# Patient Record
Sex: FEMALE | Race: WHITE | NOT HISPANIC OR LATINO | Employment: OTHER | URBAN - METROPOLITAN AREA
[De-identification: names, ages, dates, MRNs, and addresses within clinical notes are randomized per-mention and may not be internally consistent; named-entity substitution may affect disease eponyms.]

---

## 2017-01-10 ENCOUNTER — TRANSCRIBE ORDERS (OUTPATIENT)
Dept: LAB | Age: 72
End: 2017-01-10

## 2017-01-10 ENCOUNTER — APPOINTMENT (OUTPATIENT)
Dept: LAB | Age: 72
End: 2017-01-10
Payer: MEDICARE

## 2017-01-10 DIAGNOSIS — M06.4 UNSPECIFIED INFLAMMATORY POLYARTHROPATHY: Primary | ICD-10-CM

## 2017-01-10 DIAGNOSIS — M35.3 POLYMYALGIA RHEUMATICA (HCC): ICD-10-CM

## 2017-01-10 DIAGNOSIS — G89.29 CHRONIC RIGHT SHOULDER PAIN: ICD-10-CM

## 2017-01-10 DIAGNOSIS — M25.511 CHRONIC RIGHT SHOULDER PAIN: ICD-10-CM

## 2017-01-10 LAB
ALBUMIN SERPL BCP-MCNC: 3.5 G/DL (ref 3.5–5)
ALP SERPL-CCNC: 74 U/L (ref 46–116)
ALT SERPL W P-5'-P-CCNC: 43 U/L (ref 12–78)
ANION GAP SERPL CALCULATED.3IONS-SCNC: 5 MMOL/L (ref 4–13)
AST SERPL W P-5'-P-CCNC: 9 U/L (ref 5–45)
BASOPHILS # BLD AUTO: 0.02 THOUSANDS/ΜL (ref 0–0.1)
BASOPHILS NFR BLD AUTO: 0 % (ref 0–1)
BILIRUB SERPL-MCNC: 0.33 MG/DL (ref 0.2–1)
BUN SERPL-MCNC: 21 MG/DL (ref 5–25)
CALCIUM SERPL-MCNC: 9.5 MG/DL (ref 8.3–10.1)
CHLORIDE SERPL-SCNC: 103 MMOL/L (ref 100–108)
CO2 SERPL-SCNC: 32 MMOL/L (ref 21–32)
CREAT SERPL-MCNC: 0.92 MG/DL (ref 0.6–1.3)
CRP SERPL QL: 13.8 MG/L
EOSINOPHIL # BLD AUTO: 0.01 THOUSAND/ΜL (ref 0–0.61)
EOSINOPHIL NFR BLD AUTO: 0 % (ref 0–6)
ERYTHROCYTE [DISTWIDTH] IN BLOOD BY AUTOMATED COUNT: 15.2 % (ref 11.6–15.1)
ERYTHROCYTE [SEDIMENTATION RATE] IN BLOOD: 29 MM/HOUR (ref 0–20)
GFR SERPL CREATININE-BSD FRML MDRD: >60 ML/MIN/1.73SQ M
GLUCOSE SERPL-MCNC: 87 MG/DL (ref 65–140)
HCT VFR BLD AUTO: 38.7 % (ref 34.8–46.1)
HGB BLD-MCNC: 12.6 G/DL (ref 11.5–15.4)
LYMPHOCYTES # BLD AUTO: 4.14 THOUSANDS/ΜL (ref 0.6–4.47)
LYMPHOCYTES NFR BLD AUTO: 31 % (ref 14–44)
MCH RBC QN AUTO: 28.8 PG (ref 26.8–34.3)
MCHC RBC AUTO-ENTMCNC: 32.6 G/DL (ref 31.4–37.4)
MCV RBC AUTO: 88 FL (ref 82–98)
MONOCYTES # BLD AUTO: 1.25 THOUSAND/ΜL (ref 0.17–1.22)
MONOCYTES NFR BLD AUTO: 9 % (ref 4–12)
NEUTROPHILS # BLD AUTO: 7.83 THOUSANDS/ΜL (ref 1.85–7.62)
NEUTS SEG NFR BLD AUTO: 60 % (ref 43–75)
NRBC BLD AUTO-RTO: 0 /100 WBCS
PLATELET # BLD AUTO: 336 THOUSANDS/UL (ref 149–390)
PMV BLD AUTO: 9.6 FL (ref 8.9–12.7)
POTASSIUM SERPL-SCNC: 3.9 MMOL/L (ref 3.5–5.3)
PROT SERPL-MCNC: 7.5 G/DL (ref 6.4–8.2)
RBC # BLD AUTO: 4.38 MILLION/UL (ref 3.81–5.12)
SODIUM SERPL-SCNC: 140 MMOL/L (ref 136–145)
TSH SERPL DL<=0.05 MIU/L-ACNC: 2.08 UIU/ML (ref 0.36–3.74)
URATE SERPL-MCNC: 6.4 MG/DL (ref 2–6.8)
WBC # BLD AUTO: 13.29 THOUSAND/UL (ref 4.31–10.16)

## 2017-01-10 PROCEDURE — 84443 ASSAY THYROID STIM HORMONE: CPT

## 2017-01-10 PROCEDURE — 85025 COMPLETE CBC W/AUTO DIFF WBC: CPT

## 2017-01-10 PROCEDURE — 36415 COLL VENOUS BLD VENIPUNCTURE: CPT

## 2017-01-10 PROCEDURE — 86235 NUCLEAR ANTIGEN ANTIBODY: CPT

## 2017-01-10 PROCEDURE — 85652 RBC SED RATE AUTOMATED: CPT

## 2017-01-10 PROCEDURE — 80053 COMPREHEN METABOLIC PANEL: CPT

## 2017-01-10 PROCEDURE — 86140 C-REACTIVE PROTEIN: CPT

## 2017-01-10 PROCEDURE — 84550 ASSAY OF BLOOD/URIC ACID: CPT

## 2017-01-11 LAB
ENA SS-A AB SER-ACNC: <0.2 AI (ref 0–0.9)
ENA SS-B AB SER-ACNC: <0.2 AI (ref 0–0.9)

## 2017-01-12 ENCOUNTER — HOSPITAL ENCOUNTER (OUTPATIENT)
Dept: MRI IMAGING | Facility: HOSPITAL | Age: 72
Discharge: HOME | End: 2017-01-12
Payer: MEDICARE

## 2017-01-12 DIAGNOSIS — M35.3 POLYMYALGIA RHEUMATICA (HCC): ICD-10-CM

## 2017-01-12 DIAGNOSIS — M25.511 RIGHT SHOULDER PAIN, UNSPECIFIED CHRONICITY: ICD-10-CM

## 2017-01-12 DIAGNOSIS — M06.4 UNSPECIFIED INFLAMMATORY POLYARTHROPATHY: ICD-10-CM

## 2017-01-12 PROCEDURE — 73221 MRI JOINT UPR EXTREM W/O DYE: CPT

## 2017-01-25 ENCOUNTER — TRANSCRIBE ORDERS (OUTPATIENT)
Dept: LAB | Age: 72
End: 2017-01-25

## 2017-01-25 ENCOUNTER — APPOINTMENT (OUTPATIENT)
Dept: LAB | Age: 72
End: 2017-01-25
Payer: MEDICARE

## 2017-01-25 DIAGNOSIS — M25.511 RIGHT SHOULDER PAIN, UNSPECIFIED CHRONICITY: ICD-10-CM

## 2017-01-25 DIAGNOSIS — M06.4 UNSPECIFIED INFLAMMATORY POLYARTHROPATHY: ICD-10-CM

## 2017-01-25 DIAGNOSIS — M35.3 POLYMYALGIA RHEUMATICA (HCC): ICD-10-CM

## 2017-01-25 DIAGNOSIS — I10 ESSENTIAL HYPERTENSION, MALIGNANT: Primary | ICD-10-CM

## 2017-01-25 LAB
CRP SERPL QL: 7.6 MG/L
ERYTHROCYTE [SEDIMENTATION RATE] IN BLOOD: 29 MM/HOUR (ref 0–20)

## 2017-01-25 PROCEDURE — 85652 RBC SED RATE AUTOMATED: CPT

## 2017-01-25 PROCEDURE — 36415 COLL VENOUS BLD VENIPUNCTURE: CPT

## 2017-01-25 PROCEDURE — 86140 C-REACTIVE PROTEIN: CPT

## 2017-02-27 ENCOUNTER — APPOINTMENT (OUTPATIENT)
Dept: LAB | Age: 72
End: 2017-02-27
Payer: MEDICARE

## 2017-02-27 ENCOUNTER — TRANSCRIBE ORDERS (OUTPATIENT)
Dept: LAB | Age: 72
End: 2017-02-27

## 2017-02-27 DIAGNOSIS — M06.4 UNSPECIFIED INFLAMMATORY POLYARTHROPATHY: Primary | ICD-10-CM

## 2017-02-27 DIAGNOSIS — M35.3 POLYMYALGIA RHEUMATICA (HCC): ICD-10-CM

## 2017-02-27 DIAGNOSIS — M25.511 ACUTE PAIN OF RIGHT SHOULDER: ICD-10-CM

## 2017-02-27 LAB
ALBUMIN SERPL BCP-MCNC: 3.4 G/DL (ref 3.5–5)
ALP SERPL-CCNC: 68 U/L (ref 46–116)
ALT SERPL W P-5'-P-CCNC: 31 U/L (ref 12–78)
ANION GAP SERPL CALCULATED.3IONS-SCNC: 8 MMOL/L (ref 4–13)
AST SERPL W P-5'-P-CCNC: 8 U/L (ref 5–45)
BASOPHILS # BLD AUTO: 0.02 THOUSANDS/ΜL (ref 0–0.1)
BASOPHILS NFR BLD AUTO: 0 % (ref 0–1)
BILIRUB SERPL-MCNC: 0.41 MG/DL (ref 0.2–1)
BUN SERPL-MCNC: 27 MG/DL (ref 5–25)
CALCIUM SERPL-MCNC: 8.8 MG/DL (ref 8.3–10.1)
CHLORIDE SERPL-SCNC: 106 MMOL/L (ref 100–108)
CO2 SERPL-SCNC: 28 MMOL/L (ref 21–32)
CREAT SERPL-MCNC: 0.93 MG/DL (ref 0.6–1.3)
CRP SERPL QL: 7.4 MG/L
EOSINOPHIL # BLD AUTO: 0.05 THOUSAND/ΜL (ref 0–0.61)
EOSINOPHIL NFR BLD AUTO: 0 % (ref 0–6)
ERYTHROCYTE [DISTWIDTH] IN BLOOD BY AUTOMATED COUNT: 15 % (ref 11.6–15.1)
ERYTHROCYTE [SEDIMENTATION RATE] IN BLOOD: 34 MM/HOUR (ref 0–20)
GFR SERPL CREATININE-BSD FRML MDRD: 59.4 ML/MIN/1.73SQ M
GLUCOSE SERPL-MCNC: 77 MG/DL (ref 65–140)
HCT VFR BLD AUTO: 37.5 % (ref 34.8–46.1)
HGB BLD-MCNC: 12 G/DL (ref 11.5–15.4)
LYMPHOCYTES # BLD AUTO: 4.18 THOUSANDS/ΜL (ref 0.6–4.47)
LYMPHOCYTES NFR BLD AUTO: 28 % (ref 14–44)
MCH RBC QN AUTO: 28.4 PG (ref 26.8–34.3)
MCHC RBC AUTO-ENTMCNC: 32 G/DL (ref 31.4–37.4)
MCV RBC AUTO: 89 FL (ref 82–98)
MONOCYTES # BLD AUTO: 1.28 THOUSAND/ΜL (ref 0.17–1.22)
MONOCYTES NFR BLD AUTO: 9 % (ref 4–12)
NEUTROPHILS # BLD AUTO: 9.22 THOUSANDS/ΜL (ref 1.85–7.62)
NEUTS SEG NFR BLD AUTO: 63 % (ref 43–75)
NRBC BLD AUTO-RTO: 0 /100 WBCS
PLATELET # BLD AUTO: 274 THOUSANDS/UL (ref 149–390)
PMV BLD AUTO: 9.7 FL (ref 8.9–12.7)
POTASSIUM SERPL-SCNC: 3.6 MMOL/L (ref 3.5–5.3)
PROT SERPL-MCNC: 7.3 G/DL (ref 6.4–8.2)
RBC # BLD AUTO: 4.22 MILLION/UL (ref 3.81–5.12)
SODIUM SERPL-SCNC: 142 MMOL/L (ref 136–145)
WBC # BLD AUTO: 14.81 THOUSAND/UL (ref 4.31–10.16)

## 2017-02-27 PROCEDURE — 86140 C-REACTIVE PROTEIN: CPT

## 2017-02-27 PROCEDURE — 36415 COLL VENOUS BLD VENIPUNCTURE: CPT

## 2017-02-27 PROCEDURE — 85025 COMPLETE CBC W/AUTO DIFF WBC: CPT

## 2017-02-27 PROCEDURE — 80053 COMPREHEN METABOLIC PANEL: CPT

## 2017-02-27 PROCEDURE — 85652 RBC SED RATE AUTOMATED: CPT

## 2017-02-28 ENCOUNTER — GENERIC CONVERSION - ENCOUNTER (OUTPATIENT)
Dept: OTHER | Facility: OTHER | Age: 72
End: 2017-02-28

## 2017-03-17 ENCOUNTER — TRANSCRIBE ORDERS (OUTPATIENT)
Dept: LAB | Age: 72
End: 2017-03-17

## 2017-03-17 ENCOUNTER — APPOINTMENT (OUTPATIENT)
Dept: LAB | Age: 72
End: 2017-03-17
Payer: MEDICARE

## 2017-03-17 DIAGNOSIS — M35.3 POLYMYALGIA RHEUMATICA (HCC): ICD-10-CM

## 2017-03-17 DIAGNOSIS — M25.511 ACUTE PAIN OF RIGHT SHOULDER: ICD-10-CM

## 2017-03-17 DIAGNOSIS — M06.4 UNSPECIFIED INFLAMMATORY POLYARTHROPATHY: Primary | ICD-10-CM

## 2017-03-17 LAB
CRP SERPL QL: 10.1 MG/L
ERYTHROCYTE [SEDIMENTATION RATE] IN BLOOD: 42 MM/HOUR (ref 0–20)

## 2017-03-17 PROCEDURE — 85652 RBC SED RATE AUTOMATED: CPT

## 2017-03-17 PROCEDURE — 86140 C-REACTIVE PROTEIN: CPT

## 2017-03-17 PROCEDURE — 36415 COLL VENOUS BLD VENIPUNCTURE: CPT

## 2017-04-12 DIAGNOSIS — E55.9 VITAMIN D DEFICIENCY: ICD-10-CM

## 2017-04-12 DIAGNOSIS — E03.9 HYPOTHYROIDISM: ICD-10-CM

## 2017-04-12 DIAGNOSIS — I10 ESSENTIAL (PRIMARY) HYPERTENSION: ICD-10-CM

## 2017-05-02 ENCOUNTER — TRANSCRIBE ORDERS (OUTPATIENT)
Dept: LAB | Age: 72
End: 2017-05-02

## 2017-05-02 ENCOUNTER — APPOINTMENT (OUTPATIENT)
Dept: LAB | Age: 72
End: 2017-05-02
Payer: MEDICARE

## 2017-05-02 ENCOUNTER — LAB CONVERSION - ENCOUNTER (OUTPATIENT)
Dept: OTHER | Facility: OTHER | Age: 72
End: 2017-05-02

## 2017-05-02 DIAGNOSIS — E03.9 UNSPECIFIED HYPOTHYROIDISM: ICD-10-CM

## 2017-05-02 DIAGNOSIS — M25.511 ACUTE PAIN OF RIGHT SHOULDER: ICD-10-CM

## 2017-05-02 DIAGNOSIS — M06.4 INFLAMMATORY POLYARTHROPATHY (HCC): Primary | ICD-10-CM

## 2017-05-02 DIAGNOSIS — M35.3 POLYMYALGIA RHEUMATICA (HCC): ICD-10-CM

## 2017-05-02 DIAGNOSIS — E55.9 UNSPECIFIED VITAMIN D DEFICIENCY: ICD-10-CM

## 2017-05-02 DIAGNOSIS — I10 ESSENTIAL HYPERTENSION, MALIGNANT: ICD-10-CM

## 2017-05-02 LAB
25(OH)D3 SERPL-MCNC: 13.1 NG/ML (ref 30–100)
ALBUMIN SERPL BCP-MCNC: 3.2 G/DL (ref 3.5–5)
ALP SERPL-CCNC: 67 U/L (ref 46–116)
ALT SERPL W P-5'-P-CCNC: 27 U/L (ref 12–78)
ANION GAP SERPL CALCULATED.3IONS-SCNC: 7 MMOL/L (ref 4–13)
AST SERPL W P-5'-P-CCNC: 10 U/L (ref 5–45)
BILIRUB SERPL-MCNC: 0.39 MG/DL (ref 0.2–1)
BUN SERPL-MCNC: 21 MG/DL (ref 5–25)
CALCIUM SERPL-MCNC: 9.1 MG/DL (ref 8.3–10.1)
CHLORIDE SERPL-SCNC: 108 MMOL/L (ref 100–108)
CHOLEST SERPL-MCNC: 181 MG/DL (ref 50–200)
CO2 SERPL-SCNC: 28 MMOL/L (ref 21–32)
CREAT SERPL-MCNC: 0.95 MG/DL (ref 0.6–1.3)
CRP SERPL QL: 4.1 MG/L
ERYTHROCYTE [SEDIMENTATION RATE] IN BLOOD: 16 MM/HOUR (ref 0–20)
GFR SERPL CREATININE-BSD FRML MDRD: 58 ML/MIN/1.73SQ M
GLUCOSE P FAST SERPL-MCNC: 85 MG/DL (ref 65–99)
HDLC SERPL-MCNC: 59 MG/DL (ref 40–60)
LDLC SERPL CALC-MCNC: 99 MG/DL (ref 0–100)
POTASSIUM SERPL-SCNC: 4 MMOL/L (ref 3.5–5.3)
PROT SERPL-MCNC: 6.7 G/DL (ref 6.4–8.2)
SODIUM SERPL-SCNC: 143 MMOL/L (ref 136–145)
T3FREE SERPL-MCNC: 1.98 PG/ML (ref 2.3–4.2)
T4 FREE SERPL-MCNC: 1.04 NG/DL (ref 0.76–1.46)
TRIGL SERPL-MCNC: 113 MG/DL
TSH SERPL DL<=0.05 MIU/L-ACNC: 2.4 UIU/ML (ref 0.36–3.74)

## 2017-05-02 PROCEDURE — 84481 FREE ASSAY (FT-3): CPT

## 2017-05-02 PROCEDURE — 84443 ASSAY THYROID STIM HORMONE: CPT

## 2017-05-02 PROCEDURE — 80061 LIPID PANEL: CPT

## 2017-05-02 PROCEDURE — 82306 VITAMIN D 25 HYDROXY: CPT

## 2017-05-02 PROCEDURE — 85652 RBC SED RATE AUTOMATED: CPT

## 2017-05-02 PROCEDURE — 86140 C-REACTIVE PROTEIN: CPT

## 2017-05-02 PROCEDURE — 80053 COMPREHEN METABOLIC PANEL: CPT

## 2017-05-02 PROCEDURE — 36415 COLL VENOUS BLD VENIPUNCTURE: CPT

## 2017-05-02 PROCEDURE — 84439 ASSAY OF FREE THYROXINE: CPT

## 2017-05-16 ENCOUNTER — TRANSCRIBE ORDERS (OUTPATIENT)
Dept: LAB | Age: 72
End: 2017-05-16

## 2017-05-16 ENCOUNTER — APPOINTMENT (OUTPATIENT)
Dept: LAB | Age: 72
End: 2017-05-16
Payer: MEDICARE

## 2017-05-16 DIAGNOSIS — M25.511 ACUTE PAIN OF RIGHT SHOULDER: ICD-10-CM

## 2017-05-16 DIAGNOSIS — M35.3 POLYMYALGIA RHEUMATICA (HCC): ICD-10-CM

## 2017-05-16 DIAGNOSIS — E03.9 UNSPECIFIED HYPOTHYROIDISM: Primary | ICD-10-CM

## 2017-05-16 DIAGNOSIS — M06.4 INFLAMMATORY POLYARTHROPATHY (HCC): ICD-10-CM

## 2017-05-16 LAB
CRP SERPL QL: 6 MG/L
ERYTHROCYTE [SEDIMENTATION RATE] IN BLOOD: 29 MM/HOUR (ref 0–20)

## 2017-05-16 PROCEDURE — 36415 COLL VENOUS BLD VENIPUNCTURE: CPT

## 2017-05-16 PROCEDURE — 86140 C-REACTIVE PROTEIN: CPT

## 2017-05-16 PROCEDURE — 85652 RBC SED RATE AUTOMATED: CPT

## 2017-05-23 ENCOUNTER — ALLSCRIPTS OFFICE VISIT (OUTPATIENT)
Dept: OTHER | Facility: OTHER | Age: 72
End: 2017-05-23

## 2017-05-23 DIAGNOSIS — Z12.31 ENCOUNTER FOR SCREENING MAMMOGRAM FOR MALIGNANT NEOPLASM OF BREAST: ICD-10-CM

## 2017-05-23 DIAGNOSIS — Z13.820 ENCOUNTER FOR SCREENING FOR OSTEOPOROSIS: ICD-10-CM

## 2017-06-09 ENCOUNTER — APPOINTMENT (OUTPATIENT)
Dept: LAB | Age: 72
End: 2017-06-09
Payer: MEDICARE

## 2017-06-09 ENCOUNTER — TRANSCRIBE ORDERS (OUTPATIENT)
Dept: LAB | Age: 72
End: 2017-06-09

## 2017-06-09 DIAGNOSIS — M35.3 POLYMYALGIA RHEUMATICA (HCC): ICD-10-CM

## 2017-06-09 DIAGNOSIS — M06.4 INFLAMMATORY POLYARTHROPATHY (HCC): ICD-10-CM

## 2017-06-09 DIAGNOSIS — M25.511 ACUTE PAIN OF RIGHT SHOULDER: Primary | ICD-10-CM

## 2017-06-09 DIAGNOSIS — E03.9 UNSPECIFIED HYPOTHYROIDISM: ICD-10-CM

## 2017-06-09 LAB
CRP SERPL QL: <3 MG/L
ERYTHROCYTE [SEDIMENTATION RATE] IN BLOOD: 21 MM/HOUR (ref 0–20)

## 2017-06-09 PROCEDURE — 85652 RBC SED RATE AUTOMATED: CPT

## 2017-06-09 PROCEDURE — 36415 COLL VENOUS BLD VENIPUNCTURE: CPT

## 2017-06-09 PROCEDURE — 86140 C-REACTIVE PROTEIN: CPT

## 2017-06-30 ENCOUNTER — TRANSCRIBE ORDERS (OUTPATIENT)
Dept: LAB | Age: 72
End: 2017-06-30

## 2017-06-30 ENCOUNTER — APPOINTMENT (OUTPATIENT)
Dept: LAB | Age: 72
End: 2017-06-30
Payer: MEDICARE

## 2017-06-30 DIAGNOSIS — M25.511 ACUTE PAIN OF RIGHT SHOULDER: ICD-10-CM

## 2017-06-30 DIAGNOSIS — M35.3 POLYMYALGIA RHEUMATICA (HCC): ICD-10-CM

## 2017-06-30 DIAGNOSIS — M06.4 INFLAMMATORY POLYARTHROPATHY (HCC): Primary | ICD-10-CM

## 2017-06-30 DIAGNOSIS — E03.9 UNSPECIFIED HYPOTHYROIDISM: ICD-10-CM

## 2017-06-30 LAB
CRP SERPL QL: 4 MG/L
ERYTHROCYTE [SEDIMENTATION RATE] IN BLOOD: 28 MM/HOUR (ref 0–20)

## 2017-06-30 PROCEDURE — 85652 RBC SED RATE AUTOMATED: CPT

## 2017-06-30 PROCEDURE — 36415 COLL VENOUS BLD VENIPUNCTURE: CPT

## 2017-06-30 PROCEDURE — 86140 C-REACTIVE PROTEIN: CPT

## 2017-07-31 DIAGNOSIS — S42.291A OTHER DISPLACED FRACTURE OF UPPER END OF RIGHT HUMERUS, INITIAL ENCOUNTER FOR CLOSED FRACTURE: ICD-10-CM
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
## 2017-07-31 DIAGNOSIS — Z91.89 OTHER SPECIFIED PERSONAL RISK FACTORS, NOT ELSEWHERE CLASSIFIED: ICD-10-CM

## 2017-07-31 DIAGNOSIS — G47.00 INSOMNIA: ICD-10-CM

## 2017-07-31 DIAGNOSIS — M25.511 PAIN IN RIGHT SHOULDER: ICD-10-CM

## 2017-08-04 ENCOUNTER — TRANSCRIBE ORDERS (OUTPATIENT)
Dept: LAB | Age: 72
End: 2017-08-04

## 2017-08-04 ENCOUNTER — APPOINTMENT (OUTPATIENT)
Dept: LAB | Age: 72
End: 2017-08-04
Payer: MEDICARE

## 2017-08-04 DIAGNOSIS — M25.511 RIGHT SHOULDER PAIN, UNSPECIFIED CHRONICITY: ICD-10-CM

## 2017-08-04 DIAGNOSIS — M35.3 POLYMYALGIA RHEUMATICA (HCC): ICD-10-CM

## 2017-08-04 DIAGNOSIS — E03.9 UNSPECIFIED HYPOTHYROIDISM: ICD-10-CM

## 2017-08-04 DIAGNOSIS — M06.4 INFLAMMATORY POLYARTHROPATHY (HCC): Primary | ICD-10-CM

## 2017-08-04 DIAGNOSIS — M06.4 INFLAMMATORY POLYARTHROPATHY (HCC): ICD-10-CM

## 2017-08-04 LAB
CRP SERPL QL: 5.3 MG/L
ERYTHROCYTE [SEDIMENTATION RATE] IN BLOOD: 20 MM/HOUR (ref 0–20)

## 2017-08-04 PROCEDURE — 85652 RBC SED RATE AUTOMATED: CPT

## 2017-08-04 PROCEDURE — 86140 C-REACTIVE PROTEIN: CPT

## 2017-08-04 PROCEDURE — 36415 COLL VENOUS BLD VENIPUNCTURE: CPT

## 2017-08-11 ENCOUNTER — APPOINTMENT (OUTPATIENT)
Dept: RADIOLOGY | Age: 72
End: 2017-08-11
Attending: FAMILY MEDICINE
Payer: MEDICARE

## 2017-08-11 ENCOUNTER — OFFICE VISIT (OUTPATIENT)
Dept: URGENT CARE | Age: 72
End: 2017-08-11
Payer: MEDICARE
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
## 2017-08-11 DIAGNOSIS — G47.00 INSOMNIA: ICD-10-CM

## 2017-08-11 PROCEDURE — 29125 APPL SHORT ARM SPLINT STATIC: CPT | Performed by: FAMILY MEDICINE

## 2017-08-11 PROCEDURE — 73060 X-RAY EXAM OF HUMERUS: CPT

## 2017-08-11 PROCEDURE — 73130 X-RAY EXAM OF HAND: CPT

## 2017-08-11 PROCEDURE — 73110 X-RAY EXAM OF WRIST: CPT

## 2017-08-11 PROCEDURE — G0463 HOSPITAL OUTPT CLINIC VISIT: HCPCS | Performed by: FAMILY MEDICINE

## 2017-08-11 PROCEDURE — 99203 OFFICE O/P NEW LOW 30 MIN: CPT | Performed by: FAMILY MEDICINE

## 2017-08-12 ENCOUNTER — OFFICE VISIT (OUTPATIENT)
Dept: URGENT CARE | Age: 72
End: 2017-08-12
Payer: MEDICARE

## 2017-08-12 PROCEDURE — G0463 HOSPITAL OUTPT CLINIC VISIT: HCPCS | Performed by: FAMILY MEDICINE

## 2017-08-12 PROCEDURE — 99213 OFFICE O/P EST LOW 20 MIN: CPT | Performed by: FAMILY MEDICINE

## 2017-08-14 ENCOUNTER — TRANSCRIBE ORDERS (OUTPATIENT)
Dept: ADMINISTRATIVE | Facility: HOSPITAL | Age: 72
End: 2017-08-14

## 2017-08-14 ENCOUNTER — APPOINTMENT (OUTPATIENT)
Dept: RADIOLOGY | Facility: CLINIC | Age: 72
End: 2017-08-14
Payer: MEDICARE

## 2017-08-14 ENCOUNTER — ALLSCRIPTS OFFICE VISIT (OUTPATIENT)
Dept: OTHER | Facility: OTHER | Age: 72
End: 2017-08-14

## 2017-08-14 DIAGNOSIS — S42.291A CLOSED FRACTURE OF HEAD OF RIGHT HUMERUS, INITIAL ENCOUNTER: Primary | ICD-10-CM

## 2017-08-14 DIAGNOSIS — M25.511 PAIN IN RIGHT SHOULDER: ICD-10-CM

## 2017-08-14 PROCEDURE — 73030 X-RAY EXAM OF SHOULDER: CPT

## 2017-08-16 ENCOUNTER — HOSPITAL ENCOUNTER (OUTPATIENT)
Dept: RADIOLOGY | Age: 72
Discharge: HOME | End: 2017-08-16
Payer: MEDICARE

## 2017-08-16 DIAGNOSIS — S42.291A OTHER DISPLACED FRACTURE OF UPPER END OF RIGHT HUMERUS, INITIAL ENCOUNTER FOR CLOSED FRACTURE: ICD-10-CM

## 2017-08-16 PROCEDURE — 73200 CT UPPER EXTREMITY W/O DYE: CPT

## 2017-08-17 ENCOUNTER — ALLSCRIPTS OFFICE VISIT (OUTPATIENT)
Dept: OTHER | Facility: OTHER | Age: 72
End: 2017-08-17

## 2017-08-25 ENCOUNTER — TRANSCRIBE ORDERS (OUTPATIENT)
Dept: LAB | Age: 72
End: 2017-08-25

## 2017-08-25 ENCOUNTER — APPOINTMENT (OUTPATIENT)
Dept: LAB | Age: 72
End: 2017-08-25
Payer: MEDICARE

## 2017-08-25 DIAGNOSIS — M06.4 INFLAMMATORY POLYARTHROPATHY (HCC): Primary | ICD-10-CM

## 2017-08-25 DIAGNOSIS — M35.3 POLYMYALGIA RHEUMATICA (HCC): ICD-10-CM

## 2017-08-25 DIAGNOSIS — E03.9 UNSPECIFIED HYPOTHYROIDISM: ICD-10-CM

## 2017-08-25 DIAGNOSIS — M25.511 RIGHT SHOULDER PAIN, UNSPECIFIED CHRONICITY: ICD-10-CM

## 2017-08-25 LAB
CRP SERPL QL: 5.6 MG/L
ERYTHROCYTE [SEDIMENTATION RATE] IN BLOOD: 28 MM/HOUR (ref 0–20)

## 2017-08-25 PROCEDURE — 86140 C-REACTIVE PROTEIN: CPT

## 2017-08-25 PROCEDURE — 36415 COLL VENOUS BLD VENIPUNCTURE: CPT

## 2017-08-25 PROCEDURE — 85652 RBC SED RATE AUTOMATED: CPT

## 2017-09-25 ENCOUNTER — APPOINTMENT (OUTPATIENT)
Dept: RADIOLOGY | Facility: CLINIC | Age: 72
End: 2017-09-25
Payer: MEDICARE

## 2017-09-25 ENCOUNTER — ALLSCRIPTS OFFICE VISIT (OUTPATIENT)
Dept: OTHER | Facility: OTHER | Age: 72
End: 2017-09-25

## 2017-09-25 DIAGNOSIS — S52.501A CLOSED FRACTURE OF LOWER END OF RIGHT RADIUS: ICD-10-CM

## 2017-09-25 DIAGNOSIS — S42.291A OTHER DISPLACED FRACTURE OF UPPER END OF RIGHT HUMERUS, INITIAL ENCOUNTER FOR CLOSED FRACTURE: ICD-10-CM

## 2017-09-25 PROCEDURE — 73110 X-RAY EXAM OF WRIST: CPT

## 2017-09-28 ENCOUNTER — GENERIC CONVERSION - ENCOUNTER (OUTPATIENT)
Dept: OTHER | Facility: OTHER | Age: 72
End: 2017-09-28

## 2017-09-28 ENCOUNTER — APPOINTMENT (OUTPATIENT)
Dept: RADIOLOGY | Facility: OTHER | Age: 72
End: 2017-09-28
Payer: MEDICARE

## 2017-09-28 DIAGNOSIS — S42.291A OTHER DISPLACED FRACTURE OF UPPER END OF RIGHT HUMERUS, INITIAL ENCOUNTER FOR CLOSED FRACTURE: ICD-10-CM

## 2017-09-28 PROCEDURE — 73030 X-RAY EXAM OF SHOULDER: CPT

## 2017-09-29 ENCOUNTER — TRANSCRIBE ORDERS (OUTPATIENT)
Dept: LAB | Age: 72
End: 2017-09-29

## 2017-10-02 ENCOUNTER — TRANSCRIBE ORDERS (OUTPATIENT)
Dept: LAB | Age: 72
End: 2017-10-02

## 2017-10-02 ENCOUNTER — APPOINTMENT (OUTPATIENT)
Dept: LAB | Age: 72
End: 2017-10-02
Payer: MEDICARE

## 2017-10-02 DIAGNOSIS — M35.3 POLYMYALGIA RHEUMATICA (HCC): ICD-10-CM

## 2017-10-02 DIAGNOSIS — M25.511 ACUTE PAIN OF RIGHT SHOULDER: ICD-10-CM

## 2017-10-02 DIAGNOSIS — M06.4 INFLAMMATORY POLYARTHROPATHY (HCC): Primary | ICD-10-CM

## 2017-10-02 LAB
CRP SERPL QL: <3 MG/L
ERYTHROCYTE [SEDIMENTATION RATE] IN BLOOD: 19 MM/HOUR (ref 0–20)

## 2017-10-02 PROCEDURE — 36415 COLL VENOUS BLD VENIPUNCTURE: CPT

## 2017-10-02 PROCEDURE — 86140 C-REACTIVE PROTEIN: CPT

## 2017-10-02 PROCEDURE — 85652 RBC SED RATE AUTOMATED: CPT

## 2017-11-03 ENCOUNTER — TRANSCRIBE ORDERS (OUTPATIENT)
Dept: LAB | Age: 72
End: 2017-11-03

## 2017-11-03 ENCOUNTER — APPOINTMENT (OUTPATIENT)
Dept: LAB | Age: 72
End: 2017-11-03
Payer: MEDICARE

## 2017-11-03 DIAGNOSIS — M25.511 ACUTE PAIN OF RIGHT SHOULDER: Primary | ICD-10-CM

## 2017-11-03 DIAGNOSIS — M06.4 INFLAMMATORY POLYARTHROPATHY (HCC): ICD-10-CM

## 2017-11-03 DIAGNOSIS — M35.3 POLYMYALGIA RHEUMATICA (HCC): ICD-10-CM

## 2017-11-03 LAB
CRP SERPL QL: 5.4 MG/L
ERYTHROCYTE [SEDIMENTATION RATE] IN BLOOD: 26 MM/HOUR (ref 0–20)

## 2017-11-03 PROCEDURE — 86140 C-REACTIVE PROTEIN: CPT

## 2017-11-03 PROCEDURE — 36415 COLL VENOUS BLD VENIPUNCTURE: CPT

## 2017-11-03 PROCEDURE — 85652 RBC SED RATE AUTOMATED: CPT

## 2017-11-20 ENCOUNTER — APPOINTMENT (OUTPATIENT)
Dept: LAB | Age: 72
End: 2017-11-20
Payer: MEDICARE

## 2017-11-20 ENCOUNTER — TRANSCRIBE ORDERS (OUTPATIENT)
Dept: LAB | Age: 72
End: 2017-11-20

## 2017-11-20 DIAGNOSIS — M06.4 INFLAMMATORY POLYARTHROPATHY (HCC): ICD-10-CM

## 2017-11-20 DIAGNOSIS — M35.3 POLYMYALGIA RHEUMATICA (HCC): Primary | ICD-10-CM

## 2017-11-20 LAB
ALBUMIN SERPL BCP-MCNC: 3.6 G/DL (ref 3.5–5)
ALP SERPL-CCNC: 81 U/L (ref 46–116)
ALT SERPL W P-5'-P-CCNC: 28 U/L (ref 12–78)
ANION GAP SERPL CALCULATED.3IONS-SCNC: 6 MMOL/L (ref 4–13)
AST SERPL W P-5'-P-CCNC: 9 U/L (ref 5–45)
BASOPHILS # BLD AUTO: 0.03 THOUSANDS/ΜL (ref 0–0.1)
BASOPHILS NFR BLD AUTO: 0 % (ref 0–1)
BILIRUB SERPL-MCNC: 0.3 MG/DL (ref 0.2–1)
BUN SERPL-MCNC: 30 MG/DL (ref 5–25)
CALCIUM SERPL-MCNC: 9.3 MG/DL (ref 8.3–10.1)
CHLORIDE SERPL-SCNC: 107 MMOL/L (ref 100–108)
CO2 SERPL-SCNC: 28 MMOL/L (ref 21–32)
CREAT SERPL-MCNC: 1.13 MG/DL (ref 0.6–1.3)
CRP SERPL QL: 3.5 MG/L
EOSINOPHIL # BLD AUTO: 0.03 THOUSAND/ΜL (ref 0–0.61)
EOSINOPHIL NFR BLD AUTO: 0 % (ref 0–6)
ERYTHROCYTE [DISTWIDTH] IN BLOOD BY AUTOMATED COUNT: 14.2 % (ref 11.6–15.1)
ERYTHROCYTE [SEDIMENTATION RATE] IN BLOOD: 16 MM/HOUR (ref 0–20)
GFR SERPL CREATININE-BSD FRML MDRD: 49 ML/MIN/1.73SQ M
GLUCOSE P FAST SERPL-MCNC: 99 MG/DL (ref 65–99)
HCT VFR BLD AUTO: 40 % (ref 34.8–46.1)
HGB BLD-MCNC: 13.1 G/DL (ref 11.5–15.4)
LYMPHOCYTES # BLD AUTO: 3.64 THOUSANDS/ΜL (ref 0.6–4.47)
LYMPHOCYTES NFR BLD AUTO: 31 % (ref 14–44)
MCH RBC QN AUTO: 28.5 PG (ref 26.8–34.3)
MCHC RBC AUTO-ENTMCNC: 32.8 G/DL (ref 31.4–37.4)
MCV RBC AUTO: 87 FL (ref 82–98)
MONOCYTES # BLD AUTO: 1.07 THOUSAND/ΜL (ref 0.17–1.22)
MONOCYTES NFR BLD AUTO: 9 % (ref 4–12)
NEUTROPHILS # BLD AUTO: 7 THOUSANDS/ΜL (ref 1.85–7.62)
NEUTS SEG NFR BLD AUTO: 60 % (ref 43–75)
NRBC BLD AUTO-RTO: 0 /100 WBCS
PLATELET # BLD AUTO: 299 THOUSANDS/UL (ref 149–390)
PMV BLD AUTO: 10.2 FL (ref 8.9–12.7)
POTASSIUM SERPL-SCNC: 4 MMOL/L (ref 3.5–5.3)
PROT SERPL-MCNC: 7.4 G/DL (ref 6.4–8.2)
RBC # BLD AUTO: 4.6 MILLION/UL (ref 3.81–5.12)
SODIUM SERPL-SCNC: 141 MMOL/L (ref 136–145)
WBC # BLD AUTO: 11.81 THOUSAND/UL (ref 4.31–10.16)

## 2017-11-20 PROCEDURE — 36415 COLL VENOUS BLD VENIPUNCTURE: CPT

## 2017-11-20 PROCEDURE — 80053 COMPREHEN METABOLIC PANEL: CPT

## 2017-11-20 PROCEDURE — 86140 C-REACTIVE PROTEIN: CPT

## 2017-11-20 PROCEDURE — 85652 RBC SED RATE AUTOMATED: CPT

## 2017-11-20 PROCEDURE — 85025 COMPLETE CBC W/AUTO DIFF WBC: CPT

## 2017-11-27 ENCOUNTER — APPOINTMENT (OUTPATIENT)
Dept: RADIOLOGY | Facility: CLINIC | Age: 72
End: 2017-11-27
Payer: MEDICARE

## 2017-11-27 ENCOUNTER — GENERIC CONVERSION - ENCOUNTER (OUTPATIENT)
Dept: OTHER | Facility: OTHER | Age: 72
End: 2017-11-27

## 2017-11-27 ENCOUNTER — ALLSCRIPTS OFFICE VISIT (OUTPATIENT)
Dept: OTHER | Facility: OTHER | Age: 72
End: 2017-11-27

## 2017-11-27 DIAGNOSIS — S52.501A CLOSED FRACTURE OF LOWER END OF RIGHT RADIUS: ICD-10-CM

## 2017-11-27 PROCEDURE — 73110 X-RAY EXAM OF WRIST: CPT

## 2018-01-05 ENCOUNTER — APPOINTMENT (OUTPATIENT)
Dept: LAB | Age: 73
End: 2018-01-05
Payer: MEDICARE

## 2018-01-05 ENCOUNTER — TRANSCRIBE ORDERS (OUTPATIENT)
Dept: LAB | Age: 73
End: 2018-01-05

## 2018-01-05 DIAGNOSIS — M06.4 INFLAMMATORY POLYARTHROPATHY (HCC): ICD-10-CM

## 2018-01-05 DIAGNOSIS — M35.3 POLYMYALGIA RHEUMATICA (HCC): Primary | ICD-10-CM

## 2018-01-05 LAB
CRP SERPL QL: 4.4 MG/L
ERYTHROCYTE [SEDIMENTATION RATE] IN BLOOD: 23 MM/HOUR (ref 0–20)

## 2018-01-05 PROCEDURE — 86140 C-REACTIVE PROTEIN: CPT

## 2018-01-05 PROCEDURE — 85652 RBC SED RATE AUTOMATED: CPT

## 2018-01-05 PROCEDURE — 36415 COLL VENOUS BLD VENIPUNCTURE: CPT

## 2018-01-09 NOTE — RESULT NOTES
Verified Results  (1) VITAMIN D 25-HYDROXY 95MAU1472 09:02AM Karla Solano     Test Name Result Flag Reference   VIT D 25-HYDROX 13 1 ng/mL L 30 0-100 0   This assay is a certified procedure of the CDC Vitamin D Standardization Certification Program (VDSCP)     Deficiency <20ng/ml   Insufficiency 20-30ng/ml   Sufficient  ng/ml     *Patients undergoing fluorescein dye angiography may retain small amounts of fluorescein in the body for 48-72 hours post procedure  Samples containing fluorescein can produce falsely elevated Vitamin D values  If the patient had this procedure, a specimen should be resubmitted post fluorescein clearance  (1) COMPREHENSIVE METABOLIC PANEL 21OOH5656 19:64XD Astrid     Test Name Result Flag Reference   SODIUM 143 mmol/L  136-145   POTASSIUM 4 0 mmol/L  3 5-5 3   CHLORIDE 108 mmol/L  100-108   CARBON DIOXIDE 28 mmol/L  21-32   ANION GAP (CALC) 7 mmol/L  4-13   BLOOD UREA NITROGEN 21 mg/dL  5-25   CREATININE 0 95 mg/dL  0 60-1 30   Standardized to IDMS reference method   CALCIUM 9 1 mg/dL  8 3-10 1   BILI, TOTAL 0 39 mg/dL  0 20-1 00   ALK PHOSPHATAS 67 U/L     ALT (SGPT) 27 U/L  12-78   AST(SGOT) 10 U/L  5-45   ALBUMIN 3 2 g/dL L 3 5-5 0   TOTAL PROTEIN 6 7 g/dL  6 4-8 2   eGFR Non-African American 58 0 ml/min/1 73sq [facility] Disease Education Program recommendations are as follows:  GFR calculation is accurate only with a steady state creatinine  Chronic Kidney disease less than 60 ml/min/1 73 sq  meters  Kidney failure less than 15 ml/min/1 73 sq  meters  GLUCOSE FASTING 85 mg/dL  65-99     (1) TSH WITH FT4 REFLEX 95UWV8441 09:02AM Astrid     Test Name Result Flag Reference   TSH 2 400 uIU/mL  0 358-3 740   Patients undergoing fluorescein dye angiography may retain small amounts of fluorescein in the body for 48-72 hours post procedure  Samples containing fluorescein can produce falsely depressed TSH values   If the patient had this procedure,a specimen should be resubmitted post fluorescein clearance  The recommended reference ranges for TSH during pregnancy are as follows:  First trimester 0 1 to 2 5 uIU/mL  Second trimester  0 2 to 3 0 uIU/mL  Third trimester 0 3 to 3 0 uIU/m     (1) LIPID PANEL, FASTING 33HSM7710 09:02AM Karla Solano     Test Name Result Flag Reference   CHOLESTEROL 181 mg/dL     HDL,DIRECT 59 mg/dL  40-60   Specimen collection should occur prior to Metamizole administration due to the potential for falsely depressed results  LDL CHOLESTEROL CALCULATED 99 mg/dL  0-100   This is a fasting blood test  Water,black tea or black  coffee only after 9:00pm the night before test  Drink 2 glasses of water the morning of test         Triglyceride:         Normal              <150 mg/dl       Borderline High    150-199 mg/dl       High               200-499 mg/dl       Very High          >499 mg/dl  Cholesterol:         Desirable        <200 mg/dl      Borderline High  200-239 mg/dl      High             >239 mg/dl  HDL Cholesterol:        High    >59 mg/dL      Low     <41 mg/dL  LDL CALCULATED:    This screening LDL is a calculated result  It does not have the accuracy of the Direct Measured LDL in the monitoring of patients with hyperlipidemia and/or statin therapy  Direct Measure LDL (GEM591) must be ordered separately in these patients  TRIGLYCERIDES 113 mg/dL  <=150   Specimen collection should occur prior to N-Acetylcysteine or Metamizole administration due to the potential for falsely depressed results  Plan  Vitamin D deficiency    · Vitamin D (Ergocalciferol) 33493 UNIT Oral Capsule;  Take one capsule weekly  for 8 weeks

## 2018-01-11 NOTE — PROGRESS NOTES
Assessment    1  Encounter for preventive health examination (V70 0) (Z00 00)   2  Encounter for screening mammogram for breast cancer (V76 12) (Z12 31)   3  Osteoporosis screening (V82 81) (Z13 820)   4  Need for pneumococcal vaccination (V03 82) (Z23)    Plan  Encounter for screening mammogram for breast cancer    · * MAMMO SCREENING BILATERAL W CAD; Status:Active; Requested for:84Jng3121; Initial Medicare annual wellness visit    · Medicare Annual Wellness Visit; Status:Complete;   Done: 69FVO7822 01:40PM  Need for pneumococcal vaccination    · Prevnar 13 Intramuscular Suspension  Osteoporosis screening    · * DXA BONE DENSITY SPINE HIP AND PELVIS; Status:Active; Requested  for:60Ckc6517;   Screening for depression    · *VB-Depression Screening; Status:Complete;   Done: 76LRK2127 01:43PM  Screening for genitourinary condition    · *VB - Urinary Incontinence Screen (Dx Z13 89 Screen for UI); Status:Complete;   Done:  87UBQ1549 01:42PM  Screening for neurological condition    · *VB - Fall Risk Assessment  (Dx Z13 89 Screen for Neurologic Disorder);  Status:Complete;   Done: 81VGU7041 01:41PM    Discussion/Summary  Impression: Subsequent Annual Wellness Visit, with preventive exam as well as age and risk appropriate counseling completed  Cardiovascular screening and counseling: the risks and benefits of screening were discussed, screening is current, counseling was given on maintaining a healthy diet, counseling was given on maintaining a healthy weight, counseling was given on ways to improve cholesterol, counseling was given on ways to improve blood pressure, counseling was given on ways to improve exercise tolerance and Dx - V81 2 Screen for CV Disorder     Diabetes screening and counseling: the risks and benefits of screening were discussed, screening is current, counseling was given on maintaining a healthy diet, counseling was given on maintaining a healthy weight, counseling was given on ways to improve physical activity and Dx - V77 1 Screen for DM  Colorectal cancer screening and counseling: the risks and benefits of screening were discussed, the patient declines screening, counseling was given on ways to eat a high fiber diet and Dx - V76 51 Screen for CRC  Breast cancer screening and counseling: the risks and benefits of screening were discussed and due for a screening mammogram    Cervical cancer screening and counseling: screening not indicated  Osteoporosis screening and counseling: the risks and benefits of screening were discussed, counseling was given on obtaining adequate amounts of calcium and vitamin D on a daily basis, counseling was given on the importance of regular weightbearing exercise and due for DXA axial skeleton  Immunizations: the risks and benefits of pneumococcal vaccination were discussed with the patient and pneumococcal vaccine due today  Advance Directive Planning: not complete, paperwork and instructions were given to the patient, she was encouraged to follow-up with me to discuss her questions and/or decisions  Patient Discussion: plan discussed with the patient, follow-up visit needed in one year  Possible side effects of new medications were reviewed with the patient/guardian today  The treatment plan was reviewed with the patient/guardian  The patient/guardian understands and agrees with the treatment plan      Chief Complaint  Medicare Wellness visit      History of Present Illness  HPI: Last colonoscopy 5-10 yrs ago  Last mammmogram -- uncertain  DXA scan >0-9 yrs ago   Hysterectomy 20+ yrs ago   Welcome to [employer] and Wellness Visits: The patient is being seen for the initial annual wellness visit  Medicare Screening and Risk Factors   Hospitalizations: no previous hospitalizations  Medicare Screening Tests Risk Questions   Drug and Alcohol Use: The patient has never smoked cigarettes  The patient reports never drinking alcohol  Alcohol concern:    The patient has no concerns about alcohol abuse  She has never used illicit drugs  Diet and Physical Activity: Current diet includes well balanced meals, 2 servings of fruit per day, 1 servings of meat per day, 1 servings of whole grains per day, 2 cups of coffee per day, 1 cups of tea per day, 0 cans of regular soda per day and 2 of water  Exercise: walking  Mood Disorder and Cognitive Impairment Screening: She denies feeling down, depressed, or hopeless over the past two weeks  She denies feeling little interest or pleasure in doing things over the past two weeks  Cognitive impairment screening: denies difficulty learning/retaining new information, denies difficulty handling complex tasks, denies difficulty with reasoning and denies difficulty with language  Functional Ability/Level of Safety: Hearing is a hearing aid is not used  She denies hearing difficulties  Activities of daily living details: does not need help using the phone, no transportation help needed, does not need help shopping, no meal preparation help needed, does not need help doing housework, does not need help doing laundry, does not need help managing medications and does not need help managing money  Home safety risk factors:  no loose rugs, no poor household lighting, no uneven floors and no household clutter  Advance Directives: Advance directives: no living will and no advance directives  Co-Managers and Medical Equipment/Suppliers: See Patient Care Team   Preventive Quality Program 65 and Older: Falls Risk: The patient fell 0 times in the past 12 months  The patient is currently experiencing urinary symptoms  Urinary Incontinence Symptoms includes: urinary incontinence        Patient Care Team    Care Team Member Role Specialty Office Number   Alondracharlotte Lucero   [phone]   Kath Farnsworth   Family Medicine [phone]     Review of Systems    Constitutional: no fever, no chills, no malaise, no fatigue and no anorexia  Head and Face: no facial pain and no facial pressure  Eyes: no eye pain and eyes not red  ENT: no earache, no hearing loss, no nasal congestion and no sore throat  Cardiovascular: no chest pain, no palpitations and no lower extremity edema  Respiratory: no shortness of breath, no wheezing and no cough  Gastrointestinal: no abdominal pain, no nausea, no vomiting, no diarrhea, no constipation, no bright red blood per rectum and no melena  Genitourinary: no dysuria, no urinary frequency and no urinary urgency  Musculoskeletal: diffuse joint pain and generalized muscle aches  Integumentary and Breasts: no rashes and no skin lesions  Neurological: no headache, no confusion, no dizziness and no fainting  Psychiatric: no anxiety and no depression  Endocrine: no hot flashes and no night sweats  Hematologic and Lymphatic: no swollen glands, no tendency for easy bleeding, no tendency for easy bruising and no jaundice  Active Problems    1  Acute hip pain (719 45) (M25 559)   2  Acute pain of both shoulders (719 41) (M25 511,M25 512)   3  Allergic rhinitis (477 9) (J30 9)   4  Benign essential hypertension (401 1) (I10)   5  Hypothyroidism (244 9) (E03 9)   6  Insomnia (780 52) (G47 00)   7  Microscopic hematuria (599 72) (R31 29)   8  Need for influenza vaccination (V04 81) (Z23)   9  Obesity (278 00) (E66 9)   10  Obstructive sleep apnea (327 23) (G47 33)   11  Other fatigue (780 79) (R53 83)   12  Subacromial impingement of left shoulder (726 19) (M75 42)   13  Subacromial impingement of right shoulder (726 19) (M75 41)   14  Urticaria (708 9) (L50 9)   15   Vitamin D deficiency (268 9) (E55 9)    Past Medical History    · Acute bronchitis (466 0) (J20 9)   · History of Acute laryngitis (464 00) (J04 0)   · History of Acute sinusitis (461 9) (J01 90)   · History of Fracture Of The Clavicle (810 00)   · History of anemia (V12 3) (Z86 2)   · History of Impacted cerumen of both ears (380 4) (H61 23)   · Shoulder impingement syndrome (726 2) (M30 40)    The active problems and past medical history were reviewed and updated today  Surgical History    · History of Abdominal Surgery   · History of Hysterectomy    The surgical history was reviewed and updated today  Family History  Mother    · Family history of Glaucoma   · Family history of Hypertension (V17 49)   · Family history of Stroke Syndrome (V17 1)  Father    · Family history of Heart Disease (V17 49)  Sister    · Family history of Chronic Renal Failure   · Family history of Gout (V18 19)   · Family history of Hypertension (V17 49)   · Family history of Multiple Sclerosis  Brother    · Family history of Alcoholism   · Family history of Cancer   · Family history of Gout (V18 19)  Family History    · Family history of Father  At Age 68   · Family history of Mother  At Age 80    The family history was reviewed and updated today  Social History    · Alcohol   · OCCASIONAL   · Drinks coffee   · Denied: History of Drug Use   · Education History   · COLLEGE GRADUATE   · Former smoker (V15 82) (V77 352)   · 1/2 PPD QUIT    · Marital History - Currently    · 5 CHILDREN   · 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  The social history was reviewed and updated today  The social history was reviewed and is unchanged  Current Meds   1  Claritin 10 MG Oral Tablet; take 1 tablet daily as needed Recorded   2  HydroCHLOROthiazide 25 MG Oral Tablet; Take 1 tablet by mouth  every other day; Therapy: 47Ooj1259 to (Evaluate:74Xqj7040)  Requested for: 50ELL2710 Recorded   3  Levothyroxine Sodium 100 MCG Oral Tablet; take one tablet by mouth every day; Therapy: 15KZL2507 to (Evaluate:28Xrf4378)  Requested for: 32Jwk2881; Last   Rx:44Nmt0973 Ordered   4  Loratadine 10 MG Oral Tablet; Therapy: (Recorded:2016) to Recorded   5  Lutein 20 MG Oral Tablet; daily; Therapy: 60UKP4931 to Recorded   6   Metoprolol Succinate ER 50 MG Oral Tablet Extended Release 24 Hour; take 1 tablet by   mouth one time daily; Therapy: 27WQI9294 to (Loraine Segura)  Requested for: 09KBP8267; Last   Rx:02Jun2016 Ordered   7  PredniSONE 10 MG Oral Tablet; take 10-15 mg qd; Therapy: (Recorded:94Pnl6917) to Recorded   8  Tylenol 8 Hour 650 MG Oral Tablet Extended Release; Therapy: (Recorded:01Nov2016) to Recorded   9  Vitamin D (Ergocalciferol) 46642 UNIT Oral Capsule; Take one capsule weekly for 8   weeks; Therapy: 37XRT8175 to (Last WC:31CHW5331)  Requested for: 62KUK4599 Ordered    The medication list was reviewed and updated today  Allergies    1  Darvon-N TABS    Immunizations   1 2 3 4    Influenza  04-Nov-2009 2013 17-Nov-2016 2014-work     Vitals  Signs    Systolic: 373  Diastolic: 92   Temperature: 98 1 F, Tympanic  Heart Rate: 89  Height: 5 ft 1 in  Weight: 224 lb 9 oz  BMI Calculated: 42 43  BSA Calculated: 1 98  O2 Saturation: 98    Physical Exam    Constitutional   General appearance: No acute distress, well appearing and well nourished  obese  Head and Face   Head and face: Normal     Palpation of the face and sinuses: No sinus tenderness  Eyes   Conjunctiva and lids: No swelling, erythema or discharge  Pupils and irises: Equal, round, reactive to light  Ears, Nose, Mouth, and Throat   External inspection of ears and nose: Normal     Otoscopic examination: Tympanic membranes translucent with normal light reflex  Canals patent without erythema  Hearing: Normal     Nasal mucosa, septum, and turbinates: Normal without edema or erythema  Lips, teeth, and gums: Normal, good dentition  Oropharynx: Normal with no erythema, edema, exudate or lesions  Neck   Neck: Supple, symmetric, trachea midline, no masses  Thyroid: Normal, no thyromegaly  Pulmonary   Respiratory effort: No increased work of breathing or signs of respiratory distress  Auscultation of lungs: Clear to auscultation      Cardiovascular   Auscultation of heart: Normal rate and rhythm, normal S1 and S2, no murmurs  Carotid pulses: 2+ bilaterally  Examination of extremities for edema and/or varicosities: Normal     Abdomen   Abdomen: Non-tender, no masses  Liver and spleen: No hepatomegaly or splenomegaly  Lymphatic   Palpation of lymph nodes in neck: No lymphadenopathy  Musculoskeletal   Gait and station: Normal     Digits and nails: Normal without clubbing or cyanosis  Joints, bones, and muscles: Normal     Range of motion: Normal     Stability: Normal     Muscle strength/tone: Normal     Skin   Skin and subcutaneous tissue: Normal without rashes or lesions  Neurologic   Cranial nerves: Cranial nerves II-XII intact  Reflexes: 2+ and symmetric  Sensation: No sensory loss      Psychiatric   Orientation to person, place, and time: Normal     Mood and affect: Normal        Results/Data  *VB-Depression Screening 88MEE5215 01:43PM Jenene Living     Test Name Result Flag Reference   Depression Scale Result      Depression Screen - Negative For Symptoms     *VB - Urinary Incontinence Screen (Dx Z13 89 Screen for UI) 17YLD2439 01:42PM Jenene Living     Test Name Result Flag Reference   Urinary Incontinence Assessment 60YMJ6427       *VB - Fall Risk Assessment  (Dx Z13 89 Screen for Neurologic Disorder) 02XUI4150 01:41PM Jenene Living     Test Name Result Flag Reference   Falls Risk      No falls in the past year     Skyler Melgoza Annual Wellness Visit 58TJR6785 01:40PM Jenene Living     Test Name Result [facility] Reference   [address] [facility] VISIT 91AJM5206         Signatures   Electronically signed by : Fang Perez DO; May 23 2017  4:20PM EST                       (Author)

## 2018-01-13 VITALS — SYSTOLIC BLOOD PRESSURE: 173 MMHG | HEART RATE: 84 BPM | DIASTOLIC BLOOD PRESSURE: 79 MMHG

## 2018-01-13 NOTE — RESULT NOTES
Verified Results  (1) TSH WITH FT4 REFLEX 28Nov2016 10:38AM [employer] Order Number: [id number]     Test Name Result Flag Reference   TSH 3 840 uIU/mL H 0 358-3 740   Patients undergoing fluorescein dye angiography may retain small amounts of fluorescein in the body for 48-72 hours post procedure  Samples containing fluorescein can produce falsely depressed TSH values  If the patient had this procedure,a specimen should be resubmitted post fluorescein clearance  The recommended reference ranges for TSH during pregnancy are as follows:  First trimester 0 1 to 2 5 uIU/mL  Second trimester  0 2 to 3 0 uIU/mL  Third trimester 0 3 to 3 0 uIU/m   T4,FREE 1 27 ng/dL  0 76-1 46     (1) C-REACTIVE PROTEIN 28Nov2016 10:38AM [facility] Order Number: [id number]     Test Name Result Flag Reference   C-REACT PROTEIN 63 6 mg/L H <3 0     (1) SED RATE 03AGF3788 10:38AM [facility] Order Number: [id number]     Test Name Result Flag Reference   SED RATE 82 mm/hour H 0-20     (1) RHEUMATOID FACTOR SCREEN 01IMU0256 10:38AM [facility] Order Number: [id number]     Test Name Result Flag Reference   RHEUMATOID FACTOR Positive A Negative   See RF Quantitation   RF QUANTITATION 20 IU/mL A (none)     (1) VITAMIN D 25-HYDROXY 52UQB0634 10:38AM [facility] Order Number: [id number]     Test Name Result Flag Reference   VIT D 25-HYDROX 12 2 ng/mL L 30 0-100 0   This assay is a certified procedure of the CDC Vitamin D Standardization Certification Program (VDSCP)     Deficiency <20ng/ml   Insufficiency 20-30ng/ml   Sufficient  ng/ml     *Patients undergoing fluorescein dye angiography may retain small amounts of fluorescein in the body for 48-72 hours post procedure  Samples containing fluorescein can produce falsely elevated Vitamin D values  If the patient had this procedure, a specimen should be resubmitted post fluorescein clearance  Plan  Vitamin D deficiency    · Vitamin D (Ergocalciferol) 02691 UNIT Oral Capsule;  Take one capsule weekly for  8 weeks

## 2018-01-14 VITALS
DIASTOLIC BLOOD PRESSURE: 92 MMHG | SYSTOLIC BLOOD PRESSURE: 196 MMHG | TEMPERATURE: 98.1 F | WEIGHT: 224.56 LBS | BODY MASS INDEX: 42.4 KG/M2 | HEART RATE: 89 BPM | OXYGEN SATURATION: 98 % | HEIGHT: 61 IN

## 2018-01-14 VITALS
WEIGHT: 227 LBS | BODY MASS INDEX: 42.89 KG/M2 | SYSTOLIC BLOOD PRESSURE: 178 MMHG | DIASTOLIC BLOOD PRESSURE: 81 MMHG | HEART RATE: 80 BPM

## 2018-01-14 VITALS
DIASTOLIC BLOOD PRESSURE: 81 MMHG | BODY MASS INDEX: 43.23 KG/M2 | WEIGHT: 229 LBS | HEART RATE: 77 BPM | HEIGHT: 61 IN | SYSTOLIC BLOOD PRESSURE: 208 MMHG

## 2018-01-14 NOTE — RESULT NOTES
Verified Results  * XR WRIST 3+ VIEW RIGHT 29Clg2436 01:16PM Merrie Friday Order Number: [id number]   Performing Comments: rm 2     Test Name Result Flag Reference   XR WRIST 3+ VW RIGHT (Report)     RIGHT WRIST     INDICATION:  Right wrist fracture August 20, 2017, follow-up     COMPARISON: August 11, 2017     VIEWS: PA, lateral, and oblique     IMAGES: 3     FINDINGS:     Oblique nondisplaced fracture of the radial styloid is unchanged in position and alignment  Remainder the bones appear intact  No degenerative changes  No lytic or blastic lesions are seen  Soft tissues are unremarkable  IMPRESSION:     Stable position alignment of radial styloid fracture fragments  Workstation performed: [id number]     Signed by:   Maggie Crabtree MD   9/28/17       Plan  Fracture of right distal radius    · * XR WRIST 3+ VIEW RIGHT; Status:Complete;   Done: 76THD6211 01:16PM   · Remove Cast/Leg/Arm - POC; Status:Complete;   Done: 03QMC3734

## 2018-01-16 ENCOUNTER — ALLSCRIPTS OFFICE VISIT (OUTPATIENT)
Dept: OTHER | Facility: OTHER | Age: 73
End: 2018-01-16

## 2018-01-16 ENCOUNTER — APPOINTMENT (OUTPATIENT)
Dept: RADIOLOGY | Facility: CLINIC | Age: 73
End: 2018-01-16
Payer: MEDICARE

## 2018-01-16 DIAGNOSIS — R06.02 SHORTNESS OF BREATH: ICD-10-CM

## 2018-01-16 PROCEDURE — 71046 X-RAY EXAM CHEST 2 VIEWS: CPT

## 2018-01-16 NOTE — RESULT NOTES
Verified Results  (1) OTIS SCREEN W/REFLEX TO TITER/PATTERN 77TPN2419 10:38AM Rusty Marquez Order Number: [id number]     Test Name Result Flag Reference   OTIS SCREEN   Negative  Negative     (1) CYCLIC CITRULLINATED PEPTIDE 27Xdd5193 10:38AM Rusty Marquez Order Number: [id number]     Test Name Result Flag Reference   CYCLIC CITRULLINATED PEPTIDE 6 units  0 - 19   Negative               <20                            Weak positive      20 - 39                            Moderate positive  40 - 59                            Strong positive        >59    Performed at:  [address]  [id number]  : Sheree Miguel MD, Phone:  [phone]

## 2018-01-17 NOTE — PROGRESS NOTES
Assessment   1  Shortness of breath (786 05) (R06 02)   2  Benign essential hypertension (401 1) (I10)   3  Hypothyroidism (244 9) (E03 9)   4  Obesity (278 00) (E66 9)    Plan   Benign essential hypertension    · From  HydroCHLOROthiazide 25 MG Oral Tablet Take 1 tablet by mouth  every    other day To HydroCHLOROthiazide 25 MG Oral Tablet Take 1 tablet by mouth daily  Shortness of breath    · * XR CHEST PA & LATERAL; Status:Active; Requested [id number];    · ECHO COMPLETE WITH CONTRAST IF INDICATED; Status:Hold For - Scheduling;    Requested [id number];    · STRESS TEST ONLY, EXERCISE; Status:Hold For - Scheduling; Requested    URU:[id number]; Discussion/Summary      Will workup shortness of breath with chest x-ray, stress test, and echocardiogram  If testing unremarkable, patient is encouraged to follow up with her ophthalmologist to see if there any other eye drop alternatives that do not carry shortness of breath as a side effect  Hypertension not well controlled--will increase hydrochlorothiazide to 25 mg daily and have patient call in 1-2 weeks with home blood pressure readings  If blood pressure does not get below 140/90, will have to consider adding ACE-inhibitor  Patient cautioned regarding NSAID use as this will elevate blood pressure  Thyroid appears to be stable at this time  RTO 5 months for annual wellness visit--will give lab orders at that time  Patient verbalizes understanding and agrees to plan of care  The patient was counseled regarding diagnostic results,-- instructions for management,-- risk factor reductions,-- prognosis,-- patient and family education,-- impressions,-- risks and benefits of treatment options,-- importance of compliance with treatment  Possible side effects of new medications were reviewed with the patient/guardian today  The treatment plan was reviewed with the patient/guardian   The patient/guardian understands and agrees with the treatment plan      Chief Complaint   Patient is here today for a routine follow up  Patient is here today for follow up of chronic conditions described in HPI  History of Present Illness   Patient presents for routine follow-up complains of shortness of breath that began 2-3 weeks ago around the same time she began using eye drops for increased intra-ocular pressure primarily with exertion, rarely at rest associated symptoms including chest pain, wheezing, orthopnea, paroxysmal nocturnal dyspnea concerned about shortness of breath, wants to rule out cardiac cause before changing eye drops history of coronary artery disease, however patient does have longstanding history of hypertension placing her at risk for diastolic CHF    The patient is being seen for follow-up of obesity  The patient reports no change in the condition  She has had no significant interval events  Interval symptoms:  denies poor eating habits,-- denies depressed mood,-- denies anxiety,-- denies fatigue,-- denies back pain-- and-- stable joint pain  Associated symptoms: no poor self esteem,-- no polyuria,-- no polydipsia-- and-- no snoring  (Waking and obesity related to chronic prednisone use)    The patient is being seen for follow-up of hypothyroidism of undetermined etiology  The patient reports doing well  She has had no significant interval events  Interval symptoms:  denies cold intolerance,-- denies fatigue,-- denies weakness,-- denies constipation,-- denies trouble concentrating,-- denies hair loss-- and-- denies dry skin--       The patient presents with complaints of stable weight gain (Likely due to concurrent use of prednisone)  Associated symptoms: no myalgias,-- no arthralgias,-- no paresthesias,-- no depression-- and-- no palpitations  Medications:  the patient is adherent to her medication regimen, but-- she denies medication side effects  The patient presents for follow-up of essential hypertension   The patient states she has been doing well with her blood pressure control since the last visit  She has no significant interval events  Symptoms: denies impaired vision,-- denies dyspnea,-- denies chest pain,-- denies intermittent leg claudication-- and-- denies lower extremity edema  Associated symptoms include no headache,-- no focal neurologic deficits-- and-- no memory loss  Home monitoring: The patient is not checking blood pressure at home  Medications: the patient is adherent with her medication regimen  -- She denies medication side effects  Review of Systems        Constitutional: No fever, no chills, feels well, no tiredness, no recent weight gain or weight loss  Eyes: No complaints of eye pain, no red eyes, no eyesight problems, no discharge, no dry eyes, no itching of eyes  ENT: no complaints of earache, no loss of hearing, no nose bleeds, no nasal discharge, no sore throat, no hoarseness  Cardiovascular: as noted in HPI  Respiratory: as noted in HPI  Gastrointestinal: No complaints of abdominal pain, no constipation, no nausea or vomiting, no diarrhea, no bloody stools  Genitourinary: No complaints of dysuria, no incontinence, no pelvic pain, no dysmenorrhea, no vaginal discharge or bleeding  Musculoskeletal: arthralgias  Integumentary: No complaints of skin rash or lesions, no itching, no skin wounds, no breast pain or lump  Neurological: No complaints of headache, no confusion, no convulsions, no numbness, no dizziness or fainting, no tingling, no limb weakness, no difficulty walking  Psychiatric: Not suicidal, no sleep disturbance, no anxiety or depression, no change in personality, no emotional problems  Endocrine: No complaints of proptosis, no hot flashes, no muscle weakness, no deepening of the voice, no feelings of weakness        Hematologic/Lymphatic: No complaints of swollen glands, no swollen glands in the neck, does not bleed easily, does not bruise easily  Active Problems   1  Acute hip pain (719 45) (M25 559)   2  Acute pain of both shoulders (719 41) (M25 511,M25 512)   3  Allergic rhinitis (477 9) (J30 9)   4  Benign essential hypertension (401 1) (I10)   5  Closed fracture of head of right humerus, initial encounter (812 09) (S42 291A)   6  Closed traumatic nondisplaced fracture of proximal end of right humerus with routine     healing, subsequent encounter (V54 11) (S42 201D)   7  Encounter for screening mammogram for breast cancer (V76 12) (Z12 31)   8  Excessive cerumen in right ear canal (380 4) (H61 21)   9  Fall (E888 9) (W19 XXXA)   10  Fracture of right distal radius (813 42) (S52 501A)   11  Hypothyroidism (244 9) (E03 9)   12  Incomplete immunization status (V15 89) (Z91 89)   13  Initial Medicare annual wellness visit (V70 0) (Z00 00)   14  Insomnia (780 52) (G47 00)   15  Microscopic hematuria (599 72) (R31 29)   16  Need for influenza vaccination (V04 81) (Z23)   17  Need for pneumococcal vaccination (V03 82) (Z23)   18  Obesity (278 00) (E66 9)   19  Obstructive sleep apnea (327 23) (G47 33)   20  Osteoporosis screening (V82 81) (Z13 820)   21  Other fatigue (780 79) (R53 83)   22  Otitis media of right ear (382 9) (H66 91)   23  PPD screening test (V74 1) (Z11 1)   24  Right shoulder pain (719 41) (M25 511)   25  Screening for depression (V79 0) (Z13 89)   26  Screening for genitourinary condition (V81 6) (Z13 89)   27  Screening for neurological condition (V80 09) (Z13 89)   28  Subacromial impingement of left shoulder (726 19) (M75 42)   29  Subacromial impingement of right shoulder (726 19) (M75 41)   30  Urticaria (708 9) (L50 9)   31  Vitamin D deficiency (268 9) (E55 9)    Past Medical History   1  Acute bronchitis (466 0) (J20 9)   2  History of Acute laryngitis (464 00) (J04 0)   3  History of Acute sinusitis (461 9) (J01 90)   4  History of Fracture Of The Clavicle (810 00)   5   History of anemia (V12 3) (Z86 2)   6  History of Impacted cerumen of both ears (380 4) (H61 23)   7  Shoulder impingement syndrome (726 2) (M75 40)     The active problems and past medical history were reviewed and updated today  Surgical History   1  History of Abdominal Surgery   2  History of Hysterectomy     The surgical history was reviewed and updated today  Family History   Mother    1  Family history of Glaucoma   2  Family history of Hypertension (V17 49)   3  Family history of Stroke Syndrome (V17 1)  Father    4  Family history of Heart Disease (V17 49)  Sister    5  Family history of Chronic Renal Failure   6  Family history of Gout (V18 19)   7  Family history of Hypertension (V17 49)   8  Family history of Multiple Sclerosis  Brother    9  Family history of Alcoholism   10  Family history of Cancer   11  Family history of Gout (V18 19)  Family History    12  Family history of Father  At Age 65   12  Family history of Mother  At Age 80     The family history was reviewed and updated today  Social History    · Alcohol   · Drinks coffee   · Denied: History of Drug Use   · Education History   · Former smoker (V15 82) (D42 640)   · Marital History - Currently    · Occupation:  The social history was reviewed and updated today  The social history was reviewed and is unchanged  Current Meds    1  Advil TABS; Therapy: (Recorded:41Eck3540) to Recorded   2  Claritin 10 MG Oral Tablet; take 1 tablet daily as needed Recorded   3  HydroCHLOROthiazide 25 MG Oral Tablet; Take 1 tablet by mouth  every other day; Therapy: 11Kfu2304 to (Evaluate:12Uvj1371)  Requested for: 82ZPX9802 Recorded   4  Levothyroxine Sodium 100 MCG Oral Tablet; take one tablet by mouth every day; Therapy: 16MXD5196 to (Evaluate:05Dzl8791)  Requested for: 15Hvo0154; Last     Rx:63Gkd8155 Ordered   5  Loratadine 10 MG Oral Tablet; Therapy: (Recorded:40Oer3338) to Recorded   6   Lutein 20 MG Oral Tablet; daily; Therapy: 41LKQ6678 to Recorded   7  Metoprolol Succinate ER 50 MG Oral Tablet Extended Release 24 Hour; take 1 tablet by     mouth one time daily; Therapy: 83KKF6991 to (Karin Vazquez)  Requested for: 29MHM3017; Last     Rx:22Zsw1729 Ordered   8  PredniSONE 10 MG Oral Tablet; take 10-15 mg qd; Therapy: (Indiana Morales) to Recorded   9  Temazepam 15 MG Oral Capsule; TAKE 1 CAPSULE AT BEDTIME AS NEEDED FOR     SLEEP; Therapy: 36BUU2743 to (Evaluate:28Jan2018); Last Rx:09Iyv4873 Ordered   10  Tylenol 8 Hour 650 MG Oral Tablet Extended Release; Therapy: (Recorded:01Nov2016) to Recorded     The medication list was reviewed and updated today  Allergies   1  Darvon-N TABS    Vitals   Vital Signs    Recorded: 78CXL6914 10:58AM   Temperature 98 2 F   Heart Rate 79   Systolic 992   Diastolic 88   Height 5 ft 1 in   Weight 231 lb 4 oz   BMI Calculated 43 69   BSA Calculated 2 01   O2 Saturation 95     Physical Exam        Constitutional      General appearance: No acute distress, well appearing and well nourished  obese  Eyes      Conjunctiva and lids: No swelling, erythema or discharge  Pupils and irises: Equal, round and reactive to light  Ears, Nose, Mouth, and Throat      External inspection of ears and nose: Normal        Otoscopic examination: Tympanic membranes translucent with normal light reflex  Canals patent without erythema  Nasal mucosa, septum, and turbinates: Normal without edema or erythema  Oropharynx: Normal with no erythema, edema, exudate or lesions  Pulmonary      Respiratory effort: No increased work of breathing or signs of respiratory distress  Auscultation of lungs: Clear to auscultation  Cardiovascular      Auscultation of heart: Normal rate and rhythm, normal S1 and S2, without murmurs  Examination of extremities for edema and/or varicosities: Normal        Abdomen      Abdomen: Non-tender, no masses  Lymphatic      Palpation of lymph nodes in neck: No lymphadenopathy  Musculoskeletal      Gait and station: Normal        Skin      Skin and subcutaneous tissue: Normal without rashes or lesions  Neurologic      Cranial nerves: Cranial nerves 2-12 intact  Psychiatric      Orientation to person, place, and time: Normal        Mood and affect: Normal           Future Appointments      Date/Time Provider Specialty Site   02/26/2018 11:00 AM SUSANNAH Escobar   Orthopedic Surgery [address]     Signatures    Electronically signed by : Fareed Monahan DO; Jan 16 2018  1:10PM EST                       (Author)

## 2018-01-18 ENCOUNTER — GENERIC CONVERSION - ENCOUNTER (OUTPATIENT)
Dept: OTHER | Facility: OTHER | Age: 73
End: 2018-01-18

## 2018-01-22 VITALS
SYSTOLIC BLOOD PRESSURE: 146 MMHG | BODY MASS INDEX: 43.14 KG/M2 | DIASTOLIC BLOOD PRESSURE: 84 MMHG | HEIGHT: 61 IN | HEART RATE: 70 BPM | WEIGHT: 228.5 LBS

## 2018-01-22 VITALS
WEIGHT: 228.5 LBS | DIASTOLIC BLOOD PRESSURE: 84 MMHG | HEART RATE: 70 BPM | SYSTOLIC BLOOD PRESSURE: 187 MMHG | BODY MASS INDEX: 43.17 KG/M2

## 2018-01-22 VITALS
WEIGHT: 230.25 LBS | DIASTOLIC BLOOD PRESSURE: 77 MMHG | BODY MASS INDEX: 43.5 KG/M2 | HEART RATE: 84 BPM | SYSTOLIC BLOOD PRESSURE: 186 MMHG

## 2018-01-23 VITALS
HEIGHT: 61 IN | SYSTOLIC BLOOD PRESSURE: 162 MMHG | WEIGHT: 231.25 LBS | OXYGEN SATURATION: 95 % | TEMPERATURE: 98.2 F | DIASTOLIC BLOOD PRESSURE: 88 MMHG | BODY MASS INDEX: 43.66 KG/M2 | HEART RATE: 79 BPM

## 2018-01-23 NOTE — RESULT NOTES
Discussion/Summary   please inform pt that CXR unremarkable except for mild cardiomegaly --- no comparison XR to see if this is new or old -- proceed with stress test and echo as ordered     Verified Results  * XR CHEST PA & LATERAL 50SRM4529 12:48PM Aureliano Rosales Order Number: [id number]     Test Name Result Flag Reference   XR CHEST PA & LATERAL (Report)     CHEST      INDICATION: Shortness of breath, wheezing     COMPARISON: None     VIEWS: Frontal and lateral projections     IMAGES: 2     FINDINGS:        There is enlargement of the cardiac silhouette  Mild linear changes are noted in the left lung base compatible with subsegmental atelectasis or scarring  The lungs are otherwise clear  There is no evidence of pneumothorax or pleural effusion  Visualized osseous structures appear within normal limits for the patient's age  IMPRESSION:     Enlargement of cardiac silhouette  Mild linear scarring or subsegmental atelectasis at the left lung base   Otherwise clear lungs       Workstation performed: [id number]     Signed by:   Cristal Pelaez MD   1/17/18

## 2018-01-23 NOTE — RESULT NOTES
Verified Results  * XR WRIST 3+ VIEW RIGHT 13JWY4473 10:40AM Viki Stewart Order Number: [id number]   Performing Comments: pt in waiting room     Test Name Result Flag Reference   XR WRIST 3+ VW RIGHT (Report)     RIGHT WRIST     INDICATION:  Follow-up fracture of right radial styloid  COMPARISON: August 11, 2017 and September 25, 2017  VIEWS: PA, lateral, and oblique     IMAGES: 3     FINDINGS:     The distal right radial fracture is no longer visible  Alignment is normal  There is no new fracture  No degenerative changes  No lytic or blastic lesions are seen  Soft tissues are unremarkable  IMPRESSION:     Healed fracture of the distal right radius         Workstation performed: [id number]     Signed by:   Sabrina Mccracken MD   11/29/17       Plan  Fracture of right distal radius    · Follow-up PRN Evaluation and Treatment  Follow-up  Status: Complete  Done:  74SYN3662   · * XR WRIST 3+ VIEW RIGHT; Status:Complete;   Done: 12YRX0325 10:40AM

## 2018-02-02 ENCOUNTER — HOSPITAL ENCOUNTER (OUTPATIENT)
Dept: NON INVASIVE DIAGNOSTICS | Facility: CLINIC | Age: 73
Discharge: HOME | End: 2018-02-02
Payer: MEDICARE

## 2018-02-02 ENCOUNTER — TELEPHONE (OUTPATIENT)
Dept: CARDIOLOGY CLINIC | Facility: CLINIC | Age: 73
End: 2018-02-02

## 2018-02-02 DIAGNOSIS — R06.02 SHORTNESS OF BREATH: ICD-10-CM

## 2018-02-02 DIAGNOSIS — I10 ACCELERATED HYPERTENSION: Primary | ICD-10-CM

## 2018-02-02 PROCEDURE — 1124F ACP DISCUSS-NO DSCNMKR DOCD: CPT | Performed by: INTERNAL MEDICINE

## 2018-02-02 PROCEDURE — 93306 TTE W/DOPPLER COMPLETE: CPT

## 2018-02-02 PROCEDURE — 93306 TTE W/DOPPLER COMPLETE: CPT | Performed by: INTERNAL MEDICINE

## 2018-02-02 PROCEDURE — 93017 CV STRESS TEST TRACING ONLY: CPT

## 2018-02-02 RX ORDER — LISINOPRIL 10 MG/1
10 TABLET ORAL DAILY
Qty: 30 TABLET | Refills: 0 | Status: SHIPPED | OUTPATIENT
Start: 2018-02-02 | End: 2018-02-26 | Stop reason: DRUGHIGH

## 2018-02-02 NOTE — TELEPHONE ENCOUNTER
Nikki Johnson came for stress test today, but resting BP was initially > 330 systolic, came down to 916 after sitting for a bit  Not safe to get her on treadmill  She says she's been taking the HCTZ regularly now since you saw her, also been on the metoprolol  No symptoms, didn't feel the need to send her to ER, but ordered lisinopril 10 for her  Advised her to check BP with your office next week, and if you wanted different medication, you may wish to change things around  Once BP better controlled, we can stress her safely  Thanks    -Daniel Shaffer

## 2018-02-02 NOTE — TELEPHONE ENCOUNTER
Dr Fabiola Mc,   Dr Blu Phipps is out of office for a while  I will ensure the patient has a follow up on BP check next week  Once BP is in range I will have her contact your office to pursue the stress test       Thanks,  DAVECTBHARAT  (Staff at [facility] clinic please call this patient and schedule a follow up visit for BP check next week    Thanks )

## 2018-02-05 ENCOUNTER — OFFICE VISIT (OUTPATIENT)
Dept: FAMILY MEDICINE CLINIC | Facility: OTHER | Age: 73
End: 2018-02-05
Payer: MEDICARE

## 2018-02-05 VITALS
BODY MASS INDEX: 42.86 KG/M2 | HEIGHT: 61 IN | HEART RATE: 84 BPM | TEMPERATURE: 98.9 F | WEIGHT: 227 LBS | SYSTOLIC BLOOD PRESSURE: 148 MMHG | OXYGEN SATURATION: 98 % | DIASTOLIC BLOOD PRESSURE: 100 MMHG

## 2018-02-05 DIAGNOSIS — I10 UNCONTROLLED HYPERTENSION: ICD-10-CM

## 2018-02-05 DIAGNOSIS — I10 ESSENTIAL HYPERTENSION: Primary | ICD-10-CM
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
## 2018-02-05 PROBLEM — S42.291A CLOSED FRACTURE OF HEAD OF RIGHT HUMERUS: Status: ACTIVE | Noted: 2017-08-11

## 2018-02-05 PROBLEM — S52.501A FRACTURE OF RIGHT DISTAL RADIUS: Status: ACTIVE | Noted: 2017-08-11

## 2018-02-05 PROBLEM — M25.511 RIGHT SHOULDER PAIN: Status: ACTIVE | Noted: 2017-08-14

## 2018-02-05 PROBLEM — H61.21 EXCESSIVE CERUMEN IN RIGHT EAR CANAL: Status: ACTIVE | Noted: 2017-08-12

## 2018-02-05 PROBLEM — R06.02 SHORTNESS OF BREATH: Status: ACTIVE | Noted: 2018-01-16

## 2018-02-05 PROBLEM — H66.91 OTITIS MEDIA OF RIGHT EAR: Status: ACTIVE | Noted: 2017-08-12

## 2018-02-05 PROCEDURE — 99214 OFFICE O/P EST MOD 30 MIN: CPT | Performed by: FAMILY MEDICINE

## 2018-02-05 RX ORDER — LEVOTHYROXINE SODIUM 0.1 MG/1
1 TABLET ORAL DAILY
COMMUNITY
Start: 2011-10-21 | End: 2018-03-10 | Stop reason: SDUPTHER

## 2018-02-05 RX ORDER — HYDROCHLOROTHIAZIDE 25 MG/1
1 TABLET ORAL DAILY
COMMUNITY
Start: 2015-08-03 | End: 2018-08-09 | Stop reason: SDUPTHER

## 2018-02-05 RX ORDER — LUTEIN 6 MG
TABLET ORAL DAILY
COMMUNITY
Start: 2015-03-04 | End: 2019-04-02

## 2018-02-05 RX ORDER — TRAVOPROST OPHTHALMIC SOLUTION 0.04 MG/ML
SOLUTION OPHTHALMIC
COMMUNITY
End: 2018-09-04

## 2018-02-05 RX ORDER — PREDNISONE 1 MG/1
5 TABLET ORAL DAILY
COMMUNITY
End: 2019-03-11

## 2018-02-05 RX ORDER — TEMAZEPAM 15 MG/1
1 CAPSULE ORAL
COMMUNITY
Start: 2016-11-28 | End: 2018-09-04 | Stop reason: SDUPTHER

## 2018-02-05 RX ORDER — METOPROLOL SUCCINATE 50 MG/1
1 TABLET, EXTENDED RELEASE ORAL DAILY
COMMUNITY
Start: 2013-10-25 | End: 2018-07-10 | Stop reason: SDUPTHER

## 2018-02-05 NOTE — PROGRESS NOTES
Assessment/Plan:    BP is uncontrolled with current therapy  Will check labs and adjust medication next visit  He was just started on lisinopril 10 mg daily along with his water pill and blocker  Will wait for 2 weeks and have her check BP daily until any further changes  Dash diet hand out provided to her today  counseled on alarming signs and to seek immediate help if needed  FU in 2 weeks        Diagnoses and all orders for this visit:    Essential hypertension  -     CBC and differential; Future  -     Comprehensive metabolic panel; Future  -     TSH, 3rd generation with T4 reflex; Future  -     Lipid panel; Future    Uncontrolled hypertension    Other orders  -     hydrochlorothiazide (HYDRODIURIL) 25 mg tablet; Take 1 tablet by mouth daily  -     levothyroxine 100 mcg tablet; Take 1 tablet by mouth daily  -     metoprolol succinate (TOPROL-XL) 50 mg 24 hr tablet; Take 1 tablet by mouth daily  -     Lutein 20 MG TABS; Take by mouth daily  -     temazepam (RESTORIL) 15 mg capsule; Take 1 capsule by mouth  -     Acetaminophen (TYLENOL ARTHRITIS PAIN PO); Take by mouth  -     travoprost (TRAVATAN Z) 0 004 % ophthalmic solution; Apply to eye  -     predniSONE 5 mg tablet; Take 5 mg by mouth daily          Subjective:      Patient ID: Barbara Edwards is a 67 y o  female  Hypertension   This is a chronic problem  The current episode started more than 1 year ago  The problem has been gradually worsening since onset  The problem is uncontrolled  Pertinent negatives include no blurred vision, chest pain, headaches, malaise/fatigue, palpitations, peripheral edema or shortness of breath  Agents associated with hypertension include NSAIDs and steroids  Risk factors for coronary artery disease include obesity, post-menopausal state and sedentary lifestyle  Past treatments include beta blockers and diuretics  The current treatment provides mild improvement  Compliance problems include exercise          The following portions of the patient's history were reviewed and updated as appropriate: allergies, current medications, past family history, past medical history, past social history, past surgical history and problem list     Review of Systems   Constitutional: Negative for appetite change, chills, fever and malaise/fatigue  Eyes: Negative for blurred vision and visual disturbance  Respiratory: Negative for shortness of breath  Cardiovascular: Negative for chest pain and palpitations  Genitourinary: Negative for dysuria  Musculoskeletal: Positive for arthralgias and myalgias  Skin: Negative for pallor and rash  Neurological: Negative for headaches  Psychiatric/Behavioral: Negative for behavioral problems, confusion and decreased concentration  Objective:  /100 (BP Location: Left arm, Patient Position: Sitting, Cuff Size: Adult)   Pulse 84   Temp 98 9 °F (37 2 °C) (Tympanic)   Ht 5' 1" (1 549 m)   Wt 103 kg (227 lb)   SpO2 98%   BMI 42 89 kg/m²        Physical Exam   Constitutional: She is oriented to person, place, and time  She appears well-developed and well-nourished  No distress  HENT:   Head: Normocephalic and atraumatic  Nose: Nose normal    Mouth/Throat: Oropharynx is clear and moist    Neck: Normal range of motion  Neck supple  No JVD present  No thyromegaly present  Cardiovascular: Normal rate, regular rhythm and normal heart sounds  No murmur heard  Pulmonary/Chest: Effort normal and breath sounds normal  No respiratory distress  She has no wheezes  Abdominal: Soft  Bowel sounds are normal  She exhibits no distension  There is no tenderness  Musculoskeletal: Normal range of motion  She exhibits no edema or tenderness  Neurological: She is alert and oriented to person, place, and time  She has normal reflexes  No cranial nerve deficit  Skin: Skin is warm and dry  No rash noted  She is not diaphoretic  No pallor  Nursing note and vitals reviewed

## 2018-02-05 NOTE — PATIENT INSTRUCTIONS
DASH Eating Plan   AMBULATORY CARE:   The DASH (Dietary Approaches to Stop Hypertension) Eating Plan  is designed to help prevent or lower high blood pressure  It can also help to lower LDL (bad) cholesterol and decrease your risk for heart disease  The plan is low in sodium, sugar, unhealthy fats, and total fat  It is high in potassium, calcium, magnesium, and fiber  These nutrients are added when you eat more fruits, vegetables, and whole grains  Your sodium limit each day: Your dietitian will tell you how much sodium is safe for you to have each day  People with high blood pressure should have no more than 1,500 to 2,300 mg of sodium in a day  A teaspoon (tsp) of salt has 2,300 mg of sodium  This may seem like a difficult goal, but small changes to the foods you eat can make a big difference  Your healthcare provider or dietitian can help you create a meal plan that follows your sodium limit  How to limit sodium:   · Read food labels  Food labels can help you choose foods that are low in sodium  The amount of sodium is listed in milligrams (mg)  The % Daily Value (DV) column tells you how much of your daily needs are met by 1 serving of the food for each nutrient listed  Choose foods that have less than 5% of the DV of sodium  These foods are considered low in sodium  Foods that have 20% or more of the DV of sodium are considered high in sodium  Avoid foods that have more than 300 mg of sodium in each serving  Choose foods that say low-sodium, reduced-sodium, or no salt added on the food label  · Avoid salt  Do not salt food at the table, and add very little salt to foods during cooking  Use herbs and spices, such as onions, garlic, and salt-free seasonings to add flavor to foods  Try lemon or lime juice or vinegar to give foods a tart flavor  Use hot peppers or a small amount of hot pepper sauce to add a spicy flavor to foods  · Ask about salt substitutes    Ask your healthcare provider if you may use salt substitutes  Some salt substitutes have ingredients that can be harmful if you have certain health conditions  · Choose foods carefully at restaurants  Meals from restaurants, especially fast food restaurants, are often high in sodium  Some restaurants have nutrition information that tells you the amount of sodium in their foods  Ask to have your food prepared with less, or no salt  What you need to know about fats:   · Include healthy fats  Examples are unsaturated fats and omega-3 fatty acids  Unsaturated fats are found in soybean, canola, olive, or sunflower oil, and liquid and soft tub margarines  Omega-3 fatty acids are found in fatty fish, such as salmon, tuna, mackerel, and sardines  It is also found in flaxseed oil and ground flaxseed  · Avoid unhealthy fats  Do not eat unhealthy fats, such as saturated fats and trans fats  Saturated fats are found in foods that contain fat from animals  Examples are fatty meats, whole milk, butter, cream, and other dairy foods  It is also found in shortening, stick margarine, palm oil, and coconut oil  Trans fats are found in fried foods, crackers, chips, and baked goods made with margarine or shortening  Foods to include: With the DASH eating plan, you need to eat a certain number of servings from each food group  This will help you get enough of certain nutrients and limit others  The amount of servings you should eat depends on how many calories you need  Your dietitian can tell you how many calories you need  The number of servings listed next to the food groups below are for people who need about 2,000 calories each day    · Grains:  6 to 8 servings (3 of these servings should be whole-grain foods)    ¨ 1 slice of whole-grain bread     ¨ 1 ounce of dry cereal    ¨ ½ cup of cooked cereal, pasta, or brown rice    · Vegetables and fruits:  4 to 5 servings of fruits and 4 to 5 servings of vegetables    ¨ 1 medium fruit    ¨ ½ cup of frozen, canned (no added salt), or chopped fresh vegetables     ¨ ½ cup of fresh, frozen, dried, or canned fruit (canned in light syrup or fruit juice)    ¨ ½ cup of vegetable or fruit juice    · Dairy:  2 to 3 servings    ¨ 1 cup of nonfat (skim) or 1% milk    ¨ 1½ ounces of fat-free or low-fat cheese    ¨ 6 ounces of nonfat or low-fat yogurt    · Lean meat, poultry, and fish:  6 ounces or less    [employer] (chicken, turkey) with no skin    ¨ Fish (especially fatty fish, such as salmon, fresh tuna, or mackerel)    ¨ Lean beef and pork (loin, round, extra lean hamburger)    ¨ Egg whites and egg substitutes    · Nuts, seeds, and legumes:  4 to 5 servings each week    ¨ ½ cup of cooked beans and peas    ¨ 1½ ounces of unsalted nuts    ¨ 2 tablespoons of peanut butter or seeds    · Sweets and added sugars:  5 or less each week    ¨ 1 tablespoon of sugar, jelly, or jam    ¨ ½ cup of sorbet or gelatin    ¨ 1 cup of lemonade    · Fats:  2 to 3 servings each week    ¨ 1 teaspoon of soft margarine or vegetable oil    ¨ 1 tablespoon of mayonnaise    ¨ 2 tablespoons of salad dressing  Foods to avoid:   · Grains:      [employer], such as doughnuts, pastries, cookies, and biscuits (high in fat and sugar)    ¨ Mixes for cornbread and biscuits, packaged foods, such as bread stuffing, rice and pasta mixes, macaroni and cheese, and instant cereals (high in sodium)    · Fruits and vegetables:      ¨ Regular, canned vegetables (high in sodium)    ¨ Sauerkraut, pickled vegetables, and other foods prepared in brine (high in sodium)    ¨ Fried vegetables or vegetables in butter or high-fat sauces    ¨ Fruit in cream or butter sauce (high in fat)    · Dairy:      ¨ Whole milk, 2% milk, and cream (high in fat)    ¨ Regular cheese and processed cheese (high in fat and sodium)    · Meats and protein foods:      ¨ Smoked or cured meat, such as corned beef, aguila, ham, hot dogs, and sausage (high in fat and sodium)    ¨ Canned beans and canned meats or spreads, such as potted meats, sardines, anchovies, and imitation seafood (high in sodium)    ¨ Deli or lunch meats, such as bologna, ham, turkey, and roast beef (high in sodium)    ¨ High-fat meat (T-bone steak, regular hamburger, and ribs)    ¨ Whole eggs and egg yolks (high in fat)    · Other:      ¨ Seasonings made with salt, such as garlic salt, celery salt, onion salt, seasoned salt, meat tenderizers, and monosodium glutamate (MSG)    ¨ Miso soup and canned or dried soup mixes (high in sodium)    ¨ Regular soy sauce, barbecue sauce, teriyaki sauce, steak sauce, Worcestershire sauce, and most flavored vinegars (high in sodium)    ¨ Regular condiments, such as mustard, ketchup, and salad dressings (high in sodium)    ¨ Gravy and sauces, such as Felice or cheese sauces (high in sodium and fat)    ¨ Drinks high in sugar, such as soda or fruit drinks    [employer] foods, such as salted chips, popcorn, pretzels, pork rinds, salted crackers, and salted nuts    ¨ Frozen foods, such as dinners, entrees, vegetables with sauces, and breaded meats (high in sodium)  Other guidelines to follow:   · Maintain a healthy weight  Your risk for heart disease is higher if you are overweight  Your healthcare provider may suggest that you lose weight if you are overweight  You can lose weight by eating fewer calories and foods that have added sugars and fat  The DASH meal plan can help you do this  Decrease calories by eating smaller portions at each meal and fewer snacks  Ask your healthcare provider for more information about how to lose weight  · Exercise regularly  Regular exercise can help you reach or maintain a healthy weight  Regular exercise can also help decrease your blood pressure and improve your cholesterol levels  Get 30 minutes or more of moderate exercise each day of the week  To lose weight, get at least 60 minutes of exercise  Talk to your healthcare provider about the best exercise program for you      · Limit alcohol  Women should limit alcohol to 1 drink a day  Men should limit alcohol to 2 drinks a day  A drink of alcohol is 12 ounces of beer, 5 ounces of wine, or 1½ ounces of liquor  © 2017 [address] Information is for End User's use only and may not be sold, redistributed or otherwise used for commercial purposes  All illustrations and images included in CareNotes® are the copyrighted property of [employer] , [employer]  or Salvatore Melissa  The above information is an  only  It is not intended as medical advice for individual conditions or treatments  Talk to your doctor, nurse or pharmacist before following any medical regimen to see if it is safe and effective for you

## 2018-02-20 ENCOUNTER — TRANSCRIBE ORDERS (OUTPATIENT)
Dept: LAB | Age: 73
End: 2018-02-20

## 2018-02-20 ENCOUNTER — APPOINTMENT (OUTPATIENT)
Dept: LAB | Age: 73
End: 2018-02-20
Payer: MEDICARE

## 2018-02-20 DIAGNOSIS — M25.511 ACUTE PAIN OF RIGHT SHOULDER: ICD-10-CM

## 2018-02-20 DIAGNOSIS — M35.3 POLYMYALGIA RHEUMATICA (HCC): ICD-10-CM

## 2018-02-20 DIAGNOSIS — I10 ESSENTIAL HYPERTENSION: ICD-10-CM

## 2018-02-20 DIAGNOSIS — M06.4 INFLAMMATORY POLYARTHROPATHY (HCC): Primary | ICD-10-CM

## 2018-02-20 LAB
ALBUMIN SERPL BCP-MCNC: 3.7 G/DL (ref 3.5–5)
ALP SERPL-CCNC: 82 U/L (ref 46–116)
ALT SERPL W P-5'-P-CCNC: 33 U/L (ref 12–78)
ANION GAP SERPL CALCULATED.3IONS-SCNC: 11 MMOL/L (ref 4–13)
AST SERPL W P-5'-P-CCNC: 15 U/L (ref 5–45)
BASOPHILS # BLD AUTO: 0.03 THOUSANDS/ΜL (ref 0–0.1)
BASOPHILS NFR BLD AUTO: 0 % (ref 0–1)
BILIRUB SERPL-MCNC: 0.47 MG/DL (ref 0.2–1)
BUN SERPL-MCNC: 29 MG/DL (ref 5–25)
CALCIUM SERPL-MCNC: 9.3 MG/DL (ref 8.3–10.1)
CHLORIDE SERPL-SCNC: 102 MMOL/L (ref 100–108)
CHOLEST SERPL-MCNC: 199 MG/DL (ref 50–200)
CO2 SERPL-SCNC: 26 MMOL/L (ref 21–32)
CREAT SERPL-MCNC: 1.05 MG/DL (ref 0.6–1.3)
CRP SERPL QL: 6 MG/L
EOSINOPHIL # BLD AUTO: 0.05 THOUSAND/ΜL (ref 0–0.61)
EOSINOPHIL NFR BLD AUTO: 1 % (ref 0–6)
ERYTHROCYTE [DISTWIDTH] IN BLOOD BY AUTOMATED COUNT: 14 % (ref 11.6–15.1)
ERYTHROCYTE [SEDIMENTATION RATE] IN BLOOD: 28 MM/HOUR (ref 0–20)
GFR SERPL CREATININE-BSD FRML MDRD: 53 ML/MIN/1.73SQ M
GLUCOSE P FAST SERPL-MCNC: 102 MG/DL (ref 65–99)
HCT VFR BLD AUTO: 39.6 % (ref 34.8–46.1)
HDLC SERPL-MCNC: 50 MG/DL (ref 40–60)
HGB BLD-MCNC: 12.9 G/DL (ref 11.5–15.4)
LDLC SERPL CALC-MCNC: 113 MG/DL (ref 0–100)
LYMPHOCYTES # BLD AUTO: 3.13 THOUSANDS/ΜL (ref 0.6–4.47)
LYMPHOCYTES NFR BLD AUTO: 31 % (ref 14–44)
MCH RBC QN AUTO: 28 PG (ref 26.8–34.3)
MCHC RBC AUTO-ENTMCNC: 32.6 G/DL (ref 31.4–37.4)
MCV RBC AUTO: 86 FL (ref 82–98)
MONOCYTES # BLD AUTO: 0.91 THOUSAND/ΜL (ref 0.17–1.22)
MONOCYTES NFR BLD AUTO: 9 % (ref 4–12)
NEUTROPHILS # BLD AUTO: 5.83 THOUSANDS/ΜL (ref 1.85–7.62)
NEUTS SEG NFR BLD AUTO: 59 % (ref 43–75)
NRBC BLD AUTO-RTO: 0 /100 WBCS
PLATELET # BLD AUTO: 326 THOUSANDS/UL (ref 149–390)
PMV BLD AUTO: 10.2 FL (ref 8.9–12.7)
POTASSIUM SERPL-SCNC: 3.6 MMOL/L (ref 3.5–5.3)
PROT SERPL-MCNC: 7.5 G/DL (ref 6.4–8.2)
RBC # BLD AUTO: 4.61 MILLION/UL (ref 3.81–5.12)
SODIUM SERPL-SCNC: 139 MMOL/L (ref 136–145)
TRIGL SERPL-MCNC: 180 MG/DL
TSH SERPL DL<=0.05 MIU/L-ACNC: 2.66 UIU/ML (ref 0.36–3.74)
WBC # BLD AUTO: 9.97 THOUSAND/UL (ref 4.31–10.16)

## 2018-02-20 PROCEDURE — 85652 RBC SED RATE AUTOMATED: CPT

## 2018-02-20 PROCEDURE — 85025 COMPLETE CBC W/AUTO DIFF WBC: CPT

## 2018-02-20 PROCEDURE — 80061 LIPID PANEL: CPT

## 2018-02-20 PROCEDURE — 80053 COMPREHEN METABOLIC PANEL: CPT

## 2018-02-20 PROCEDURE — 36415 COLL VENOUS BLD VENIPUNCTURE: CPT

## 2018-02-20 PROCEDURE — 84443 ASSAY THYROID STIM HORMONE: CPT

## 2018-02-20 PROCEDURE — 86140 C-REACTIVE PROTEIN: CPT

## 2018-02-26 ENCOUNTER — OFFICE VISIT (OUTPATIENT)
Dept: FAMILY MEDICINE CLINIC | Facility: OTHER | Age: 73
End: 2018-02-26
Payer: MEDICARE

## 2018-02-26 ENCOUNTER — OFFICE VISIT (OUTPATIENT)
Dept: OBGYN CLINIC | Facility: HOSPITAL | Age: 73
End: 2018-02-26
Payer: MEDICARE

## 2018-02-26 VITALS
BODY MASS INDEX: 42.89 KG/M2 | WEIGHT: 227 LBS | DIASTOLIC BLOOD PRESSURE: 74 MMHG | SYSTOLIC BLOOD PRESSURE: 148 MMHG | HEART RATE: 73 BPM

## 2018-02-26 VITALS
SYSTOLIC BLOOD PRESSURE: 188 MMHG | OXYGEN SATURATION: 98 % | HEART RATE: 85 BPM | DIASTOLIC BLOOD PRESSURE: 94 MMHG | BODY MASS INDEX: 44.16 KG/M2 | TEMPERATURE: 98.8 F | WEIGHT: 233.7 LBS

## 2018-02-26 DIAGNOSIS — G47.00 INSOMNIA, UNSPECIFIED TYPE: ICD-10-CM

## 2018-02-26 DIAGNOSIS — Z91.81 HISTORY OF FALL: ICD-10-CM

## 2018-02-26 DIAGNOSIS — I10 BENIGN ESSENTIAL HYPERTENSION: Primary | ICD-10-CM

## 2018-02-26 DIAGNOSIS — S42.201D CLOSED FRACTURE OF PROXIMAL END OF RIGHT HUMERUS WITH ROUTINE HEALING, UNSPECIFIED FRACTURE MORPHOLOGY, SUBSEQUENT ENCOUNTER: Primary | ICD-10-CM

## 2018-02-26 PROCEDURE — 99213 OFFICE O/P EST LOW 20 MIN: CPT | Performed by: ORTHOPAEDIC SURGERY

## 2018-02-26 PROCEDURE — 99214 OFFICE O/P EST MOD 30 MIN: CPT | Performed by: FAMILY MEDICINE

## 2018-02-26 RX ORDER — LISINOPRIL 20 MG/1
20 TABLET ORAL DAILY
Qty: 90 TABLET | Refills: 0 | Status: SHIPPED | OUTPATIENT
Start: 2018-02-26 | End: 2018-05-21 | Stop reason: SDUPTHER

## 2018-02-26 RX ORDER — PREDNISONE 2.5 MG
2.5 TABLET ORAL DAILY
Refills: 5 | COMMUNITY
Start: 2018-02-17 | End: 2018-09-04

## 2018-02-26 NOTE — PROGRESS NOTES
Assessment/Plan:           Problem List Items Addressed This Visit     Benign essential hypertension - Primary    Relevant Medications    lisinopril (ZESTRIL) 20 mg tablet  Increased the dose of lisinopril   Continue with rest of medication   Low salt diet  FU routine and prn   Labs done recently and reviewed  Other Visit Diagnoses     History of fall        Relevant Orders    Ambulatory referral to Physical Therapy  Discussed use of cane or walker  Fall precautions  Fu routine         Fatigue:  Discussed to rest in day time   Get good night sleep as possible  Avoid caffeine late   Regular exercise and eat balanced meals and keep hydrated  Subjective:      Patient ID: Meagan Almeida is a 67 y o  female  Patient is here for routine follow up visit  She has been taking 3 BP agents but BP seems to be still elevated  Her home readings are in the 634-985 range systolic and 55'T diastolic  She denies any CP or SOB or palpitations or HA associated  She doesn't feel dizzy either  She does feel sleepy and tired in day time but that is due to her not being able to sleep at night  She did have history of fall this year with fracture  She has been following with the orthopedic docs and doing well  She is back to her full function as before the fall now  States has had bone scan order from Dr Charlee Shaw that she still hasn't done yet  We discussed about safety at home and fall precautions and use of devices like cane or walker  Recommended PT referral for overall strength and balance and conditioning  She agreed to it  The following portions of the patient's history were reviewed and updated as appropriate: allergies, current medications, past family history, past medical history, past social history, past surgical history and problem list     Review of Systems   Constitutional: Positive for fatigue  Negative for appetite change, chills, fever and unexpected weight change     HENT: Negative for congestion, ear pain, rhinorrhea and sore throat  Eyes: Negative for visual disturbance  Respiratory: Negative for cough, chest tightness and shortness of breath  Cardiovascular: Negative for chest pain, palpitations and leg swelling  Gastrointestinal: Negative for abdominal pain, blood in stool, constipation and diarrhea  Endocrine: Negative for cold intolerance, heat intolerance, polydipsia, polyphagia and polyuria  Genitourinary: Negative for dysuria, flank pain, menstrual problem and vaginal discharge  Musculoskeletal: Negative for arthralgias and back pain  Skin: Negative for rash  Allergic/Immunologic: Negative for environmental allergies  Neurological: Negative for dizziness, weakness, light-headedness and headaches  Hematological: Negative for adenopathy  Psychiatric/Behavioral: Negative for behavioral problems, decreased concentration and sleep disturbance  The patient is not nervous/anxious and is not hyperactive  Objective:      BP (!) 188/94 (BP Location: Left arm, Patient Position: Sitting, Cuff Size: Adult)   Pulse 85   Temp 98 8 °F (37 1 °C) (Tympanic)   Wt 106 kg (233 lb 11 2 oz)   SpO2 98%   BMI 44 16 kg/m²          Physical Exam   Constitutional: She is oriented to person, place, and time  She appears well-developed and well-nourished  No distress  HENT:   Head: Normocephalic and atraumatic  Nose: Nose normal    Mouth/Throat: Oropharynx is clear and moist    Eyes: Conjunctivae are normal  Pupils are equal, round, and reactive to light  Right eye exhibits no discharge  Left eye exhibits no discharge  Neck: Normal range of motion  Neck supple  No thyromegaly present  Cardiovascular: Normal rate, regular rhythm and normal heart sounds  No murmur heard  Pulmonary/Chest: Effort normal and breath sounds normal  No respiratory distress  She has no wheezes  Abdominal: Soft  Bowel sounds are normal  She exhibits no distension  There is no tenderness  Lymphadenopathy:     She has no cervical adenopathy  Neurological: She is alert and oriented to person, place, and time  She has normal reflexes  No cranial nerve deficit  Skin: Skin is warm and dry  No rash noted  She is not diaphoretic  No pallor  Nursing note and vitals reviewed        Lab Results   Component Value Date    WBC 9 97 02/20/2018    HGB 12 9 02/20/2018    HCT 39 6 02/20/2018     02/20/2018    CHOL 199 02/20/2018    TRIG 180 (H) 02/20/2018    HDL 50 02/20/2018    ALT 33 02/20/2018    AST 15 02/20/2018     02/20/2018    K 3 6 02/20/2018     02/20/2018    CREATININE 1 05 02/20/2018    BUN 29 (H) 02/20/2018    CO2 26 02/20/2018    GLUF 102 (H) 02/20/2018

## 2018-02-26 NOTE — PROGRESS NOTES
Assessment/Plan:  Assessment/Plan   Diagnoses and all orders for this visit:    Closed fracture of proximal end of right humerus with routine healing, subsequent encounter      - patient exam consistent with routine healing, good ROM and appropriate function  - follow up on as needed basis should symptoms return/worsen    Subjective:   Patient ID: Rajesh Kim is a 67 y o  female  Patient seen previously for nondisplaced right proximal humerus fracture, managed nonoperatively with routine healing, presents today for routine follow up  Her injury is from August 2017, she is doing well and continues to improve from a range of motion and functionality standpoint  No recent injuries or setbacks, she states that her current function is satisfactory should it remain the same  The following portions of the patient's history were reviewed and updated as appropriate: allergies, current medications, past family history, past medical history, past social history, past surgical history and problem list     Review of Systems   Constitutional: Negative  Negative for chills and fever  HENT: Negative  Respiratory: Negative  Negative for shortness of breath and wheezing  Cardiovascular: Negative  Negative for chest pain and palpitations  Gastrointestinal: Negative  Negative for diarrhea, nausea and vomiting  Endocrine: Negative  Genitourinary: Negative  Skin: Negative  Neurological: Negative  Hematological: Negative  Psychiatric/Behavioral: Negative  Objective:  Right Shoulder Exam     Tenderness   The patient is experiencing no tenderness  Range of Motion   Active Abduction: 90   Passive Abduction: 120   Forward Flexion: 90     Muscle Strength   Abduction: 4/5   External Rotation: 4/5     Other   Erythema: absent  Sensation: normal  Pulse: present            Physical Exam   Constitutional: She is oriented to person, place, and time   She appears well-developed and well-nourished  HENT:   Head: Normocephalic and atraumatic  Neck: Normal range of motion  Cardiovascular: Normal rate and intact distal pulses  Pulmonary/Chest: Effort normal  She has no wheezes  Abdominal: Soft  She exhibits no distension  Neurological: She is alert and oriented to person, place, and time  Skin: Skin is warm and dry  Psychiatric: She has a normal mood and affect         No pertinent imaging to be reviewed at today's visit given clinical exam consistent with routine healing

## 2018-03-10 DIAGNOSIS — E03.9 HYPOTHYROIDISM, UNSPECIFIED TYPE: Primary | ICD-10-CM

## 2018-03-11 RX ORDER — LEVOTHYROXINE SODIUM 0.1 MG/1
TABLET ORAL
Qty: 90 TABLET | Refills: 3 | Status: SHIPPED | OUTPATIENT
Start: 2018-03-11 | End: 2018-03-12 | Stop reason: SDUPTHER

## 2018-03-12 DIAGNOSIS — E03.9 HYPOTHYROIDISM, UNSPECIFIED TYPE: ICD-10-CM

## 2018-03-12 RX ORDER — LEVOTHYROXINE SODIUM 0.1 MG/1
100 TABLET ORAL DAILY
Qty: 90 TABLET | Refills: 3 | Status: SHIPPED | OUTPATIENT
Start: 2018-03-12 | End: 2019-02-06 | Stop reason: SDUPTHER

## 2018-05-10 ENCOUNTER — TRANSCRIBE ORDERS (OUTPATIENT)
Dept: LAB | Age: 73
End: 2018-05-10

## 2018-05-10 ENCOUNTER — APPOINTMENT (OUTPATIENT)
Dept: LAB | Age: 73
End: 2018-05-10
Payer: MEDICARE

## 2018-05-10 DIAGNOSIS — M35.3 POLYMYALGIA RHEUMATICA (HCC): Primary | ICD-10-CM

## 2018-05-10 DIAGNOSIS — M06.4 INFLAMMATORY POLYARTHROPATHY (HCC): ICD-10-CM

## 2018-05-10 DIAGNOSIS — M25.511 ACUTE PAIN OF RIGHT SHOULDER: ICD-10-CM

## 2018-05-10 LAB
CRP SERPL QL: 7.1 MG/L
ERYTHROCYTE [SEDIMENTATION RATE] IN BLOOD: 36 MM/HOUR (ref 0–20)

## 2018-05-10 PROCEDURE — 85652 RBC SED RATE AUTOMATED: CPT

## 2018-05-10 PROCEDURE — 36415 COLL VENOUS BLD VENIPUNCTURE: CPT

## 2018-05-10 PROCEDURE — 86140 C-REACTIVE PROTEIN: CPT

## 2018-05-21 DIAGNOSIS — I10 BENIGN ESSENTIAL HYPERTENSION: ICD-10-CM

## 2018-05-21 RX ORDER — LISINOPRIL 20 MG/1
20 TABLET ORAL DAILY
Qty: 90 TABLET | Refills: 0 | Status: SHIPPED | OUTPATIENT
Start: 2018-05-21 | End: 2018-08-10 | Stop reason: SDUPTHER

## 2018-05-29 ENCOUNTER — OFFICE VISIT (OUTPATIENT)
Dept: FAMILY MEDICINE CLINIC | Facility: OTHER | Age: 73
End: 2018-05-29
Payer: MEDICARE

## 2018-05-29 VITALS
WEIGHT: 227 LBS | HEART RATE: 91 BPM | BODY MASS INDEX: 42.89 KG/M2 | TEMPERATURE: 98.7 F | SYSTOLIC BLOOD PRESSURE: 128 MMHG | OXYGEN SATURATION: 98 % | DIASTOLIC BLOOD PRESSURE: 74 MMHG

## 2018-05-29 DIAGNOSIS — E03.9 ACQUIRED HYPOTHYROIDISM: ICD-10-CM

## 2018-05-29 DIAGNOSIS — R73.09 ABNORMAL BLOOD SUGAR: ICD-10-CM

## 2018-05-29 DIAGNOSIS — I10 BENIGN ESSENTIAL HYPERTENSION: ICD-10-CM

## 2018-05-29 DIAGNOSIS — Z86.39 HISTORY OF VITAMIN D DEFICIENCY: Primary | ICD-10-CM

## 2018-05-29 DIAGNOSIS — Z87.81 HISTORY OF FRACTURE DUE TO FALL: ICD-10-CM

## 2018-05-29 DIAGNOSIS — M89.9 DISORDER OF BONE: ICD-10-CM

## 2018-05-29 DIAGNOSIS — Z23 NEED FOR PNEUMOCOCCAL VACCINATION: ICD-10-CM

## 2018-05-29 PROCEDURE — G0009 ADMIN PNEUMOCOCCAL VACCINE: HCPCS

## 2018-05-29 PROCEDURE — 90732 PPSV23 VACC 2 YRS+ SUBQ/IM: CPT

## 2018-05-29 PROCEDURE — 99214 OFFICE O/P EST MOD 30 MIN: CPT | Performed by: FAMILY MEDICINE

## 2018-05-29 NOTE — PROGRESS NOTES
Assessment/Plan:           Problem List Items Addressed This Visit     Benign essential hypertension    Relevant Orders    CBC and differential    Comprehensive metabolic panel    TSH, 3rd generation with T4 reflex    HEMOGLOBIN A1C W/ EAG ESTIMATION    Vitamin D 25 hydroxy    Lipid panel    Hypothyroidism    Relevant Orders    CBC and differential    Comprehensive metabolic panel    TSH, 3rd generation with T4 reflex    HEMOGLOBIN A1C W/ EAG ESTIMATION    Vitamin D 25 hydroxy      Other Visit Diagnoses     History of vitamin D deficiency    -  Primary    Relevant Orders    CBC and differential    Comprehensive metabolic panel    TSH, 3rd generation with T4 reflex    HEMOGLOBIN A1C W/ EAG ESTIMATION    Vitamin D 25 hydroxy    DXA bone density spine hip and pelvis    History of fracture due to fall        Relevant Orders    CBC and differential    Comprehensive metabolic panel    TSH, 3rd generation with T4 reflex    HEMOGLOBIN A1C W/ EAG ESTIMATION    Vitamin D 25 hydroxy    Abnormal blood sugar        Relevant Orders    CBC and differential    Comprehensive metabolic panel    TSH, 3rd generation with T4 reflex    HEMOGLOBIN A1C W/ EAG ESTIMATION    Vitamin D 25 hydroxy    Disorder of bone         Relevant Orders    Vitamin D 25 hydroxy    DXA bone density spine hip and pelvis    Need for pneumococcal vaccination        Relevant Orders    Pneumococcal Polysaccharide Vaccine 23-Valent =>1yo SQ IM (Completed)            Subjective:      Patient ID: Therese Huynh is a 67 y o  female  Hypertension   Patient is a 67 y o  female who presents for follow-up of hypertension  Her high blood pressure was first noted several years ago  Home blood pressure readings: good  Salt intake and diet: salt not added to cooking  Usual weight: stable  Associated signs and symptoms: none  Patient denies: blurred vision, chest pain, headache and peripheral edema  Use of agents associated with hypertension: none   Medication compliance: taking as prescribed  Hypothyroidism:  Patient has been doing well with current therapy  Has no concerns about Palpitations or weight change or energy change or hair or skin changes,  She has been feeling well  Will check labs and monitor routine    She has elevated blood sugar but is not diabetic  Has been watching her sugar intake and carb intake  She is due to have blood work  Advised to walk and encouraged to exercise regularly  She is due to have pneumococcal vaccine and agrees to get it today  The following portions of the patient's history were reviewed and updated as appropriate: allergies, current medications, past family history, past medical history, past social history, past surgical history and problem list     Review of Systems   Constitutional: Negative for appetite change, chills, fatigue, fever and unexpected weight change  HENT: Negative for congestion, ear pain, rhinorrhea and sore throat  Eyes: Negative for visual disturbance  Respiratory: Negative for cough, chest tightness and shortness of breath  Cardiovascular: Negative for chest pain, palpitations and leg swelling  Gastrointestinal: Negative for abdominal pain, blood in stool, constipation and diarrhea  Endocrine: Negative for cold intolerance, heat intolerance, polydipsia, polyphagia and polyuria  Genitourinary: Negative for dysuria, flank pain, menstrual problem and vaginal discharge  Musculoskeletal: Negative for arthralgias and back pain  Skin: Negative for rash  Allergic/Immunologic: Negative for environmental allergies  Neurological: Negative for dizziness, weakness and headaches  Hematological: Negative for adenopathy  Psychiatric/Behavioral: Negative for behavioral problems, decreased concentration and sleep disturbance  The patient is not nervous/anxious and is not hyperactive            Objective:      /74 (BP Location: Left arm, Patient Position: Sitting, Cuff Size: Large)   Pulse 91   Temp 98 7 °F (37 1 °C) (Tympanic)   Wt 103 kg (227 lb)   SpO2 98%   BMI 42 89 kg/m²          Physical Exam   Constitutional: She is oriented to person, place, and time  She appears well-developed and well-nourished  No distress  HENT:   Head: Normocephalic and atraumatic  Eyes: Conjunctivae are normal  Right eye exhibits no discharge  Left eye exhibits no discharge  No scleral icterus  Neck: Normal range of motion  Neck supple  Cardiovascular: Normal rate, regular rhythm and normal heart sounds  No murmur heard  Pulmonary/Chest: Effort normal and breath sounds normal  No respiratory distress  She has no wheezes  Abdominal: Soft  Bowel sounds are normal  She exhibits no distension  There is no tenderness  Lymphadenopathy:     She has no cervical adenopathy  Neurological: She is alert and oriented to person, place, and time  No cranial nerve deficit  Skin: Skin is warm and dry  No rash noted  She is not diaphoretic  Psychiatric: She has a normal mood and affect  Her behavior is normal    Nursing note and vitals reviewed          Lab Results   Component Value Date    WBC 9 97 02/20/2018    HGB 12 9 02/20/2018    HCT 39 6 02/20/2018     02/20/2018    CHOL 199 02/20/2018    TRIG 180 (H) 02/20/2018    HDL 50 02/20/2018    ALT 33 02/20/2018    AST 15 02/20/2018     02/20/2018    K 3 6 02/20/2018     02/20/2018    CREATININE 1 05 02/20/2018    BUN 29 (H) 02/20/2018    CO2 26 02/20/2018    GLUF 102 (H) 02/20/2018

## 2018-07-10 ENCOUNTER — APPOINTMENT (OUTPATIENT)
Dept: LAB | Age: 73
End: 2018-07-10
Payer: MEDICARE

## 2018-07-10 ENCOUNTER — TRANSCRIBE ORDERS (OUTPATIENT)
Dept: LAB | Age: 73
End: 2018-07-10

## 2018-07-10 DIAGNOSIS — Z79.52 LONG TERM CURRENT USE OF SYSTEMIC STEROIDS: ICD-10-CM

## 2018-07-10 DIAGNOSIS — M25.519 ARTHRALGIA OF SHOULDER, UNSPECIFIED LATERALITY: ICD-10-CM

## 2018-07-10 DIAGNOSIS — M35.3 POLYMYALGIA RHEUMATICA (HCC): Primary | ICD-10-CM

## 2018-07-10 DIAGNOSIS — M35.3 POLYMYALGIA RHEUMATICA (HCC): ICD-10-CM

## 2018-07-10 DIAGNOSIS — I10 ESSENTIAL HYPERTENSION: Primary | ICD-10-CM

## 2018-07-10 LAB
CRP SERPL QL: 4.3 MG/L
ERYTHROCYTE [SEDIMENTATION RATE] IN BLOOD: 38 MM/HOUR (ref 0–20)

## 2018-07-10 PROCEDURE — 86140 C-REACTIVE PROTEIN: CPT

## 2018-07-10 PROCEDURE — 85652 RBC SED RATE AUTOMATED: CPT

## 2018-07-10 PROCEDURE — 36415 COLL VENOUS BLD VENIPUNCTURE: CPT

## 2018-07-10 RX ORDER — METOPROLOL SUCCINATE 50 MG/1
TABLET, EXTENDED RELEASE ORAL
Qty: 90 TABLET | Refills: 3 | Status: SHIPPED | OUTPATIENT
Start: 2018-07-10 | End: 2019-07-03 | Stop reason: SDUPTHER

## 2018-08-09 DIAGNOSIS — I10 ESSENTIAL HYPERTENSION: Primary | ICD-10-CM

## 2018-08-09 RX ORDER — HYDROCHLOROTHIAZIDE 25 MG/1
TABLET ORAL
Qty: 90 TABLET | Refills: 2 | Status: SHIPPED | OUTPATIENT
Start: 2018-08-09 | End: 2019-07-10 | Stop reason: SDUPTHER

## 2018-08-10 DIAGNOSIS — I10 BENIGN ESSENTIAL HYPERTENSION: ICD-10-CM

## 2018-08-13 RX ORDER — LISINOPRIL 20 MG/1
TABLET ORAL
Qty: 90 TABLET | Refills: 0 | Status: SHIPPED | OUTPATIENT
Start: 2018-08-13 | End: 2018-11-06 | Stop reason: SDUPTHER

## 2018-09-04 ENCOUNTER — OFFICE VISIT (OUTPATIENT)
Dept: FAMILY MEDICINE CLINIC | Facility: OTHER | Age: 73
End: 2018-09-04
Payer: MEDICARE

## 2018-09-04 VITALS
TEMPERATURE: 98.6 F | OXYGEN SATURATION: 97 % | BODY MASS INDEX: 42.75 KG/M2 | HEIGHT: 61 IN | WEIGHT: 226.44 LBS | DIASTOLIC BLOOD PRESSURE: 80 MMHG | SYSTOLIC BLOOD PRESSURE: 148 MMHG | HEART RATE: 68 BPM

## 2018-09-04 DIAGNOSIS — Z00.00 MEDICARE ANNUAL WELLNESS VISIT, SUBSEQUENT: Primary | ICD-10-CM

## 2018-09-04 DIAGNOSIS — Z13.820 OSTEOPOROSIS SCREENING: ICD-10-CM

## 2018-09-04 DIAGNOSIS — G47.00 INSOMNIA, UNSPECIFIED TYPE: ICD-10-CM

## 2018-09-04 DIAGNOSIS — I10 BENIGN ESSENTIAL HYPERTENSION: ICD-10-CM

## 2018-09-04 DIAGNOSIS — Z12.39 BREAST CANCER SCREENING: ICD-10-CM

## 2018-09-04 DIAGNOSIS — R73.01 IMPAIRED FASTING GLUCOSE: ICD-10-CM

## 2018-09-04 DIAGNOSIS — Z12.11 COLON CANCER SCREENING: ICD-10-CM

## 2018-09-04 DIAGNOSIS — E55.9 VITAMIN D DEFICIENCY: ICD-10-CM

## 2018-09-04 DIAGNOSIS — R31.29 MICROSCOPIC HEMATURIA: ICD-10-CM

## 2018-09-04 DIAGNOSIS — E03.9 ACQUIRED HYPOTHYROIDISM: ICD-10-CM

## 2018-09-04 PROCEDURE — G0439 PPPS, SUBSEQ VISIT: HCPCS | Performed by: FAMILY MEDICINE

## 2018-09-04 RX ORDER — LATANOPROST 50 UG/ML
SOLUTION/ DROPS OPHTHALMIC
Refills: 5 | COMMUNITY
Start: 2018-08-16

## 2018-09-04 RX ORDER — TEMAZEPAM 15 MG/1
15 CAPSULE ORAL
Qty: 30 CAPSULE | Refills: 0 | Status: SHIPPED | OUTPATIENT
Start: 2018-09-04 | End: 2019-09-10 | Stop reason: SDUPTHER

## 2018-09-04 NOTE — PROGRESS NOTES
HPI:  Jennifer Abdi is a 68 y o  female here for her Subsequent Wellness Visit      Patient Active Problem List   Diagnosis    Acute hip pain    Acute pain of both shoulders    Allergic rhinitis    Benign essential hypertension    Closed fracture of proximal end of right humerus with routine healing    Excessive cerumen in right ear canal    Fall    Fracture of right distal radius    Hypothyroidism    Insomnia    Microscopic hematuria    Obesity    Obstructive sleep apnea    Other fatigue    Otitis media of right ear    Right shoulder pain    Shortness of breath    Subacromial impingement of left shoulder    Subacromial impingement of right shoulder    Urticaria    Vitamin D deficiency     Past Medical History:   Diagnosis Date    Anemia     Arthritis     Hypertension     Polymyalgia rheumatica (HCC)     Shoulder impingement syndrome     last assessed 12/27/16     Past Surgical History:   Procedure Laterality Date    ABDOMINAL SURGERY      last assessed 11/1/16    OTHER SURGICAL HISTORY Right 2017    shoulder    TOTAL ABDOMINAL HYSTERECTOMY  27 year ago    WRIST FRACTURE SURGERY Right 2017     Family History   Problem Relation Age of Onset    Hypertension Mother    Deadra Barbie Glaucoma Mother     Stroke Mother     Gout Father     COPD Father     Heart disease Father     Kidney failure Sister         chronic    Gout Sister     Multiple sclerosis Sister     Hypertension Sister     Alcohol abuse Brother     Cancer Brother     Gout Brother      History   Smoking Status    Former Smoker    Packs/day: 0 50    Quit date: 1998   Smokeless Tobacco    Never Used     Comment: 40 years ago      History   Alcohol Use    Yes     Comment: rarely ; occasional       History   Drug Use No       Current Outpatient Prescriptions   Medication Sig Dispense Refill    Acetaminophen (TYLENOL ARTHRITIS PAIN PO) Take by mouth      hydrochlorothiazide (HYDRODIURIL) 25 mg tablet TAKE 1 TABLET BY MOUTH EVERY DAY 90 tablet 2    latanoprost (XALATAN) 0 005 % ophthalmic solution INSTILL 1 DROP IN EACH EYE AT BEDTIME  5    levothyroxine 100 mcg tablet Take 1 tablet (100 mcg total) by mouth daily for 90 days 90 tablet 3    lisinopril (ZESTRIL) 20 mg tablet TAKE 1 TABLET BY MOUTH EVERY DAY 90 tablet 0    Lutein 20 MG TABS Take by mouth daily      metoprolol succinate (TOPROL-XL) 50 mg 24 hr tablet TAKE 1 TABLET BY MOUTH ONE TIME DAILY 90 tablet 3    predniSONE 5 mg tablet Take 5 mg by mouth daily      temazepam (RESTORIL) 15 mg capsule Take 1 capsule (15 mg total) by mouth daily at bedtime as needed for sleep 30 capsule 0     No current facility-administered medications for this visit  Allergies   Allergen Reactions    Tdap [Diphth-Acell Pertussis-Tetanus] Swelling    Dilaudid [Hydromorphone]      Severe vertigo     Propoxyphene      Immunization History   Administered Date(s) Administered    Influenza Split High Dose Preservative Free IM 11/17/2016    Influenza TIV (IM) 1945, 11/04/2009, 01/01/2013    Pneumococcal Conjugate 13-Valent 05/23/2017    Pneumococcal Polysaccharide PPV23 05/29/2018    Tuberculin Skin Test-PPD Intradermal 07/31/2017, 08/16/2017       Patient Care Team:  Willi Martinez DO as PCP - Gordon Cardozo MD    Medicare Screening Tests and Risk Assessments:  Howie Edwards is here for her Subsequent Wellness visit  Last Medicare Wellness visit information reviewed, patient interviewed and updates made to the record today  Health Risk Assessment:  Patient rates overall health as very good  Patient feels that their physical health rating is Same  Eyesight was rated as Same  Hearing was rated as Same  Patient feels that their emotional and mental health rating is Same  Pain experienced by patient in the last 7 days has been None  Patient states that she has experienced no weight loss or gain in last 6 months       Emotional/Mental Health:  Patient has been feeling nervous/anxious  PHQ-9 Depression Screening:    Frequency of the following problems over the past two weeks:      1  Little interest or pleasure in doing things: 0 - not at all      2  Feeling down, depressed, or hopeless: 0 - not at all  PHQ-2 Score: 0          Broken Bones/Falls: Fall Risk Assessment:    In the past year, patient has experienced: No history of falling in past year  visual disturbance    Bladder/Bowel:  Patient has leaked urine accidently in the last six months  Patient reports no loss of bowel control  Immunizations:  Patient has had a flu vaccination within the last year  Patient has received a pneumonia shot  Patient has not received a shingles shot  Patient has received tetanus/diphtheria shot  Home Safety:  Patient does not have trouble with stairs inside or outside of their home  Patient currently reports that there are no safety hazards present in home, working smoke alarms, working carbon monoxide detectors  Preventative Screenings:   Breast cancer screening performed, colon cancer screen completed, cholesterol screen completed, glaucoma eye exam completed,     Nutrition:  Current diet: Regular and Limited junk food with servings of the following:    Medications:  Patient is currently taking over-the-counter supplements  List of OTC medications includes: Advil  Patient is able to manage medications  Lifestyle Choices:  Patient reports no tobacco use  Patient has smoked or used tobacco in the past   Patient has stopped her tobacco use  Patient reports alcohol use  Alcohol use per week: 1 per month  Patient drives a vehicle  Patient wears seat belt  Current level of exercise of physical activity described by patient as: Low          Activities of Daily Living:  Can get out of bed by his or her self, able to dress self, able to make own meals, able to do own shopping, able to bathe self, can do own laundry/housekeeping, can manage own money, pay bills and track expenses    Previous Hospitalizations:  No hospitalization or ED visit in past 12 months        Advanced Directives:  Patient has not decided on power of   Patient has not completed advanced directive  Preventative Screening/Counseling:      Cardiovascular:      General: Risks and Benefits Discussed      Counseling: Healthy Diet, Healthy Weight, Improve Cholesterol, Improve Blood Pressure and Improve Exercise Tolerance     Due for Labs/Analytes/Optional EKG: Lipid Panel          Diabetes:      General: Risks and Benefits Discussed      Counseling: Healthy Diet, Healthy Weight and Improve Physical Activity      Due for labs: Blood Glucose          Colorectal Cancer:      General: Risks and Benefits Discussed      Counseling: high fiber diet      Due for studies: Fecal Occult Blood          Breast Cancer:      General: Risks and Benefits Discussed          Cervical Cancer:      General: Screening Not Indicated          Osteoporosis:      General: Risks and Benefits Discussed      Counseling: Calcium and Vitamin D Intake and Regular Weightbearing Exercise      Due for studies: DXA Axial          AAA:      General: Screening Not Indicated          Glaucoma:      General: Risks and Benefits Discussed and Screening Current          HIV:      General: Screening Not Indicated          Hepatitis C:      General: Risks and Benefits Discussed and Screening Current        Advanced Directives:   Patient has no living will for healthcare, does not have durable POA for healthcare, patient does not have an advanced directive  Information on ACP and/or AD provided  5 wishes given       Immunizations:  Patient reviewed and up to date      Influenza: Risks & Benefits Discussed and Influenza Recommended Annually      Pneumococcal: Lifetime Vaccine Completed      Shingrix: Risks & Benefits Discussed      TDAP: Tdap Vaccine UTD      Other Preventative Counseling (Non-Medicare):  Alcohol Use, Fall Prevention, Increase physical activity, Nutrition Counseling, Car/seat belt/driving safety reviewed, Skin self-exam and Sunscreen use            Review of Systems   Constitutional: Negative for appetite change, fatigue, fever and unexpected weight change  HENT: Negative for congestion, dental problem, ear pain, postnasal drip, sore throat and tinnitus  Eyes: Negative for pain, discharge and visual disturbance  Respiratory: Negative for cough, shortness of breath and wheezing  Cardiovascular: Negative for chest pain, palpitations and leg swelling  Gastrointestinal: Negative for abdominal pain, bowel incontinence, constipation, diarrhea, nausea and vomiting  Endocrine: Negative for cold intolerance and heat intolerance  Genitourinary: Negative for difficulty urinating, dysuria, flank pain and urgency  Musculoskeletal: Negative for arthralgias, back pain, joint swelling and myalgias  Skin: Negative for rash and wound  Allergic/Immunologic: Negative for immunocompromised state  Neurological: Negative for dizziness, syncope, speech difficulty, weakness and numbness  Hematological: Negative for adenopathy  Does not bruise/bleed easily  Psychiatric/Behavioral: Negative for confusion, dysphoric mood and sleep disturbance  The patient is not nervous/anxious  /80 (BP Location: Left arm, Patient Position: Sitting, Cuff Size: Large)   Pulse 68   Temp 98 6 °F (37 °C) (Tympanic)   Ht 5' 0 5" (1 537 m)   Wt 103 kg (226 lb 7 oz)   SpO2 97%   BMI 43 50 kg/m²       Physical Exam   Constitutional: She is oriented to person, place, and time  She appears well-developed and well-nourished  No distress  HENT:   Head: Normocephalic and atraumatic     Right Ear: Hearing, tympanic membrane, external ear and ear canal normal    Left Ear: Hearing, tympanic membrane, external ear and ear canal normal    Nose: Nose normal    Mouth/Throat: Uvula is midline, oropharynx is clear and moist and mucous membranes are normal    Eyes: Conjunctivae and EOM are normal  Pupils are equal, round, and reactive to light  No scleral icterus  Neck: Normal range of motion  Neck supple  No thyromegaly present  Cardiovascular: Normal rate, regular rhythm, normal heart sounds and intact distal pulses  No murmur heard  Pulmonary/Chest: Effort normal and breath sounds normal  No respiratory distress  Abdominal: Soft  Bowel sounds are normal  There is no tenderness  Musculoskeletal: Normal range of motion  She exhibits no edema or tenderness  Lymphadenopathy:     She has no cervical adenopathy  Neurological: She is alert and oriented to person, place, and time  No cranial nerve deficit  Skin: Skin is warm and dry  No rash noted  Psychiatric: She has a normal mood and affect   Her behavior is normal        Assessment and Plan:    Problem List Items Addressed This Visit     Benign essential hypertension    Relevant Orders    Comprehensive metabolic panel    Lipid panel    Hypothyroidism    Relevant Orders    TSH, 3rd generation with Free T4 reflex    Insomnia    Relevant Medications    temazepam (RESTORIL) 15 mg capsule    Microscopic hematuria    Relevant Orders    UA (URINE) with reflex to Microscopic    Vitamin D deficiency      Other Visit Diagnoses     Medicare annual wellness visit, subsequent    -  Primary    Impaired fasting glucose        Relevant Orders    Comprehensive metabolic panel    Hemoglobin A1C    Lipid panel    Breast cancer screening        Relevant Orders    Mammo screening bilateral w cad    Osteoporosis screening        Relevant Orders    DXA bone density spine hip and pelvis    Colon cancer screening        Relevant Orders    Occult Blood, Fecal Immunochemical        Health Maintenance Due   Topic Date Due    MAMMOGRAM  1945    DTaP,Tdap,and Td Vaccines (1 - Tdap) 08/23/1966           RTO 6 months for BP recheck    The patient indicates understanding of these issues and agrees with the plan          Josue Charles, DO

## 2018-09-12 ENCOUNTER — APPOINTMENT (OUTPATIENT)
Dept: LAB | Facility: HOSPITAL | Age: 73
End: 2018-09-12
Payer: MEDICARE

## 2018-09-12 ENCOUNTER — TELEPHONE (OUTPATIENT)
Dept: FAMILY MEDICINE CLINIC | Facility: OTHER | Age: 73
End: 2018-09-12

## 2018-09-12 DIAGNOSIS — Z12.11 COLON CANCER SCREENING: ICD-10-CM

## 2018-09-12 LAB — HEMOCCULT STL QL IA: NEGATIVE

## 2018-09-12 PROCEDURE — G0328 FECAL BLOOD SCRN IMMUNOASSAY: HCPCS

## 2018-09-12 NOTE — TELEPHONE ENCOUNTER
Pt notified   ----- Message from Konstantin Siddiqui DO sent at 9/12/2018  2:00 PM EDT -----  Please inform pt that FIT testing was negative    Thanks!   Konstantin Siddiqui DO

## 2018-09-17 ENCOUNTER — TRANSCRIBE ORDERS (OUTPATIENT)
Dept: LAB | Age: 73
End: 2018-09-17

## 2018-09-17 ENCOUNTER — APPOINTMENT (OUTPATIENT)
Dept: LAB | Age: 73
End: 2018-09-17
Payer: MEDICARE

## 2018-09-17 DIAGNOSIS — Z86.39 HISTORY OF VITAMIN D DEFICIENCY: ICD-10-CM

## 2018-09-17 DIAGNOSIS — I10 BENIGN ESSENTIAL HYPERTENSION: ICD-10-CM

## 2018-09-17 DIAGNOSIS — M35.3 POLYMYALGIA (HCC): ICD-10-CM

## 2018-09-17 DIAGNOSIS — Z87.81 HISTORY OF FRACTURE DUE TO FALL: ICD-10-CM

## 2018-09-17 DIAGNOSIS — Z79.52 LONG TERM CURRENT USE OF SYSTEMIC STEROIDS: ICD-10-CM

## 2018-09-17 DIAGNOSIS — R73.09 ABNORMAL BLOOD SUGAR: ICD-10-CM

## 2018-09-17 DIAGNOSIS — R73.01 IMPAIRED FASTING GLUCOSE: ICD-10-CM

## 2018-09-17 DIAGNOSIS — M35.3 POLYMYALGIA (HCC): Primary | ICD-10-CM

## 2018-09-17 DIAGNOSIS — M89.9 DISORDER OF BONE: ICD-10-CM

## 2018-09-17 DIAGNOSIS — E03.9 ACQUIRED HYPOTHYROIDISM: ICD-10-CM

## 2018-09-17 LAB
25(OH)D3 SERPL-MCNC: 18.6 NG/ML (ref 30–100)
ALBUMIN SERPL BCP-MCNC: 3.6 G/DL (ref 3.5–5)
ALP SERPL-CCNC: 73 U/L (ref 46–116)
ALT SERPL W P-5'-P-CCNC: 36 U/L (ref 12–78)
ANION GAP SERPL CALCULATED.3IONS-SCNC: 8 MMOL/L (ref 4–13)
AST SERPL W P-5'-P-CCNC: 14 U/L (ref 5–45)
BASOPHILS # BLD AUTO: 0.05 THOUSANDS/ΜL (ref 0–0.1)
BASOPHILS NFR BLD AUTO: 1 % (ref 0–1)
BILIRUB SERPL-MCNC: 0.43 MG/DL (ref 0.2–1)
BILIRUB UR QL STRIP: NEGATIVE
BUN SERPL-MCNC: 35 MG/DL (ref 5–25)
CALCIUM SERPL-MCNC: 9.3 MG/DL (ref 8.3–10.1)
CHLORIDE SERPL-SCNC: 104 MMOL/L (ref 100–108)
CHOLEST SERPL-MCNC: 187 MG/DL (ref 50–200)
CLARITY UR: NORMAL
CO2 SERPL-SCNC: 27 MMOL/L (ref 21–32)
COLOR UR: YELLOW
CREAT SERPL-MCNC: 1.24 MG/DL (ref 0.6–1.3)
CRP SERPL QL: 4.4 MG/L
EOSINOPHIL # BLD AUTO: 0.04 THOUSAND/ΜL (ref 0–0.61)
EOSINOPHIL NFR BLD AUTO: 0 % (ref 0–6)
ERYTHROCYTE [DISTWIDTH] IN BLOOD BY AUTOMATED COUNT: 13.5 % (ref 11.6–15.1)
ERYTHROCYTE [SEDIMENTATION RATE] IN BLOOD: 29 MM/HOUR (ref 0–20)
EST. AVERAGE GLUCOSE BLD GHB EST-MCNC: 154 MG/DL
GFR SERPL CREATININE-BSD FRML MDRD: 43 ML/MIN/1.73SQ M
GLUCOSE P FAST SERPL-MCNC: 102 MG/DL (ref 65–99)
GLUCOSE UR STRIP-MCNC: NEGATIVE MG/DL
HBA1C MFR BLD: 7 % (ref 4.2–6.3)
HCT VFR BLD AUTO: 39.3 % (ref 34.8–46.1)
HDLC SERPL-MCNC: 42 MG/DL (ref 40–60)
HGB BLD-MCNC: 12.7 G/DL (ref 11.5–15.4)
HGB UR QL STRIP.AUTO: NEGATIVE
IMM GRANULOCYTES # BLD AUTO: 0.02 THOUSAND/UL (ref 0–0.2)
IMM GRANULOCYTES NFR BLD AUTO: 0 % (ref 0–2)
KETONES UR STRIP-MCNC: NEGATIVE MG/DL
LDLC SERPL CALC-MCNC: 110 MG/DL (ref 0–100)
LEUKOCYTE ESTERASE UR QL STRIP: NEGATIVE
LYMPHOCYTES # BLD AUTO: 2.61 THOUSANDS/ΜL (ref 0.6–4.47)
LYMPHOCYTES NFR BLD AUTO: 26 % (ref 14–44)
MCH RBC QN AUTO: 29.3 PG (ref 26.8–34.3)
MCHC RBC AUTO-ENTMCNC: 32.3 G/DL (ref 31.4–37.4)
MCV RBC AUTO: 91 FL (ref 82–98)
MONOCYTES # BLD AUTO: 0.86 THOUSAND/ΜL (ref 0.17–1.22)
MONOCYTES NFR BLD AUTO: 9 % (ref 4–12)
NEUTROPHILS # BLD AUTO: 6.41 THOUSANDS/ΜL (ref 1.85–7.62)
NEUTS SEG NFR BLD AUTO: 64 % (ref 43–75)
NITRITE UR QL STRIP: NEGATIVE
NONHDLC SERPL-MCNC: 145 MG/DL
NRBC BLD AUTO-RTO: 0 /100 WBCS
PH UR STRIP.AUTO: 5.5 [PH] (ref 4.5–8)
PLATELET # BLD AUTO: 256 THOUSANDS/UL (ref 149–390)
PMV BLD AUTO: 10.9 FL (ref 8.9–12.7)
POTASSIUM SERPL-SCNC: 3.8 MMOL/L (ref 3.5–5.3)
PROT SERPL-MCNC: 7.6 G/DL (ref 6.4–8.2)
PROT UR STRIP-MCNC: NEGATIVE MG/DL
RBC # BLD AUTO: 4.33 MILLION/UL (ref 3.81–5.12)
SODIUM SERPL-SCNC: 139 MMOL/L (ref 136–145)
SP GR UR STRIP.AUTO: 1.02 (ref 1–1.03)
TRIGL SERPL-MCNC: 173 MG/DL
TSH SERPL DL<=0.05 MIU/L-ACNC: 1.44 UIU/ML (ref 0.36–3.74)
UROBILINOGEN UR QL STRIP.AUTO: 0.2 E.U./DL
WBC # BLD AUTO: 9.99 THOUSAND/UL (ref 4.31–10.16)

## 2018-09-17 PROCEDURE — 86140 C-REACTIVE PROTEIN: CPT

## 2018-09-17 PROCEDURE — 81003 URINALYSIS AUTO W/O SCOPE: CPT | Performed by: FAMILY MEDICINE

## 2018-09-17 PROCEDURE — 80053 COMPREHEN METABOLIC PANEL: CPT

## 2018-09-17 PROCEDURE — 85025 COMPLETE CBC W/AUTO DIFF WBC: CPT

## 2018-09-17 PROCEDURE — 85652 RBC SED RATE AUTOMATED: CPT

## 2018-09-17 PROCEDURE — 82306 VITAMIN D 25 HYDROXY: CPT

## 2018-09-17 PROCEDURE — 84443 ASSAY THYROID STIM HORMONE: CPT

## 2018-09-17 PROCEDURE — 36415 COLL VENOUS BLD VENIPUNCTURE: CPT

## 2018-09-17 PROCEDURE — 80061 LIPID PANEL: CPT

## 2018-09-17 PROCEDURE — 83036 HEMOGLOBIN GLYCOSYLATED A1C: CPT

## 2018-09-18 ENCOUNTER — TELEPHONE (OUTPATIENT)
Dept: FAMILY MEDICINE CLINIC | Facility: OTHER | Age: 73
End: 2018-09-18

## 2018-09-18 DIAGNOSIS — E11.9 NEW ONSET TYPE 2 DIABETES MELLITUS (HCC): Primary | ICD-10-CM

## 2018-09-18 DIAGNOSIS — E55.9 VITAMIN D DEFICIENCY: ICD-10-CM

## 2018-09-18 RX ORDER — ERGOCALCIFEROL 1.25 MG/1
50000 CAPSULE ORAL WEEKLY
Qty: 12 CAPSULE | Refills: 0 | Status: SHIPPED | OUTPATIENT
Start: 2018-09-18 | End: 2019-04-02

## 2018-09-18 NOTE — TELEPHONE ENCOUNTER
----- Message from Michael James DO sent at 9/18/2018  5:44 PM EDT -----  Woody Morris,    I have reviewed your recent blood work and two items warrant some extra attention  First, your vitamin D level was notably low at 18 6 ng/mL -- I will send a weekly replacement medication to the pharmacy  Please take this for 12 weeks and we can reassess your labs at that time  Secondly, I'm sorry to say that you have earned the distinction of "Type 2 Diabetic" based on your A1c of 7%  This A1c value correlates to an average glucose of 150-160, far above our goal   This is likely exacerbated by your chronic prednisone use, but nevertheless, I am placing an order for you to meet with the Diabetic Nutrition educators to see if there are any dietary adjustments you can make to keep this under control without needing medications  Please let me know if you have any questions  I'd like to recheck blood work in 3-4 months and follow up with you in the office to review the findings      Take care,  Dr Quijano

## 2018-09-27 ENCOUNTER — HOSPITAL ENCOUNTER (OUTPATIENT)
Dept: RADIOLOGY | Facility: MEDICAL CENTER | Age: 73
Discharge: HOME | End: 2018-09-27
Payer: MEDICARE

## 2018-09-27 DIAGNOSIS — Z13.820 OSTEOPOROSIS SCREENING: ICD-10-CM

## 2018-09-27 DIAGNOSIS — Z12.39 BREAST CANCER SCREENING: ICD-10-CM

## 2018-09-27 PROCEDURE — 77067 SCR MAMMO BI INCL CAD: CPT

## 2018-09-27 PROCEDURE — 77080 DXA BONE DENSITY AXIAL: CPT

## 2018-10-02 ENCOUNTER — TELEPHONE (OUTPATIENT)
Dept: FAMILY MEDICINE CLINIC | Facility: OTHER | Age: 73
End: 2018-10-02

## 2018-10-02 NOTE — TELEPHONE ENCOUNTER
Left message for patient    ----- Message from Tony Callahan DO sent at 10/2/2018  7:29 AM EDT -----  Please inform pt that DXA scan shows osteopenia, or "low bone density "  A daily intake of at least 1200 mg calcium and 800 to 1000 IU of Vitamin D, as well as weight bearing and muscle strengthening exercise, fall prevention and avoidance of tobacco and excessive alcohol intake as basic preventive measures are suggested  Will repeat study in 2-3 yrs as recommended by current guidelines  Thanks!   Tony Callahan DO

## 2018-11-06 DIAGNOSIS — I10 BENIGN ESSENTIAL HYPERTENSION: ICD-10-CM

## 2018-11-06 RX ORDER — LISINOPRIL 20 MG/1
20 TABLET ORAL DAILY
Qty: 90 TABLET | Refills: 1 | Status: SHIPPED | OUTPATIENT
Start: 2018-11-06 | End: 2019-05-16 | Stop reason: SDUPTHER

## 2018-12-05 DIAGNOSIS — E55.9 VITAMIN D DEFICIENCY: ICD-10-CM

## 2018-12-05 RX ORDER — ERGOCALCIFEROL 1.25 MG/1
50000 CAPSULE ORAL WEEKLY
Qty: 12 CAPSULE | Refills: 0 | OUTPATIENT
Start: 2018-12-05 | End: 2019-02-21

## 2018-12-12 DIAGNOSIS — E55.9 VITAMIN D DEFICIENCY: ICD-10-CM

## 2018-12-12 RX ORDER — ERGOCALCIFEROL 1.25 MG/1
CAPSULE ORAL
Qty: 12 CAPSULE | Refills: 0 | OUTPATIENT
Start: 2018-12-12

## 2019-01-07 LAB
LEFT EYE DIABETIC RETINOPATHY: NORMAL
RIGHT EYE DIABETIC RETINOPATHY: NORMAL

## 2019-02-06 DIAGNOSIS — E03.9 HYPOTHYROIDISM, UNSPECIFIED TYPE: ICD-10-CM

## 2019-02-06 RX ORDER — LEVOTHYROXINE SODIUM 0.1 MG/1
100 TABLET ORAL DAILY
Qty: 90 TABLET | Refills: 1 | Status: SHIPPED | OUTPATIENT
Start: 2019-02-06 | End: 2019-08-01 | Stop reason: SDUPTHER

## 2019-02-21 ENCOUNTER — TRANSCRIBE ORDERS (OUTPATIENT)
Dept: LAB | Age: 74
End: 2019-02-21

## 2019-02-21 ENCOUNTER — APPOINTMENT (OUTPATIENT)
Dept: LAB | Age: 74
End: 2019-02-21
Payer: MEDICARE

## 2019-02-21 DIAGNOSIS — M35.3 POLYMYALGIA RHEUMATICA (HCC): ICD-10-CM

## 2019-02-21 DIAGNOSIS — M35.3 POLYMYALGIA RHEUMATICA (HCC): Primary | ICD-10-CM

## 2019-02-21 LAB
CRP SERPL QL: 6.1 MG/L
ERYTHROCYTE [SEDIMENTATION RATE] IN BLOOD: 39 MM/HOUR (ref 0–20)

## 2019-02-21 PROCEDURE — 36415 COLL VENOUS BLD VENIPUNCTURE: CPT

## 2019-02-21 PROCEDURE — 85652 RBC SED RATE AUTOMATED: CPT

## 2019-02-21 PROCEDURE — 86140 C-REACTIVE PROTEIN: CPT

## 2019-03-11 ENCOUNTER — OFFICE VISIT (OUTPATIENT)
Dept: FAMILY MEDICINE CLINIC | Facility: OTHER | Age: 74
End: 2019-03-11
Payer: MEDICARE

## 2019-03-11 VITALS
OXYGEN SATURATION: 97 % | HEIGHT: 60 IN | BODY MASS INDEX: 43.31 KG/M2 | TEMPERATURE: 98.2 F | SYSTOLIC BLOOD PRESSURE: 120 MMHG | WEIGHT: 220.6 LBS | DIASTOLIC BLOOD PRESSURE: 60 MMHG | HEART RATE: 80 BPM

## 2019-03-11 DIAGNOSIS — E11.9 NEW ONSET TYPE 2 DIABETES MELLITUS (HCC): ICD-10-CM

## 2019-03-11 DIAGNOSIS — M17.0 OSTEOARTHRITIS OF BOTH KNEES, UNSPECIFIED OSTEOARTHRITIS TYPE: Primary | ICD-10-CM

## 2019-03-11 PROCEDURE — 99213 OFFICE O/P EST LOW 20 MIN: CPT | Performed by: FAMILY MEDICINE

## 2019-03-11 NOTE — PROGRESS NOTES
Subjective:      Patient ID: Mena Snow is a 68 y o  female  Pt presents c/o bilateral knee arthritis x long time  Says it has been growing worse over the last few months  Rheumatology ordered XR that pt wishes to review with me today    Knee Pain    There was no injury mechanism  The pain is present in the left knee and right knee  The quality of the pain is described as aching  The pain is moderate  The pain has been constant since onset  Associated symptoms include a loss of motion  Pertinent negatives include no inability to bear weight, loss of sensation, muscle weakness, numbness or tingling  She reports no foreign bodies present  The symptoms are aggravated by movement  She has tried NSAIDs for the symptoms  The treatment provided mild relief  Diabetes   She presents for her follow-up ("I don't have diabetes -- I was on prednisone") diabetic visit  She has type 2 diabetes mellitus  The initial diagnosis of diabetes was made 6 months ago  There are no hypoglycemic associated symptoms  Pertinent negatives for hypoglycemia include no dizziness, headaches or nervousness/anxiousness  There are no diabetic associated symptoms  Pertinent negatives for diabetes include no chest pain and no fatigue  There are no hypoglycemic complications  There are no diabetic complications  Risk factors for coronary artery disease include diabetes mellitus, family history, obesity, hypertension, post-menopausal, sedentary lifestyle and stress  When asked about current treatments, none were reported  Her weight is stable  She is following a generally healthy diet  When asked about meal planning, she reported none  She has not had a previous visit with a dietitian  She participates in exercise intermittently  An ACE inhibitor/angiotensin II receptor blocker is being taken  She does not see a podiatrist Eye exam is not current         The following portions of the patient's history were reviewed and updated as appropriate: allergies, current medications, past family history, past medical history, past social history, past surgical history and problem list       Current Outpatient Medications:     hydrochlorothiazide (HYDRODIURIL) 25 mg tablet, TAKE 1 TABLET BY MOUTH EVERY DAY, Disp: 90 tablet, Rfl: 2    latanoprost (XALATAN) 0 005 % ophthalmic solution, INSTILL 1 DROP IN EACH EYE AT BEDTIME, Disp: , Rfl: 5    levothyroxine 100 mcg tablet, Take 1 tablet (100 mcg total) by mouth daily for 90 days, Disp: 90 tablet, Rfl: 1    lisinopril (ZESTRIL) 20 mg tablet, Take 1 tablet (20 mg total) by mouth daily, Disp: 90 tablet, Rfl: 1    metoprolol succinate (TOPROL-XL) 50 mg 24 hr tablet, TAKE 1 TABLET BY MOUTH ONE TIME DAILY, Disp: 90 tablet, Rfl: 3    temazepam (RESTORIL) 15 mg capsule, Take 1 capsule (15 mg total) by mouth daily at bedtime as needed for sleep, Disp: 30 capsule, Rfl: 0    Acetaminophen (TYLENOL ARTHRITIS PAIN PO), Take by mouth, Disp: , Rfl:     ergocalciferol (VITAMIN D2) 50,000 units, Take 1 capsule (50,000 Units total) by mouth once a week for 12 doses, Disp: 12 capsule, Rfl: 0    Lutein 20 MG TABS, Take by mouth daily, Disp: , Rfl:       Review of Systems   Constitutional: Negative for activity change, fatigue and fever  HENT: Negative for congestion, ear pain, sinus pain and sore throat  Eyes: Negative for pain and itching  Respiratory: Negative for cough and shortness of breath  Cardiovascular: Negative for chest pain and palpitations  Gastrointestinal: Negative for abdominal pain, constipation, diarrhea, nausea and vomiting  Endocrine: Negative for cold intolerance and heat intolerance  Genitourinary: Negative for dysuria  Musculoskeletal: Positive for arthralgias  Negative for myalgias  Skin: Negative for color change and rash  Neurological: Negative for dizziness, tingling, syncope, numbness and headaches  Hematological: Negative for adenopathy     Psychiatric/Behavioral: Negative for behavioral problems, dysphoric mood and sleep disturbance  The patient is not nervous/anxious  Objective:      /60 (BP Location: Left arm, Patient Position: Sitting, Cuff Size: Large)   Pulse 80   Temp 98 2 °F (36 8 °C) (Tympanic)   Ht 5' (1 524 m)   Wt 100 kg (220 lb 9 6 oz)   SpO2 97%   BMI 43 08 kg/m²          Physical Exam   Constitutional: She is oriented to person, place, and time  She appears well-developed and well-nourished  No distress  Morbidly obese, Body mass index is 43 08 kg/m²  HENT:   Head: Normocephalic and atraumatic  Right Ear: External ear normal    Left Ear: External ear normal    Nose: Nose normal    Mouth/Throat: Oropharynx is clear and moist    Eyes: Pupils are equal, round, and reactive to light  Conjunctivae and EOM are normal  No scleral icterus  Neck: Normal range of motion  Neck supple  No thyromegaly present  Cardiovascular: Normal rate, regular rhythm and normal heart sounds  No murmur heard  Pulmonary/Chest: Effort normal and breath sounds normal  No respiratory distress  She has no wheezes  Abdominal: Soft  Bowel sounds are normal  She exhibits no distension  There is no tenderness  Musculoskeletal: She exhibits no edema  Right knee: She exhibits decreased range of motion  She exhibits no swelling, no effusion, no ecchymosis and no deformity  Tenderness found  Medial joint line tenderness noted  Left knee: She exhibits decreased range of motion  She exhibits no swelling, no effusion, no ecchymosis and no deformity  Tenderness found  Medial joint line tenderness noted  Lymphadenopathy:     She has no cervical adenopathy  Neurological: She is alert and oriented to person, place, and time  No cranial nerve deficit  Coordination normal    Skin: Skin is warm and dry  No rash noted  No erythema  No pallor  Psychiatric: She has a normal mood and affect  Her behavior is normal    Vitals reviewed            Assessment/Plan: Diagnoses and all orders for this visit:    Osteoarthritis of both knees, unspecified osteoarthritis type  -     Ambulatory referral to Orthopedic Surgery; Future  -     XR suggests b/l osteoarthritis (L>R, not bone-on-bone) -- f/u with Ortho to discuss injections as pt wishes to avoid surgery    New onset type 2 diabetes mellitus ([facility] [address] )        -     Dx reviewed with pt, labs from 9/2018 reviewed in detail        -     Reiterated the importance of healthy diet and regular exercise        -     Labs (due 12/2018) reprinted -- pt advised to do them ASAP;  Consider Nutrition referral          Return in about 3 months (around 6/11/2019) for Recheck DM    AWV due September 2019  The patient indicates understanding of these issues and agrees with the plan          Fern Clark, DO

## 2019-04-02 ENCOUNTER — OFFICE VISIT (OUTPATIENT)
Dept: FAMILY MEDICINE CLINIC | Facility: OTHER | Age: 74
End: 2019-04-02
Payer: MEDICARE

## 2019-04-02 VITALS
HEIGHT: 60 IN | DIASTOLIC BLOOD PRESSURE: 70 MMHG | TEMPERATURE: 97.7 F | OXYGEN SATURATION: 98 % | SYSTOLIC BLOOD PRESSURE: 142 MMHG | BODY MASS INDEX: 42.03 KG/M2 | HEART RATE: 82 BPM | WEIGHT: 214.06 LBS

## 2019-04-02 DIAGNOSIS — R53.83 MALAISE AND FATIGUE: ICD-10-CM

## 2019-04-02 DIAGNOSIS — E86.1 HYPOTENSION DUE TO HYPOVOLEMIA: ICD-10-CM

## 2019-04-02 DIAGNOSIS — I95.89 HYPOTENSION DUE TO HYPOVOLEMIA: ICD-10-CM

## 2019-04-02 DIAGNOSIS — E86.9 VOLUME DEPLETION: Primary | ICD-10-CM

## 2019-04-02 DIAGNOSIS — R53.81 MALAISE AND FATIGUE: ICD-10-CM

## 2019-04-02 DIAGNOSIS — E11.9 NEW ONSET TYPE 2 DIABETES MELLITUS (HCC): ICD-10-CM

## 2019-04-02 DIAGNOSIS — E66.01 MORBID OBESITY (HCC): ICD-10-CM

## 2019-04-02 LAB — SL AMB POCT HEMOGLOBIN AIC: 6.8 (ref ?–6.5)

## 2019-04-02 PROCEDURE — 99214 OFFICE O/P EST MOD 30 MIN: CPT | Performed by: FAMILY MEDICINE

## 2019-04-02 PROCEDURE — 83036 HEMOGLOBIN GLYCOSYLATED A1C: CPT | Performed by: FAMILY MEDICINE

## 2019-04-02 RX ORDER — IBUPROFEN 200 MG
200 TABLET ORAL AS NEEDED
COMMUNITY
End: 2019-05-07

## 2019-04-03 ENCOUNTER — APPOINTMENT (OUTPATIENT)
Dept: LAB | Age: 74
End: 2019-04-03
Payer: MEDICARE

## 2019-04-03 DIAGNOSIS — M35.3 POLYMYALGIA RHEUMATICA (HCC): Primary | ICD-10-CM

## 2019-04-03 DIAGNOSIS — E11.9 NEW ONSET TYPE 2 DIABETES MELLITUS (HCC): ICD-10-CM

## 2019-04-03 DIAGNOSIS — I95.89 HYPOTENSION DUE TO HYPOVOLEMIA: ICD-10-CM

## 2019-04-03 DIAGNOSIS — R53.83 MALAISE AND FATIGUE: ICD-10-CM

## 2019-04-03 DIAGNOSIS — E86.1 HYPOTENSION DUE TO HYPOVOLEMIA: ICD-10-CM

## 2019-04-03 DIAGNOSIS — E86.9 VOLUME DEPLETION: ICD-10-CM

## 2019-04-03 DIAGNOSIS — E55.9 VITAMIN D DEFICIENCY: ICD-10-CM

## 2019-04-03 DIAGNOSIS — R53.81 MALAISE AND FATIGUE: ICD-10-CM

## 2019-04-03 LAB
25(OH)D3 SERPL-MCNC: 21.2 NG/ML (ref 30–100)
ALBUMIN SERPL BCP-MCNC: 3.4 G/DL (ref 3.5–5)
ALP SERPL-CCNC: 80 U/L (ref 46–116)
ALT SERPL W P-5'-P-CCNC: 44 U/L (ref 12–78)
ANION GAP SERPL CALCULATED.3IONS-SCNC: 9 MMOL/L (ref 4–13)
AST SERPL W P-5'-P-CCNC: 16 U/L (ref 5–45)
BACTERIA UR QL AUTO: ABNORMAL /HPF
BASOPHILS # BLD AUTO: 0.06 THOUSANDS/ΜL (ref 0–0.1)
BASOPHILS NFR BLD AUTO: 1 % (ref 0–1)
BILIRUB SERPL-MCNC: 0.4 MG/DL (ref 0.2–1)
BILIRUB UR QL STRIP: NEGATIVE
BUN SERPL-MCNC: 52 MG/DL (ref 5–25)
CALCIUM SERPL-MCNC: 9 MG/DL (ref 8.3–10.1)
CHLORIDE SERPL-SCNC: 100 MMOL/L (ref 100–108)
CHOLEST SERPL-MCNC: 181 MG/DL (ref 50–200)
CLARITY UR: ABNORMAL
CO2 SERPL-SCNC: 25 MMOL/L (ref 21–32)
COLOR UR: YELLOW
CREAT SERPL-MCNC: 2.09 MG/DL (ref 0.6–1.3)
CREAT UR-MCNC: 179 MG/DL
CRP SERPL QL: 30.5 MG/L
EOSINOPHIL # BLD AUTO: 0.11 THOUSAND/ΜL (ref 0–0.61)
EOSINOPHIL NFR BLD AUTO: 1 % (ref 0–6)
ERYTHROCYTE [DISTWIDTH] IN BLOOD BY AUTOMATED COUNT: 13.1 % (ref 11.6–15.1)
EST. AVERAGE GLUCOSE BLD GHB EST-MCNC: 146 MG/DL
GFR SERPL CREATININE-BSD FRML MDRD: 23 ML/MIN/1.73SQ M
GLUCOSE P FAST SERPL-MCNC: 139 MG/DL (ref 65–99)
GLUCOSE UR STRIP-MCNC: NEGATIVE MG/DL
HBA1C MFR BLD: 6.7 % (ref 4.2–6.3)
HCT VFR BLD AUTO: 35.4 % (ref 34.8–46.1)
HDLC SERPL-MCNC: 25 MG/DL (ref 40–60)
HGB BLD-MCNC: 11.7 G/DL (ref 11.5–15.4)
HGB UR QL STRIP.AUTO: ABNORMAL
HYALINE CASTS #/AREA URNS LPF: ABNORMAL /LPF
IMM GRANULOCYTES # BLD AUTO: 0.08 THOUSAND/UL (ref 0–0.2)
IMM GRANULOCYTES NFR BLD AUTO: 1 % (ref 0–2)
KETONES UR STRIP-MCNC: NEGATIVE MG/DL
LDLC SERPL CALC-MCNC: 116 MG/DL (ref 0–100)
LEUKOCYTE ESTERASE UR QL STRIP: ABNORMAL
LYMPHOCYTES # BLD AUTO: 2.58 THOUSANDS/ΜL (ref 0.6–4.47)
LYMPHOCYTES NFR BLD AUTO: 24 % (ref 14–44)
MCH RBC QN AUTO: 28.6 PG (ref 26.8–34.3)
MCHC RBC AUTO-ENTMCNC: 33.1 G/DL (ref 31.4–37.4)
MCV RBC AUTO: 87 FL (ref 82–98)
MICROALBUMIN UR-MCNC: 45.8 MG/L (ref 0–20)
MICROALBUMIN/CREAT 24H UR: 26 MG/G CREATININE (ref 0–30)
MONOCYTES # BLD AUTO: 1.13 THOUSAND/ΜL (ref 0.17–1.22)
MONOCYTES NFR BLD AUTO: 10 % (ref 4–12)
NEUTROPHILS # BLD AUTO: 6.96 THOUSANDS/ΜL (ref 1.85–7.62)
NEUTS SEG NFR BLD AUTO: 63 % (ref 43–75)
NITRITE UR QL STRIP: NEGATIVE
NON-SQ EPI CELLS URNS QL MICRO: ABNORMAL /HPF
NONHDLC SERPL-MCNC: 156 MG/DL
NRBC BLD AUTO-RTO: 0 /100 WBCS
PH UR STRIP.AUTO: 6 [PH]
PLATELET # BLD AUTO: 347 THOUSANDS/UL (ref 149–390)
PMV BLD AUTO: 10.2 FL (ref 8.9–12.7)
POTASSIUM SERPL-SCNC: 3.5 MMOL/L (ref 3.5–5.3)
PROT SERPL-MCNC: 7.6 G/DL (ref 6.4–8.2)
PROT UR STRIP-MCNC: ABNORMAL MG/DL
RBC # BLD AUTO: 4.09 MILLION/UL (ref 3.81–5.12)
RBC #/AREA URNS AUTO: ABNORMAL /HPF
SODIUM SERPL-SCNC: 134 MMOL/L (ref 136–145)
SP GR UR STRIP.AUTO: 1.02 (ref 1–1.03)
TRIGL SERPL-MCNC: 201 MG/DL
UROBILINOGEN UR QL STRIP.AUTO: 0.2 E.U./DL
WBC # BLD AUTO: 10.92 THOUSAND/UL (ref 4.31–10.16)
WBC #/AREA URNS AUTO: ABNORMAL /HPF

## 2019-04-03 PROCEDURE — 86140 C-REACTIVE PROTEIN: CPT

## 2019-04-03 PROCEDURE — 87077 CULTURE AEROBIC IDENTIFY: CPT | Performed by: FAMILY MEDICINE

## 2019-04-03 PROCEDURE — 87086 URINE CULTURE/COLONY COUNT: CPT | Performed by: FAMILY MEDICINE

## 2019-04-03 PROCEDURE — 80053 COMPREHEN METABOLIC PANEL: CPT

## 2019-04-03 PROCEDURE — 36415 COLL VENOUS BLD VENIPUNCTURE: CPT

## 2019-04-03 PROCEDURE — 80061 LIPID PANEL: CPT

## 2019-04-03 PROCEDURE — 83036 HEMOGLOBIN GLYCOSYLATED A1C: CPT

## 2019-04-03 PROCEDURE — 82043 UR ALBUMIN QUANTITATIVE: CPT

## 2019-04-03 PROCEDURE — 82570 ASSAY OF URINE CREATININE: CPT

## 2019-04-03 PROCEDURE — 81001 URINALYSIS AUTO W/SCOPE: CPT | Performed by: FAMILY MEDICINE

## 2019-04-03 PROCEDURE — 82306 VITAMIN D 25 HYDROXY: CPT

## 2019-04-03 PROCEDURE — 87186 SC STD MICRODIL/AGAR DIL: CPT | Performed by: FAMILY MEDICINE

## 2019-04-03 PROCEDURE — 85025 COMPLETE CBC W/AUTO DIFF WBC: CPT

## 2019-04-04 ENCOUNTER — TELEPHONE (OUTPATIENT)
Dept: FAMILY MEDICINE CLINIC | Facility: OTHER | Age: 74
End: 2019-04-04

## 2019-04-04 DIAGNOSIS — N39.0 URINARY TRACT INFECTION WITHOUT HEMATURIA, SITE UNSPECIFIED: Primary | ICD-10-CM

## 2019-04-04 RX ORDER — CIPROFLOXACIN 500 MG/1
500 TABLET, FILM COATED ORAL EVERY 12 HOURS SCHEDULED
Qty: 20 TABLET | Refills: 0 | Status: SHIPPED | OUTPATIENT
Start: 2019-04-04 | End: 2019-04-14

## 2019-04-05 ENCOUNTER — TELEPHONE (OUTPATIENT)
Dept: FAMILY MEDICINE CLINIC | Facility: OTHER | Age: 74
End: 2019-04-05

## 2019-04-05 LAB — BACTERIA UR CULT: ABNORMAL

## 2019-04-08 ENCOUNTER — TELEPHONE (OUTPATIENT)
Dept: FAMILY MEDICINE CLINIC | Facility: OTHER | Age: 74
End: 2019-04-08

## 2019-04-11 ENCOUNTER — TELEPHONE (OUTPATIENT)
Dept: FAMILY MEDICINE CLINIC | Facility: CLINIC | Age: 74
End: 2019-04-11

## 2019-04-11 DIAGNOSIS — N17.9 ACUTE RENAL FAILURE, UNSPECIFIED ACUTE RENAL FAILURE TYPE (HCC): Primary | ICD-10-CM

## 2019-04-15 ENCOUNTER — APPOINTMENT (OUTPATIENT)
Dept: LAB | Age: 74
End: 2019-04-15
Payer: MEDICARE

## 2019-04-15 DIAGNOSIS — N17.9 ACUTE RENAL FAILURE, UNSPECIFIED ACUTE RENAL FAILURE TYPE (HCC): ICD-10-CM

## 2019-04-15 LAB
ANION GAP SERPL CALCULATED.3IONS-SCNC: 7 MMOL/L (ref 4–13)
BUN SERPL-MCNC: 30 MG/DL (ref 5–25)
CALCIUM SERPL-MCNC: 8.6 MG/DL (ref 8.3–10.1)
CHLORIDE SERPL-SCNC: 115 MMOL/L (ref 100–108)
CO2 SERPL-SCNC: 20 MMOL/L (ref 21–32)
CREAT SERPL-MCNC: 1.72 MG/DL (ref 0.6–1.3)
GFR SERPL CREATININE-BSD FRML MDRD: 29 ML/MIN/1.73SQ M
GLUCOSE P FAST SERPL-MCNC: 94 MG/DL (ref 65–99)
POTASSIUM SERPL-SCNC: 4.5 MMOL/L (ref 3.5–5.3)
SODIUM SERPL-SCNC: 142 MMOL/L (ref 136–145)

## 2019-04-15 PROCEDURE — 80048 BASIC METABOLIC PNL TOTAL CA: CPT

## 2019-04-15 PROCEDURE — 36415 COLL VENOUS BLD VENIPUNCTURE: CPT

## 2019-04-16 ENCOUNTER — OFFICE VISIT (OUTPATIENT)
Dept: FAMILY MEDICINE CLINIC | Facility: OTHER | Age: 74
End: 2019-04-16
Payer: MEDICARE

## 2019-04-16 VITALS
HEART RATE: 63 BPM | SYSTOLIC BLOOD PRESSURE: 148 MMHG | TEMPERATURE: 98.7 F | BODY MASS INDEX: 44.03 KG/M2 | DIASTOLIC BLOOD PRESSURE: 76 MMHG | OXYGEN SATURATION: 98 % | HEIGHT: 60 IN | WEIGHT: 224.25 LBS

## 2019-04-16 DIAGNOSIS — N17.9 ACUTE RENAL FAILURE, UNSPECIFIED ACUTE RENAL FAILURE TYPE (HCC): Primary | ICD-10-CM

## 2019-04-16 DIAGNOSIS — I10 BENIGN ESSENTIAL HYPERTENSION: ICD-10-CM

## 2019-04-16 PROCEDURE — 99213 OFFICE O/P EST LOW 20 MIN: CPT | Performed by: FAMILY MEDICINE

## 2019-04-17 DIAGNOSIS — I10 BENIGN ESSENTIAL HYPERTENSION: ICD-10-CM

## 2019-04-17 RX ORDER — LISINOPRIL 20 MG/1
TABLET ORAL
Qty: 90 TABLET | Refills: 1 | OUTPATIENT
Start: 2019-04-17

## 2019-04-19 ENCOUNTER — TELEPHONE (OUTPATIENT)
Dept: FAMILY MEDICINE CLINIC | Facility: OTHER | Age: 74
End: 2019-04-19

## 2019-04-22 ENCOUNTER — CONSULT (OUTPATIENT)
Dept: OBGYN CLINIC | Facility: MEDICAL CENTER | Age: 74
End: 2019-04-22
Payer: MEDICARE

## 2019-04-22 VITALS
WEIGHT: 224 LBS | SYSTOLIC BLOOD PRESSURE: 175 MMHG | BODY MASS INDEX: 43.98 KG/M2 | HEART RATE: 85 BPM | HEIGHT: 60 IN | DIASTOLIC BLOOD PRESSURE: 74 MMHG

## 2019-04-22 DIAGNOSIS — M17.0 PRIMARY OSTEOARTHRITIS OF BOTH KNEES: Primary | ICD-10-CM

## 2019-04-22 DIAGNOSIS — M17.0 OSTEOARTHRITIS OF BOTH KNEES, UNSPECIFIED OSTEOARTHRITIS TYPE: ICD-10-CM

## 2019-04-22 PROCEDURE — 99214 OFFICE O/P EST MOD 30 MIN: CPT | Performed by: ORTHOPAEDIC SURGERY

## 2019-04-22 PROCEDURE — 20610 DRAIN/INJ JOINT/BURSA W/O US: CPT | Performed by: ORTHOPAEDIC SURGERY

## 2019-04-22 RX ORDER — LIDOCAINE HYDROCHLORIDE 10 MG/ML
1 INJECTION, SOLUTION INFILTRATION; PERINEURAL
Status: COMPLETED | OUTPATIENT
Start: 2019-04-22 | End: 2019-04-22

## 2019-04-22 RX ORDER — TRIAMCINOLONE ACETONIDE 40 MG/ML
40 INJECTION, SUSPENSION INTRA-ARTICULAR; INTRAMUSCULAR
Status: COMPLETED | OUTPATIENT
Start: 2019-04-22 | End: 2019-04-22

## 2019-04-22 RX ADMIN — LIDOCAINE HYDROCHLORIDE 1 ML: 10 INJECTION, SOLUTION INFILTRATION; PERINEURAL at 13:51

## 2019-04-22 RX ADMIN — TRIAMCINOLONE ACETONIDE 40 MG: 40 INJECTION, SUSPENSION INTRA-ARTICULAR; INTRAMUSCULAR at 13:50

## 2019-04-22 RX ADMIN — TRIAMCINOLONE ACETONIDE 40 MG: 40 INJECTION, SUSPENSION INTRA-ARTICULAR; INTRAMUSCULAR at 13:51

## 2019-04-22 RX ADMIN — LIDOCAINE HYDROCHLORIDE 1 ML: 10 INJECTION, SOLUTION INFILTRATION; PERINEURAL at 13:50

## 2019-04-24 ENCOUNTER — TELEPHONE (OUTPATIENT)
Dept: OBGYN CLINIC | Facility: HOSPITAL | Age: 74
End: 2019-04-24

## 2019-05-01 ENCOUNTER — TELEPHONE (OUTPATIENT)
Dept: OBGYN CLINIC | Facility: OTHER | Age: 74
End: 2019-05-01

## 2019-05-03 ENCOUNTER — APPOINTMENT (OUTPATIENT)
Dept: LAB | Age: 74
End: 2019-05-03
Payer: MEDICARE

## 2019-05-03 DIAGNOSIS — N17.9 ACUTE RENAL FAILURE, UNSPECIFIED ACUTE RENAL FAILURE TYPE (HCC): ICD-10-CM

## 2019-05-03 LAB
ANION GAP SERPL CALCULATED.3IONS-SCNC: 5 MMOL/L (ref 4–13)
BUN SERPL-MCNC: 46 MG/DL (ref 5–25)
CALCIUM SERPL-MCNC: 9 MG/DL (ref 8.3–10.1)
CHLORIDE SERPL-SCNC: 105 MMOL/L (ref 100–108)
CO2 SERPL-SCNC: 26 MMOL/L (ref 21–32)
CREAT SERPL-MCNC: 1.57 MG/DL (ref 0.6–1.3)
GFR SERPL CREATININE-BSD FRML MDRD: 32 ML/MIN/1.73SQ M
GLUCOSE P FAST SERPL-MCNC: 105 MG/DL (ref 65–99)
POTASSIUM SERPL-SCNC: 4.4 MMOL/L (ref 3.5–5.3)
SODIUM SERPL-SCNC: 136 MMOL/L (ref 136–145)

## 2019-05-03 PROCEDURE — 80048 BASIC METABOLIC PNL TOTAL CA: CPT

## 2019-05-03 PROCEDURE — 36415 COLL VENOUS BLD VENIPUNCTURE: CPT

## 2019-05-07 ENCOUNTER — OFFICE VISIT (OUTPATIENT)
Dept: FAMILY MEDICINE CLINIC | Facility: OTHER | Age: 74
End: 2019-05-07
Payer: MEDICARE

## 2019-05-07 VITALS
BODY MASS INDEX: 41.93 KG/M2 | TEMPERATURE: 98.8 F | WEIGHT: 213.56 LBS | DIASTOLIC BLOOD PRESSURE: 64 MMHG | OXYGEN SATURATION: 98 % | SYSTOLIC BLOOD PRESSURE: 108 MMHG | HEIGHT: 60 IN | HEART RATE: 64 BPM

## 2019-05-07 DIAGNOSIS — I10 BENIGN ESSENTIAL HYPERTENSION: Primary | ICD-10-CM

## 2019-05-07 DIAGNOSIS — N18.30 CKD (CHRONIC KIDNEY DISEASE) STAGE 3, GFR 30-59 ML/MIN (HCC): ICD-10-CM

## 2019-05-07 DIAGNOSIS — Z28.21 REFUSED INFLUENZA VACCINE: ICD-10-CM
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
## 2019-05-07 PROBLEM — R06.02 SHORTNESS OF BREATH: Status: RESOLVED | Noted: 2018-01-16 | Resolved: 2019-05-07

## 2019-05-07 PROBLEM — M25.511 RIGHT SHOULDER PAIN: Status: RESOLVED | Noted: 2017-08-14 | Resolved: 2019-05-07

## 2019-05-07 PROBLEM — H61.21 EXCESSIVE CERUMEN IN RIGHT EAR CANAL: Status: RESOLVED | Noted: 2017-08-12 | Resolved: 2019-05-07

## 2019-05-07 PROBLEM — H66.91 OTITIS MEDIA OF RIGHT EAR: Status: RESOLVED | Noted: 2017-08-12 | Resolved: 2019-05-07

## 2019-05-07 PROCEDURE — 99213 OFFICE O/P EST LOW 20 MIN: CPT | Performed by: FAMILY MEDICINE

## 2019-05-10 ENCOUNTER — TELEPHONE (OUTPATIENT)
Dept: OBGYN CLINIC | Facility: HOSPITAL | Age: 74
End: 2019-05-10

## 2019-05-16 DIAGNOSIS — I10 BENIGN ESSENTIAL HYPERTENSION: ICD-10-CM

## 2019-05-16 RX ORDER — LISINOPRIL 20 MG/1
20 TABLET ORAL DAILY
Qty: 90 TABLET | Refills: 1 | Status: SHIPPED | OUTPATIENT
Start: 2019-05-16 | End: 2019-10-28 | Stop reason: SDUPTHER

## 2019-05-16 RX ORDER — LISINOPRIL 20 MG/1
20 TABLET ORAL DAILY
Qty: 90 TABLET | Refills: 1 | OUTPATIENT
Start: 2019-05-16

## 2019-06-05 ENCOUNTER — OFFICE VISIT (OUTPATIENT)
Dept: OBGYN CLINIC | Facility: CLINIC | Age: 74
End: 2019-06-05
Payer: MEDICARE

## 2019-06-05 DIAGNOSIS — M17.0 PRIMARY OSTEOARTHRITIS OF BOTH KNEES: Primary | ICD-10-CM

## 2019-06-05 PROCEDURE — 20610 DRAIN/INJ JOINT/BURSA W/O US: CPT | Performed by: ORTHOPAEDIC SURGERY

## 2019-06-05 RX ORDER — HYALURONATE SODIUM 10 MG/ML
20 SYRINGE (ML) INTRAARTICULAR
Status: COMPLETED | OUTPATIENT
Start: 2019-06-05 | End: 2019-06-05

## 2019-06-05 RX ADMIN — Medication 20 MG: at 13:29

## 2019-06-12 ENCOUNTER — OFFICE VISIT (OUTPATIENT)
Dept: OBGYN CLINIC | Facility: CLINIC | Age: 74
End: 2019-06-12
Payer: MEDICARE

## 2019-06-12 DIAGNOSIS — M17.0 OSTEOARTHRITIS OF BOTH KNEES, UNSPECIFIED OSTEOARTHRITIS TYPE: Primary | ICD-10-CM

## 2019-06-12 PROCEDURE — 20610 DRAIN/INJ JOINT/BURSA W/O US: CPT | Performed by: ORTHOPAEDIC SURGERY

## 2019-06-12 RX ORDER — HYALURONATE SODIUM 10 MG/ML
20 SYRINGE (ML) INTRAARTICULAR
Status: COMPLETED | OUTPATIENT
Start: 2019-06-12 | End: 2019-06-12

## 2019-06-12 RX ADMIN — Medication 20 MG: at 13:19

## 2019-06-19 ENCOUNTER — OFFICE VISIT (OUTPATIENT)
Dept: OBGYN CLINIC | Facility: CLINIC | Age: 74
End: 2019-06-19
Payer: MEDICARE

## 2019-06-19 DIAGNOSIS — M17.0 PRIMARY OSTEOARTHRITIS OF BOTH KNEES: Primary | ICD-10-CM

## 2019-06-19 PROCEDURE — 20610 DRAIN/INJ JOINT/BURSA W/O US: CPT | Performed by: ORTHOPAEDIC SURGERY

## 2019-06-19 RX ORDER — HYALURONATE SODIUM 10 MG/ML
20 SYRINGE (ML) INTRAARTICULAR
Status: COMPLETED | OUTPATIENT
Start: 2019-06-19 | End: 2019-06-19

## 2019-06-19 RX ADMIN — Medication 20 MG: at 13:18

## 2019-07-03 DIAGNOSIS — I10 ESSENTIAL HYPERTENSION: ICD-10-CM

## 2019-07-03 RX ORDER — METOPROLOL SUCCINATE 50 MG/1
TABLET, EXTENDED RELEASE ORAL
Qty: 90 TABLET | Refills: 1 | Status: SHIPPED | OUTPATIENT
Start: 2019-07-03 | End: 2020-01-13

## 2019-07-10 DIAGNOSIS — I10 ESSENTIAL HYPERTENSION: ICD-10-CM

## 2019-07-10 RX ORDER — HYDROCHLOROTHIAZIDE 25 MG/1
TABLET ORAL
Qty: 90 TABLET | Refills: 1 | Status: SHIPPED | OUTPATIENT
Start: 2019-07-10 | End: 2020-01-13

## 2019-07-10 NOTE — TELEPHONE ENCOUNTER
Patient should contact office for ALL future refills    No longer accepting auto-refill requests from pharmacies due to medication reconciliation errors

## 2019-07-21 DIAGNOSIS — E03.9 HYPOTHYROIDISM, UNSPECIFIED TYPE: ICD-10-CM

## 2019-07-22 RX ORDER — LEVOTHYROXINE SODIUM 0.1 MG/1
TABLET ORAL
Qty: 90 TABLET | Refills: 1 | OUTPATIENT
Start: 2019-07-22

## 2019-08-01 DIAGNOSIS — E03.9 HYPOTHYROIDISM, UNSPECIFIED TYPE: ICD-10-CM

## 2019-08-01 RX ORDER — LEVOTHYROXINE SODIUM 0.1 MG/1
100 TABLET ORAL DAILY
Qty: 90 TABLET | Refills: 1 | Status: SHIPPED | OUTPATIENT
Start: 2019-08-01 | End: 2020-01-17 | Stop reason: SDUPTHER

## 2019-08-07 LAB
LEFT EYE DIABETIC RETINOPATHY: NORMAL
RIGHT EYE DIABETIC RETINOPATHY: NORMAL

## 2019-09-09 ENCOUNTER — APPOINTMENT (OUTPATIENT)
Dept: RADIOLOGY | Facility: AMBULARY SURGERY CENTER | Age: 74
End: 2019-09-09
Payer: MEDICARE

## 2019-09-09 ENCOUNTER — OFFICE VISIT (OUTPATIENT)
Dept: OBGYN CLINIC | Facility: CLINIC | Age: 74
End: 2019-09-09
Payer: MEDICARE

## 2019-09-09 VITALS
BODY MASS INDEX: 41.82 KG/M2 | DIASTOLIC BLOOD PRESSURE: 86 MMHG | HEART RATE: 64 BPM | WEIGHT: 213 LBS | SYSTOLIC BLOOD PRESSURE: 156 MMHG | HEIGHT: 60 IN

## 2019-09-09 DIAGNOSIS — M25.562 PAIN IN BOTH KNEES, UNSPECIFIED CHRONICITY: Primary | ICD-10-CM

## 2019-09-09 DIAGNOSIS — M25.561 PAIN IN BOTH KNEES, UNSPECIFIED CHRONICITY: ICD-10-CM

## 2019-09-09 DIAGNOSIS — M25.561 PAIN IN BOTH KNEES, UNSPECIFIED CHRONICITY: Primary | ICD-10-CM

## 2019-09-09 DIAGNOSIS — M25.562 PAIN IN BOTH KNEES, UNSPECIFIED CHRONICITY: ICD-10-CM

## 2019-09-09 PROCEDURE — 99212 OFFICE O/P EST SF 10 MIN: CPT | Performed by: ORTHOPAEDIC SURGERY

## 2019-09-09 PROCEDURE — 73562 X-RAY EXAM OF KNEE 3: CPT

## 2019-09-09 NOTE — PROGRESS NOTES
Assessment:  1  Pain in both knees, unspecified chronicity  XR knee 3 vw left non injury    XR knee 3 vw right non injury     Patient Active Problem List   Diagnosis    Acute hip pain    Acute pain of both shoulders    Allergic rhinitis    Benign essential hypertension    Closed fracture of proximal end of right humerus with routine healing    Fracture of right distal radius    Hypothyroidism    Insomnia    Microscopic hematuria    Morbid obesity ([facility] [address] [facility]    Obstructive sleep apnea    Other fatigue    Subacromial impingement of left shoulder    Subacromial impingement of right shoulder    Urticaria    Vitamin D deficiency    New onset type 2 diabetes mellitus (HCC)    Primary osteoarthritis of both knees    Osteoarthritis of both knees    CKD (chronic kidney disease) stage 3, GFR 30-59 ml/min ([facility] [address] )           Plan       follow-up as needed I have talked to her about weight loss and talking to bariatric department for help with weight loss  Subjective:     Patient ID:    Chief Complaint:Celeste Garg 76 y o  female      HPI    Patient comes in today with regards to bilateral knees  She was last seen in June had bilateral knee injections of lubricants we had outside images  The following portions of the patient's history were reviewed and updated as appropriate: allergies, current medications, past family history, past social history, past surgical history and problem list     All organ systems normal    Social History     Socioeconomic History    Marital status:       Spouse name: Not on file    Number of children: 11    Years of education: 12    Highest education level: Not on file   Occupational History    Occupation: RN   Social Needs    Financial resource strain: Not on file    Food insecurity:     Worry: Not on file     Inability: Not on file   [employer] needs:     Medical: Not on file     Non-medical: Not on file   Tobacco Use    Smoking status: Former Smoker Packs/day: 0 50     Last attempt to quit:      Years since quittin 7    Smokeless tobacco: Never Used    Tobacco comment: 40 years ago    Substance and Sexual Activity    Alcohol use: Yes     Comment: rarely ; occasional     Drug use: No    Sexual activity: Not on file   Lifestyle    Physical activity:     Days per week: Not on file     Minutes per session: Not on file    Stress: Not on file   Relationships    Social connections:     Talks on phone: Not on file     Gets together: Not on file     Attends Anabaptist service: Not on file     Active member of club or organization: Not on file     Attends meetings of clubs or organizations: Not on file     Relationship status: Not on file    Intimate partner violence:     Fear of current or ex partner: Not on file     Emotionally abused: Not on file     Physically abused: Not on file     Forced sexual activity: Not on file   Other Topics Concern    Not on file   Social History Narrative    Drinks coffee     Past Medical History:   Diagnosis Date    Anemia     Arthritis     Hypertension     Polymyalgia rheumatica ([facility] [address] [facility]     Shoulder impingement syndrome     last assessed 16     Past Surgical History:   Procedure Laterality Date    ABDOMINAL SURGERY      last assessed 16    OTHER SURGICAL HISTORY Right 2017    shoulder    TOTAL ABDOMINAL HYSTERECTOMY  30 year ago    WRIST FRACTURE SURGERY Right 2017     Allergies   Allergen Reactions    Hydromorphone Dizziness and Other (See Comments)     Severe vertigo     Tdap [Tetanus-Diphth-Acell Pertussis] Swelling    Propoxyphene      Current Outpatient Medications on File Prior to Visit   Medication Sig Dispense Refill    Acetaminophen 500 MG Take 1,000 mg by mouth 2 (two) times a day      hydrochlorothiazide (HYDRODIURIL) 25 mg tablet TAKE 1 TABLET BY MOUTH EVERY DAY 90 tablet 1    latanoprost (XALATAN) 0 005 % ophthalmic solution INSTILL 1 DROP IN EACH EYE AT BEDTIME  5    levothyroxine 100 mcg tablet Take 1 tablet (100 mcg total) by mouth daily for 90 days 90 tablet 1    lisinopril (ZESTRIL) 20 mg tablet Take 1 tablet (20 mg total) by mouth daily 90 tablet 1    metoprolol succinate (TOPROL-XL) 50 mg 24 hr tablet TAKE 1 TABLET BY MOUTH ONE TIME DAILY 90 tablet 1    temazepam (RESTORIL) 15 mg capsule Take 1 capsule (15 mg total) by mouth daily at bedtime as needed for sleep 30 capsule 0     No current facility-administered medications on file prior to visit  Objective:        Ortho Exam   no effusion no ecchymosis pinpoint tenderness over the medial joint line      I have personally reviewed pertinent films in PACS and my interpretation is X-ray show severe arthritis on the left knee involving the medial joint space and moderate to severe arthritis on the right knee involving the medial joint space  Portions of the record may have been created with voice recognition software   Occasional wrong word or "sound a like" substitutions may have occurred due to the inherent limitations of voice recognition software   Read the chart carefully and recognize, using context, where substitutions have occurred

## 2019-09-10 ENCOUNTER — OFFICE VISIT (OUTPATIENT)
Dept: FAMILY MEDICINE CLINIC | Facility: OTHER | Age: 74
End: 2019-09-10
Payer: MEDICARE

## 2019-09-10 VITALS
DIASTOLIC BLOOD PRESSURE: 70 MMHG | SYSTOLIC BLOOD PRESSURE: 128 MMHG | HEIGHT: 60 IN | HEART RATE: 69 BPM | TEMPERATURE: 98.4 F | OXYGEN SATURATION: 96 % | BODY MASS INDEX: 43.04 KG/M2 | WEIGHT: 219.25 LBS

## 2019-09-10 DIAGNOSIS — E11.9 NEW ONSET TYPE 2 DIABETES MELLITUS (HCC): ICD-10-CM

## 2019-09-10 DIAGNOSIS — G47.00 INSOMNIA, UNSPECIFIED TYPE: ICD-10-CM

## 2019-09-10 DIAGNOSIS — I10 BENIGN ESSENTIAL HYPERTENSION: ICD-10-CM

## 2019-09-10 DIAGNOSIS — Z00.00 MEDICARE ANNUAL WELLNESS VISIT, SUBSEQUENT: ICD-10-CM

## 2019-09-10 DIAGNOSIS — Z12.39 BREAST CANCER SCREENING: ICD-10-CM

## 2019-09-10 DIAGNOSIS — N18.30 CKD (CHRONIC KIDNEY DISEASE) STAGE 3, GFR 30-59 ML/MIN (HCC): ICD-10-CM

## 2019-09-10 DIAGNOSIS — Z11.59 NEED FOR HEPATITIS C SCREENING TEST: ICD-10-CM

## 2019-09-10 DIAGNOSIS — Z12.11 COLON CANCER SCREENING: Primary | ICD-10-CM

## 2019-09-10 PROBLEM — M06.4 INFLAMMATORY POLYARTHROPATHY (HCC): Status: ACTIVE | Noted: 2017-09-06

## 2019-09-10 PROBLEM — M35.3 POLYMYALGIA RHEUMATICA (HCC): Status: ACTIVE | Noted: 2017-09-06

## 2019-09-10 LAB — SL AMB POCT HEMOGLOBIN AIC: 6.5 (ref ?–6.5)

## 2019-09-10 PROCEDURE — G0438 PPPS, INITIAL VISIT: HCPCS | Performed by: FAMILY MEDICINE

## 2019-09-10 PROCEDURE — 83036 HEMOGLOBIN GLYCOSYLATED A1C: CPT | Performed by: FAMILY MEDICINE

## 2019-09-10 RX ORDER — TEMAZEPAM 15 MG/1
15 CAPSULE ORAL
Qty: 30 CAPSULE | Refills: 0 | Status: SHIPPED | OUTPATIENT
Start: 2019-09-10 | End: 2020-04-20 | Stop reason: SDUPTHER

## 2019-09-10 RX ORDER — IBUPROFEN 200 MG
200 TABLET ORAL EVERY 6 HOURS PRN
COMMUNITY

## 2019-09-10 NOTE — PROGRESS NOTES
History of Present Illness:     Patient presents for Medicare Annual Wellness visit    Patient Care Team:  Austin López DO as PCP - MD Dr Jose Sandra (Ophthalmology)  Lencho Zelaya DO (Orthopedic Surgery)     Problem List:     Patient Active Problem List   Diagnosis    Acute hip pain    Acute pain of both shoulders    Allergic rhinitis    Benign essential hypertension    Closed fracture of proximal end of right humerus with routine healing    Fracture of right distal radius    Hypothyroidism    Insomnia    Microscopic hematuria    Morbid obesity ([facility] [address] [facility]    Obstructive sleep apnea    Other fatigue    Subacromial impingement of left shoulder    Subacromial impingement of right shoulder    Urticaria    Vitamin D deficiency    New onset type 2 diabetes mellitus ([facility] [address] [facility]    Primary osteoarthritis of both knees    Osteoarthritis of both knees    CKD (chronic kidney disease) stage 3, GFR 30-59 ml/min [facility]    Anemia    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[address]    Polymyalgia rheumatica ([facility] [address] )      Past Medical and Surgical History:     Past Medical History:   Diagnosis Date    Anemia     Arthritis     Hypertension     Polymyalgia rheumatica ([facility] [address] [facility]     Shoulder impingement syndrome     last assessed 12/27/16     Past Surgical History:   Procedure Laterality Date    ABDOMINAL SURGERY      last assessed 11/1/16    OTHER SURGICAL HISTORY Right 2017    shoulder    TOTAL ABDOMINAL HYSTERECTOMY  30 year ago    WRIST FRACTURE SURGERY Right 2017      Family History:     Family History   Problem Relation Age of Onset   Asif Epperson Hypertension Mother     Glaucoma Mother     Stroke Mother     Gout Father     COPD Father     Heart disease Father     Kidney failure Sister         chronic    Gout Sister     Multiple sclerosis Sister     Hypertension Sister     Alcohol abuse Brother     Cancer Brother     Gout Brother       Social History:     Social History     Socioeconomic History    Marital status:       Spouse name: None    Number of children: 11    Years of education: 12    Highest education level: None   Occupational History    Occupation: RN   Social Needs    Financial resource strain: None    Food insecurity:     Worry: None     Inability: None    Transportation needs:     Medical: None     Non-medical: None   Tobacco Use    Smoking status: Former Smoker     Packs/day: 0 50     Last attempt to quit:      Years since quittin 7    Smokeless tobacco: Never Used    Tobacco comment: 40 years ago    Substance and Sexual Activity    Alcohol use: Yes     Comment: rarely ; occasional     Drug use: No    Sexual activity: None   Lifestyle    Physical activity:     Days per week: None     Minutes per session: None    Stress: None   Relationships    Social connections:     Talks on phone: None     Gets together: None     Attends Gnosticism service: None     Active member of club or organization: None     Attends meetings of clubs or organizations: None     Relationship status: None    Intimate partner violence:     Fear of current or ex partner: None     Emotionally abused: None     Physically abused: None     Forced sexual activity: None   Other Topics Concern    None   Social History Narrative    Drinks coffee       Medications and Allergies:     Current Outpatient Medications   Medication Sig Dispense Refill    Acetaminophen 500 MG Take 1,000 mg by mouth 2 (two) times a day      hydrochlorothiazide (HYDRODIURIL) 25 mg tablet TAKE 1 TABLET BY MOUTH EVERY DAY 90 tablet 1    ibuprofen (ADVIL) 200 mg tablet Take 200 mg by mouth every 6 (six) hours as needed for mild pain      latanoprost (XALATAN) 0 005 % ophthalmic solution INSTILL 1 DROP IN EACH EYE AT BEDTIME  5    levothyroxine 100 mcg tablet Take 1 tablet (100 mcg total) by mouth daily for 90 days 90 tablet 1    lisinopril (ZESTRIL) 20 mg tablet Take 1 tablet (20 mg total) by mouth daily 90 tablet 1    metoprolol succinate (TOPROL-XL) 50 mg 24 hr tablet TAKE 1 TABLET BY MOUTH ONE TIME DAILY 90 tablet 1    temazepam (RESTORIL) 15 mg capsule Take 1 capsule (15 mg total) by mouth daily at bedtime as needed for sleep 30 capsule 0     No current facility-administered medications for this visit  Allergies   Allergen Reactions    Hydromorphone Dizziness and Other (See Comments)     Severe vertigo     Tdap [Tetanus-Diphth-Acell Pertussis] Swelling    Propoxyphene       Immunizations:     Immunization History   Administered Date(s) Administered    Influenza Split High Dose Preservative Free IM 11/17/2016    Influenza TIV (IM) 1945, 11/04/2009, 01/01/2013    Pneumococcal Conjugate 13-Valent 05/23/2017    Pneumococcal Polysaccharide PPV23 05/29/2018    Tuberculin Skin Test-PPD Intradermal 07/31/2017, 08/16/2017      Health Maintenance:         Topic Date Due    Hepatitis C Screening  1945    MAMMOGRAM  09/27/2019    CRC Screening: Colonoscopy  09/10/2020 (Originally 1945)    CRC Screening: FOBTx3/FIT  09/12/2019         Topic Date Due    INFLUENZA VACCINE  07/01/2019      Medicare Health Risk Assessment:     /70 (BP Location: Left arm, Patient Position: Sitting, Cuff Size: Large)   Pulse 69   Temp 98 4 °F (36 9 °C) (Tympanic)   Ht 5' (1 524 m)   Wt 99 5 kg (219 lb 4 oz)   SpO2 96%   BMI 42 82 kg/m²      Fernanda Beverly is here for her Subsequent Wellness visit  Health Risk Assessment:   Patient rates overall health as good  Patient feels that their physical health rating is same  Eyesight was rated as same  Hearing was rated as same  Patient feels that their emotional and mental health rating is same  Pain experienced in the last 7 days has been some  Patient's pain rating has been 6/10  Depression Screening:   PHQ-2 Score: 0      Fall Risk Screening:    In the past year, patient has experienced: no history of falling in past year      Urinary Incontinence Screening:   Patient has leaked urine accidently in the last six months  Home Safety:  Patient does not have trouble with stairs inside or outside of their home  Patient has working smoke alarms and has no working carbon monoxide detector  Nutrition:   Current diet is Regular and No Added Salt  Medications:   Patient is currently taking over-the-counter supplements  OTC medications include: Aleve and Tylenol  Patient is able to manage medications  Activities of Daily Living (ADLs)/Instrumental Activities of Daily Living (IADLs):   Walk and transfer into and out of bed and chair?: Yes  Dress and groom yourself?: Yes    Bathe or shower yourself?: Yes    Feed yourself?  Yes  Do your laundry/housekeeping?: Yes  Manage your money, pay your bills and track your expenses?: Yes  Make your own meals?: Yes    Do your own shopping?: Yes    Previous Hospitalizations:   Any hospitalizations or ED visits within the last 12 months?: No      Advance Care Planning:   Living will: No    Durable POA for healthcare: No    Advanced directive: No    Advanced directive counseling given: Yes    Five wishes given: Yes      Cognitive Screening:   Provider or family/friend/caregiver concerned regarding cognition?: No    PREVENTIVE SCREENINGS      Cardiovascular Screening:    General: Screening Current      Diabetes Screening:     General: Screening Not Indicated and History Diabetes      Colorectal Cancer Screening:     General: Screening Current    Due for: FOBT/FIT      Breast Cancer Screening:     General: Screening Current and Risks and Benefits Discussed    Due for: Mammogram        Cervical Cancer Screening:    General: Screening Not Indicated      Osteoporosis Screening:    General: Risks and Benefits Discussed and Screening Current      Abdominal Aortic Aneurysm (AAA) Screening:        General: Screening Not Indicated      Lung Cancer Screening:     General: Screening Not Indicated      Hepatitis C Screening:    General: Risks and Benefits Discussed      Review of Systems   Constitutional: Negative for appetite change, fatigue, fever and unexpected weight change  HENT: Negative for congestion, dental problem, ear pain, postnasal drip, sore throat and tinnitus  Eyes: Negative for pain, discharge and visual disturbance  Respiratory: Negative for cough, shortness of breath and wheezing  Cardiovascular: Negative for chest pain, palpitations and leg swelling  Gastrointestinal: Negative for abdominal pain, constipation, diarrhea, nausea and vomiting  Endocrine: Negative for cold intolerance and heat intolerance  Genitourinary: Negative for difficulty urinating, dysuria, flank pain and urgency  Musculoskeletal: Negative for arthralgias, back pain, joint swelling and myalgias  Skin: Negative for rash and wound  Allergic/Immunologic: Negative for immunocompromised state  Neurological: Negative for dizziness, syncope, speech difficulty, weakness and numbness  Hematological: Negative for adenopathy  Does not bruise/bleed easily  Psychiatric/Behavioral: Negative for confusion, dysphoric mood and sleep disturbance  The patient is not nervous/anxious  /70 (BP Location: Left arm, Patient Position: Sitting, Cuff Size: Large)   Pulse 69   Temp 98 4 °F (36 9 °C) (Tympanic)   Ht 5' (1 524 m)   Wt 99 5 kg (219 lb 4 oz)   SpO2 96%   BMI 42 82 kg/m²       Physical Exam   Constitutional: She is oriented to person, place, and time  She appears well-developed and well-nourished  No distress  Body mass index is 42 82 kg/m²  HENT:   Head: Normocephalic and atraumatic     Right Ear: Hearing, tympanic membrane, external ear and ear canal normal    Left Ear: Hearing, tympanic membrane, external ear and ear canal normal    Nose: Nose normal    Mouth/Throat: Uvula is midline, oropharynx is clear and moist and mucous membranes are normal    Eyes: Pupils are equal, round, and reactive to light  Conjunctivae and EOM are normal  No scleral icterus  Neck: Normal range of motion  Neck supple  No thyromegaly present  Cardiovascular: Normal rate, regular rhythm, normal heart sounds and intact distal pulses  Pulses are no weak pulses  No murmur heard  Pulses:       Dorsalis pedis pulses are 2+ on the right side, and 2+ on the left side  Posterior tibial pulses are 2+ on the right side, and 2+ on the left side  Pulmonary/Chest: Effort normal and breath sounds normal  No respiratory distress  She has no wheezes  Abdominal: Soft  Bowel sounds are normal  She exhibits no distension  There is no tenderness  Musculoskeletal: Normal range of motion  She exhibits no edema, tenderness or deformity  Feet:   Right Foot:   Skin Integrity: Negative for ulcer, skin breakdown, erythema, warmth, callus or dry skin  Left Foot:   Skin Integrity: Negative for ulcer, skin breakdown, erythema, warmth, callus or dry skin  Lymphadenopathy:     She has no cervical adenopathy  Neurological: She is alert and oriented to person, place, and time  No cranial nerve deficit  Coordination normal    Skin: Skin is warm and dry  No rash noted  No erythema  No pallor  Psychiatric: She has a normal mood and affect  Her behavior is normal    Nursing note and vitals reviewed  Patient's shoes and socks removed  Right Foot/Ankle   Right Foot Inspection  Skin Exam: skin normal and skin intact no dry skin, no warmth, no callus, no erythema, no maceration, no abnormal color, no pre-ulcer, no ulcer and no callus                          Toe Exam: ROM and strength within normal limits  Sensory   Vibration: intact  Proprioception: intact   Monofilament testing: intact  Vascular  Capillary refills: < 3 seconds  The right DP pulse is 2+  The right PT pulse is 2+       Left Foot/Ankle  Left Foot Inspection  Skin Exam: skin normal and skin intactno dry skin, no warmth, no erythema, no maceration, normal color, no pre-ulcer, no ulcer and no callus                         Toe Exam: ROM and strength within normal limits                   Sensory   Vibration: intact  Proprioception: intact  Monofilament: intact  Vascular  Capillary refills: < 3 seconds  The left DP pulse is 2+  The left PT pulse is 2+  Assign Risk Category:  No deformity present; No loss of protective sensation; No weak pulses       Risk: 0         Assessment and Plan:     Problem List Items Addressed This Visit        Endocrine    New onset type 2 diabetes mellitus ([facility] [address] )    Relevant Orders    POCT hemoglobin A1c (Completed)    Comprehensive metabolic panel    Lipid panel    Microalbumin / creatinine urine ratio    Hemoglobin A1C       Cardiovascular and Mediastinum    Benign essential hypertension    Relevant Orders    Comprehensive metabolic panel    Lipid panel       Genitourinary    CKD (chronic kidney disease) stage 3, GFR 30-59 ml/min (HCC)    Relevant Orders    Comprehensive metabolic panel    Lipid panel       Other    Insomnia    Relevant Medications    temazepam (RESTORIL) 15 mg capsule      Other Visit Diagnoses     Colon cancer screening    -  Primary    Relevant Orders    Occult Blood, Fecal Immunochemical    Breast cancer screening        Relevant Orders    Mammo screening bilateral w 3d & cad    Medicare annual wellness visit, subsequent        Need for hepatitis C screening test        Relevant Orders    Hepatitis C antibody           Preventive health issues were discussed with patient, and age appropriate screening tests were ordered as noted in patient's After Visit Summary  Personalized health advice and appropriate referrals for health education or preventive services given if needed, as noted in patient's After Visit Summary          Rogene Gitelman, DO

## 2019-09-10 NOTE — PATIENT INSTRUCTIONS
Medicare Preventive Visit Patient Instructions  Thank you for completing your Welcome to Medicare Visit or Medicare Annual Wellness Visit today  Your next wellness visit will be due in one year (9/10/2020)  The screening/preventive services that you may require over the next 5-10 years are detailed below  Some tests may not apply to you based off risk factors and/or age  Screening tests ordered at today's visit but not completed yet may show as past due  Also, please note that scanned in results may not display below  Preventive Screenings:  Service Recommendations Previous Testing/Comments   Colorectal Cancer Screening  * Colonoscopy    * Fecal Occult Blood Test (FOBT)/Fecal Immunochemical Test (FIT)  * Fecal DNA/Cologuard Test  * Flexible Sigmoidoscopy Age: 54-65 years old   Colonoscopy: every 10 years (may be performed more frequently if at higher risk)  OR  FOBT/FIT: every 1 year  OR  Cologuard: every 3 years  OR  Sigmoidoscopy: every 5 years  Screening may be recommended earlier than age 48 if at higher risk for colorectal cancer  Also, an individualized decision between you and your healthcare provider will decide whether screening between the ages of 74-80 would be appropriate  Colonoscopy: Not on file  FOBT/FIT: 09/12/2018  Cologuard: Not on file  Sigmoidoscopy: Not on file    Screening Current     Breast Cancer Screening Age: 36 years old  Frequency: every 1-2 years  Not required if history of left and right mastectomy Mammogram: 09/27/2018    Screening Current   Cervical Cancer Screening Between the ages of 21-29, pap smear recommended once every 3 years  Between the ages of 33-67, can perform pap smear with HPV co-testing every 5 years     Recommendations may differ for women with a history of total hysterectomy, cervical cancer, or abnormal pap smears in past  Pap Smear: Not on file    Screening Not Indicated   Hepatitis C Screening Once for adults born between 1945 and 1965  More frequently in patients at high risk for Hepatitis C Hep C Antibody: Not on file       Diabetes Screening 1-2 times per year if you're at risk for diabetes or have pre-diabetes Fasting glucose: 105 mg/dL   A1C: 6 7 %    Screening Not Indicated  History Diabetes   Cholesterol Screening Once every 5 years if you don't have a lipid disorder  May order more often based on risk factors  Lipid panel: 04/03/2019    Screening Current     Other Preventive Screenings Covered by Medicare:  1  Abdominal Aortic Aneurysm (AAA) Screening: covered once if your at risk  You're considered to be at risk if you have a family history of AAA  2  Lung Cancer Screening: covers low dose CT scan once per year if you meet all of the following conditions: (1) Age 50-69; (2) No signs or symptoms of lung cancer; (3) Current smoker or have quit smoking within the last 15 years; (4) You have a tobacco smoking history of at least 30 pack years (packs per day multiplied by number of years you smoked); (5) You get a written order from a healthcare provider  3  Glaucoma Screening: covered annually if you're considered high risk: (1) You have diabetes OR (2) Family history of glaucoma OR (3)  aged 48 and older OR (3)  American aged 72 and older  3  Osteoporosis Screening: covered every 2 years if you meet one of the following conditions: (1) You're estrogen deficient and at risk for osteoporosis based off medical history and other findings; (2) Have a vertebral abnormality; (3) On glucocorticoid therapy for more than 3 months; (4) Have primary hyperparathyroidism; (5) On osteoporosis medications and need to assess response to drug therapy  · Last bone density test (DXA Scan): 09/27/2018  5  HIV Screening: covered annually if you're between the age of 12-76  Also covered annually if you are younger than 13 and older than 72 with risk factors for HIV infection   For pregnant patients, it is covered up to 3 times per pregnancy  Immunizations:  Immunization Recommendations   Influenza Vaccine Annual influenza vaccination during flu season is recommended for all persons aged >= 6 months who do not have contraindications   Pneumococcal Vaccine (Prevnar and Pneumovax)  * Prevnar = PCV13  * Pneumovax = PPSV23   Adults 25-60 years old: 1-3 doses may be recommended based on certain risk factors  Adults 72 years old: Prevnar (PCV13) vaccine recommended followed by Pneumovax (PPSV23) vaccine  If already received PPSV23 since turning 65, then PCV13 recommended at least one year after PPSV23 dose  Hepatitis B Vaccine 3 dose series if at intermediate or high risk (ex: diabetes, end stage renal disease, liver disease)   Tetanus (Td) Vaccine - COST NOT COVERED BY MEDICARE PART B Following completion of primary series, a booster dose should be given every 10 years to maintain immunity against tetanus  Td may also be given as tetanus wound prophylaxis  Tdap Vaccine - COST NOT COVERED BY MEDICARE PART B Recommended at least once for all adults  For pregnant patients, recommended with each pregnancy  Shingles Vaccine (Shingrix) - COST NOT COVERED BY MEDICARE PART B  2 shot series recommended in those aged 48 and above     Health Maintenance Due:      Topic Date Due    Hepatitis C Screening  1945    MAMMOGRAM  09/27/2019    CRC Screening: Colonoscopy  09/10/2020 (Originally 1945)    CRC Screening: FOBTx3/FIT  09/12/2019     Immunizations Due:      Topic Date Due    INFLUENZA VACCINE  07/01/2019     Advance Directives   What are advance directives? Advance directives are legal documents that state your wishes and plans for medical care  These plans are made ahead of time in case you lose your ability to make decisions for yourself  Advance directives can apply to any medical decision, such as the treatments you want, and if you want to donate organs  What are the types of advance directives?   There are many types of advance directives, and each state has rules about how to use them  You may choose a combination of any of the following:  · Living will: This is a written record of the treatment you want  You can also choose which treatments you do not want, which to limit, and which to stop at a certain time  This includes surgery, medicine, IV fluid, and tube feedings  · Durable power of  for healthcare [facility]): This is a written record that states who you want to make healthcare choices for you when you are unable to make them for yourself  This person, called a proxy, is usually a family member or a friend  You may choose more than 1 proxy  · Do not resuscitate (DNR) order:  A DNR order is used in case your heart stops beating or you stop breathing  It is a request not to have certain forms of treatment, such as CPR  A DNR order may be included in other types of advance directives  · Medical directive: This covers the care that you want if you are in a coma, near death, or unable to make decisions for yourself  You can list the treatments you want for each condition  Treatment may include pain medicine, surgery, blood transfusions, dialysis, IV or tube feedings, and a ventilator (breathing machine)  · Values history: This document has questions about your views, beliefs, and how you feel and think about life  This information can help others choose the care that you would choose  Why are advance directives important? An advance directive helps you control your care  Although spoken wishes may be used, it is better to have your wishes written down  Spoken wishes can be misunderstood, or not followed  Treatments may be given even if you do not want them  An advance directive may make it easier for your family to make difficult choices about your care  Urinary Incontinence   Urinary incontinence (UI)  is when you lose control of your bladder   UI develops because your bladder cannot store or empty urine properly  The 3 most common types of UI are stress incontinence, urge incontinence, or both  Medicines:   · May be given to help strengthen your bladder control  Report any side effects of medication to your healthcare provider  Do pelvic muscle exercises often:  Your pelvic muscles help you stop urinating  Squeeze these muscles tight for 5 seconds, then relax for 5 seconds  Gradually work up to squeezing for 10 seconds  Do 3 sets of 15 repetitions a day, or as directed  This will help strengthen your pelvic muscles and improve bladder control  Train your bladder:  Go to the bathroom at set times, such as every 2 hours, even if you do not feel the urge to go  You can also try to hold your urine when you feel the urge to go  For example, hold your urine for 5 minutes when you feel the urge to go  As that becomes easier, hold your urine for 10 minutes  Self-care:   · Keep a UI record  Write down how often you leak urine and how much you leak  Make a note of what you were doing when you leaked urine  · Drink liquids as directed  You may need to limit the amount of liquid you drink to help control your urine leakage  Do not drink any liquid right before you go to bed  Limit or do not have drinks that contain caffeine or alcohol  · Prevent constipation  Eat a variety of high-fiber foods  Good examples are high-fiber cereals, beans, vegetables, and whole-grain breads  Walking is the best way to trigger your intestines to have a bowel movement  · Exercise regularly and maintain a healthy weight  Weight loss and exercise will decrease pressure on your bladder and help you control your leakage  · Use a catheter as directed  to help empty your bladder  A catheter is a tiny, plastic tube that is put into your bladder to drain your urine  · Go to behavior therapy as directed  Behavior therapy may be used to help you learn to control your urge to urinate      Weight Management   Why it is important to manage your weight:  Being overweight increases your risk of health conditions such as heart disease, high blood pressure, type 2 diabetes, and certain types of cancer  It can also increase your risk for osteoarthritis, sleep apnea, and other respiratory problems  Aim for a slow, steady weight loss  Even a small amount of weight loss can lower your risk of health problems  How to lose weight safely:  A safe and healthy way to lose weight is to eat fewer calories and get regular exercise  You can lose up about 1 pound a week by decreasing the number of calories you eat by 500 calories each day  Healthy meal plan for weight management:  A healthy meal plan includes a variety of foods, contains fewer calories, and helps you stay healthy  A healthy meal plan includes the following:  · Eat whole-grain foods more often  A healthy meal plan should contain fiber  Fiber is the part of grains, fruits, and vegetables that is not broken down by your body  Whole-grain foods are healthy and provide extra fiber in your diet  Some examples of whole-grain foods are whole-wheat breads and pastas, oatmeal, brown rice, and bulgur  · Eat a variety of vegetables every day  Include dark, leafy greens such as spinach, kale, [place] greens, and mustard greens  Eat yellow and orange vegetables such as carrots, sweet potatoes, and winter squash  · Eat a variety of fruits every day  Choose fresh or canned fruit (canned in its own juice or light syrup) instead of juice  Fruit juice has very little or no fiber  · Eat low-fat dairy foods  Drink fat-free (skim) milk or 1% milk  Eat fat-free yogurt and low-fat cottage cheese  Try low-fat cheeses such as mozzarella and other reduced-fat cheeses  · Choose meat and other protein foods that are low in fat  Choose beans or other legumes such as split peas or lentils  Choose fish, skinless poultry (chicken or turkey), or lean cuts of red meat (beef or pork)   Before you cook meat or poultry, cut off any visible fat  · Use less fat and oil  Try baking foods instead of frying them  Add less fat, such as margarine, sour cream, regular salad dressing and mayonnaise to foods  Eat fewer high-fat foods  Some examples of high-fat foods include french fries, doughnuts, ice cream, and cakes  · Eat fewer sweets  Limit foods and drinks that are high in sugar  This includes candy, cookies, regular soda, and sweetened drinks  Exercise:  Exercise at least 30 minutes per day on most days of the week  Some examples of exercise include walking, biking, dancing, and swimming  You can also fit in more physical activity by taking the stairs instead of the elevator or parking farther away from stores  Ask your healthcare provider about the best exercise plan for you  © Copyright [employer] 2018 Information is for End User's use only and may not be sold, redistributed or otherwise used for commercial purposes   All illustrations and images included in CareNotes® are the copyrighted property of [employer]  or [address] [place] [address]

## 2019-10-08 ENCOUNTER — OFFICE VISIT (OUTPATIENT)
Dept: FAMILY MEDICINE CLINIC | Facility: OTHER | Age: 74
End: 2019-10-08
Payer: MEDICARE

## 2019-10-08 VITALS
TEMPERATURE: 98.5 F | BODY MASS INDEX: 43.59 KG/M2 | OXYGEN SATURATION: 98 % | WEIGHT: 222 LBS | HEART RATE: 73 BPM | SYSTOLIC BLOOD PRESSURE: 120 MMHG | HEIGHT: 60 IN | DIASTOLIC BLOOD PRESSURE: 58 MMHG

## 2019-10-08 DIAGNOSIS — J01.40 ACUTE NON-RECURRENT PANSINUSITIS: Primary | ICD-10-CM

## 2019-10-08 PROCEDURE — 99213 OFFICE O/P EST LOW 20 MIN: CPT | Performed by: FAMILY MEDICINE

## 2019-10-08 RX ORDER — AMOXICILLIN AND CLAVULANATE POTASSIUM 875; 125 MG/1; MG/1
1 TABLET, FILM COATED ORAL EVERY 12 HOURS SCHEDULED
Qty: 20 TABLET | Refills: 0 | Status: SHIPPED | OUTPATIENT
Start: 2019-10-08 | End: 2019-10-18

## 2019-10-08 NOTE — PROGRESS NOTES
Subjective:      Patient ID: Nilson Beth is a 76 y o  female  URI    This is a recurrent problem  Episode onset: 3 wks ago  The problem has been waxing and waning  There has been no fever  Associated symptoms include congestion, coughing, headaches, a plugged ear sensation and a sore throat (scratchy)  Pertinent negatives include no abdominal pain, chest pain, diarrhea, dysuria, ear pain, joint pain, joint swelling, nausea, neck pain, rash, rhinorrhea, sinus pain, sneezing, swollen glands, vomiting or wheezing  She has tried antihistamine, increased fluids and steam for the symptoms  The treatment provided mild relief         The following portions of the patient's history were reviewed and updated as appropriate: allergies, current medications, past family history, past medical history, past social history, past surgical history and problem list       Current Outpatient Medications:     Acetaminophen 500 MG, Take 1,000 mg by mouth 2 (two) times a day, Disp: , Rfl:     hydrochlorothiazide (HYDRODIURIL) 25 mg tablet, TAKE 1 TABLET BY MOUTH EVERY DAY, Disp: 90 tablet, Rfl: 1    ibuprofen (ADVIL) 200 mg tablet, Take 200 mg by mouth every 6 (six) hours as needed for mild pain, Disp: , Rfl:     latanoprost (XALATAN) 0 005 % ophthalmic solution, INSTILL 1 DROP IN EACH EYE AT BEDTIME, Disp: , Rfl: 5    levothyroxine 100 mcg tablet, Take 1 tablet (100 mcg total) by mouth daily for 90 days, Disp: 90 tablet, Rfl: 1    lisinopril (ZESTRIL) 20 mg tablet, Take 1 tablet (20 mg total) by mouth daily, Disp: 90 tablet, Rfl: 1    metoprolol succinate (TOPROL-XL) 50 mg 24 hr tablet, TAKE 1 TABLET BY MOUTH ONE TIME DAILY, Disp: 90 tablet, Rfl: 1    temazepam (RESTORIL) 15 mg capsule, Take 1 capsule (15 mg total) by mouth daily at bedtime as needed for sleep, Disp: 30 capsule, Rfl: 0    amoxicillin-clavulanate (AUGMENTIN) 875-125 mg per tablet, Take 1 tablet by mouth every 12 (twelve) hours for 10 days, Disp: 20 tablet, Rfl: 0      Review of Systems   Constitutional: Positive for chills and fatigue  Negative for appetite change and fever  HENT: Positive for congestion, postnasal drip, sinus pressure and sore throat (scratchy)  Negative for drooling, ear pain, hearing loss, rhinorrhea, sinus pain, sneezing and tinnitus  Eyes: Negative for pain, discharge, redness and itching  Respiratory: Positive for cough  Negative for chest tightness, shortness of breath and wheezing  Cardiovascular: Negative for chest pain, palpitations and leg swelling  Gastrointestinal: Negative for abdominal pain, diarrhea, nausea and vomiting  Genitourinary: Negative for dysuria and flank pain  Musculoskeletal: Negative for arthralgias, joint pain, myalgias and neck pain  Skin: Negative for rash  Neurological: Positive for headaches  Negative for dizziness  Hematological: Negative for adenopathy  Psychiatric/Behavioral: Negative for confusion and dysphoric mood  The patient is not nervous/anxious  Objective:      /58 (BP Location: Left arm, Patient Position: Sitting, Cuff Size: Large)   Pulse 73   Temp 98 5 °F (36 9 °C) (Tympanic)   Ht 5' (1 524 m)   Wt 101 kg (222 lb)   SpO2 98%   BMI 43 36 kg/m²          Physical Exam   Constitutional: She is oriented to person, place, and time  She appears well-developed and well-nourished  No distress  Body mass index is 43 36 kg/m²  HENT:   Head: Normocephalic and atraumatic  Right Ear: Tympanic membrane is retracted  Left Ear: Tympanic membrane is retracted  Nose: Mucosal edema present  Right sinus exhibits maxillary sinus tenderness and frontal sinus tenderness  Left sinus exhibits maxillary sinus tenderness and frontal sinus tenderness  Mouth/Throat: Uvula is midline  Oropharyngeal exudate and posterior oropharyngeal erythema present  Eyes: Pupils are equal, round, and reactive to light  Conjunctivae and EOM are normal  Right eye exhibits no discharge   Left eye exhibits no discharge  No scleral icterus  Neck: Normal range of motion  Neck supple  No thyromegaly present  Cardiovascular: Normal rate and regular rhythm  No murmur heard  Pulmonary/Chest: Effort normal and breath sounds normal  No respiratory distress  She has no wheezes  Abdominal: Soft  Bowel sounds are normal  There is no tenderness  Musculoskeletal: Normal range of motion  She exhibits no edema  Lymphadenopathy:     She has cervical adenopathy  Neurological: She is alert and oriented to person, place, and time  She has normal reflexes  Skin: Skin is warm and dry  No rash noted  Psychiatric: She has a normal mood and affect  Her behavior is normal    Nursing note and vitals reviewed  Assessment/Plan:   Diagnoses and all orders for this visit:    Acute non-recurrent pansinusitis  -     amoxicillin-clavulanate (AUGMENTIN) 875-125 mg per tablet; Take 1 tablet by mouth every 12 (twelve) hours for 10 days  -     empirically treat with 10 day course of oral Augmentin, recommend supportive care with saltwater gargles and saline nasal spray        Return if symptoms worsen or fail to improve  The patient indicates understanding of these issues and agrees with the plan          Aby Linares DO

## 2019-10-19 DIAGNOSIS — I10 BENIGN ESSENTIAL HYPERTENSION: ICD-10-CM

## 2019-10-21 RX ORDER — LISINOPRIL 20 MG/1
TABLET ORAL
Qty: 90 TABLET | Refills: 1 | OUTPATIENT
Start: 2019-10-21

## 2019-10-24 ENCOUNTER — IMMUNIZATIONS (OUTPATIENT)
Dept: FAMILY MEDICINE CLINIC | Facility: OTHER | Age: 74
End: 2019-10-24
Payer: MEDICARE

## 2019-10-24 DIAGNOSIS — Z23 ENCOUNTER FOR IMMUNIZATION: Primary | ICD-10-CM

## 2019-10-24 PROCEDURE — 90662 IIV NO PRSV INCREASED AG IM: CPT

## 2019-10-24 PROCEDURE — G0008 ADMIN INFLUENZA VIRUS VAC: HCPCS

## 2019-10-28 DIAGNOSIS — I10 BENIGN ESSENTIAL HYPERTENSION: ICD-10-CM

## 2019-10-28 RX ORDER — LISINOPRIL 20 MG/1
20 TABLET ORAL DAILY
Qty: 90 TABLET | Refills: 1 | Status: SHIPPED | OUTPATIENT
Start: 2019-10-28 | End: 2020-04-20 | Stop reason: SDUPTHER

## 2019-11-01 ENCOUNTER — APPOINTMENT (OUTPATIENT)
Dept: LAB | Facility: HOSPITAL | Age: 74
End: 2019-11-01
Attending: FAMILY MEDICINE
Payer: MEDICARE

## 2019-11-01 DIAGNOSIS — Z12.11 COLON CANCER SCREENING: ICD-10-CM

## 2019-11-01 LAB — HEMOCCULT STL QL IA: NEGATIVE

## 2019-11-01 PROCEDURE — G0328 FECAL BLOOD SCRN IMMUNOASSAY: HCPCS

## 2019-11-04 ENCOUNTER — TELEPHONE (OUTPATIENT)
Dept: FAMILY MEDICINE CLINIC | Facility: OTHER | Age: 74
End: 2019-11-04

## 2019-11-04 NOTE — TELEPHONE ENCOUNTER
LM for pt to return call      ----- Message from Hollie Johnston DO sent at 11/4/2019 11:19 AM EST -----  Please inform pt that FIT testing was negative -- repeat in 1 yr     Thanks!   Hollie Johnston DO

## 2019-11-14 ENCOUNTER — TELEPHONE (OUTPATIENT)
Dept: FAMILY MEDICINE CLINIC | Facility: OTHER | Age: 74
End: 2019-11-14

## 2019-11-14 DIAGNOSIS — E11.9 NEW ONSET TYPE 2 DIABETES MELLITUS (HCC): ICD-10-CM

## 2019-11-14 DIAGNOSIS — I10 BENIGN ESSENTIAL HYPERTENSION: Primary | ICD-10-CM

## 2019-11-14 DIAGNOSIS — G47.33 OBSTRUCTIVE SLEEP APNEA: ICD-10-CM

## 2019-11-14 DIAGNOSIS — E66.01 MORBID OBESITY (HCC): ICD-10-CM

## 2019-11-14 NOTE — TELEPHONE ENCOUNTER
Patient is requesting an order for her stress test so she can reschedule it since It was canceled in feb 2018      Patient also said she never got the results/discussion of her echo that was done in Feb 2018  She would like a call back   Please advise  Thank you

## 2019-11-14 NOTE — TELEPHONE ENCOUNTER
Order for stress test entered at pt request     Please inform her that the echo from 2/2018 was essentially normal   No follow up echo needed at this time  Thanks!   Jacoby Zelaya, DO

## 2019-12-10 ENCOUNTER — APPOINTMENT (OUTPATIENT)
Dept: LAB | Age: 74
End: 2019-12-10
Payer: MEDICARE

## 2019-12-10 DIAGNOSIS — N18.30 CKD (CHRONIC KIDNEY DISEASE) STAGE 3, GFR 30-59 ML/MIN (HCC): ICD-10-CM

## 2019-12-10 DIAGNOSIS — I10 BENIGN ESSENTIAL HYPERTENSION: ICD-10-CM

## 2019-12-10 DIAGNOSIS — Z11.59 NEED FOR HEPATITIS C SCREENING TEST: ICD-10-CM

## 2019-12-10 DIAGNOSIS — E11.9 NEW ONSET TYPE 2 DIABETES MELLITUS (HCC): ICD-10-CM

## 2019-12-10 LAB
ALBUMIN SERPL BCP-MCNC: 3.7 G/DL (ref 3.5–5)
ALP SERPL-CCNC: 86 U/L (ref 46–116)
ALT SERPL W P-5'-P-CCNC: 26 U/L (ref 12–78)
ANION GAP SERPL CALCULATED.3IONS-SCNC: 5 MMOL/L (ref 4–13)
AST SERPL W P-5'-P-CCNC: 10 U/L (ref 5–45)
BILIRUB SERPL-MCNC: 0.41 MG/DL (ref 0.2–1)
BUN SERPL-MCNC: 31 MG/DL (ref 5–25)
CALCIUM SERPL-MCNC: 9.7 MG/DL (ref 8.3–10.1)
CHLORIDE SERPL-SCNC: 105 MMOL/L (ref 100–108)
CHOLEST SERPL-MCNC: 231 MG/DL (ref 50–200)
CO2 SERPL-SCNC: 27 MMOL/L (ref 21–32)
CREAT SERPL-MCNC: 1.44 MG/DL (ref 0.6–1.3)
CREAT UR-MCNC: 155 MG/DL
EST. AVERAGE GLUCOSE BLD GHB EST-MCNC: 143 MG/DL
GFR SERPL CREATININE-BSD FRML MDRD: 36 ML/MIN/1.73SQ M
GLUCOSE P FAST SERPL-MCNC: 126 MG/DL (ref 65–99)
HBA1C MFR BLD: 6.6 % (ref 4.2–6.3)
HCV AB SER QL: NORMAL
HDLC SERPL-MCNC: 35 MG/DL
LDLC SERPL CALC-MCNC: 144 MG/DL (ref 0–100)
MICROALBUMIN UR-MCNC: 11.1 MG/L (ref 0–20)
MICROALBUMIN/CREAT 24H UR: 7 MG/G CREATININE (ref 0–30)
NONHDLC SERPL-MCNC: 196 MG/DL
POTASSIUM SERPL-SCNC: 4.1 MMOL/L (ref 3.5–5.3)
PROT SERPL-MCNC: 7.2 G/DL (ref 6.4–8.2)
SODIUM SERPL-SCNC: 137 MMOL/L (ref 136–145)
TRIGL SERPL-MCNC: 261 MG/DL

## 2019-12-10 PROCEDURE — 36415 COLL VENOUS BLD VENIPUNCTURE: CPT

## 2019-12-10 PROCEDURE — 82043 UR ALBUMIN QUANTITATIVE: CPT

## 2019-12-10 PROCEDURE — 83036 HEMOGLOBIN GLYCOSYLATED A1C: CPT

## 2019-12-10 PROCEDURE — 82570 ASSAY OF URINE CREATININE: CPT

## 2019-12-10 PROCEDURE — 80053 COMPREHEN METABOLIC PANEL: CPT

## 2019-12-10 PROCEDURE — 80061 LIPID PANEL: CPT

## 2019-12-10 PROCEDURE — 86803 HEPATITIS C AB TEST: CPT

## 2019-12-16 ENCOUNTER — OFFICE VISIT (OUTPATIENT)
Dept: FAMILY MEDICINE CLINIC | Facility: OTHER | Age: 74
End: 2019-12-16
Payer: MEDICARE

## 2019-12-16 VITALS
SYSTOLIC BLOOD PRESSURE: 130 MMHG | HEIGHT: 60 IN | DIASTOLIC BLOOD PRESSURE: 60 MMHG | OXYGEN SATURATION: 100 % | WEIGHT: 225.38 LBS | HEART RATE: 73 BPM | TEMPERATURE: 98.2 F | BODY MASS INDEX: 44.25 KG/M2

## 2019-12-16 DIAGNOSIS — E78.2 MIXED HYPERLIPIDEMIA: ICD-10-CM

## 2019-12-16 DIAGNOSIS — I10 BENIGN ESSENTIAL HYPERTENSION: ICD-10-CM

## 2019-12-16 DIAGNOSIS — N18.30 CKD (CHRONIC KIDNEY DISEASE) STAGE 3, GFR 30-59 ML/MIN (HCC): ICD-10-CM

## 2019-12-16 DIAGNOSIS — E11.9 NEW ONSET TYPE 2 DIABETES MELLITUS (HCC): Primary | ICD-10-CM

## 2019-12-16 PROCEDURE — 99214 OFFICE O/P EST MOD 30 MIN: CPT | Performed by: FAMILY MEDICINE

## 2019-12-16 RX ORDER — ATORVASTATIN CALCIUM 10 MG/1
10 TABLET, FILM COATED ORAL DAILY
Qty: 90 TABLET | Refills: 1 | Status: SHIPPED | OUTPATIENT
Start: 2019-12-16 | End: 2020-08-03 | Stop reason: ALTCHOICE

## 2019-12-16 NOTE — PROGRESS NOTES
Subjective:      Patient ID: Viraj Martin is a 76 y o  female  Hypertension   This is a chronic problem  The current episode started more than 1 year ago  The problem has been waxing and waning since onset  The problem is controlled  Pertinent negatives include no anxiety, blurred vision, chest pain, headaches, malaise/fatigue, neck pain, orthopnea, palpitations, peripheral edema, PND, shortness of breath or sweats  Agents associated with hypertension include NSAIDs  Risk factors for coronary artery disease include diabetes mellitus, dyslipidemia, family history, obesity, post-menopausal state and sedentary lifestyle  Past treatments include diuretics, beta blockers and ACE inhibitors  The current treatment provides moderate improvement  Compliance problems include diet, exercise and psychosocial issues  Hypertensive end-organ damage includes kidney disease  There is no history of angina, CAD/MI, CVA, heart failure, left ventricular hypertrophy, PVD or retinopathy  Identifiable causes of hypertension include a hypertension causing med  There is no history of chronic renal disease or a thyroid problem  Diabetes   She presents for her follow-up diabetic visit  She has type 2 diabetes mellitus  Her disease course has been stable  There are no hypoglycemic associated symptoms  Pertinent negatives for hypoglycemia include no dizziness, headaches, nervousness/anxiousness or sweats  Pertinent negatives for diabetes include no blurred vision, no chest pain, no fatigue, no foot paresthesias, no foot ulcerations, no polydipsia, no polyphagia, no polyuria, no visual change, no weakness and no weight loss  There are no hypoglycemic complications  Diabetic complications include nephropathy  Pertinent negatives for diabetic complications include no autonomic neuropathy, CVA, heart disease, peripheral neuropathy, PVD or retinopathy   Risk factors for coronary artery disease include diabetes mellitus, dyslipidemia, hypertension, obesity, family history, post-menopausal and sedentary lifestyle  Current diabetic treatment includes diet  She is compliant with treatment all of the time  Her weight is stable  She is following a generally healthy diet  Meal planning includes avoidance of concentrated sweets and carbohydrate counting  She has not had a previous visit with a dietitian  She participates in exercise intermittently  Her breakfast blood glucose is taken between 7-8 am  Her breakfast blood glucose range is generally 130-140 mg/dl  An ACE inhibitor/angiotensin II receptor blocker is being taken  She does not see a podiatrist Eye exam is current  Hyperlipidemia   This is a chronic problem  The current episode started more than 1 year ago  The problem is uncontrolled  Exacerbating diseases include diabetes, hypothyroidism and obesity  She has no history of chronic renal disease, liver disease or nephrotic syndrome  There are no known factors aggravating her hyperlipidemia  Pertinent negatives include no chest pain, focal sensory loss, focal weakness, leg pain, myalgias or shortness of breath  Current antihyperlipidemic treatment includes diet change  The current treatment provides no improvement of lipids  Compliance problems include adherence to diet, adherence to exercise and psychosocial issues  Risk factors for coronary artery disease include post-menopausal, a sedentary lifestyle, obesity, hypertension, diabetes mellitus, dyslipidemia and family history         The following portions of the patient's history were reviewed and updated as appropriate: allergies, current medications, past family history, past medical history, past social history, past surgical history and problem list       Current Outpatient Medications:     Acetaminophen 500 MG, Take 1,000 mg by mouth daily , Disp: , Rfl:     hydrochlorothiazide (HYDRODIURIL) 25 mg tablet, TAKE 1 TABLET BY MOUTH EVERY DAY, Disp: 90 tablet, Rfl: 1    ibuprofen (ADVIL) 200 mg tablet, Take 200 mg by mouth every 6 (six) hours as needed for mild pain, Disp: , Rfl:     latanoprost (XALATAN) 0 005 % ophthalmic solution, INSTILL 1 DROP IN EACH EYE AT BEDTIME, Disp: , Rfl: 5    levothyroxine 100 mcg tablet, Take 1 tablet (100 mcg total) by mouth daily for 90 days, Disp: 90 tablet, Rfl: 1    lisinopril (ZESTRIL) 20 mg tablet, Take 1 tablet (20 mg total) by mouth daily, Disp: 90 tablet, Rfl: 1    metoprolol succinate (TOPROL-XL) 50 mg 24 hr tablet, TAKE 1 TABLET BY MOUTH ONE TIME DAILY, Disp: 90 tablet, Rfl: 1    temazepam (RESTORIL) 15 mg capsule, Take 1 capsule (15 mg total) by mouth daily at bedtime as needed for sleep, Disp: 30 capsule, Rfl: 0    atorvastatin (LIPITOR) 10 mg tablet, Take 1 tablet (10 mg total) by mouth daily, Disp: 90 tablet, Rfl: 1        Review of Systems   Constitutional: Negative for activity change, fatigue, fever, malaise/fatigue and weight loss  HENT: Negative for congestion, ear pain, sinus pain and sore throat  Eyes: Negative for blurred vision, pain and itching  Respiratory: Negative for cough, chest tightness and shortness of breath  Cardiovascular: Negative for chest pain, palpitations, orthopnea and PND  Gastrointestinal: Negative for abdominal pain, constipation, diarrhea, nausea and vomiting  Endocrine: Negative for cold intolerance, heat intolerance, polydipsia, polyphagia and polyuria  Genitourinary: Negative for difficulty urinating, dysuria, frequency and urgency  Musculoskeletal: Negative for arthralgias, back pain, myalgias and neck pain  Skin: Negative for color change and rash  Neurological: Negative for dizziness, focal weakness, syncope, weakness and headaches  Hematological: Negative for adenopathy  Psychiatric/Behavioral: Negative for behavioral problems, dysphoric mood and sleep disturbance  The patient is not nervous/anxious            Objective:      /60 (BP Location: Left arm, Patient Position: Sitting, Cuff Size: Large)   Pulse 73   Temp 98 2 °F (36 8 °C) (Tympanic)   Ht 5' (1 524 m)   Wt 102 kg (225 lb 6 oz)   SpO2 100%   BMI 44 02 kg/m²          Physical Exam   Constitutional: She is oriented to person, place, and time  She appears well-developed and well-nourished  No distress  Body mass index is 44 02 kg/m²  HENT:   Head: Normocephalic and atraumatic  Right Ear: External ear normal    Left Ear: External ear normal    Nose: Nose normal    Mouth/Throat: Oropharynx is clear and moist    Eyes: Pupils are equal, round, and reactive to light  Conjunctivae and EOM are normal  No scleral icterus  Neck: Normal range of motion  Neck supple  No thyromegaly present  Cardiovascular: Normal rate, regular rhythm and normal heart sounds  No murmur heard  Pulmonary/Chest: Effort normal and breath sounds normal  No respiratory distress  She has no wheezes  Abdominal: Soft  Bowel sounds are normal  She exhibits no distension  There is no tenderness  Musculoskeletal: Normal range of motion  She exhibits no edema, tenderness or deformity  Lymphadenopathy:     She has no cervical adenopathy  Neurological: She is alert and oriented to person, place, and time  No cranial nerve deficit  Coordination normal    Skin: Skin is warm and dry  No rash noted  No erythema  No pallor  Psychiatric: She has a normal mood and affect  Her behavior is normal    Nursing note and vitals reviewed  Assessment/Plan:  Diagnoses and all orders for this visit:    New onset type 2 diabetes mellitus ([address] )  -     Comprehensive metabolic panel; Future  -     Hemoglobin A1C; Future  -     Microalbumin / creatinine urine ratio; Future    Benign essential hypertension  -     Comprehensive metabolic panel; Future  -     Lipid panel; Future    Mixed hyperlipidemia  -     atorvastatin (LIPITOR) 10 mg tablet; Take 1 tablet (10 mg total) by mouth daily  -     Comprehensive metabolic panel;  Future  -     Lipid panel; Future    CKD (chronic kidney disease) stage 3, GFR 30-59 ml/min ([facility])  -     Comprehensive metabolic panel; Future        66-year-old female presents for blood pressure recheck  Blood pressure appears to be well controlled on current antihypertensive regimen of beta-blocker, Ace inhibitor, and diuretic  Recommend patient keep home blood pressure log and bring to next visit for our review  Recent blood work reviewed with patient during visit  New onset type 2 diabetes appears to be stable at this time with a hemoglobin A1c of 6 7%--continue lifestyle modifications with a goal A1c between 6 and 7%  LDL goal less than 100--recent blood work showed LDL in the 140 range, therefore I am recommending patient started toward a statin 10 mg daily and recheck labs in approximately 3 months  CKD stable at this time however I have asked patient to refrain from using NSAIDs as this can exacerbate her chronic kidney disease as well as her hypertension  Recommend she talk to Ortho about a topical agent, perhaps Voltaren, to help control her knee pain  Return in about 4 months (around 4/16/2020) for Recheck DM (labs due prior) - 30 min  The patient indicates understanding of these issues and agrees with the plan          Rony Shelton DO

## 2020-01-02 ENCOUNTER — HOSPITAL ENCOUNTER (OUTPATIENT)
Dept: NON INVASIVE DIAGNOSTICS | Facility: CLINIC | Age: 75
Discharge: HOME | End: 2020-01-02
Payer: MEDICARE

## 2020-01-02 DIAGNOSIS — G47.33 OBSTRUCTIVE SLEEP APNEA: ICD-10-CM

## 2020-01-02 DIAGNOSIS — I10 BENIGN ESSENTIAL HYPERTENSION: ICD-10-CM

## 2020-01-02 DIAGNOSIS — E66.01 MORBID OBESITY (HCC): ICD-10-CM

## 2020-01-02 DIAGNOSIS — E11.9 NEW ONSET TYPE 2 DIABETES MELLITUS (HCC): ICD-10-CM

## 2020-01-02 PROCEDURE — 93017 CV STRESS TEST TRACING ONLY: CPT

## 2020-01-02 PROCEDURE — 93018 CV STRESS TEST I&R ONLY: CPT | Performed by: INTERNAL MEDICINE

## 2020-01-02 PROCEDURE — 93016 CV STRESS TEST SUPVJ ONLY: CPT | Performed by: INTERNAL MEDICINE

## 2020-01-03 LAB
CHEST PAIN STATEMENT: NORMAL
MAX DIASTOLIC BP: 66 MMHG
MAX HEART RATE: 127 BPM
MAX PREDICTED HEART RATE: 146 BPM
MAX. SYSTOLIC BP: 184 MMHG
PROTOCOL NAME: NORMAL
REASON FOR TERMINATION: NORMAL
TARGET HR FORMULA: NORMAL
TIME IN EXERCISE PHASE: NORMAL

## 2020-01-06 ENCOUNTER — TELEPHONE (OUTPATIENT)
Dept: FAMILY MEDICINE CLINIC | Facility: OTHER | Age: 75
End: 2020-01-06

## 2020-01-06 NOTE — TELEPHONE ENCOUNTER
Pt aware      ----- Message from [id number] sent at 1/4/2020  1:06 PM EST -----  Can we let Aaliyah Coleman know that her stress test came back negative for ischemia  If she is having any symptoms (chest pain/heaviness with exertion, unusual shortness of breath/nausea/dizziness with exertion) she should go to the ER, otherwise she can discuss results further with Dr Sadia Palacios when she sees her again in April     Thanks

## 2020-01-09 ENCOUNTER — OFFICE VISIT (OUTPATIENT)
Dept: FAMILY MEDICINE CLINIC | Facility: OTHER | Age: 75
End: 2020-01-09
Payer: MEDICARE

## 2020-01-09 VITALS
SYSTOLIC BLOOD PRESSURE: 132 MMHG | WEIGHT: 220.38 LBS | TEMPERATURE: 98 F | BODY MASS INDEX: 43.27 KG/M2 | HEART RATE: 72 BPM | OXYGEN SATURATION: 98 % | HEIGHT: 60 IN | DIASTOLIC BLOOD PRESSURE: 70 MMHG

## 2020-01-09 DIAGNOSIS — J01.00 ACUTE NON-RECURRENT MAXILLARY SINUSITIS: ICD-10-CM

## 2020-01-09 DIAGNOSIS — B34.9 VIRAL ILLNESS: Primary | ICD-10-CM

## 2020-01-09 DIAGNOSIS — H92.02 ACUTE EAR PAIN, LEFT: ICD-10-CM

## 2020-01-09 PROCEDURE — 99214 OFFICE O/P EST MOD 30 MIN: CPT | Performed by: FAMILY MEDICINE

## 2020-01-09 RX ORDER — DOXYCYCLINE HYCLATE 100 MG/1
100 CAPSULE ORAL EVERY 12 HOURS SCHEDULED
Qty: 20 CAPSULE | Refills: 0 | Status: SHIPPED | OUTPATIENT
Start: 2020-01-09 | End: 2020-01-19

## 2020-01-09 NOTE — PATIENT INSTRUCTIONS
Sinusitis   AMBULATORY CARE:   Sinusitis  is inflammation or infection of your sinuses  It is most often caused by a virus  Acute sinusitis may last up to 12 weeks  Chronic sinusitis lasts longer than 12 weeks  Recurrent sinusitis means you have 4 or more times in 1 year  Common symptoms include the following:   · Fever    · Pain, pressure, redness, or swelling around the forehead, cheeks, or eyes    · Thick yellow or green discharge from your nose    · Tenderness when you touch your face over your sinuses    · Dry cough that happens mostly at night or when you lie down    · Headache and face pain that is worse when you lean forward    · Tooth pain, or pain when you chew  Seek care immediately if:   · Your eye and eyelid are red, swollen, and painful  · You cannot open your eye  · You have vision changes, such as double vision  · Your eyeball bulges out or you cannot move your eye  · You are more sleepy than normal, or you notice changes in your ability to think, move, or talk  · You have a stiff neck, a fever, or a bad headache  · You have swelling of your forehead or scalp  Contact your healthcare provider if:   · Your symptoms do not improve after 3 days  · Your symptoms do not go away after 10 days  · You have nausea and are vomiting  · Your nose is bleeding  · You have questions or concerns about your condition or care  Treatment for sinusitis:  Your symptoms may go away on their own  Your healthcare provider may recommend watchful waiting for up to 10 days before starting antibiotics  You may  need any of the following:  · Acetaminophen  decreases pain and fever  It is available without a doctor's order  Ask how much to take and how often to take it  Follow directions  Read the labels of all other medicines you are using to see if they also contain acetaminophen, or ask your doctor or pharmacist  Acetaminophen can cause liver damage if not taken correctly   Do not use more than 4 grams (4,000 milligrams) total of acetaminophen in one day  · NSAIDs , such as ibuprofen, help decrease swelling, pain, and fever  This medicine is available with or without a doctor's order  NSAIDs can cause stomach bleeding or kidney problems in certain people  If you take blood thinner medicine, always ask your healthcare provider if NSAIDs are safe for you  Always read the medicine label and follow directions  · Nasal steroid sprays  may help decrease inflammation in your nose and sinuses  · Decongestants  help reduce swelling and drain mucus in the nose and sinuses  They may help you breathe easier  · Antihistamines  help dry mucus in the nose and relieve sneezing  · Antibiotics  help treat or prevent a bacterial infection  · Take your medicine as directed  Contact your healthcare provider if you think your medicine is not helping or if you have side effects  Tell him or her if you are allergic to any medicine  Keep a list of the medicines, vitamins, and herbs you take  Include the amounts, and when and why you take them  Bring the list or the pill bottles to follow-up visits  Carry your medicine list with you in case of an emergency  Self-care:   · Rinse your sinuses  Use a sinus rinse device to rinse your nasal passages with a saline (salt water) solution or distilled water  Do not use tap water  This will help thin the mucus in your nose and rinse away pollen and dirt  It will also help reduce swelling so you can breathe normally  Ask your healthcare provider how often to do this  · Breathe in steam   Heat a bowl of water until you see steam  Lean over the bowl and make a tent over your head with a large towel  Breathe deeply for about 20 minutes  Be careful not to get too close to the steam or burn yourself  Do this 3 times a day  You can also breathe deeply when you take a hot shower  · Sleep with your head elevated    Place an extra pillow under your head before you go to sleep to help your sinuses drain  · Drink liquids as directed  Ask your healthcare provider how much liquid to drink each day and which liquids are best for you  Liquids will thin the mucus in your nose and help it drain  Avoid drinks that contain alcohol or caffeine  · Do not smoke, and avoid secondhand smoke  Nicotine and other chemicals in cigarettes and cigars can make your symptoms worse  Ask your healthcare provider for information if you currently smoke and need help to quit  E-cigarettes or smokeless tobacco still contain nicotine  Talk to your healthcare provider before you use these products  Prevent the spread of germs that cause sinusitis:  Wash your hands often with soap and water  Wash your hands after you use the bathroom, change a child's diaper, or sneeze  Wash your hands before you prepare or eat food  Follow up with your healthcare provider as directed: You may be referred to an ear, nose, and throat specialist  Write down your questions so you remember to ask them during your visits  © 2017 [facility] Information is for End User's use only and may not be sold, redistributed or otherwise used for commercial purposes  All illustrations and images included in CareNotes® are the copyrighted property of [employer]  or Salvatore Melissa  The above information is an  only  It is not intended as medical advice for individual conditions or treatments  Talk to your doctor, nurse or pharmacist before following any medical regimen to see if it is safe and effective for you

## 2020-01-09 NOTE — PROGRESS NOTES
Assessment/Plan:    Advised patient to take over-the-counter pain medications as needed with precautions  She is to continue to keep hydrated  Continue with the allergy medication  Will treat with doxy Cyclen twice a day for 10 days to cover sinus infection as well as the possible left ear infection  Patient was advised to follow-up as needed  Problem List Items Addressed This Visit     None      Visit Diagnoses     Viral illness    -  Primary    Acute ear pain, left        Relevant Medications    doxycycline hyclate (VIBRAMYCIN) 100 mg capsule  May take OTC tylenol prn       Acute non-recurrent maxillary sinusitis        Relevant Medications  Continue with allergy medication and keep hydrated  Take OTC pain killers prn   FU PRN     doxycycline hyclate (VIBRAMYCIN) 100 mg capsule            Subjective:      Patient ID: Salvador Greenfield is a 76 y o  female  Patient presents for acute symptoms of sinus congestion nasal drainage for 1 week  As of yesterday she did have really bad left-sided ear pain which was getting worse at nighttime she did have over-the-counter pain medication which helped she would like to get checked today  She says that her left-sided hearing is a little muffled other than that pain has improved  Sinusitis   This is a new problem  The current episode started in the past 7 days  The problem has been gradually worsening since onset  There has been no fever  The pain is moderate  Associated symptoms include chills, congestion, coughing, ear pain, sinus pressure and a sore throat  Pertinent negatives include no diaphoresis, headaches, neck pain, shortness of breath, sneezing or swollen glands  Past treatments include acetaminophen  The treatment provided mild relief         The following portions of the patient's history were reviewed and updated as appropriate: She  has a past medical history of Anemia, Arthritis, Hypertension, Polymyalgia rheumatica ([address] ), and Shoulder impingement syndrome  She   Patient Active Problem List    Diagnosis Date Noted    CKD (chronic kidney disease) stage 3, GFR 30-59 ml/min ([facility]) 05/07/2019    Primary osteoarthritis of both knees 04/22/2019    Osteoarthritis of both knees 04/22/2019    New onset type 2 diabetes mellitus ([facility] ) 09/18/2018    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[facility] ) 09/06/2017    Polymyalgia rheumatica ([facility] ) 09/06/2017    Closed fracture of proximal end of right humerus with routine healing 08/11/2017    Fracture of right distal radius 08/11/2017    Anemia 12/29/2016    Vitamin D deficiency 11/29/2016    Acute hip pain 11/28/2016    Insomnia 11/28/2016    Subacromial impingement of left shoulder 11/01/2016    Subacromial impingement of right shoulder 11/01/2016    Acute pain of both shoulders 10/20/2016    Other fatigue 10/05/2015    Allergic rhinitis 11/06/2014    Microscopic hematuria 03/13/2014    Benign essential hypertension 07/30/2012    Urticaria 06/25/2012    Hypothyroidism 06/20/2012    Morbid obesity ([facility] ) 06/20/2012    Obstructive sleep apnea 06/20/2012     She  has a past surgical history that includes Total abdominal hysterectomy (30 year ago); Wrist fracture surgery (Right, 2017); Other surgical history (Right, 2017); and Abdominal surgery  Her family history includes Alcohol abuse in her brother; COPD in her father; Cancer in her brother; Coronary artery disease in her brother; Glaucoma in her mother; Gout in her brother, father, and sister; Heart disease in her father; Hypertension in her mother and sister; Kidney failure in her sister; Multiple sclerosis in her sister; Stroke in her mother  She  reports that she quit smoking about 22 years ago  She smoked 0 50 packs per day  She has never used smokeless tobacco  She reports that she drinks alcohol  She reports that she does not use drugs    Current Outpatient Medications   Medication Sig Dispense Refill    Acetaminophen 500 MG Take 1,000 mg by mouth daily       atorvastatin (LIPITOR) 10 mg tablet Take 1 tablet (10 mg total) by mouth daily 90 tablet 1    hydrochlorothiazide (HYDRODIURIL) 25 mg tablet TAKE 1 TABLET BY MOUTH EVERY DAY 90 tablet 1    ibuprofen (ADVIL) 200 mg tablet Take 200 mg by mouth every 6 (six) hours as needed for mild pain      latanoprost (XALATAN) 0 005 % ophthalmic solution INSTILL 1 DROP IN EACH EYE AT BEDTIME  5    lisinopril (ZESTRIL) 20 mg tablet Take 1 tablet (20 mg total) by mouth daily 90 tablet 1    metoprolol succinate (TOPROL-XL) 50 mg 24 hr tablet TAKE 1 TABLET BY MOUTH ONE TIME DAILY 90 tablet 1    temazepam (RESTORIL) 15 mg capsule Take 1 capsule (15 mg total) by mouth daily at bedtime as needed for sleep 30 capsule 0    doxycycline hyclate (VIBRAMYCIN) 100 mg capsule Take 1 capsule (100 mg total) by mouth every 12 (twelve) hours for 10 days 20 capsule 0    levothyroxine 100 mcg tablet Take 1 tablet (100 mcg total) by mouth daily for 90 days 90 tablet 1     No current facility-administered medications for this visit        Current Outpatient Medications on File Prior to Visit   Medication Sig    Acetaminophen 500 MG Take 1,000 mg by mouth daily     atorvastatin (LIPITOR) 10 mg tablet Take 1 tablet (10 mg total) by mouth daily    hydrochlorothiazide (HYDRODIURIL) 25 mg tablet TAKE 1 TABLET BY MOUTH EVERY DAY    ibuprofen (ADVIL) 200 mg tablet Take 200 mg by mouth every 6 (six) hours as needed for mild pain    latanoprost (XALATAN) 0 005 % ophthalmic solution INSTILL 1 DROP IN EACH EYE AT BEDTIME    lisinopril (ZESTRIL) 20 mg tablet Take 1 tablet (20 mg total) by mouth daily    metoprolol succinate (TOPROL-XL) 50 mg 24 hr tablet TAKE 1 TABLET BY MOUTH ONE TIME DAILY    temazepam (RESTORIL) 15 mg capsule Take 1 capsule (15 mg total) by mouth daily at bedtime as needed for sleep    levothyroxine 100 mcg tablet Take 1 tablet (100 mcg total) by mouth daily for 90 days     No current facility-administered medications on file prior to visit  She is allergic to hydromorphone; tdap [tetanus-diphth-acell pertussis]; and propoxyphene       Review of Systems   Constitutional: Positive for chills  Negative for diaphoresis  HENT: Positive for congestion, ear pain, sinus pressure and sore throat  Negative for sneezing  Respiratory: Positive for cough  Negative for shortness of breath  Gastrointestinal: Negative for abdominal pain, nausea and vomiting  Musculoskeletal: Negative for arthralgias, myalgias and neck pain  Skin: Negative for rash  Neurological: Negative for light-headedness and headaches  Objective:      /70 (BP Location: Left arm, Patient Position: Sitting, Cuff Size: Large)   Pulse 72   Temp 98 °F (36 7 °C) (Tympanic)   Ht 5' (1 524 m)   Wt 100 kg (220 lb 6 oz)   SpO2 98%   BMI 43 04 kg/m²          Physical Exam   Constitutional: She is oriented to person, place, and time  She appears well-developed and well-nourished  No distress  HENT:   Head: Normocephalic and atraumatic  Right Ear: External ear normal    Mouth/Throat: Oropharynx is clear and moist  No oropharyngeal exudate  Nasal mucosa is boggy with erythema and purulence  TM's with mild erythema bilaterally  Left ear canal with some dark dried bloody discharge  Manipulation of left bob didn't reproduce the pain  Eyes: Right eye exhibits no discharge  Left eye exhibits no discharge  No scleral icterus  Neck: Normal range of motion  Neck supple  Cardiovascular: Normal rate, regular rhythm and normal heart sounds  No murmur heard  Pulmonary/Chest: Effort normal and breath sounds normal  No respiratory distress  She has no wheezes  Abdominal: Soft  Bowel sounds are normal  She exhibits no distension  There is no tenderness  Lymphadenopathy:     She has no cervical adenopathy  Neurological: She is alert and oriented to person, place, and time  No cranial nerve deficit  Skin: Skin is warm and dry  No rash noted   She is not diaphoretic  Psychiatric: She has a normal mood and affect  Her behavior is normal    Nursing note and vitals reviewed

## 2020-01-11 DIAGNOSIS — I10 ESSENTIAL HYPERTENSION: ICD-10-CM

## 2020-01-13 RX ORDER — HYDROCHLOROTHIAZIDE 25 MG/1
TABLET ORAL
Qty: 90 TABLET | Refills: 0 | Status: SHIPPED | OUTPATIENT
Start: 2020-01-13 | End: 2020-01-17 | Stop reason: SDUPTHER

## 2020-01-13 RX ORDER — METOPROLOL SUCCINATE 50 MG/1
TABLET, EXTENDED RELEASE ORAL
Qty: 90 TABLET | Refills: 0 | Status: SHIPPED | OUTPATIENT
Start: 2020-01-13 | End: 2020-04-14 | Stop reason: SDUPTHER

## 2020-01-15 ENCOUNTER — OFFICE VISIT (OUTPATIENT)
Dept: OBGYN CLINIC | Facility: CLINIC | Age: 75
End: 2020-01-15
Payer: MEDICARE

## 2020-01-15 VITALS — WEIGHT: 220 LBS | BODY MASS INDEX: 43.19 KG/M2 | HEIGHT: 60 IN

## 2020-01-15 DIAGNOSIS — M17.0 BILATERAL PRIMARY OSTEOARTHRITIS OF KNEE: Primary | ICD-10-CM

## 2020-01-15 PROCEDURE — 99213 OFFICE O/P EST LOW 20 MIN: CPT | Performed by: ORTHOPAEDIC SURGERY

## 2020-01-15 PROCEDURE — 20610 DRAIN/INJ JOINT/BURSA W/O US: CPT | Performed by: ORTHOPAEDIC SURGERY

## 2020-01-15 RX ORDER — BUPIVACAINE HYDROCHLORIDE 5 MG/ML
2 INJECTION, SOLUTION EPIDURAL; INTRACAUDAL
Status: COMPLETED | OUTPATIENT
Start: 2020-01-15 | End: 2020-01-15

## 2020-01-15 RX ORDER — LIDOCAINE HYDROCHLORIDE 10 MG/ML
1 INJECTION, SOLUTION INFILTRATION; PERINEURAL
Status: COMPLETED | OUTPATIENT
Start: 2020-01-15 | End: 2020-01-15

## 2020-01-15 RX ORDER — BETAMETHASONE SODIUM PHOSPHATE AND BETAMETHASONE ACETATE 3; 3 MG/ML; MG/ML
12 INJECTION, SUSPENSION INTRA-ARTICULAR; INTRALESIONAL; INTRAMUSCULAR; SOFT TISSUE
Status: COMPLETED | OUTPATIENT
Start: 2020-01-15 | End: 2020-01-15

## 2020-01-15 RX ADMIN — BUPIVACAINE HYDROCHLORIDE 2 ML: 5 INJECTION, SOLUTION EPIDURAL; INTRACAUDAL at 13:58

## 2020-01-15 RX ADMIN — LIDOCAINE HYDROCHLORIDE 1 ML: 10 INJECTION, SOLUTION INFILTRATION; PERINEURAL at 13:58

## 2020-01-15 RX ADMIN — BETAMETHASONE SODIUM PHOSPHATE AND BETAMETHASONE ACETATE 12 MG: 3; 3 INJECTION, SUSPENSION INTRA-ARTICULAR; INTRALESIONAL; INTRAMUSCULAR; SOFT TISSUE at 13:58

## 2020-01-15 NOTE — PROGRESS NOTES
Assessment/Plan:  1  Bilateral primary osteoarthritis of knee  Large joint arthrocentesis    Large joint arthrocentesis     Patient Active Problem List   Diagnosis    Acute hip pain    Acute pain of both shoulders    Allergic rhinitis    Benign essential hypertension    Closed fracture of proximal end of right humerus with routine healing    Fracture of right distal radius    Hypothyroidism    Insomnia    Microscopic hematuria    Morbid obesity ([address] )    Obstructive sleep apnea    Other fatigue    Subacromial impingement of left shoulder    Subacromial impingement of right shoulder    Urticaria    Vitamin D deficiency    New onset type 2 diabetes mellitus (HCC)    Primary osteoarthritis of both knees    Osteoarthritis of both knees    CKD (chronic kidney disease) stage 3, GFR 30-59 ml/min (HCC)    Anemia    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HCC)    Polymyalgia rheumatica (HCC)       Discussion/Summary:    76 y o  female  Bilateral knee pain secondary to osteoarthritis left worse than right  She received bilateral intra-articular cortisone injections today tolerated this well  She is ready received medial  brace for the left knee does not feel this is helping much  She will return any time within the next 3 months for another round of Euflexxa injections in the bilateral knees  Discussed with her again the importance of BMI reduction to under 40 if she were to desire total knee arthroplasty in the future as well as  Her hemoglobin A1c being under 7 before any surgical intervention  She understands this and she will follow up as needed  The assessment and plan were formulated by Dr Jeannie Gonzales and I assisted in carrying it out  Subjective:   Patient ID: Lilian Smalls is a 76 y o  female   HPI    Patient presents to the office for follow up of   Bilateral knee pain  Since the last visit, the patient reports  Relief from cortisone injections given 4 months ago    Still has pain in the bilateral knees left worse than right  Pain stiff knee aching  Moderate to severe at times  Pain is not constant  Does  Worse with activity better with rest  Patient  denies any new injuries to the  Bilateral knees since the last visit  The following portions of the patient's history were reviewed and updated as appropriate: allergies, current medications, past family history, past social history, past surgical history and problem list     Social History     Socioeconomic History    Marital status:       Spouse name: Not on file    Number of children: 11    Years of education: 12    Highest education level: Not on file   Occupational History    Occupation: RN   Social Needs    Financial resource strain: Not on file    Food insecurity:     Worry: Not on file     Inability: Not on file   [employer] needs:     Medical: Not on file     Non-medical: Not on file   Tobacco Use    Smoking status: Former Smoker     Packs/day: 0 50     Last attempt to quit: 1998     Years since quittin 0    Smokeless tobacco: Never Used    Tobacco comment: 40 years ago    Substance and Sexual Activity    Alcohol use: Yes     Comment: rarely ; occasional     Drug use: No    Sexual activity: Not on file   Lifestyle    Physical activity:     Days per week: Not on file     Minutes per session: Not on file    Stress: Not on file   Relationships    Social connections:     Talks on phone: Not on file     Gets together: Not on file     Attends [employer] service: Not on file     Active member of club or organization: Not on file     Attends meetings of clubs or organizations: Not on file     Relationship status: Not on file    Intimate partner violence:     Fear of current or ex partner: Not on file     Emotionally abused: Not on file     Physically abused: Not on file     Forced sexual activity: Not on file   Other Topics Concern    Not on file   Social History Narrative    Drinks coffee     Past Medical History:   Diagnosis Date    Anemia     Arthritis     Hypertension     [address]ym[address] rhe[address]a ([facility] [address]     Shoulder impingement syndrome     last assessed 12/27/16     Past Surgical History:   Procedure Laterality Date    ABDOMINAL SURGERY      last assessed 11/1/16    OTHER SURGICAL HISTORY Right 2017    shoulder    TOTAL ABDOMINAL HYSTERECTOMY  30 year ago    WRIST FRACTURE SURGERY Right 2017     Allergies   Allergen Reactions    Hydromorphone Dizziness and Other (See Comments)     Severe vertigo     Tdap [Tetanus-Diphth-Acell Pertussis] Swelling    Propoxyphene      Current Outpatient Medications on File Prior to Visit   Medication Sig Dispense Refill    Acetaminophen 500 MG Take 1,000 mg by mouth daily       atorvastatin (LIPITOR) 10 mg tablet Take 1 tablet (10 mg total) by mouth daily 90 tablet 1    doxycycline hyclate (VIBRAMYCIN) 100 mg capsule Take 1 capsule (100 mg total) by mouth every 12 (twelve) hours for 10 days 20 capsule 0    hydrochlorothiazide (HYDRODIURIL) 25 mg tablet TAKE 1 TABLET BY MOUTH EVERY DAY 90 tablet 0    ibuprofen (ADVIL) 200 mg tablet Take 200 mg by mouth every 6 (six) hours as needed for mild pain      latanoprost (XALATAN) 0 005 % ophthalmic solution INSTILL 1 DROP IN EACH EYE AT BEDTIME  5    levothyroxine 100 mcg tablet Take 1 tablet (100 mcg total) by mouth daily for 90 days 90 tablet 1    lisinopril (ZESTRIL) 20 mg tablet Take 1 tablet (20 mg total) by mouth daily 90 tablet 1    metoprolol succinate (TOPROL-XL) 50 mg 24 hr tablet TAKE 1 TABLET BY MOUTH EVERY DAY 90 tablet 0    temazepam (RESTORIL) 15 mg capsule Take 1 capsule (15 mg total) by mouth daily at bedtime as needed for sleep 30 capsule 0     No current facility-administered medications on file prior to visit  Review of Systems      Objective: There were no vitals filed for this visit  Physical Exam   Constitutional: She is oriented to person, place, and time   She appears well-developed and well-nourished  HENT:   Head: Normocephalic and atraumatic  Eyes: Conjunctivae are normal  No scleral icterus  Neck: Neck supple  Cardiovascular:   No discernible arrhthymias   Pulmonary/Chest: Effort normal  No stridor  No respiratory distress  Abdominal: Soft  She exhibits no distension  Musculoskeletal:        Right knee: She exhibits no effusion  Left knee: She exhibits no effusion  Neurological: She is alert and oriented to person, place, and time  Skin: Skin is warm and dry  No erythema  Psychiatric: She has a normal mood and affect  Her behavior is normal        Right Knee Exam     Tenderness   The patient is experiencing tenderness in the medial joint line  Range of Motion   Extension: normal     Other   Erythema: absent  Scars: absent  Sensation: normal  Right knee pulse absent:  skin warm and well perfused  Swelling: none  Effusion: no effusion present      Left Knee Exam     Tenderness   The patient is experiencing tenderness in the medial joint line  Range of Motion   Extension: normal     Other   Erythema: absent  Scars: absent  Sensation: normal  Left knee pulse absent:  skin warm and well perfused  Swelling: none  Effusion: no effusion present            I have personally reviewed pertinent films in PACS      Large joint arthrocentesis: R knee  Date/Time: 1/15/2020 1:58 PM  Consent given by: patient  Site marked: site marked  Timeout: Immediately prior to procedure a time out was called to verify the correct patient, procedure, equipment, support staff and site/side marked as required   Supporting Documentation  Indications: pain   Procedure Details  Location: knee - R knee  Preparation: Patient was prepped and draped in the usual sterile fashion  Needle size: 22 G  Approach: anterolateral  Medications administered: 1 mL lidocaine 1 %; 12 mg betamethasone acetate-betamethasone sodium phosphate 6 (3-3) mg/mL; 2 mL bupivacaine (PF) 0 5 %    Patient tolerance: patient tolerated the procedure well with no immediate complications  Dressing:  Sterile dressing applied    Large joint arthrocentesis: L knee  Date/Time: 1/15/2020 1:58 PM  Consent given by: patient  Site marked: site marked  Timeout: Immediately prior to procedure a time out was called to verify the correct patient, procedure, equipment, support staff and site/side marked as required   Supporting Documentation  Indications: pain   Procedure Details  Location: knee - L knee  Preparation: Patient was prepped and draped in the usual sterile fashion  Needle size: 22 G  Approach: anterolateral  Medications administered: 1 mL lidocaine 1 %; 12 mg betamethasone acetate-betamethasone sodium phosphate 6 (3-3) mg/mL; 2 mL bupivacaine (PF) 0 5 %    Patient tolerance: patient tolerated the procedure well with no immediate complications  Dressing:  Sterile dressing applied            Portions of the record may have been created with voice recognition software  Occasional wrong word or "sound a like" substitutions may have occurred due to the inherent limitations of voice recognition software  Read the chart carefully and recognize, using context, where substitutions have occurred

## 2020-01-17 DIAGNOSIS — E03.9 HYPOTHYROIDISM, UNSPECIFIED TYPE: ICD-10-CM

## 2020-01-17 DIAGNOSIS — I10 ESSENTIAL HYPERTENSION: ICD-10-CM

## 2020-01-17 RX ORDER — HYDROCHLOROTHIAZIDE 25 MG/1
25 TABLET ORAL DAILY
Qty: 90 TABLET | Refills: 1 | Status: SHIPPED | OUTPATIENT
Start: 2020-01-17 | End: 2020-04-20 | Stop reason: SDUPTHER

## 2020-01-17 RX ORDER — LEVOTHYROXINE SODIUM 0.1 MG/1
100 TABLET ORAL DAILY
Qty: 90 TABLET | Refills: 1 | Status: SHIPPED | OUTPATIENT
Start: 2020-01-17 | End: 2020-04-20 | Stop reason: SDUPTHER

## 2020-04-02 ENCOUNTER — TELEPHONE (OUTPATIENT)
Dept: OBGYN CLINIC | Facility: HOSPITAL | Age: 75
End: 2020-04-02

## 2020-04-02 DIAGNOSIS — M17.0 PRIMARY OSTEOARTHRITIS OF BOTH KNEES: Primary | ICD-10-CM

## 2020-04-14 DIAGNOSIS — I10 ESSENTIAL HYPERTENSION: ICD-10-CM

## 2020-04-14 RX ORDER — METOPROLOL SUCCINATE 50 MG/1
50 TABLET, EXTENDED RELEASE ORAL DAILY
Qty: 90 TABLET | Refills: 1 | Status: SHIPPED | OUTPATIENT
Start: 2020-04-14 | End: 2020-10-20 | Stop reason: SDUPTHER

## 2020-04-15 ENCOUNTER — APPOINTMENT (OUTPATIENT)
Dept: LAB | Age: 75
End: 2020-04-15
Payer: MEDICARE

## 2020-04-15 DIAGNOSIS — E11.9 NEW ONSET TYPE 2 DIABETES MELLITUS (HCC): ICD-10-CM

## 2020-04-15 DIAGNOSIS — N18.30 CKD (CHRONIC KIDNEY DISEASE) STAGE 3, GFR 30-59 ML/MIN (HCC): ICD-10-CM

## 2020-04-15 DIAGNOSIS — E78.2 MIXED HYPERLIPIDEMIA: ICD-10-CM

## 2020-04-15 DIAGNOSIS — I10 BENIGN ESSENTIAL HYPERTENSION: ICD-10-CM

## 2020-04-15 LAB
ALBUMIN SERPL BCP-MCNC: 3.7 G/DL (ref 3.5–5)
ALP SERPL-CCNC: 81 U/L (ref 46–116)
ALT SERPL W P-5'-P-CCNC: 25 U/L (ref 12–78)
ANION GAP SERPL CALCULATED.3IONS-SCNC: 6 MMOL/L (ref 4–13)
AST SERPL W P-5'-P-CCNC: 12 U/L (ref 5–45)
BILIRUB SERPL-MCNC: 0.33 MG/DL (ref 0.2–1)
BUN SERPL-MCNC: 31 MG/DL (ref 5–25)
CALCIUM SERPL-MCNC: 9.4 MG/DL (ref 8.3–10.1)
CHLORIDE SERPL-SCNC: 105 MMOL/L (ref 100–108)
CHOLEST SERPL-MCNC: 177 MG/DL (ref 50–200)
CO2 SERPL-SCNC: 27 MMOL/L (ref 21–32)
CREAT SERPL-MCNC: 1.47 MG/DL (ref 0.6–1.3)
CREAT UR-MCNC: 165 MG/DL
EST. AVERAGE GLUCOSE BLD GHB EST-MCNC: 143 MG/DL
GFR SERPL CREATININE-BSD FRML MDRD: 35 ML/MIN/1.73SQ M
GLUCOSE P FAST SERPL-MCNC: 122 MG/DL (ref 65–99)
HBA1C MFR BLD: 6.6 %
HDLC SERPL-MCNC: 39 MG/DL
LDLC SERPL CALC-MCNC: 101 MG/DL (ref 0–100)
MICROALBUMIN UR-MCNC: <5 MG/L (ref 0–20)
MICROALBUMIN/CREAT 24H UR: <3 MG/G CREATININE (ref 0–30)
NONHDLC SERPL-MCNC: 138 MG/DL
POTASSIUM SERPL-SCNC: 3.9 MMOL/L (ref 3.5–5.3)
PROT SERPL-MCNC: 7.3 G/DL (ref 6.4–8.2)
SODIUM SERPL-SCNC: 138 MMOL/L (ref 136–145)
TRIGL SERPL-MCNC: 184 MG/DL

## 2020-04-15 PROCEDURE — 80061 LIPID PANEL: CPT

## 2020-04-15 PROCEDURE — 80053 COMPREHEN METABOLIC PANEL: CPT

## 2020-04-15 PROCEDURE — 83036 HEMOGLOBIN GLYCOSYLATED A1C: CPT

## 2020-04-15 PROCEDURE — 36415 COLL VENOUS BLD VENIPUNCTURE: CPT

## 2020-04-15 PROCEDURE — 82043 UR ALBUMIN QUANTITATIVE: CPT

## 2020-04-15 PROCEDURE — 82570 ASSAY OF URINE CREATININE: CPT

## 2020-04-17 ENCOUNTER — OFFICE VISIT (OUTPATIENT)
Dept: OBGYN CLINIC | Facility: CLINIC | Age: 75
End: 2020-04-17
Payer: MEDICARE

## 2020-04-17 DIAGNOSIS — M17.0 OSTEOARTHRITIS OF BOTH KNEES, UNSPECIFIED OSTEOARTHRITIS TYPE: Primary | ICD-10-CM

## 2020-04-17 PROCEDURE — 1036F TOBACCO NON-USER: CPT | Performed by: ORTHOPAEDIC SURGERY

## 2020-04-17 PROCEDURE — 20610 DRAIN/INJ JOINT/BURSA W/O US: CPT | Performed by: ORTHOPAEDIC SURGERY

## 2020-04-17 PROCEDURE — 2022F DILAT RTA XM EVC RTNOPTHY: CPT | Performed by: ORTHOPAEDIC SURGERY

## 2020-04-17 PROCEDURE — 3075F SYST BP GE 130 - 139MM HG: CPT | Performed by: ORTHOPAEDIC SURGERY

## 2020-04-17 PROCEDURE — 3066F NEPHROPATHY DOC TX: CPT | Performed by: ORTHOPAEDIC SURGERY

## 2020-04-17 PROCEDURE — 99212 OFFICE O/P EST SF 10 MIN: CPT | Performed by: ORTHOPAEDIC SURGERY

## 2020-04-17 PROCEDURE — 1160F RVW MEDS BY RX/DR IN RCRD: CPT | Performed by: ORTHOPAEDIC SURGERY

## 2020-04-17 PROCEDURE — 4040F PNEUMOC VAC/ADMIN/RCVD: CPT | Performed by: ORTHOPAEDIC SURGERY

## 2020-04-17 PROCEDURE — 3078F DIAST BP <80 MM HG: CPT | Performed by: ORTHOPAEDIC SURGERY

## 2020-04-17 PROCEDURE — 3044F HG A1C LEVEL LT 7.0%: CPT | Performed by: ORTHOPAEDIC SURGERY

## 2020-04-17 RX ORDER — BETAMETHASONE SODIUM PHOSPHATE AND BETAMETHASONE ACETATE 3; 3 MG/ML; MG/ML
6 INJECTION, SUSPENSION INTRA-ARTICULAR; INTRALESIONAL; INTRAMUSCULAR; SOFT TISSUE
Status: COMPLETED | OUTPATIENT
Start: 2020-04-17 | End: 2020-04-17

## 2020-04-17 RX ORDER — LIDOCAINE HYDROCHLORIDE 10 MG/ML
1 INJECTION, SOLUTION INFILTRATION; PERINEURAL
Status: COMPLETED | OUTPATIENT
Start: 2020-04-17 | End: 2020-04-17

## 2020-04-17 RX ADMIN — BETAMETHASONE SODIUM PHOSPHATE AND BETAMETHASONE ACETATE 6 MG: 3; 3 INJECTION, SUSPENSION INTRA-ARTICULAR; INTRALESIONAL; INTRAMUSCULAR; SOFT TISSUE at 11:10

## 2020-04-17 RX ADMIN — LIDOCAINE HYDROCHLORIDE 1 ML: 10 INJECTION, SOLUTION INFILTRATION; PERINEURAL at 11:10

## 2020-04-19 DIAGNOSIS — I10 BENIGN ESSENTIAL HYPERTENSION: ICD-10-CM

## 2020-04-20 ENCOUNTER — TELEMEDICINE (OUTPATIENT)
Dept: FAMILY MEDICINE CLINIC | Facility: OTHER | Age: 75
End: 2020-04-20
Payer: MEDICARE

## 2020-04-20 VITALS
HEIGHT: 60 IN | DIASTOLIC BLOOD PRESSURE: 61 MMHG | HEART RATE: 68 BPM | BODY MASS INDEX: 43.19 KG/M2 | SYSTOLIC BLOOD PRESSURE: 129 MMHG | WEIGHT: 220 LBS

## 2020-04-20 DIAGNOSIS — N18.30 TYPE 2 DIABETES MELLITUS WITH STAGE 3 CHRONIC KIDNEY DISEASE, WITHOUT LONG-TERM CURRENT USE OF INSULIN (HCC): Primary | ICD-10-CM

## 2020-04-20 DIAGNOSIS — E11.22 TYPE 2 DIABETES MELLITUS WITH STAGE 3 CHRONIC KIDNEY DISEASE, WITHOUT LONG-TERM CURRENT USE OF INSULIN (HCC): Primary | ICD-10-CM

## 2020-04-20 DIAGNOSIS — G47.00 INSOMNIA, UNSPECIFIED TYPE: ICD-10-CM

## 2020-04-20 DIAGNOSIS — I10 BENIGN ESSENTIAL HYPERTENSION: ICD-10-CM

## 2020-04-20 DIAGNOSIS — E03.9 HYPOTHYROIDISM, UNSPECIFIED TYPE: ICD-10-CM

## 2020-04-20 DIAGNOSIS — N18.30 CKD (CHRONIC KIDNEY DISEASE) STAGE 3, GFR 30-59 ML/MIN (HCC): ICD-10-CM

## 2020-04-20 PROCEDURE — 99214 OFFICE O/P EST MOD 30 MIN: CPT | Performed by: FAMILY MEDICINE

## 2020-04-20 RX ORDER — LISINOPRIL 20 MG/1
20 TABLET ORAL DAILY
Qty: 90 TABLET | Refills: 1 | Status: SHIPPED | OUTPATIENT
Start: 2020-04-20 | End: 2020-05-01 | Stop reason: SDUPTHER

## 2020-04-20 RX ORDER — TEMAZEPAM 15 MG/1
15 CAPSULE ORAL
Qty: 30 CAPSULE | Refills: 0 | Status: SHIPPED | OUTPATIENT
Start: 2020-04-20 | End: 2021-09-24 | Stop reason: SDUPTHER

## 2020-04-20 RX ORDER — HYDROCHLOROTHIAZIDE 25 MG/1
25 TABLET ORAL DAILY
Qty: 90 TABLET | Refills: 1 | Status: SHIPPED | OUTPATIENT
Start: 2020-04-20 | End: 2021-02-03 | Stop reason: SDUPTHER

## 2020-04-20 RX ORDER — LEVOTHYROXINE SODIUM 0.1 MG/1
100 TABLET ORAL DAILY
Qty: 90 TABLET | Refills: 1 | Status: SHIPPED | OUTPATIENT
Start: 2020-04-20 | End: 2021-01-25 | Stop reason: SDUPTHER

## 2020-04-20 RX ORDER — LISINOPRIL 20 MG/1
TABLET ORAL
Qty: 90 TABLET | Refills: 1 | OUTPATIENT
Start: 2020-04-20

## 2020-04-21 PROBLEM — N18.30 TYPE 2 DIABETES MELLITUS WITH STAGE 3 CHRONIC KIDNEY DISEASE, WITHOUT LONG-TERM CURRENT USE OF INSULIN (HCC): Status: ACTIVE | Noted: 2018-09-18

## 2020-04-21 PROBLEM — E11.22 TYPE 2 DIABETES MELLITUS WITH STAGE 3 CHRONIC KIDNEY DISEASE, WITHOUT LONG-TERM CURRENT USE OF INSULIN (HCC): Status: ACTIVE | Noted: 2018-09-18

## 2020-05-01 DIAGNOSIS — I10 BENIGN ESSENTIAL HYPERTENSION: ICD-10-CM

## 2020-05-04 RX ORDER — LISINOPRIL 20 MG/1
20 TABLET ORAL DAILY
Qty: 90 TABLET | Refills: 1 | Status: SHIPPED | OUTPATIENT
Start: 2020-05-04 | End: 2021-04-20 | Stop reason: SDUPTHER

## 2020-07-02 ENCOUNTER — TELEMEDICINE (OUTPATIENT)
Dept: FAMILY MEDICINE CLINIC | Facility: OTHER | Age: 75
End: 2020-07-02
Payer: MEDICARE

## 2020-07-02 DIAGNOSIS — Z20.822 EXPOSURE TO COVID-19 VIRUS: Primary | ICD-10-CM

## 2020-07-02 PROCEDURE — 99441 PR PHYS/QHP TELEPHONE EVALUATION 5-10 MIN: CPT | Performed by: FAMILY MEDICINE

## 2020-07-02 NOTE — PROGRESS NOTES
COVID-19 Virtual Visit     Assessment/Plan:    Problem List Items Addressed This Visit     None      Visit Diagnoses     Exposure to COVID-19 virus    -  Primary    Relevant Orders    SARS CoV-2 ANTIBODY,IgG- Lab Collect        This virtual check-in was done via [employer] and patient was informed that this is a secure, HIPAA-compliant platform  She agrees to proceed  Disposition:      I discussed COVID-19 antibody testing with Juana Grimes  I indicated that there are important limitations of this test   Antibody tests detect the body's immune response to infection rather than detecting the virus itself  It can take weeks for antibodies to show up in the blood  For this reason, antibody tests are not used to detect or manage infection  They may be better for tracking infections in the community  Current antibody tests have not undergone the usual strict FDA approval process  The FDA decided to make it easier to have more tests available  The result is that these new tests may not be reliable  Since COVID-19 antibody testing is so new, we do not know how accurate it is  One can have a positive test and have not actually been infected  One can also have a negative test despite having been infected  There are other coronaviruses that are known to cause the common cold  Many people may have antibodies to these other viruses that could make the COVID-19 antibody test positive  More importantly, even if the test positive, this does not necessarily indicate immunity to the virus  After this discussion, Juana Grimes has decided to proceed with COVID-19 antibody testing  As a result of this visit, I have not referred the patient for further respiratory evaluation       I spent 10 minutes directly with the patient during this visit    Encounter provider Chanell Sánchez DO    Provider located at [address] [id number]    Recent Visits  No visits were found meeting these conditions  Showing recent visits within past 7 days and meeting all other requirements     Today's Visits  Date Type Provider Dept   07/02/20 Telemedicine Karla Jin, DO [facility]   Showing today's visits and meeting all other requirements     Future Appointments  No visits were found meeting these conditions  Showing future appointments within next 150 days and meeting all other requirements        Patient agrees to participate in a virtual check in via telephone or video visit instead of presenting to the office to address urgent/immediate medical needs  Patient is aware this is a billable service  After connecting through telephone, the patient was identified by name and date of birth  Jason Villegas was informed that this was a telemedicine visit and that the exam was being conducted confidentially over secure lines  My office door was closed  No one else was in the room  Jason Villegas acknowledged consent and understanding of privacy and security of the telemedicine visit  I informed the patient that I have reviewed her record in Epic and presented the opportunity for her to ask any questions regarding the visit today  The patient agreed to participate  It was my intent to perform this visit via video technology but the patient was not able to do a video connection so the visit was completed via audio telephone only  Subjective  Jason Villegas is a 76 y o  female who is concerned about COVID-19  She reports that she thinks she had COVID in February  She has not experienced none She has not had contact with a person who is under investigation for or who is positive for COVID-19 within the last 14 days  She has not been hospitalized recently for fever and/or lower respiratory symptoms      Past Medical History:   Diagnosis Date    Anemia     Arthritis     Hypertension     [address]ymyalgia rhe[address]a ([facility] [address]     Shoulder impingement syndrome     last assessed 12/27/16 Past Surgical History:   Procedure Laterality Date    ABDOMINAL SURGERY      last assessed 11/1/16    OTHER SURGICAL HISTORY Right 2017    shoulder    TOTAL ABDOMINAL HYSTERECTOMY  30 year ago    WRIST FRACTURE SURGERY Right 2017       Current Outpatient Medications   Medication Sig Dispense Refill    Acetaminophen 500 MG Take 1,000 mg by mouth daily       atorvastatin (LIPITOR) 10 mg tablet Take 1 tablet (10 mg total) by mouth daily 90 tablet 1    hydrochlorothiazide (HYDRODIURIL) 25 mg tablet Take 1 tablet (25 mg total) by mouth daily 90 tablet 1    ibuprofen (ADVIL) 200 mg tablet Take 200 mg by mouth every 6 (six) hours as needed for mild pain      latanoprost (XALATAN) 0 005 % ophthalmic solution INSTILL 1 DROP IN EACH EYE AT BEDTIME  5    levothyroxine 100 mcg tablet Take 1 tablet (100 mcg total) by mouth daily 90 tablet 1    lisinopril (ZESTRIL) 20 mg tablet Take 1 tablet (20 mg total) by mouth daily 90 tablet 1    metoprolol succinate (TOPROL-XL) 50 mg 24 hr tablet Take 1 tablet (50 mg total) by mouth daily 90 tablet 1    temazepam (RESTORIL) 15 mg capsule Take 1 capsule (15 mg total) by mouth daily at bedtime as needed for sleep 30 capsule 0     No current facility-administered medications for this visit  Allergies   Allergen Reactions    Hydromorphone Dizziness and Other (See Comments)     Severe vertigo     Tdap [Tetanus-Diphth-Acell Pertussis] Swelling    Propoxyphene        Review of Systems   Constitutional: Negative for activity change, fatigue and fever  HENT: Negative for congestion, ear pain, sinus pain and sore throat  Eyes: Negative for pain and itching  Respiratory: Negative for cough and shortness of breath  Cardiovascular: Negative for chest pain and palpitations  Gastrointestinal: Negative for abdominal pain, constipation, diarrhea, nausea and vomiting  Endocrine: Negative for cold intolerance and heat intolerance     Genitourinary: Negative for dysuria  Musculoskeletal: Negative for myalgias  Skin: Negative for color change and rash  Neurological: Negative for dizziness, syncope and headaches  Hematological: Negative for adenopathy  Psychiatric/Behavioral: Negative for behavioral problems, dysphoric mood and sleep disturbance  The patient is not nervous/anxious  Video Exam    There were no vitals filed for this visit  Juana Grimes appears alert, no distress  Physical Exam   Constitutional: No distress  Observations based on telephone visit   Pulmonary/Chest: Effort normal  No respiratory distress  VIRTUAL VISIT DISCLAIMER    Kazakh Gent acknowledges that she has consented to an online visit or consultation  She understands that the online visit is based solely on information provided by her, and that, in the absence of a face-to-face physical evaluation by the physician, the diagnosis she receives is both limited and provisional in terms of accuracy and completeness  This is not intended to replace a full medical face-to-face evaluation by the physician  Diallo Land understands and accepts these terms

## 2020-07-06 ENCOUNTER — APPOINTMENT (OUTPATIENT)
Dept: LAB | Facility: CLINIC | Age: 75
End: 2020-07-06
Payer: MEDICARE

## 2020-07-06 DIAGNOSIS — I10 BENIGN ESSENTIAL HYPERTENSION: ICD-10-CM

## 2020-07-06 DIAGNOSIS — Z20.822 EXPOSURE TO COVID-19 VIRUS: ICD-10-CM

## 2020-07-06 DIAGNOSIS — E11.22 TYPE 2 DIABETES MELLITUS WITH STAGE 3 CHRONIC KIDNEY DISEASE, WITHOUT LONG-TERM CURRENT USE OF INSULIN (HCC): ICD-10-CM

## 2020-07-06 DIAGNOSIS — N18.30 TYPE 2 DIABETES MELLITUS WITH STAGE 3 CHRONIC KIDNEY DISEASE, WITHOUT LONG-TERM CURRENT USE OF INSULIN (HCC): ICD-10-CM

## 2020-07-06 DIAGNOSIS — N18.30 CKD (CHRONIC KIDNEY DISEASE) STAGE 3, GFR 30-59 ML/MIN (HCC): ICD-10-CM

## 2020-07-06 LAB
ALBUMIN SERPL BCP-MCNC: 3.7 G/DL (ref 3.5–5)
ALP SERPL-CCNC: 78 U/L (ref 46–116)
ALT SERPL W P-5'-P-CCNC: 26 U/L (ref 12–78)
ANION GAP SERPL CALCULATED.3IONS-SCNC: 8 MMOL/L (ref 4–13)
AST SERPL W P-5'-P-CCNC: 15 U/L (ref 5–45)
BILIRUB SERPL-MCNC: 0.38 MG/DL (ref 0.2–1)
BUN SERPL-MCNC: 27 MG/DL (ref 5–25)
CALCIUM SERPL-MCNC: 9.2 MG/DL (ref 8.3–10.1)
CHLORIDE SERPL-SCNC: 104 MMOL/L (ref 100–108)
CHOLEST SERPL-MCNC: 230 MG/DL (ref 50–200)
CO2 SERPL-SCNC: 28 MMOL/L (ref 21–32)
CREAT SERPL-MCNC: 1.41 MG/DL (ref 0.6–1.3)
CREAT UR-MCNC: 314 MG/DL
EST. AVERAGE GLUCOSE BLD GHB EST-MCNC: 151 MG/DL
GFR SERPL CREATININE-BSD FRML MDRD: 37 ML/MIN/1.73SQ M
GLUCOSE P FAST SERPL-MCNC: 125 MG/DL (ref 65–99)
HBA1C MFR BLD: 6.9 %
HDLC SERPL-MCNC: 39 MG/DL
LDLC SERPL CALC-MCNC: 151 MG/DL (ref 0–100)
MICROALBUMIN UR-MCNC: 18 MG/L (ref 0–20)
MICROALBUMIN/CREAT 24H UR: 6 MG/G CREATININE (ref 0–30)
NONHDLC SERPL-MCNC: 191 MG/DL
POTASSIUM SERPL-SCNC: 4 MMOL/L (ref 3.5–5.3)
PROT SERPL-MCNC: 7.3 G/DL (ref 6.4–8.2)
SARS-COV-2 IGG+IGM SERPL QL IA: NORMAL
SODIUM SERPL-SCNC: 140 MMOL/L (ref 136–145)
TRIGL SERPL-MCNC: 199 MG/DL

## 2020-07-06 PROCEDURE — 82570 ASSAY OF URINE CREATININE: CPT

## 2020-07-06 PROCEDURE — 83036 HEMOGLOBIN GLYCOSYLATED A1C: CPT

## 2020-07-06 PROCEDURE — 82043 UR ALBUMIN QUANTITATIVE: CPT

## 2020-07-06 PROCEDURE — 80053 COMPREHEN METABOLIC PANEL: CPT

## 2020-07-06 PROCEDURE — 36415 COLL VENOUS BLD VENIPUNCTURE: CPT

## 2020-07-06 PROCEDURE — 80061 LIPID PANEL: CPT

## 2020-07-06 PROCEDURE — 86769 SARS-COV-2 COVID-19 ANTIBODY: CPT

## 2020-07-08 ENCOUNTER — TELEPHONE (OUTPATIENT)
Dept: FAMILY MEDICINE CLINIC | Facility: OTHER | Age: 75
End: 2020-07-08

## 2020-07-08 NOTE — TELEPHONE ENCOUNTER
Patient informed     ----- Message from Brandie Orellana,  sent at 7/7/2020  5:01 PM EDT -----  Please inform pt that COVID antibodies were NEGATIVE  DM labs mostly stable -- will review in greater detail at August appt  Thanks!   Brandie Orellana, DO

## 2020-07-20 ENCOUNTER — OFFICE VISIT (OUTPATIENT)
Dept: OBGYN CLINIC | Facility: CLINIC | Age: 75
End: 2020-07-20
Payer: MEDICARE

## 2020-07-20 VITALS — WEIGHT: 220 LBS | HEIGHT: 60 IN | BODY MASS INDEX: 43.19 KG/M2

## 2020-07-20 DIAGNOSIS — M17.0 PRIMARY OSTEOARTHRITIS OF BOTH KNEES: Primary | ICD-10-CM

## 2020-07-20 PROCEDURE — 1160F RVW MEDS BY RX/DR IN RCRD: CPT | Performed by: ORTHOPAEDIC SURGERY

## 2020-07-20 PROCEDURE — 4040F PNEUMOC VAC/ADMIN/RCVD: CPT | Performed by: ORTHOPAEDIC SURGERY

## 2020-07-20 PROCEDURE — 3074F SYST BP LT 130 MM HG: CPT | Performed by: ORTHOPAEDIC SURGERY

## 2020-07-20 PROCEDURE — 2022F DILAT RTA XM EVC RTNOPTHY: CPT | Performed by: ORTHOPAEDIC SURGERY

## 2020-07-20 PROCEDURE — 3066F NEPHROPATHY DOC TX: CPT | Performed by: ORTHOPAEDIC SURGERY

## 2020-07-20 PROCEDURE — 3078F DIAST BP <80 MM HG: CPT | Performed by: ORTHOPAEDIC SURGERY

## 2020-07-20 PROCEDURE — 1036F TOBACCO NON-USER: CPT | Performed by: ORTHOPAEDIC SURGERY

## 2020-07-20 PROCEDURE — 3008F BODY MASS INDEX DOCD: CPT | Performed by: ORTHOPAEDIC SURGERY

## 2020-07-20 PROCEDURE — 20610 DRAIN/INJ JOINT/BURSA W/O US: CPT | Performed by: ORTHOPAEDIC SURGERY

## 2020-07-20 PROCEDURE — 99213 OFFICE O/P EST LOW 20 MIN: CPT | Performed by: ORTHOPAEDIC SURGERY

## 2020-07-20 PROCEDURE — 3044F HG A1C LEVEL LT 7.0%: CPT | Performed by: ORTHOPAEDIC SURGERY

## 2020-07-20 RX ORDER — LIDOCAINE HYDROCHLORIDE 10 MG/ML
1 INJECTION, SOLUTION INFILTRATION; PERINEURAL
Status: COMPLETED | OUTPATIENT
Start: 2020-07-20 | End: 2020-07-20

## 2020-07-20 RX ORDER — BUPIVACAINE HYDROCHLORIDE 5 MG/ML
2 INJECTION, SOLUTION EPIDURAL; INTRACAUDAL
Status: COMPLETED | OUTPATIENT
Start: 2020-07-20 | End: 2020-07-20

## 2020-07-20 RX ORDER — BETAMETHASONE SODIUM PHOSPHATE AND BETAMETHASONE ACETATE 3; 3 MG/ML; MG/ML
12 INJECTION, SUSPENSION INTRA-ARTICULAR; INTRALESIONAL; INTRAMUSCULAR; SOFT TISSUE
Status: COMPLETED | OUTPATIENT
Start: 2020-07-20 | End: 2020-07-20

## 2020-07-20 RX ORDER — TIMOLOL 5.12 MG/ML
SOLUTION/ DROPS OPHTHALMIC
COMMUNITY
Start: 2020-05-22

## 2020-07-20 RX ORDER — TIMOLOL MALEATE 5 MG/ML
SOLUTION/ DROPS OPHTHALMIC
COMMUNITY
Start: 2020-05-27

## 2020-07-20 RX ADMIN — BETAMETHASONE SODIUM PHOSPHATE AND BETAMETHASONE ACETATE 12 MG: 3; 3 INJECTION, SUSPENSION INTRA-ARTICULAR; INTRALESIONAL; INTRAMUSCULAR; SOFT TISSUE at 13:28

## 2020-07-20 RX ADMIN — LIDOCAINE HYDROCHLORIDE 1 ML: 10 INJECTION, SOLUTION INFILTRATION; PERINEURAL at 13:28

## 2020-07-20 RX ADMIN — LIDOCAINE HYDROCHLORIDE 1 ML: 10 INJECTION, SOLUTION INFILTRATION; PERINEURAL at 13:27

## 2020-07-20 RX ADMIN — BUPIVACAINE HYDROCHLORIDE 2 ML: 5 INJECTION, SOLUTION EPIDURAL; INTRACAUDAL at 13:28

## 2020-07-20 RX ADMIN — BUPIVACAINE HYDROCHLORIDE 2 ML: 5 INJECTION, SOLUTION EPIDURAL; INTRACAUDAL at 13:27

## 2020-07-20 RX ADMIN — BETAMETHASONE SODIUM PHOSPHATE AND BETAMETHASONE ACETATE 12 MG: 3; 3 INJECTION, SUSPENSION INTRA-ARTICULAR; INTRALESIONAL; INTRAMUSCULAR; SOFT TISSUE at 13:27

## 2020-07-20 NOTE — PROGRESS NOTES
Assessment/Plan:  1  Primary osteoarthritis of both knees  Large joint arthrocentesis    Large joint arthrocentesis     Patient Active Problem List   Diagnosis    Acute hip pain    Acute pain of both shoulders    Allergic rhinitis    Benign essential hypertension    Closed fracture of proximal end of right humerus with routine healing    Fracture of right distal radius    Hypothyroidism    Insomnia    Microscopic hematuria    Morbid obesity (HCC)    Obstructive sleep apnea    Other fatigue    Subacromial impingement of left shoulder    Subacromial impingement of right shoulder    Urticaria    Vitamin D deficiency    Type 2 diabetes mellitus with stage 3 chronic kidney disease, without long-term current use of insulin ([facility])    Primary osteoarthritis of both knees    Osteoarthritis of both knees    CKD (chronic kidney disease) stage 3, GFR 30-59 ml/min ([facility])    Anemia    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HCC)    Polymyalgia rheumatica ([facility])       Discussion/Summary:    76 y o  female  With bilateral knee osteoarthritis   bilateral intra-articular cortisone injections performed today in the knees   patient will follow up in 4-6 weeks to begin Visco injections for the bilat knees   advised the patient that as she has CKD stage 3 that she should limit NSAID use and she will follow up with her PCP regarding this as well      The assessment and plan were formulated by Dr Saad Jimenez and I assisted in carrying it out       subjective:   Patient ID: Sandy Frye is a 76 y o  female   HPI    Patient presents to the office for follow up of  Bilateral knee osteoarthritis  Since the last visit, the patient reports  Increased pain left knee worse than right  Pain is in the anteromedial aspect  Pain does not radiate  It is moderate to severe aching and intermittent  Made worse with weight-bearing  She no longer wears the left  brace as this is too uncomfortable for her    She has had relief from cortisone injections in the past although the last 1 she received did not give much with  Patient  denies any new injuries to the  Bilateral knees since the last visit  The following portions of the patient's history were reviewed and updated as appropriate: allergies, current medications, past family history, past social history, past surgical history and problem list     Social History     Socioeconomic History    Marital status:       Spouse name: Not on file    Number of children: 11    Years of education: 12    Highest education level: Not on file   Occupational History    Occupation: RN   Social Needs    Financial resource strain: Not on file    Food insecurity:     Worry: Not on file     Inability: Not on file   DPSI needs:     Medical: Not on file     Non-medical: Not on file   Tobacco Use    Smoking status: Former Smoker     Packs/day: 0 50     Last attempt to quit:      Years since quittin 5    Smokeless tobacco: Never Used    Tobacco comment: 40 years ago    Substance and Sexual Activity    Alcohol use: Yes     Comment: rarely ; occasional     Drug use: No    Sexual activity: Not on file   Lifestyle    Physical activity:     Days per week: Not on file     Minutes per session: Not on file    Stress: Not on file   Relationships    Social connections:     Talks on phone: Not on file     Gets together: Not on file     Attends Bahai service: Not on file     Active member of club or organization: Not on file     Attends meetings of clubs or organizations: Not on file     Relationship status: Not on file    Intimate partner violence:     Fear of current or ex partner: Not on file     Emotionally abused: Not on file     Physically abused: Not on file     Forced sexual activity: Not on file   Other Topics Concern    Not on file   Social History Narrative    Drinks coffee     Past Medical History:   Diagnosis Date    Anemia     Arthritis     Hypertension     Polymyalgia rheumatica (HCC)     Shoulder impingement syndrome     last assessed 12/27/16     Past Surgical History:   Procedure Laterality Date    ABDOMINAL SURGERY      last assessed 11/1/16    OTHER SURGICAL HISTORY Right 2017    shoulder    TOTAL ABDOMINAL HYSTERECTOMY  30 year ago    WRIST FRACTURE SURGERY Right 2017     Allergies   Allergen Reactions    Hydromorphone Dizziness and Other (See Comments)     Severe vertigo     Tdap [Tetanus-Diphth-Acell Pertussis] Swelling    Propoxyphene      Current Outpatient Medications on File Prior to Visit   Medication Sig Dispense Refill    Acetaminophen 500 MG Take 1,000 mg by mouth daily       atorvastatin (LIPITOR) 10 mg tablet Take 1 tablet (10 mg total) by mouth daily 90 tablet 1    BETIMOL 0 5 % ophthalmic solution INSTILL 1 DROP INTO THE AFFECTED EYE(S) EVERY DAY      hydrochlorothiazide (HYDRODIURIL) 25 mg tablet Take 1 tablet (25 mg total) by mouth daily 90 tablet 1    ibuprofen (ADVIL) 200 mg tablet Take 200 mg by mouth every 6 (six) hours as needed for mild pain      latanoprost (XALATAN) 0 005 % ophthalmic solution INSTILL 1 DROP IN EACH EYE AT BEDTIME  5    levothyroxine 100 mcg tablet Take 1 tablet (100 mcg total) by mouth daily 90 tablet 1    lisinopril (ZESTRIL) 20 mg tablet Take 1 tablet (20 mg total) by mouth daily 90 tablet 1    metoprolol succinate (TOPROL-XL) 50 mg 24 hr tablet Take 1 tablet (50 mg total) by mouth daily 90 tablet 1    temazepam (RESTORIL) 15 mg capsule Take 1 capsule (15 mg total) by mouth daily at bedtime as needed for sleep 30 capsule 0    timolol (TIMOPTIC) 0 5 % ophthalmic solution        No current facility-administered medications on file prior to visit  Review of Systems      Objective: There were no vitals filed for this visit  Physical Exam   Constitutional: She is oriented to person, place, and time  She appears well-developed and well-nourished  HENT:   Head: Normocephalic and atraumatic  Eyes: Conjunctivae are normal  No scleral icterus  Neck: Neck supple  Cardiovascular:   No discernible arrhthymias   Pulmonary/Chest: Effort normal  No stridor  No respiratory distress  Abdominal: Soft  She exhibits no distension  Musculoskeletal:        Right knee: She exhibits no effusion  Left knee: She exhibits no effusion  Neurological: She is alert and oriented to person, place, and time  Skin: Skin is warm and dry  No erythema  Psychiatric: She has a normal mood and affect  Her behavior is normal        Right Knee Exam     Muscle Strength   The patient has normal right knee strength  Tenderness   The patient is experiencing tenderness in the medial joint line  Range of Motion   Extension: abnormal   Flexion: abnormal     Tests   Varus: negative Valgus: positive    Other   Erythema: absent  Scars: absent  Sensation: normal  Pulse: present  Swelling: none  Effusion: no effusion present      Left Knee Exam     Muscle Strength   The patient has normal left knee strength  Tenderness   The patient is experiencing tenderness in the medial joint line  Range of Motion   Extension: abnormal   Flexion: abnormal     Tests   Varus: negative Valgus: positive    Other   Erythema: absent  Scars: absent  Sensation: normal  Pulse: present  Swelling: none  Effusion: no effusion present            I have personally reviewed pertinent films in PACS      Large joint arthrocentesis: R knee  Date/Time: 7/20/2020 1:27 PM  Consent given by: patient  Site marked: site marked  Timeout: Immediately prior to procedure a time out was called to verify the correct patient, procedure, equipment, support staff and site/side marked as required   Supporting Documentation  Indications: pain   Procedure Details  Location: knee - R knee  Preparation: Patient was prepped and draped in the usual sterile fashion  Needle size: 22 G  Approach: anterolateral  Medications administered: 1 mL lidocaine 1 %; 12 mg betamethasone acetate-betamethasone sodium phosphate 6 (3-3) mg/mL; 2 mL bupivacaine (PF) 0 5 %    Patient tolerance: patient tolerated the procedure well with no immediate complications  Dressing:  Sterile dressing applied    Large joint arthrocentesis: L knee  Date/Time: 7/20/2020 1:28 PM  Consent given by: patient  Site marked: site marked  Timeout: Immediately prior to procedure a time out was called to verify the correct patient, procedure, equipment, support staff and site/side marked as required   Supporting Documentation  Indications: pain   Procedure Details  Location: knee - L knee  Preparation: Patient was prepped and draped in the usual sterile fashion  Needle size: 22 G  Approach: anterolateral  Medications administered: 1 mL lidocaine 1 %; 12 mg betamethasone acetate-betamethasone sodium phosphate 6 (3-3) mg/mL; 2 mL bupivacaine (PF) 0 5 %    Patient tolerance: patient tolerated the procedure well with no immediate complications  Dressing:  Sterile dressing applied            Portions of the record may have been created with voice recognition software  Occasional wrong word or "sound a like" substitutions may have occurred due to the inherent limitations of voice recognition software  Read the chart carefully and recognize, using context, where substitutions have occurred

## 2020-08-03 ENCOUNTER — OFFICE VISIT (OUTPATIENT)
Dept: FAMILY MEDICINE CLINIC | Facility: OTHER | Age: 75
End: 2020-08-03
Payer: MEDICARE

## 2020-08-03 VITALS
HEART RATE: 74 BPM | WEIGHT: 224 LBS | SYSTOLIC BLOOD PRESSURE: 122 MMHG | HEIGHT: 60 IN | TEMPERATURE: 98.2 F | BODY MASS INDEX: 43.98 KG/M2 | DIASTOLIC BLOOD PRESSURE: 80 MMHG

## 2020-08-03 DIAGNOSIS — N18.30 TYPE 2 DIABETES MELLITUS WITH STAGE 3 CHRONIC KIDNEY DISEASE, WITHOUT LONG-TERM CURRENT USE OF INSULIN (HCC): Primary | ICD-10-CM

## 2020-08-03 DIAGNOSIS — Z13.820 OSTEOPOROSIS SCREENING: ICD-10-CM

## 2020-08-03 DIAGNOSIS — E11.22 TYPE 2 DIABETES MELLITUS WITH STAGE 3 CHRONIC KIDNEY DISEASE, WITHOUT LONG-TERM CURRENT USE OF INSULIN (HCC): Primary | ICD-10-CM

## 2020-08-03 DIAGNOSIS — I10 BENIGN ESSENTIAL HYPERTENSION: ICD-10-CM

## 2020-08-03 DIAGNOSIS — N18.30 CKD (CHRONIC KIDNEY DISEASE) STAGE 3, GFR 30-59 ML/MIN (HCC): ICD-10-CM

## 2020-08-03 DIAGNOSIS — Z78.0 POST-MENOPAUSAL: ICD-10-CM

## 2020-08-03 DIAGNOSIS — M06.4 INFLAMMATORY POLYARTHROPATHY (HCC): ICD-10-CM

## 2020-08-03 DIAGNOSIS — Z12.31 BREAST CANCER SCREENING BY MAMMOGRAM: ICD-10-CM

## 2020-08-03 DIAGNOSIS — E78.2 MIXED HYPERLIPIDEMIA: ICD-10-CM

## 2020-08-03 PROCEDURE — 4040F PNEUMOC VAC/ADMIN/RCVD: CPT | Performed by: FAMILY MEDICINE

## 2020-08-03 PROCEDURE — 3044F HG A1C LEVEL LT 7.0%: CPT | Performed by: FAMILY MEDICINE

## 2020-08-03 PROCEDURE — 3074F SYST BP LT 130 MM HG: CPT | Performed by: FAMILY MEDICINE

## 2020-08-03 PROCEDURE — 3079F DIAST BP 80-89 MM HG: CPT | Performed by: FAMILY MEDICINE

## 2020-08-03 PROCEDURE — 1160F RVW MEDS BY RX/DR IN RCRD: CPT | Performed by: FAMILY MEDICINE

## 2020-08-03 PROCEDURE — 1036F TOBACCO NON-USER: CPT | Performed by: FAMILY MEDICINE

## 2020-08-03 PROCEDURE — 3066F NEPHROPATHY DOC TX: CPT | Performed by: FAMILY MEDICINE

## 2020-08-03 PROCEDURE — 2022F DILAT RTA XM EVC RTNOPTHY: CPT | Performed by: FAMILY MEDICINE

## 2020-08-03 PROCEDURE — 99214 OFFICE O/P EST MOD 30 MIN: CPT | Performed by: FAMILY MEDICINE

## 2020-08-03 NOTE — PROGRESS NOTES
Assessment/Plan:    No problem-specific Assessment & Plan notes found for this encounter  Diagnoses and all orders for this visit:    Type 2 diabetes mellitus with stage 3 chronic kidney disease, without long-term current use of insulin ([facility])  Comments:  A1c 6 8%  Referral to nutrition services  Recheck in 3 months, consider metformin if A1c >7%  Orders:  -     Ambulatory referral to Nutrition Services; Future  -     Comprehensive metabolic panel; Future  -     Hemoglobin A1C; Future    Benign essential hypertension  Comments:  BP stable on ACE+diuretic  Continue to monitor for goal <140/90  Orders:  -     Ambulatory referral to Nutrition Services; Future  -     Comprehensive metabolic panel; Future    Mixed hyperlipidemia  -     Lipid panel; Future    CKD (chronic kidney disease) stage 3, GFR 30-59 ml/min ([facility])  Comments:  Stable, recheck in 3 months with labs  Consider Nephrology referral if GFR <40  Orders:  -     Ambulatory referral to Nutrition Services; Future  -     Comprehensive metabolic panel; Future    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[facility] )  Comments:  Stable, followed by Rheumatology    Breast cancer screening by mammogram  -     Mammo screening bilateral w 3d & cad; Future    Post-menopausal  -     DXA bone density spine hip and pelvis; Future    Osteoporosis screening  -     DXA bone density spine hip and pelvis; Future        Return in about 12 weeks (around 10/26/2020) for AWV, Recheck DM/HTN  The patient indicates understanding of these issues and agrees with the plan  Subjective:      Patient ID: Farheen Centeno is a 76 y o  female  Diabetes   She presents for her follow-up diabetic visit  She has type 2 diabetes mellitus  Her disease course has been stable  There are no hypoglycemic associated symptoms  Pertinent negatives for hypoglycemia include no dizziness, headaches, nervousness/anxiousness or sweats  There are no diabetic associated symptoms   Pertinent negatives for diabetes include no blurred vision, no chest pain and no fatigue  There are no hypoglycemic complications  Pertinent negatives for diabetic complications include no CVA, heart disease, nephropathy, peripheral neuropathy, PVD or retinopathy  Risk factors for coronary artery disease include diabetes mellitus, dyslipidemia, family history, hypertension, obesity and post-menopausal  Current diabetic treatment includes diet  She is compliant with treatment most of the time  Her weight is stable  She is following a generally healthy diet  Meal planning includes avoidance of concentrated sweets  She has not had a previous visit with a dietitian  She participates in exercise intermittently  Her breakfast blood glucose is taken between 7-8 am  Her breakfast blood glucose range is generally 130-140 mg/dl  An ACE inhibitor/angiotensin II receptor blocker is being taken  She does not see a podiatrist Eye exam is current  Hypertension   This is a chronic problem  The current episode started more than 1 year ago  The problem has been waxing and waning since onset  The problem is controlled  Pertinent negatives include no anxiety, blurred vision, chest pain, headaches, malaise/fatigue, neck pain, orthopnea, palpitations, peripheral edema, PND, shortness of breath or sweats  Agents associated with hypertension include NSAIDs  Risk factors for coronary artery disease include diabetes mellitus, dyslipidemia, family history, obesity and post-menopausal state  Past treatments include ACE inhibitors and diuretics  The current treatment provides significant improvement  Compliance problems include diet, exercise and psychosocial issues  Hypertensive end-organ damage includes kidney disease  There is no history of angina, CAD/MI, CVA, heart failure, left ventricular hypertrophy, PVD or retinopathy  Identifiable causes of hypertension include chronic renal disease, a hypertension causing med and a thyroid problem     Hyperlipidemia   Exacerbating diseases include chronic renal disease, diabetes, hypothyroidism and obesity  She has no history of liver disease or nephrotic syndrome  There are no known factors aggravating her hyperlipidemia  Pertinent negatives include no chest pain, focal sensory loss, focal weakness, leg pain, myalgias or shortness of breath  Current antihyperlipidemic treatment includes diet change  The current treatment provides moderate improvement of lipids  Compliance problems include adherence to diet, adherence to exercise and psychosocial issues  Risk factors for coronary artery disease include post-menopausal, obesity, hypertension, diabetes mellitus, dyslipidemia and family history         The following portions of the patient's history were reviewed and updated as appropriate: allergies, current medications, past family history, past medical history, past social history, past surgical history and problem list       Current Outpatient Medications:     Acetaminophen 500 MG, Take 1,000 mg by mouth daily , Disp: , Rfl:     BETIMOL 0 5 % ophthalmic solution, INSTILL 1 DROP INTO THE AFFECTED EYE(S) EVERY DAY, Disp: , Rfl:     hydrochlorothiazide (HYDRODIURIL) 25 mg tablet, Take 1 tablet (25 mg total) by mouth daily, Disp: 90 tablet, Rfl: 1    ibuprofen (ADVIL) 200 mg tablet, Take 200 mg by mouth every 6 (six) hours as needed for mild pain, Disp: , Rfl:     latanoprost (XALATAN) 0 005 % ophthalmic solution, INSTILL 1 DROP IN EACH EYE AT BEDTIME, Disp: , Rfl: 5    levothyroxine 100 mcg tablet, Take 1 tablet (100 mcg total) by mouth daily, Disp: 90 tablet, Rfl: 1    lisinopril (ZESTRIL) 20 mg tablet, Take 1 tablet (20 mg total) by mouth daily, Disp: 90 tablet, Rfl: 1    metoprolol succinate (TOPROL-XL) 50 mg 24 hr tablet, Take 1 tablet (50 mg total) by mouth daily, Disp: 90 tablet, Rfl: 1    temazepam (RESTORIL) 15 mg capsule, Take 1 capsule (15 mg total) by mouth daily at bedtime as needed for sleep, Disp: 30 capsule, Rfl: 0   timolol (TIMOPTIC) 0 5 % ophthalmic solution, , Disp: , Rfl:       Review of Systems   Constitutional: Negative for activity change, fatigue, fever and malaise/fatigue  HENT: Negative for congestion, ear pain, sinus pain and sore throat  Eyes: Negative for blurred vision, pain and itching  Respiratory: Negative for cough and shortness of breath  Cardiovascular: Negative for chest pain, palpitations, orthopnea and PND  Gastrointestinal: Negative for abdominal pain, constipation, diarrhea, nausea and vomiting  Endocrine: Negative for cold intolerance and heat intolerance  Genitourinary: Negative for dysuria  Musculoskeletal: Negative for myalgias and neck pain  Skin: Negative for color change and rash  Neurological: Negative for dizziness, focal weakness, syncope and headaches  Hematological: Negative for adenopathy  Psychiatric/Behavioral: Negative for behavioral problems, dysphoric mood and sleep disturbance  The patient is not nervous/anxious  Objective:      /80   Pulse 74   Temp 98 2 °F (36 8 °C)   Ht 5' (1 524 m)   Wt 102 kg (224 lb)   BMI 43 75 kg/m²          Physical Exam   Constitutional: She is oriented to person, place, and time  She appears well-developed  No distress  HENT:   Head: Normocephalic and atraumatic  Right Ear: External ear normal    Left Ear: External ear normal    Nose: Nose normal    Eyes: Pupils are equal, round, and reactive to light  Conjunctivae are normal  No scleral icterus  Neck: Normal range of motion  Neck supple  No thyromegaly present  Cardiovascular: Normal rate, regular rhythm and normal heart sounds  Pulses are no weak pulses  No murmur heard  Pulses:       Dorsalis pedis pulses are 2+ on the right side and 2+ on the left side  Posterior tibial pulses are 2+ on the right side and 2+ on the left side  Pulmonary/Chest: Effort normal and breath sounds normal  No respiratory distress  She has no wheezes     Abdominal: Soft  Bowel sounds are normal  She exhibits no distension  There is no abdominal tenderness  Musculoskeletal: Normal range of motion  General: No tenderness  Right lower leg: No edema  Left lower leg: No edema  Feet:   Right Foot:   Skin Integrity: Negative for ulcer, skin breakdown, erythema, warmth, callus or dry skin  Left Foot:   Skin Integrity: Negative for ulcer, skin breakdown, erythema, warmth, callus or dry skin  Lymphadenopathy:     She has no cervical adenopathy  Neurological: She is alert and oriented to person, place, and time  No cranial nerve deficit  Skin: Skin is warm and dry  No rash noted  Psychiatric: Her behavior is normal        Patient's shoes and socks removed  Right Foot/Ankle   Right Foot Inspection  Skin Exam: skin normal and skin intact no dry skin, no warmth, no callus, no erythema, no maceration, no abnormal color, no pre-ulcer, no ulcer and no callus                          Toe Exam: ROM and strength within normal limits  Sensory   Vibration: intact  Proprioception: intact   Monofilament testing: intact  Vascular  Capillary refills: < 3 seconds  The right DP pulse is 2+  The right PT pulse is 2+  Left Foot/Ankle  Left Foot Inspection  Skin Exam: skin normal and skin intactno dry skin, no warmth, no erythema, no maceration, normal color, no pre-ulcer, no ulcer and no callus                         Toe Exam: ROM and strength within normal limits                   Sensory   Vibration: intact  Proprioception: intact  Monofilament: intact  Vascular  Capillary refills: < 3 seconds  The left DP pulse is 2+  The left PT pulse is 2+  Assign Risk Category:  No deformity present; No loss of protective sensation;  No weak pulses       Risk: 0

## 2020-08-19 ENCOUNTER — OFFICE VISIT (OUTPATIENT)
Dept: OBGYN CLINIC | Facility: CLINIC | Age: 75
End: 2020-08-19
Payer: MEDICARE

## 2020-08-19 VITALS
WEIGHT: 224 LBS | HEIGHT: 60 IN | DIASTOLIC BLOOD PRESSURE: 64 MMHG | HEART RATE: 80 BPM | BODY MASS INDEX: 43.98 KG/M2 | SYSTOLIC BLOOD PRESSURE: 124 MMHG

## 2020-08-19 DIAGNOSIS — M17.0 PRIMARY OSTEOARTHRITIS OF BOTH KNEES: Primary | ICD-10-CM

## 2020-08-19 PROCEDURE — 3074F SYST BP LT 130 MM HG: CPT | Performed by: ORTHOPAEDIC SURGERY

## 2020-08-19 PROCEDURE — 3044F HG A1C LEVEL LT 7.0%: CPT | Performed by: ORTHOPAEDIC SURGERY

## 2020-08-19 PROCEDURE — 20610 DRAIN/INJ JOINT/BURSA W/O US: CPT | Performed by: ORTHOPAEDIC SURGERY

## 2020-08-19 PROCEDURE — 3078F DIAST BP <80 MM HG: CPT | Performed by: ORTHOPAEDIC SURGERY

## 2020-08-19 PROCEDURE — 2022F DILAT RTA XM EVC RTNOPTHY: CPT | Performed by: ORTHOPAEDIC SURGERY

## 2020-08-19 PROCEDURE — 1036F TOBACCO NON-USER: CPT | Performed by: ORTHOPAEDIC SURGERY

## 2020-08-19 PROCEDURE — 1160F RVW MEDS BY RX/DR IN RCRD: CPT | Performed by: ORTHOPAEDIC SURGERY

## 2020-08-19 PROCEDURE — 3008F BODY MASS INDEX DOCD: CPT | Performed by: ORTHOPAEDIC SURGERY

## 2020-08-19 PROCEDURE — 4040F PNEUMOC VAC/ADMIN/RCVD: CPT | Performed by: ORTHOPAEDIC SURGERY

## 2020-08-19 PROCEDURE — 3066F NEPHROPATHY DOC TX: CPT | Performed by: ORTHOPAEDIC SURGERY

## 2020-08-19 RX ORDER — HYALURONATE SODIUM 10 MG/ML
20 SYRINGE (ML) INTRAARTICULAR
Status: COMPLETED | OUTPATIENT
Start: 2020-08-19 | End: 2020-08-19

## 2020-08-19 RX ADMIN — Medication 20 MG: at 13:18

## 2020-08-19 NOTE — PROGRESS NOTES
1  Primary osteoarthritis of both knees  Large joint arthrocentesis    Large joint arthrocentesis     Patient is here for her First injection of  Euflexxa into the bilateral knee  Patient reports knee pain, left worse than right worse with activity better with rest         Physical exam of the knee shows no effusion no ecchymosis    All other organ systems normal    Large joint arthrocentesis: L knee  Date/Time: 8/19/2020 1:18 PM  Consent given by: patient  Site marked: site marked  Timeout: Immediately prior to procedure a time out was called to verify the correct patient, procedure, equipment, support staff and site/side marked as required   Supporting Documentation  Indications: pain   Procedure Details  Location: knee - L knee  Preparation: Patient was prepped and draped in the usual sterile fashion  Needle size: 22 G  Ultrasound guidance: no  Approach: anterolateral  Medications administered: 20 mg Sodium Hyaluronate 20 MG/2ML    Patient tolerance: patient tolerated the procedure well with no immediate complications  Dressing:  Sterile dressing applied    Large joint arthrocentesis: R knee  Date/Time: 8/19/2020 1:18 PM  Consent given by: patient  Site marked: site marked  Timeout: Immediately prior to procedure a time out was called to verify the correct patient, procedure, equipment, support staff and site/side marked as required   Supporting Documentation  Indications: pain   Procedure Details  Location: knee - R knee  Preparation: Patient was prepped and draped in the usual sterile fashion  Needle size: 22 G  Ultrasound guidance: no  Approach: anterolateral  Medications administered: 20 mg Sodium Hyaluronate 20 MG/2ML    Patient tolerance: patient tolerated the procedure well with no immediate complications  Dressing:  Sterile dressing applied          Patient tolerated procedure follow up  1 week

## 2020-08-26 ENCOUNTER — OFFICE VISIT (OUTPATIENT)
Dept: OBGYN CLINIC | Facility: CLINIC | Age: 75
End: 2020-08-26
Payer: MEDICARE

## 2020-08-26 VITALS
DIASTOLIC BLOOD PRESSURE: 67 MMHG | WEIGHT: 224 LBS | SYSTOLIC BLOOD PRESSURE: 130 MMHG | HEIGHT: 60 IN | BODY MASS INDEX: 43.98 KG/M2 | HEART RATE: 78 BPM

## 2020-08-26 DIAGNOSIS — M17.0 OSTEOARTHRITIS OF BOTH KNEES, UNSPECIFIED OSTEOARTHRITIS TYPE: Primary | ICD-10-CM

## 2020-08-26 PROCEDURE — 20610 DRAIN/INJ JOINT/BURSA W/O US: CPT | Performed by: ORTHOPAEDIC SURGERY

## 2020-08-26 RX ORDER — HYALURONATE SODIUM 10 MG/ML
20 SYRINGE (ML) INTRAARTICULAR
Status: COMPLETED | OUTPATIENT
Start: 2020-08-26 | End: 2020-08-26

## 2020-08-26 RX ADMIN — Medication 20 MG: at 14:26

## 2020-08-26 RX ADMIN — Medication 20 MG: at 14:27

## 2020-08-26 NOTE — PROGRESS NOTES
1  Osteoarthritis of both knees, unspecified osteoarthritis type       Patient is here for her 2nd injection of Euflexxa into the bilateral knee  Patient reports improvements in the right LEs improvements in left  All organ systems normal  Physical exam of the knee shows no effusion no ecchymosis        Large joint arthrocentesis: R knee  Date/Time: 8/26/2020 2:26 PM  Consent given by: patient  Supporting Documentation  Indications: pain   Procedure Details  Location: knee - R knee  Needle size: 22 G  Ultrasound guidance: no  Approach: anterolateral  Medications administered: 20 mg Sodium Hyaluronate 20 MG/2ML    Patient tolerance: patient tolerated the procedure well with no immediate complications    Large joint arthrocentesis: L knee  Date/Time: 8/26/2020 2:27 PM  Consent given by: patient  Timeout: Immediately prior to procedure a time out was called to verify the correct patient, procedure, equipment, support staff and site/side marked as required   Supporting Documentation  Indications: pain   Procedure Details  Location: knee - L knee  Needle size: 22 G  Ultrasound guidance: no  Approach: anterolateral  Medications administered: 20 mg Sodium Hyaluronate 20 MG/2ML    Patient tolerance: patient tolerated the procedure well with no immediate complications          Patient tolerated procedure follow up 1 week

## 2020-09-03 ENCOUNTER — OFFICE VISIT (OUTPATIENT)
Dept: OBGYN CLINIC | Facility: CLINIC | Age: 75
End: 2020-09-03
Payer: MEDICARE

## 2020-09-03 VITALS
SYSTOLIC BLOOD PRESSURE: 128 MMHG | HEIGHT: 60 IN | HEART RATE: 68 BPM | BODY MASS INDEX: 43.98 KG/M2 | WEIGHT: 224 LBS | DIASTOLIC BLOOD PRESSURE: 74 MMHG

## 2020-09-03 DIAGNOSIS — M17.0 OSTEOARTHRITIS OF BOTH KNEES, UNSPECIFIED OSTEOARTHRITIS TYPE: Primary | ICD-10-CM

## 2020-09-03 PROCEDURE — 20610 DRAIN/INJ JOINT/BURSA W/O US: CPT | Performed by: ORTHOPAEDIC SURGERY

## 2020-09-03 RX ORDER — HYALURONATE SODIUM 10 MG/ML
20 SYRINGE (ML) INTRAARTICULAR
Status: COMPLETED | OUTPATIENT
Start: 2020-09-03 | End: 2020-09-03

## 2020-09-03 RX ADMIN — Medication 20 MG: at 13:02

## 2020-09-03 NOTE — PROGRESS NOTES
1  Osteoarthritis of both knees, unspecified osteoarthritis type       Patient is here for her 3rd injection of Euflexxa into the bilateral knee  Patient reports minimal improvement  All organ systems normal  Physical exam of the knee shows no effusion no ecchymosis  Large joint arthrocentesis: bilateral knee  Date/Time: 9/3/2020 1:02 PM  Procedure Details  Location: knee - bilateral knee  Approach: anterolateral    Medications (Right): 20 mg Sodium Hyaluronate 20 MG/2MLMedications (Left): 20 mg Sodium Hyaluronate 20 MG/2ML           Patient tolerated procedure follow up p r n

## 2020-10-01 ENCOUNTER — OFFICE VISIT (OUTPATIENT)
Dept: OBGYN CLINIC | Facility: CLINIC | Age: 75
End: 2020-10-01
Payer: MEDICARE

## 2020-10-01 VITALS — HEIGHT: 60 IN | BODY MASS INDEX: 43.98 KG/M2 | WEIGHT: 224 LBS | TEMPERATURE: 98.1 F

## 2020-10-01 DIAGNOSIS — M17.0 OSTEOARTHRITIS OF BOTH KNEES, UNSPECIFIED OSTEOARTHRITIS TYPE: Primary | ICD-10-CM

## 2020-10-01 PROCEDURE — 20610 DRAIN/INJ JOINT/BURSA W/O US: CPT | Performed by: ORTHOPAEDIC SURGERY

## 2020-10-01 PROCEDURE — 99212 OFFICE O/P EST SF 10 MIN: CPT | Performed by: ORTHOPAEDIC SURGERY

## 2020-10-01 RX ORDER — BETAMETHASONE SODIUM PHOSPHATE AND BETAMETHASONE ACETATE 3; 3 MG/ML; MG/ML
6 INJECTION, SUSPENSION INTRA-ARTICULAR; INTRALESIONAL; INTRAMUSCULAR; SOFT TISSUE
Status: COMPLETED | OUTPATIENT
Start: 2020-10-01 | End: 2020-10-01

## 2020-10-01 RX ORDER — BUPIVACAINE HYDROCHLORIDE 2.5 MG/ML
1 INJECTION, SOLUTION INFILTRATION; PERINEURAL
Status: COMPLETED | OUTPATIENT
Start: 2020-10-01 | End: 2020-10-01

## 2020-10-01 RX ORDER — LIDOCAINE HYDROCHLORIDE 10 MG/ML
1 INJECTION, SOLUTION INFILTRATION; PERINEURAL
Status: COMPLETED | OUTPATIENT
Start: 2020-10-01 | End: 2020-10-01

## 2020-10-01 RX ADMIN — BETAMETHASONE SODIUM PHOSPHATE AND BETAMETHASONE ACETATE 6 MG: 3; 3 INJECTION, SUSPENSION INTRA-ARTICULAR; INTRALESIONAL; INTRAMUSCULAR; SOFT TISSUE at 13:12

## 2020-10-01 RX ADMIN — BUPIVACAINE HYDROCHLORIDE 1 ML: 2.5 INJECTION, SOLUTION INFILTRATION; PERINEURAL at 13:13

## 2020-10-01 RX ADMIN — LIDOCAINE HYDROCHLORIDE 1 ML: 10 INJECTION, SOLUTION INFILTRATION; PERINEURAL at 13:12

## 2020-10-01 RX ADMIN — BETAMETHASONE SODIUM PHOSPHATE AND BETAMETHASONE ACETATE 6 MG: 3; 3 INJECTION, SUSPENSION INTRA-ARTICULAR; INTRALESIONAL; INTRAMUSCULAR; SOFT TISSUE at 13:13

## 2020-10-01 RX ADMIN — LIDOCAINE HYDROCHLORIDE 1 ML: 10 INJECTION, SOLUTION INFILTRATION; PERINEURAL at 13:13

## 2020-10-01 RX ADMIN — BUPIVACAINE HYDROCHLORIDE 1 ML: 2.5 INJECTION, SOLUTION INFILTRATION; PERINEURAL at 13:12

## 2020-10-03 DIAGNOSIS — I10 ESSENTIAL HYPERTENSION: ICD-10-CM

## 2020-10-05 RX ORDER — METOPROLOL SUCCINATE 50 MG/1
TABLET, EXTENDED RELEASE ORAL
Qty: 90 TABLET | Refills: 1 | OUTPATIENT
Start: 2020-10-05

## 2020-10-20 DIAGNOSIS — I10 ESSENTIAL HYPERTENSION: ICD-10-CM

## 2020-10-20 RX ORDER — METOPROLOL SUCCINATE 50 MG/1
50 TABLET, EXTENDED RELEASE ORAL DAILY
Qty: 90 TABLET | Refills: 1 | Status: SHIPPED | OUTPATIENT
Start: 2020-10-20 | End: 2021-04-20 | Stop reason: SDUPTHER

## 2020-10-30 ENCOUNTER — HOSPITAL ENCOUNTER (OUTPATIENT)
Dept: RADIOLOGY | Facility: MEDICAL CENTER | Age: 75
Discharge: HOME | End: 2020-10-30
Payer: MEDICARE

## 2020-10-30 VITALS — BODY MASS INDEX: 43.98 KG/M2 | WEIGHT: 224 LBS | HEIGHT: 60 IN

## 2020-10-30 DIAGNOSIS — Z13.820 OSTEOPOROSIS SCREENING: ICD-10-CM

## 2020-10-30 DIAGNOSIS — Z12.31 VISIT FOR SCREENING MAMMOGRAM: ICD-10-CM

## 2020-10-30 DIAGNOSIS — Z78.0 POST-MENOPAUSAL: ICD-10-CM

## 2020-10-30 DIAGNOSIS — Z12.31 ENCOUNTER FOR SCREENING MAMMOGRAM FOR BREAST CANCER: Primary | ICD-10-CM

## 2020-10-30 DIAGNOSIS — Z12.31 BREAST CANCER SCREENING BY MAMMOGRAM: ICD-10-CM

## 2020-10-30 PROCEDURE — 77080 DXA BONE DENSITY AXIAL: CPT

## 2020-10-30 PROCEDURE — 77063 BREAST TOMOSYNTHESIS BI: CPT

## 2020-10-30 PROCEDURE — 77067 SCR MAMMO BI INCL CAD: CPT

## 2020-11-02 ENCOUNTER — TELEPHONE (OUTPATIENT)
Dept: FAMILY MEDICINE CLINIC | Facility: OTHER | Age: 75
End: 2020-11-02

## 2020-11-10 ENCOUNTER — LAB (OUTPATIENT)
Dept: LAB | Facility: CLINIC | Age: 75
End: 2020-11-10
Payer: MEDICARE

## 2020-11-10 DIAGNOSIS — I10 BENIGN ESSENTIAL HYPERTENSION: ICD-10-CM

## 2020-11-10 DIAGNOSIS — E11.22 TYPE 2 DIABETES MELLITUS WITH STAGE 3 CHRONIC KIDNEY DISEASE, WITHOUT LONG-TERM CURRENT USE OF INSULIN (HCC): ICD-10-CM

## 2020-11-10 DIAGNOSIS — N18.30 CKD (CHRONIC KIDNEY DISEASE) STAGE 3, GFR 30-59 ML/MIN (HCC): ICD-10-CM

## 2020-11-10 DIAGNOSIS — E78.2 MIXED HYPERLIPIDEMIA: ICD-10-CM

## 2020-11-10 DIAGNOSIS — N18.30 TYPE 2 DIABETES MELLITUS WITH STAGE 3 CHRONIC KIDNEY DISEASE, WITHOUT LONG-TERM CURRENT USE OF INSULIN (HCC): ICD-10-CM

## 2020-11-10 LAB
ALBUMIN SERPL BCP-MCNC: 3.7 G/DL (ref 3.5–5)
ALP SERPL-CCNC: 90 U/L (ref 46–116)
ALT SERPL W P-5'-P-CCNC: 26 U/L (ref 12–78)
ANION GAP SERPL CALCULATED.3IONS-SCNC: 5 MMOL/L (ref 4–13)
AST SERPL W P-5'-P-CCNC: 13 U/L (ref 5–45)
BILIRUB SERPL-MCNC: 0.34 MG/DL (ref 0.2–1)
BUN SERPL-MCNC: 34 MG/DL (ref 5–25)
CALCIUM SERPL-MCNC: 9.6 MG/DL (ref 8.3–10.1)
CHLORIDE SERPL-SCNC: 106 MMOL/L (ref 100–108)
CHOLEST SERPL-MCNC: 215 MG/DL (ref 50–200)
CO2 SERPL-SCNC: 28 MMOL/L (ref 21–32)
CREAT SERPL-MCNC: 1.45 MG/DL (ref 0.6–1.3)
EST. AVERAGE GLUCOSE BLD GHB EST-MCNC: 157 MG/DL
GFR SERPL CREATININE-BSD FRML MDRD: 35 ML/MIN/1.73SQ M
GLUCOSE P FAST SERPL-MCNC: 125 MG/DL (ref 65–99)
HBA1C MFR BLD: 7.1 %
HDLC SERPL-MCNC: 44 MG/DL
LDLC SERPL CALC-MCNC: 135 MG/DL (ref 0–100)
NONHDLC SERPL-MCNC: 171 MG/DL
POTASSIUM SERPL-SCNC: 3.9 MMOL/L (ref 3.5–5.3)
PROT SERPL-MCNC: 7.2 G/DL (ref 6.4–8.2)
SODIUM SERPL-SCNC: 139 MMOL/L (ref 136–145)
TRIGL SERPL-MCNC: 180 MG/DL

## 2020-11-10 PROCEDURE — 36415 COLL VENOUS BLD VENIPUNCTURE: CPT

## 2020-11-10 PROCEDURE — 80061 LIPID PANEL: CPT

## 2020-11-10 PROCEDURE — 83036 HEMOGLOBIN GLYCOSYLATED A1C: CPT

## 2020-11-10 PROCEDURE — 80053 COMPREHEN METABOLIC PANEL: CPT

## 2020-11-17 ENCOUNTER — OFFICE VISIT (OUTPATIENT)
Dept: FAMILY MEDICINE CLINIC | Facility: OTHER | Age: 75
End: 2020-11-17
Payer: MEDICARE

## 2020-11-17 VITALS
TEMPERATURE: 98 F | DIASTOLIC BLOOD PRESSURE: 80 MMHG | WEIGHT: 226 LBS | HEIGHT: 60 IN | SYSTOLIC BLOOD PRESSURE: 124 MMHG | BODY MASS INDEX: 44.37 KG/M2 | HEART RATE: 68 BPM | OXYGEN SATURATION: 98 %

## 2020-11-17 DIAGNOSIS — Z12.12 SCREENING FOR COLORECTAL CANCER: ICD-10-CM

## 2020-11-17 DIAGNOSIS — E78.2 MIXED HYPERLIPIDEMIA: ICD-10-CM

## 2020-11-17 DIAGNOSIS — D64.9 ANEMIA, UNSPECIFIED TYPE: ICD-10-CM

## 2020-11-17 DIAGNOSIS — Z12.11 SCREENING FOR COLORECTAL CANCER: ICD-10-CM

## 2020-11-17 DIAGNOSIS — E11.22 TYPE 2 DIABETES MELLITUS WITH STAGE 3 CHRONIC KIDNEY DISEASE, WITHOUT LONG-TERM CURRENT USE OF INSULIN, UNSPECIFIED WHETHER STAGE 3A OR 3B CKD (HCC): Primary | ICD-10-CM

## 2020-11-17 DIAGNOSIS — I10 BENIGN ESSENTIAL HYPERTENSION: ICD-10-CM

## 2020-11-17 DIAGNOSIS — Z00.00 MEDICARE ANNUAL WELLNESS VISIT, SUBSEQUENT: ICD-10-CM

## 2020-11-17 DIAGNOSIS — N18.30 TYPE 2 DIABETES MELLITUS WITH STAGE 3 CHRONIC KIDNEY DISEASE, WITHOUT LONG-TERM CURRENT USE OF INSULIN, UNSPECIFIED WHETHER STAGE 3A OR 3B CKD (HCC): Primary | ICD-10-CM

## 2020-11-17 DIAGNOSIS — E03.9 ACQUIRED HYPOTHYROIDISM: ICD-10-CM

## 2020-11-17 PROCEDURE — 99214 OFFICE O/P EST MOD 30 MIN: CPT | Performed by: FAMILY MEDICINE

## 2020-11-17 PROCEDURE — G0439 PPPS, SUBSEQ VISIT: HCPCS | Performed by: FAMILY MEDICINE

## 2020-12-02 ENCOUNTER — LAB (OUTPATIENT)
Dept: LAB | Facility: HOSPITAL | Age: 75
End: 2020-12-02
Attending: FAMILY MEDICINE
Payer: MEDICARE

## 2020-12-02 DIAGNOSIS — Z12.11 SCREENING FOR COLORECTAL CANCER: ICD-10-CM

## 2020-12-02 DIAGNOSIS — Z12.12 SCREENING FOR COLORECTAL CANCER: ICD-10-CM

## 2020-12-02 LAB — HEMOCCULT STL QL IA: NEGATIVE

## 2020-12-02 PROCEDURE — G0328 FECAL BLOOD SCRN IMMUNOASSAY: HCPCS

## 2020-12-31 ENCOUNTER — OFFICE VISIT (OUTPATIENT)
Dept: OBGYN CLINIC | Facility: MEDICAL CENTER | Age: 75
End: 2020-12-31
Payer: MEDICARE

## 2020-12-31 ENCOUNTER — APPOINTMENT (OUTPATIENT)
Dept: RADIOLOGY | Facility: MEDICAL CENTER | Age: 75
End: 2020-12-31
Payer: MEDICARE

## 2020-12-31 VITALS
WEIGHT: 231 LBS | HEART RATE: 79 BPM | DIASTOLIC BLOOD PRESSURE: 72 MMHG | SYSTOLIC BLOOD PRESSURE: 165 MMHG | HEIGHT: 60 IN | BODY MASS INDEX: 45.35 KG/M2

## 2020-12-31 DIAGNOSIS — G89.29 CHRONIC RIGHT-SIDED LOW BACK PAIN WITHOUT SCIATICA: ICD-10-CM

## 2020-12-31 DIAGNOSIS — M54.50 CHRONIC RIGHT-SIDED LOW BACK PAIN WITHOUT SCIATICA: ICD-10-CM

## 2020-12-31 DIAGNOSIS — G89.29 CHRONIC RIGHT-SIDED LOW BACK PAIN WITHOUT SCIATICA: Primary | ICD-10-CM

## 2020-12-31 DIAGNOSIS — M54.50 CHRONIC RIGHT-SIDED LOW BACK PAIN WITHOUT SCIATICA: Primary | ICD-10-CM

## 2020-12-31 PROCEDURE — 72100 X-RAY EXAM L-S SPINE 2/3 VWS: CPT

## 2020-12-31 PROCEDURE — 99214 OFFICE O/P EST MOD 30 MIN: CPT | Performed by: PHYSICAL MEDICINE & REHABILITATION

## 2020-12-31 RX ORDER — METHOCARBAMOL 500 MG/1
500 TABLET, FILM COATED ORAL 2 TIMES DAILY PRN
Qty: 30 TABLET | Refills: 0 | Status: SHIPPED | OUTPATIENT
Start: 2020-12-31 | End: 2021-01-13

## 2021-01-06 DIAGNOSIS — E03.9 HYPOTHYROIDISM, UNSPECIFIED TYPE: ICD-10-CM

## 2021-01-06 RX ORDER — LEVOTHYROXINE SODIUM 0.1 MG/1
TABLET ORAL
Qty: 90 TABLET | Refills: 1 | OUTPATIENT
Start: 2021-01-06

## 2021-01-12 ENCOUNTER — TELEPHONE (OUTPATIENT)
Dept: OBGYN CLINIC | Facility: OTHER | Age: 76
End: 2021-01-12

## 2021-01-12 NOTE — TELEPHONE ENCOUNTER
If pain is somewhat better, she can continue to do that  She should start physical therapy and we will reassess her at her follow up

## 2021-01-12 NOTE — TELEPHONE ENCOUNTER
Patient saw Dr Chapincito Smith two weeks ago  She is taking Methocarbamol but still having lots of pain, medicine did not make her sleepy, it gave her insomnia  She is alternating Tylenol and Advil    Pain in somewhat better, but she is not sure what to do for pain  Starts Physical Therapy tomorrow     Main concern: Still having to take Pain Medicine      C/b [phone]

## 2021-01-13 ENCOUNTER — EVALUATION (OUTPATIENT)
Dept: PHYSICAL THERAPY | Facility: CLINIC | Age: 76
End: 2021-01-13
Payer: MEDICARE

## 2021-01-13 DIAGNOSIS — M54.50 CHRONIC RIGHT-SIDED LOW BACK PAIN WITHOUT SCIATICA: Primary | ICD-10-CM

## 2021-01-13 DIAGNOSIS — G89.29 CHRONIC RIGHT-SIDED LOW BACK PAIN WITHOUT SCIATICA: Primary | ICD-10-CM

## 2021-01-13 PROCEDURE — 97161 PT EVAL LOW COMPLEX 20 MIN: CPT | Performed by: PHYSICAL THERAPIST

## 2021-01-13 PROCEDURE — 97110 THERAPEUTIC EXERCISES: CPT | Performed by: PHYSICAL THERAPIST

## 2021-01-13 RX ORDER — TIZANIDINE 4 MG/1
4 TABLET ORAL 2 TIMES DAILY PRN
Qty: 30 TABLET | Refills: 0 | Status: SHIPPED | OUTPATIENT
Start: 2021-01-13 | End: 2021-08-23 | Stop reason: SDUPTHER

## 2021-01-13 NOTE — PROGRESS NOTES
PT Evaluation     Today's date: 2021  Patient name: Rc Plaza  : 1945  MRN: [id number]  Referring provider: Aislinn Keene DO  Dx:   Encounter Diagnosis     ICD-10-CM    1  Chronic right-sided low back pain without sciatica  M54 5 Ambulatory referral to Physical Therapy    G89 29                   Assessment  Assessment details: Rc Plaza is a 76 y o  female who presents with signs and symptoms consistent with referring diagnosis of right sided sciatica  She demonstrates mild decrease in l/s ROM, decreased LE strength, decreased flexibility, poor posture, abnormal gait, and decreased function   Patient would benefit from skilled physical therapay in order to address said deficits and improve functional mobility  Impairments: abnormal or restricted ROM, abnormal movement, activity intolerance, impaired balance, impaired physical strength, lacks appropriate home exercise program, pain with function, weight-bearing intolerance, poor posture  and poor body mechanics  Understanding of Dx/Px/POC: good   Prognosis: good    Goals  STG:  Pt will tolerate lying supine  Pt will decrease pain 1 grade  Pt will return to work    LTG:  Decrease pain 2 grades  Get dressed without difficulty  Get in/out of bed without difficulty   Improve FOTO score to at least 60     Plan  Patient would benefit from: skilled physical therapy  Planned modality interventions: low level laser therapy, thermotherapy: paraffin bath and cryotherapy  Planned therapy interventions: abdominal trunk stabilization, joint mobilization, manual therapy, massage, activity modification, neuromuscular re-education, self care, sensory integrative techniques, strengthening, patient education, postural training, stretching, therapeutic activities, therapeutic exercise, home exercise program, functional ROM exercises and flexibility  Frequency: 2x week  Duration in weeks: 6  Treatment plan discussed with: patient        Subjective Evaluation    History of Present Illness  Mechanism of injury: Patient reports pain for about 3-4 weeks  She has difficulty picking things up from the ground and walking (she feels like she is waddling)  She notes pain in the back of her leg  She is unable to sleep at night, can only sleep on her left side  She notes she feels good while sitting at 90 degrees  Pain down glute into posterior thigh and into knee (maybe just below knee)      Patient is a retired registered nurse  The patient's pain is multifactorial and is predominantly due to right piriformis syndrome/myofascial spasticity and postural/core instability  There are no concerning neurological deficits           Pain  Current pain ratin  At worst pain rating: 10    Patient Goals  Patient goals for therapy: decreased pain and improved balance          Objective     Active Range of Motion     Lumbar   Flexion: 10 degrees   Extension: 10 degrees   Left lateral flexion: 26 degrees       Right lateral flexion: 20 degrees     Strength/Myotome Testing     Left Hip   Planes of Motion   Flexion: 4- (painin right low back)    Right Hip   Planes of Motion   Flexion: 4  Abduction: 3+    Left Knee   Flexion: 5  Extension: 5    Right Knee   Flexion: 5  Extension: 5    Left Ankle/Foot   Dorsiflexion: 5    Right Ankle/Foot   Dorsiflexion: 5    Tests     Lumbar     Right   Positive passive SLR and slump test      Right Hip   Negative HOLLY         Flowsheet Rows      Most Recent Value   PT/OT G-Codes   Current Score  40   Projected Score  60             Precautions: None       Manuals             Piriformis release 6'            Laser to piriformis NV            Therabar roller to TFL/ITB NV                         Neuro Re-Ed             Seated TA             Seated TA with march             Seated TA with kick              Seated TA with iso hip abd/add             Seated BKFO                                       Ther Ex             nustep NV            Supine piriformis stretch with towel NV attempt            Hamstring stretch at steps  20" x3             TB side step             gastroc SB stretch NV                                                                Ther Activity                                       Gait Training                                       Modalities

## 2021-01-13 NOTE — TELEPHONE ENCOUNTER
She will stop methocarbamol and start tizanidine that I have sent to her pharmacy  She still has not started physical therapy and should do this as soon as possible  Physical therapy will help with her pain over time  We are limited with medications that we can use for pain control due to her medical problems/kidney disease

## 2021-01-13 NOTE — TELEPHONE ENCOUNTER
Patient stated that the muscle relaxer that she was given she had to stop since it was not working for her, she is still having a lot of pain and is asking if another medication can be given to her until she is seen next in the office on 2/4  She is asking for a call back relating this          Call back#  [phone]

## 2021-01-18 ENCOUNTER — OFFICE VISIT (OUTPATIENT)
Dept: PHYSICAL THERAPY | Facility: CLINIC | Age: 76
End: 2021-01-18
Payer: MEDICARE

## 2021-01-18 DIAGNOSIS — M54.50 CHRONIC RIGHT-SIDED LOW BACK PAIN WITHOUT SCIATICA: Primary | ICD-10-CM

## 2021-01-18 DIAGNOSIS — G89.29 CHRONIC RIGHT-SIDED LOW BACK PAIN WITHOUT SCIATICA: Primary | ICD-10-CM

## 2021-01-18 PROCEDURE — 97112 NEUROMUSCULAR REEDUCATION: CPT | Performed by: PHYSICAL THERAPIST

## 2021-01-18 PROCEDURE — 97140 MANUAL THERAPY 1/> REGIONS: CPT | Performed by: PHYSICAL THERAPIST

## 2021-01-18 NOTE — PROGRESS NOTES
Daily Note     Today's date: 2021  Patient name: Nilson Beth  : 1945  MRN: [id number]  Referring provider: Baron Marie DO  Dx:   Encounter Diagnosis     ICD-10-CM    1  Chronic right-sided low back pain without sciatica  M54 5     G89 29                   Subjective: Patient notes she felt great after IE from piriformis release  She states that was the best day but her pain has been a bit better  She has started her new meds which feel better too  She also reports it is a bit easier to get into bed as well  Objective: See treatment diary below      Assessment: Tolerated treatment well  Patient would benefit from continued PT  She reports mild soreness with isometric adduction today at R L/S  Patent has some point tenerness over greater trochanter with therabar roller  Mild tenderness along ITB and piriformis today  She tolerated seated TA exercises wel without increase in pain  Discussed performing kegals along with TA  Plan: Progress treatment as tolerated         Precautions: None       Manuals            Piriformis release 6' 6'           Laser to piriformis  NV           Therabar roller to TFL/ITB NV 6'                         Neuro Re-Ed             Seated TA  10x5"            Seated TA with march  x10 bl            Seated TA with kick              Seated TA with iso hip abd/add  10x5" ea            Seated BKFO                                       Ther Ex             nustep NV lvl 3 3'            Supine piriformis stretch with towel NV attempt            Hamstring stretch at steps  20" x3             TB side step  NV           gastroc SB stretch NV 20" x3                                                                Ther Activity                                       Gait Training                                       Modalities

## 2021-01-20 ENCOUNTER — OFFICE VISIT (OUTPATIENT)
Dept: PHYSICAL THERAPY | Facility: CLINIC | Age: 76
End: 2021-01-20
Payer: MEDICARE

## 2021-01-20 DIAGNOSIS — M54.50 CHRONIC RIGHT-SIDED LOW BACK PAIN WITHOUT SCIATICA: Primary | ICD-10-CM

## 2021-01-20 DIAGNOSIS — G89.29 CHRONIC RIGHT-SIDED LOW BACK PAIN WITHOUT SCIATICA: Primary | ICD-10-CM

## 2021-01-20 PROCEDURE — 97140 MANUAL THERAPY 1/> REGIONS: CPT

## 2021-01-20 PROCEDURE — 97112 NEUROMUSCULAR REEDUCATION: CPT

## 2021-01-20 PROCEDURE — 97110 THERAPEUTIC EXERCISES: CPT

## 2021-01-20 NOTE — PROGRESS NOTES
Daily Note     Today's date: 2021  Patient name: Nivia Gan  : 1945  MRN: [id number]  Referring provider: Anuj Hunt DO  Dx:   Encounter Diagnosis     ICD-10-CM    1  Chronic right-sided low back pain without sciatica  M54 5     G89 29                   Subjective: Pt reports no significant discomfort following last visit; however, states she has to be careful not to aggravate the L knee  Objective: See treatment diary below      Assessment: Tolerated treatment fair  Patient would benefit from continued PT  Increased tenderness to light pressure w/ therabar roller to prox ITB  Tenderness of R piriformis noted  w/ Laser (due to pressure of instrument)  Cueing needed for proper core stability techniques  Plan: Progress treatment as tolerated         Precautions: None       Manuals           Piriformis release 6' 6' 6'          Laser to piriformis  NV 6'          Therabar roller to TFL/ITB NV 6'  6'                       Neuro Re-Ed             Seated TA  10x5"  10x5"          Seated TA with march  x10 bl  x10 BL          Seated TA with kick              Seated TA with iso hip abd/add  10x5" ea  10x5" ea          Seated BKFO                                       Ther Ex             nustep NV lvl 3 3'  4' lv 3          Supine piriformis stretch with towel NV attempt            Hamstring stretch at steps  20" x3             TB side step  NV 2 laps          gastroc SB stretch NV 20" x3  20"x3                                                              Ther Activity                                       Gait Training                                       Modalities

## 2021-01-21 DIAGNOSIS — I10 BENIGN ESSENTIAL HYPERTENSION: ICD-10-CM

## 2021-01-21 RX ORDER — HYDROCHLOROTHIAZIDE 25 MG/1
TABLET ORAL
Qty: 90 TABLET | Refills: 1 | OUTPATIENT
Start: 2021-01-21

## 2021-01-25 ENCOUNTER — OFFICE VISIT (OUTPATIENT)
Dept: PHYSICAL THERAPY | Facility: CLINIC | Age: 76
End: 2021-01-25
Payer: MEDICARE

## 2021-01-25 ENCOUNTER — TELEPHONE (OUTPATIENT)
Dept: FAMILY MEDICINE CLINIC | Facility: OTHER | Age: 76
End: 2021-01-25

## 2021-01-25 DIAGNOSIS — E03.9 HYPOTHYROIDISM, UNSPECIFIED TYPE: ICD-10-CM

## 2021-01-25 DIAGNOSIS — G89.29 CHRONIC RIGHT-SIDED LOW BACK PAIN WITHOUT SCIATICA: Primary | ICD-10-CM

## 2021-01-25 DIAGNOSIS — M54.50 CHRONIC RIGHT-SIDED LOW BACK PAIN WITHOUT SCIATICA: Primary | ICD-10-CM

## 2021-01-25 PROCEDURE — 97112 NEUROMUSCULAR REEDUCATION: CPT | Performed by: PHYSICAL THERAPIST

## 2021-01-25 PROCEDURE — 97110 THERAPEUTIC EXERCISES: CPT | Performed by: PHYSICAL THERAPIST

## 2021-01-25 PROCEDURE — 97140 MANUAL THERAPY 1/> REGIONS: CPT | Performed by: PHYSICAL THERAPIST

## 2021-01-25 RX ORDER — LEVOTHYROXINE SODIUM 0.1 MG/1
100 TABLET ORAL DAILY
Qty: 90 TABLET | Refills: 1 | Status: SHIPPED | OUTPATIENT
Start: 2021-01-25 | End: 2021-07-22 | Stop reason: SDUPTHER

## 2021-01-25 NOTE — TELEPHONE ENCOUNTER
Patient called and would like to know if you would also like to add a UA test to her labs for her upcoming appointment in February ?   Please advise   Thank you

## 2021-01-25 NOTE — PROGRESS NOTES
Daily Note     Today's date: 2021  Patient name: Patrick Corley  : 1945  MRN: [id number]  Referring provider: Alyce Marvin DO  Dx:   Encounter Diagnosis     ICD-10-CM    1  Chronic right-sided low back pain without sciatica  M54 5     G89 29                   Subjective: Pt reports she felt ok after last session and her pain has been inconsistent  She does report increased pain in her knees (left is worse than right)       Objective: See treatment diary below      Assessment: Tolerated treatment fair  Patient would benefit from continued PT  Patient is limited in function due to bilateral (L>R) knee pain  She has quite a bit of tenderness at lateral hip at piriformis, proximal ITB, greater trochanter, and TFL, only tolerating light pressure  She demonstrates increased ease of transitions today, with minimal assistance needed to go from side lying >sit  Plan: Progress treatment as tolerated         Precautions: None       Manuals          Piriformis release 6' 6' 6' 6'         Laser to piriformis  NV 6' 8'         Therabar roller to TFL/ITB NV 6'  6' 6'                      Neuro Re-Ed             Seated TA  10x5"  10x5"          Seated TA with march  x10 bl  x10 BL x10 bl          Seated TA with kick              Seated TA with iso hip abd/add  10x5" ea  10x5" ea 15x5" ea          Seated BKFO                                       Ther Ex             nustep NV lvl 3 3'  4' lv 3 4' lvl 3         Supine piriformis stretch with towel NV attempt            Hamstring stretch at steps  20" x3             TB side step  NV 2 laps Y TB 2 laps          gastroc SB stretch NV 20" x3  20"x3 20: x3                                                              Ther Activity                                       Gait Training                                       Modalities

## 2021-01-27 ENCOUNTER — APPOINTMENT (OUTPATIENT)
Dept: RADIOLOGY | Facility: AMBULARY SURGERY CENTER | Age: 76
End: 2021-01-27
Attending: ORTHOPAEDIC SURGERY
Payer: MEDICARE

## 2021-01-27 ENCOUNTER — APPOINTMENT (OUTPATIENT)
Dept: PHYSICAL THERAPY | Facility: CLINIC | Age: 76
End: 2021-01-27
Payer: MEDICARE

## 2021-01-27 ENCOUNTER — OFFICE VISIT (OUTPATIENT)
Dept: OBGYN CLINIC | Facility: CLINIC | Age: 76
End: 2021-01-27
Payer: MEDICARE

## 2021-01-27 DIAGNOSIS — M17.0 PRIMARY OSTEOARTHRITIS OF BOTH KNEES: ICD-10-CM

## 2021-01-27 DIAGNOSIS — M17.0 PRIMARY OSTEOARTHRITIS OF BOTH KNEES: Primary | ICD-10-CM

## 2021-01-27 PROCEDURE — 73562 X-RAY EXAM OF KNEE 3: CPT

## 2021-01-27 PROCEDURE — 20610 DRAIN/INJ JOINT/BURSA W/O US: CPT | Performed by: ORTHOPAEDIC SURGERY

## 2021-01-27 PROCEDURE — 99213 OFFICE O/P EST LOW 20 MIN: CPT | Performed by: ORTHOPAEDIC SURGERY

## 2021-01-27 RX ORDER — BUPIVACAINE HYDROCHLORIDE 2.5 MG/ML
1 INJECTION, SOLUTION INFILTRATION; PERINEURAL
Status: COMPLETED | OUTPATIENT
Start: 2021-01-27 | End: 2021-01-27

## 2021-01-27 RX ORDER — LIDOCAINE HYDROCHLORIDE 10 MG/ML
1 INJECTION, SOLUTION INFILTRATION; PERINEURAL
Status: COMPLETED | OUTPATIENT
Start: 2021-01-27 | End: 2021-01-27

## 2021-01-27 RX ORDER — BETAMETHASONE SODIUM PHOSPHATE AND BETAMETHASONE ACETATE 3; 3 MG/ML; MG/ML
6 INJECTION, SUSPENSION INTRA-ARTICULAR; INTRALESIONAL; INTRAMUSCULAR; SOFT TISSUE
Status: COMPLETED | OUTPATIENT
Start: 2021-01-27 | End: 2021-01-27

## 2021-01-27 RX ORDER — METHYLPREDNISOLONE 4 MG/1
TABLET ORAL
Qty: 1 EACH | Refills: 1 | Status: SHIPPED | OUTPATIENT
Start: 2021-01-27 | End: 2021-03-12

## 2021-01-27 RX ADMIN — LIDOCAINE HYDROCHLORIDE 1 ML: 10 INJECTION, SOLUTION INFILTRATION; PERINEURAL at 14:52

## 2021-01-27 RX ADMIN — BETAMETHASONE SODIUM PHOSPHATE AND BETAMETHASONE ACETATE 6 MG: 3; 3 INJECTION, SUSPENSION INTRA-ARTICULAR; INTRALESIONAL; INTRAMUSCULAR; SOFT TISSUE at 14:43

## 2021-01-27 RX ADMIN — BUPIVACAINE HYDROCHLORIDE 1 ML: 2.5 INJECTION, SOLUTION INFILTRATION; PERINEURAL at 14:52

## 2021-01-27 NOTE — PROGRESS NOTES
Assessment:  1  Primary osteoarthritis of both knees  XR knee 3 vw right non injury    XR knee 3 vw left non injury    Large joint arthrocentesis: bilateral knee    methylPREDNISolone 4 MG tablet therapy pack     Patient Active Problem List   Diagnosis    Acute hip pain    Acute pain of both shoulders    Allergic rhinitis    Benign essential hypertension    Closed fracture of proximal end of right humerus with routine healing    Fracture of right distal radius    Hypothyroidism    Insomnia    Microscopic hematuria    Morbid obesity (HCC)    Obstructive sleep apnea    Other fatigue    Subacromial impingement of left shoulder    Subacromial impingement of right shoulder    Urticaria    Vitamin D deficiency    Type 2 diabetes mellitus with stage 3 chronic kidney disease, without long-term current use of insulin (HCC)    Primary osteoarthritis of both knees    Osteoarthritis of both knees    CKD (chronic kidney disease) stage 3, GFR 30-59 ml/min    Anemia    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HCC)    Polymyalgia rheumatica (HCC)    Mixed hyperlipidemia    Chronic right-sided low back pain without sciatica           Plan      75 yo female osteoarthritis of bilateral knees  Patient requested bilateral knee CSI  Risk and benefits were reviewed with the patient and she elected to proceed  Patient tolerated the procedure well without any immediate complications  Patient advised to use ice if she has soreness after the injections  Medrol Dosepak was prescribed to take for her Sciatica if she needs it  Also advised to stop taking the advil if she begins to take the Medrol Dosepak  Patient will follow up as needed  Subjective:     Patient ID:    Chief Complaint:Celeste Garg 76 y o  female      HPI    Patient comes in today follow up for bilateral osteoarthritis  At the last visit 10/1/20, she received a CSI into her bilateral knees after receiving 3 Euflexxa injections   Last date of Euflexxa injection was on 9/3/20  Today the patient is requesting bilateral CSI  The following portions of the patient's history were reviewed and updated as appropriate: allergies, current medications, past family history, past social history, past surgical history and problem list         Social History     Socioeconomic History    Marital status:       Spouse name: Not on file    Number of children: 11    Years of education: 12    Highest education level: Not on file   Occupational History    Occupation: RN   Social Needs    Financial resource strain: Not on file    Food insecurity     Worry: Not on file     Inability: Not on file   [employer] needs     Medical: Not on file     Non-medical: Not on file   Tobacco Use    Smoking status: Former Smoker     Packs/day: 0 50     Quit date:      Years since quittin 0    Smokeless tobacco: Never Used    Tobacco comment: 40 years ago    Substance and Sexual Activity    Alcohol use: Yes     Comment: rarely ; occasional     Drug use: No    Sexual activity: Not on file   Lifestyle    Physical activity     Days per week: Not on file     Minutes per session: Not on file    Stress: Not on file   Relationships    Social connections     Talks on phone: Not on file     Gets together: Not on file     Attends Rastafarian service: Not on file     Active member of club or organization: Not on file     Attends meetings of clubs or organizations: Not on file     Relationship status: Not on file    Intimate partner violence     Fear of current or ex partner: Not on file     Emotionally abused: Not on file     Physically abused: Not on file     Forced sexual activity: Not on file   Other Topics Concern    Not on file   Social History Narrative    Drinks coffee     Past Medical History:   Diagnosis Date    Anemia     Arthritis     Hypertension     Polymyalgia rheumatica ([facility] [address] )     Shoulder impingement syndrome     last assessed 16     Past Surgical History:   Procedure Laterality Date    ABDOMINAL SURGERY      last assessed 11/1/16    OOPHORECTOMY Right     OTHER SURGICAL HISTORY Right 2017    shoulder    TOTAL ABDOMINAL HYSTERECTOMY  30 year ago    WRIST FRACTURE SURGERY Right 2017     Allergies   Allergen Reactions    Hydromorphone Dizziness and Other (See Comments)     Severe vertigo     Tdap [Tetanus-Diphth-Acell Pertussis] Swelling    Propoxyphene      Current Outpatient Medications on File Prior to Visit   Medication Sig Dispense Refill    Acetaminophen 500 MG Take 1,000 mg by mouth daily       BETIMOL 0 5 % ophthalmic solution INSTILL 1 DROP INTO THE AFFECTED EYE(S) EVERY DAY      Diclofenac Sodium (VOLTAREN) 1 % Apply 2 g topically 3 (three) times a day as needed (Pain) 1 Tube 1    hydrochlorothiazide (HYDRODIURIL) 25 mg tablet Take 1 tablet (25 mg total) by mouth daily 90 tablet 1    ibuprofen (ADVIL) 200 mg tablet Take 200 mg by mouth every 6 (six) hours as needed for mild pain      latanoprost (XALATAN) 0 005 % ophthalmic solution INSTILL 1 DROP IN EACH EYE AT BEDTIME  5    levothyroxine 100 mcg tablet Take 1 tablet (100 mcg total) by mouth daily 90 tablet 1    lisinopril (ZESTRIL) 20 mg tablet Take 1 tablet (20 mg total) by mouth daily 90 tablet 1    metoprolol succinate (TOPROL-XL) 50 mg 24 hr tablet Take 1 tablet (50 mg total) by mouth daily 90 tablet 1    temazepam (RESTORIL) 15 mg capsule Take 1 capsule (15 mg total) by mouth daily at bedtime as needed for sleep 30 capsule 0    timolol (TIMOPTIC) 0 5 % ophthalmic solution       tiZANidine (ZANAFLEX) 4 mg tablet Take 1 tablet (4 mg total) by mouth 2 (two) times a day as needed for muscle spasms 30 tablet 0     No current facility-administered medications on file prior to visit  Objective:    Review of Systems   Constitutional: Positive for activity change  Negative for fever  HENT: Negative for trouble swallowing  Eyes: Negative for visual disturbance  Respiratory: Negative for shortness of breath  Cardiovascular: Negative for chest pain  Gastrointestinal: Negative for abdominal pain  Denies bowel incontinence  Endocrine: Negative for polydipsia  Genitourinary:        Denies urinary incontinence  Musculoskeletal: Positive for back pain  Negative for gait problem  Skin: Negative for rash  Allergic/Immunologic: Negative for immunocompromised state  Neurological: Negative for weakness and numbness  Denies saddle anesthesia  Hematological: Does not bruise/bleed easily  Psychiatric/Behavioral: Negative for confusion  Right Knee Exam     Other   Erythema: absent  Sensation: normal  Pulse: present  Swelling: none  Effusion: no effusion present      Left Knee Exam     Other   Erythema: absent  Sensation: normal  Pulse: present  Swelling: none  Effusion: no effusion present            Physical Exam  Musculoskeletal:      Right knee: She exhibits no effusion  Left knee: She exhibits no effusion  Large joint arthrocentesis: bilateral knee  Universal Protocol:  Consent: Verbal consent obtained    Risks and benefits: risks, benefits and alternatives were discussed  Consent given by: patient  Patient understanding: patient states understanding of the procedure being performed  Site marked: the operative site was marked  Patient identity confirmed: verbally with patient    Supporting Documentation  Indications: pain   Procedure Details  Location: knee - bilateral knee  Ultrasound guidance: no  Approach: anterolateral    Medications (Right): 6 mg betamethasone acetate-betamethasone sodium phosphate 6 (3-3) mg/mL; 1 mL bupivacaine 0 25 %; 1 mL lidocaine 1 %Medications (Left): 6 mg betamethasone acetate-betamethasone sodium phosphate 6 (3-3) mg/mL; 1 mL bupivacaine 0 25 %; 1 mL lidocaine 1 %   Patient tolerance: patient tolerated the procedure well with no immediate complications  Dressing:  Sterile dressing applied          I have personally reviewed pertinent films in PACS and my interpretation is degenerative changes bilaterally         Portions of the record may have been created with voice recognition software   Occasional wrong word or "sound a like" substitutions may have occurred due to the inherent limitations of voice recognition software   Read the chart carefully and recognize, using context, where substitutions have occurred      Scribe Attestation    I,:  Sinan Felton MA am acting as a scribe while in the presence of the attending physician :       I,:  Ryan Perera DO personally performed the services described in this documentation    as scribed in my presence :

## 2021-01-28 ENCOUNTER — OFFICE VISIT (OUTPATIENT)
Dept: PHYSICAL THERAPY | Facility: CLINIC | Age: 76
End: 2021-01-28
Payer: MEDICARE

## 2021-01-28 DIAGNOSIS — M54.50 CHRONIC RIGHT-SIDED LOW BACK PAIN WITHOUT SCIATICA: Primary | ICD-10-CM

## 2021-01-28 DIAGNOSIS — G89.29 CHRONIC RIGHT-SIDED LOW BACK PAIN WITHOUT SCIATICA: Primary | ICD-10-CM

## 2021-01-28 PROCEDURE — 97112 NEUROMUSCULAR REEDUCATION: CPT

## 2021-01-28 PROCEDURE — 97110 THERAPEUTIC EXERCISES: CPT

## 2021-01-28 PROCEDURE — 97140 MANUAL THERAPY 1/> REGIONS: CPT

## 2021-01-28 NOTE — PROGRESS NOTES
Daily Note     Today's date: 2021  Patient name: Davide Neal  : 1945  MRN: [id number]  Referring provider: Patrice Hogan DO  Dx:   Encounter Diagnosis     ICD-10-CM    1  Chronic right-sided low back pain without sciatica  M54 5     G89 29                   Subjective: Pt reports she has an easier time getting in and out of bed lately  Also reports she is now able to sit up in bed to read for a short time w/out discomfort  Objective: See treatment diary below      Assessment: Tolerated treatment well  Patient would benefit from continued PT  Performed SB calf stretch w/ knee slightly flexed to avoid knee discomfort  No resistance set on Nustep today due to knee discomfort/recent knee injections  No adverse effects noted w/ other TE performed  Tenderness persists w/ palpation of TFL, proximal ITB  Under supervision of PT, SHANIA [id number]      Plan: Progress treatment as tolerated         Precautions: None       Manuals         Piriformis release 6' 6' 6' 6' 6'        Laser to piriformis  NV 6' 8' 8'        Therabar roller to TFL/ITB NV 6'  6' 6' 4'                     Neuro Re-Ed             Seated TA  10x5"  10x5"          Seated TA with march  x10 bl  x10 BL x10 bl  x10 BL        Seated TA with kick              Seated TA with iso hip abd/add  10x5" ea  10x5" ea 15x5" ea  15x5"ea        Seated BKFO                                       Ther Ex             nustep NV lvl 3 3'  4' lv 3 4' lvl 3 4' lv 1        Supine piriformis stretch with towel NV attempt            Hamstring stretch at steps  20" x3             TB side step  NV 2 laps Y TB 2 laps  YTB 3 laps        gastroc SB stretch NV 20" x3  20"x3 20: x3  20"x3                                                            Ther Activity                                       Gait Training                                       Modalities

## 2021-02-01 ENCOUNTER — APPOINTMENT (OUTPATIENT)
Dept: PHYSICAL THERAPY | Facility: CLINIC | Age: 76
End: 2021-02-01
Payer: MEDICARE

## 2021-02-03 ENCOUNTER — APPOINTMENT (OUTPATIENT)
Dept: PHYSICAL THERAPY | Facility: CLINIC | Age: 76
End: 2021-02-03
Payer: MEDICARE

## 2021-02-03 DIAGNOSIS — I10 BENIGN ESSENTIAL HYPERTENSION: ICD-10-CM

## 2021-02-03 RX ORDER — HYDROCHLOROTHIAZIDE 25 MG/1
25 TABLET ORAL DAILY
Qty: 90 TABLET | Refills: 1 | Status: SHIPPED | OUTPATIENT
Start: 2021-02-03 | End: 2021-07-22 | Stop reason: SDUPTHER

## 2021-02-03 NOTE — PROGRESS NOTES
Daily Note     Today's date: 2/3/2021  Patient name: Marilyn Martin  : 1945  MRN: [id number]  Referring provider: Shauna Cardona DO  Dx: No diagnosis found  Subjective: ***      Objective: See treatment diary below      Assessment: Tolerated treatment {Tolerated treatment :7205864400}   Patient {assessment:9978832517}      Plan: {PLAN:2071380443}     Precautions: None       Manuals 1/13 1/18 1/20 1/25 1/28 2/3       Piriformis release 6' 6' 6' 6' 6' 6'       Laser to piriformis  NV 6' 8' 8' 8'       Therabar roller to TFL/ITB NV 6'  6' 6' 4' 4'                    Neuro Re-Ed             Seated TA  10x5"  10x5"          Seated TA with march  x10 bl  x10 BL x10 bl  x10 BL x10 BL       Seated TA with kick              Seated TA with iso hip abd/add  10x5" ea  10x5" ea 15x5" ea  15x5"ea 15x5" ea       Seated BKFO                                       Ther Ex             nustep NV lvl 3 3'  4' lv 3 4' lvl 3 4' lv 1 4' lv 1       Supine piriformis stretch with towel NV attempt            Hamstring stretch at steps  20" x3             TB side step  NV 2 laps Y TB 2 laps  YTB 3 laps YTB 3 laps       gastroc SB stretch NV 20" x3  20"x3 20: x3  20"x3 20"x3                                                           Ther Activity                                       Gait Training                                       Modalities

## 2021-02-04 ENCOUNTER — OFFICE VISIT (OUTPATIENT)
Dept: OBGYN CLINIC | Facility: MEDICAL CENTER | Age: 76
End: 2021-02-04
Payer: MEDICARE

## 2021-02-04 VITALS
BODY MASS INDEX: 45.35 KG/M2 | HEIGHT: 60 IN | DIASTOLIC BLOOD PRESSURE: 82 MMHG | HEART RATE: 58 BPM | SYSTOLIC BLOOD PRESSURE: 160 MMHG | WEIGHT: 231 LBS

## 2021-02-04 DIAGNOSIS — G89.29 CHRONIC RIGHT-SIDED LOW BACK PAIN WITHOUT SCIATICA: Primary | ICD-10-CM

## 2021-02-04 DIAGNOSIS — M54.50 CHRONIC RIGHT-SIDED LOW BACK PAIN WITHOUT SCIATICA: Primary | ICD-10-CM

## 2021-02-04 PROCEDURE — 99213 OFFICE O/P EST LOW 20 MIN: CPT | Performed by: PHYSICAL MEDICINE & REHABILITATION

## 2021-02-04 NOTE — PROGRESS NOTES
1  Chronic right-sided low back pain without sciatica       No orders of the defined types were placed in this encounter  Imaging Studies (I personally reviewed images in PACS and report if it was available):  Lumbar spine x-rays that are most recent to this encounter were reviewed  These images show diffuse disc space narrowing in the lower lumbar spine  There is grade 1 anterolisthesis of L4 on L5  There is lower lumbar facet hypertrophy  No acute osseous abnormalities      Impression:  The patient's pain is multifactorial and is predominantly due to right piriformis syndrome/myofascial spasticity and postural/core instability  There are no concerning neurological deficits      We discussed different treatment options and decided to proceed with methocarbamol, Tylenol, diclofenac gel and methyl-salicylate patches on her last visit  She discontinued methocarbamol and started tizanidine which has been helpful  She was also prescribed a medrol dose pack by my colleague, Dr Halima Acosta, which has helped her pain  She still has pain along the right greater trochanter but this has improved so we decided to hold off on an injection  She will continue PT  She will try to get back into work  If she continues to have pain in about 3-4 weeks, can consider an MRI of her lumbar spine  No follow-ups on file  HPI:  Luciano Dumont is a 76 y o  female  who presents in follow up  Here for   Chief Complaint   Patient presents with    Spine - Follow-up       Date of injury: Pain started around early to mid December without any incident  Trajectory of symptoms: Doing a lot better  Review of Systems   Constitutional: Positive for activity change  Negative for fever  HENT: Negative for trouble swallowing  Eyes: Negative for visual disturbance  Respiratory: Negative for shortness of breath  Cardiovascular: Negative for chest pain  Gastrointestinal: Negative for abdominal pain          Denies bowel incontinence  Endocrine: Negative for polydipsia  Genitourinary:        Denies urinary incontinence  Musculoskeletal: Positive for back pain  Negative for gait problem  Skin: Negative for rash  Allergic/Immunologic: Negative for immunocompromised state  Neurological: Negative for weakness and numbness  Denies saddle anesthesia  Hematological: Does not bruise/bleed easily  Psychiatric/Behavioral: Negative for confusion         Following history reviewed and updated:  Past Medical History:   Diagnosis Date    Anemia     Arthritis     Hypertension     Polymyalgia rheumatica ([facility] [address] [facility]     Shoulder impingement syndrome     last assessed 16     Past Surgical History:   Procedure Laterality Date    ABDOMINAL SURGERY      last assessed 16    OOPHORECTOMY Right     OTHER SURGICAL HISTORY Right 2017    shoulder    TOTAL ABDOMINAL HYSTERECTOMY  30 year ago    WRIST FRACTURE SURGERY Right 2017     Social History   Social History     Substance and Sexual Activity   Alcohol Use Yes    Comment: rarely ; occasional      Social History     Substance and Sexual Activity   Drug Use No     Social History     Tobacco Use   Smoking Status Former Smoker    Packs/day: 0 50    Quit date: 0    Years since quittin 1   Smokeless Tobacco Never Used   Tobacco Comment    40 years ago      Family History   Problem Relation Age of Onset   Valaria Reil Hypertension Mother    Valaria Reil Glaucoma Mother     Stroke Mother     Gout Father     COPD Father     Heart disease Father     Kidney failure Sister         chronic    Gout Sister     Multiple sclerosis Sister     Hypertension Sister     Alcohol abuse Brother     Cancer Brother     Gout Brother     Coronary artery disease Brother         CABGx4  (2019)    No Known Problems Daughter     No Known Problems Maternal Grandmother     No Known Problems Maternal Grandfather     No Known Problems Paternal Grandmother     No Known Problems Paternal Grandfather     Lung cancer Paternal Uncle     No Known Problems Sister     No Known Problems Daughter     No Known Problems Son     No Known Problems Son     No Known Problems Son      Allergies   Allergen Reactions    Hydromorphone Dizziness and Other (See Comments)     Severe vertigo     Tdap [Tetanus-Diphth-Acell Pertussis] Swelling    Propoxyphene         Constitutional:  /82 (BP Location: Right arm, Patient Position: Sitting, Cuff Size: Standard)   Pulse 58   Ht 5' (1 524 m)   Wt 105 kg (231 lb)   BMI 45 11 kg/m²    General: NAD  Eyes: Clear sclerae  ENT: No inflammation, lesion, or mass of lips  No tracheal deviation  Musculoskeletal: As mentioned below  Integumentary: No visible rashes or skin lesions  Pulmonary/Chest: Effort normal  No respiratory distress  Neuro: CN's grossly intact, FINN  Psych: Normal affect and judgement  Vascular: WWP  Right Hip Exam     Tenderness   The patient is experiencing tenderness in the greater trochanter  Back Exam     Tenderness   The patient is experiencing tenderness in the lumbar and sacroiliac  Range of Motion   Extension: abnormal   Flexion: abnormal     Muscle Strength   The patient has normal back strength      Other   Erythema: no back redness  Scars: absent             Procedures

## 2021-02-05 ENCOUNTER — OFFICE VISIT (OUTPATIENT)
Dept: PHYSICAL THERAPY | Facility: CLINIC | Age: 76
End: 2021-02-05
Payer: MEDICARE

## 2021-02-05 DIAGNOSIS — M54.50 CHRONIC RIGHT-SIDED LOW BACK PAIN WITHOUT SCIATICA: Primary | ICD-10-CM

## 2021-02-05 DIAGNOSIS — G89.29 CHRONIC RIGHT-SIDED LOW BACK PAIN WITHOUT SCIATICA: Primary | ICD-10-CM

## 2021-02-05 PROCEDURE — 97112 NEUROMUSCULAR REEDUCATION: CPT

## 2021-02-05 PROCEDURE — 97140 MANUAL THERAPY 1/> REGIONS: CPT

## 2021-02-05 PROCEDURE — 97110 THERAPEUTIC EXERCISES: CPT

## 2021-02-05 NOTE — PROGRESS NOTES
Daily Note     Today's date: 2021  Patient name: Patrick Corley  : 1945  MRN: [id number]  Referring provider: Alyce Marvin DO  Dx:   Encounter Diagnosis     ICD-10-CM    1  Chronic right-sided low back pain without sciatica  M54 5     G89 29                   Subjective: Pt reports decreased sx, discomfort in low back and hip following a series of steroid tx  Objective: See treatment diary below      Assessment: Tolerated treatment well  Patient would benefit from continued PT  Pt defers Nustep due to the fact it often aggravates her knee  No adverse effects noted w/ Laser tx  TTP of R piriformis persists  Plan: Progress treatment as tolerated         Precautions: None       Manuals 1/13 1/18 1/20 1/25 1/28 2/3 2/5      Piriformis release 6' 6' 6' 6' 6' 6' 6'      Laser to piriformis  NV 6' 8' 8' 8' 8'      Therabar roller to TFL/ITB NV 6'  6' 6' 4' 4' 4'                   Neuro Re-Ed             Seated TA  10x5"  10x5"          Seated TA with march  x10 bl  x10 BL x10 bl  x10 BL x10 BL x15 BL      Seated TA with kick              Seated TA with iso hip abd/add  10x5" ea  10x5" ea 15x5" ea  15x5"ea 15x5" ea 15x5" ea      Seated BKFO                                       Ther Ex             nustep NV lvl 3 3'  4' lv 3 4' lvl 3 4' lv 1 4' lv 1 defers      Supine piriformis stretch with towel NV attempt            Hamstring stretch at steps  20" x3             TB side step  NV 2 laps Y TB 2 laps  YTB 3 laps YTB 3 laps YTB 2 laps      gastroc SB stretch NV 20" x3  20"x3 20: x3  20"x3 20"x3 20"x3                                                          Ther Activity                                       Gait Training                                       Modalities                                       1:1 [id number]

## 2021-02-08 ENCOUNTER — APPOINTMENT (OUTPATIENT)
Dept: PHYSICAL THERAPY | Facility: CLINIC | Age: 76
End: 2021-02-08
Payer: MEDICARE

## 2021-02-10 ENCOUNTER — OFFICE VISIT (OUTPATIENT)
Dept: PHYSICAL THERAPY | Facility: CLINIC | Age: 76
End: 2021-02-10
Payer: MEDICARE

## 2021-02-10 DIAGNOSIS — G89.29 CHRONIC RIGHT-SIDED LOW BACK PAIN WITHOUT SCIATICA: Primary | ICD-10-CM

## 2021-02-10 DIAGNOSIS — M54.50 CHRONIC RIGHT-SIDED LOW BACK PAIN WITHOUT SCIATICA: Primary | ICD-10-CM

## 2021-02-10 PROCEDURE — 97112 NEUROMUSCULAR REEDUCATION: CPT

## 2021-02-10 PROCEDURE — 97140 MANUAL THERAPY 1/> REGIONS: CPT

## 2021-02-10 PROCEDURE — 97110 THERAPEUTIC EXERCISES: CPT

## 2021-02-10 NOTE — PROGRESS NOTES
Daily Note     Today's date: 2/10/2021  Patient name: Nivia Gan  : 1945  MRN: [id number]  Referring provider: Anuj Hunt DO  Dx:   Encounter Diagnosis     ICD-10-CM    1  Chronic right-sided low back pain without sciatica  M54 5     G89 29                   Subjective: Pt states "two PT visits a week may be too much for the knee"; pt c/o soreness a couple of days after PT  Objective: See treatment diary below      Assessment: Tolerated treatment well  Patient would benefit from continued PT  No increased sx noted in the knee following exercise performed today  Moderate tenderness of TFL noted; tolerates piriformis release well  No adverse effects noted w/ Laser tx  Plan: Progress treatment as tolerated         Precautions: None       Manuals 1/13 1/18 1/20 1/25 1/28 2/3 2/5 2/10     Piriformis release 6' 6' 6' 6' 6' 6' 6' 6'     Laser to piriformis  NV 6' 8' 8' 8' 8' 8'     Therabar roller to TFL/ITB NV 6'  6' 6' 4' 4' 4' 4'                  Neuro Re-Ed             Seated TA  10x5"  10x5"          Seated TA with march  x10 bl  x10 BL x10 bl  x10 BL x10 BL x15 BL 15 BL     Seated TA with kick              Seated TA with iso hip abd/add  10x5" ea  10x5" ea 15x5" ea  15x5"ea 15x5" ea 15x5" ea 15x5" ea     Seated BKFO                                       Ther Ex             nustep NV lvl 3 3'  4' lv 3 4' lvl 3 4' lv 1 4' lv 1 defers DC     Supine piriformis stretch with towel NV attempt            Hamstring stretch at steps  20" x3             TB side step  NV 2 laps Y TB 2 laps  YTB 3 laps YTB 3 laps YTB 2 laps YTB 2 laps     gastroc SB stretch NV 20" x3  20"x3 20: x3  20"x3 20"x3 20"x3 20"x3                                                         Ther Activity                                       Gait Training                                       Modalities

## 2021-02-12 DIAGNOSIS — Z23 ENCOUNTER FOR IMMUNIZATION: ICD-10-CM

## 2021-02-15 ENCOUNTER — OFFICE VISIT (OUTPATIENT)
Dept: PHYSICAL THERAPY | Facility: CLINIC | Age: 76
End: 2021-02-15
Payer: MEDICARE

## 2021-02-15 DIAGNOSIS — G89.29 CHRONIC RIGHT-SIDED LOW BACK PAIN WITHOUT SCIATICA: Primary | ICD-10-CM

## 2021-02-15 DIAGNOSIS — M54.50 CHRONIC RIGHT-SIDED LOW BACK PAIN WITHOUT SCIATICA: Primary | ICD-10-CM

## 2021-02-15 PROCEDURE — 97140 MANUAL THERAPY 1/> REGIONS: CPT | Performed by: PHYSICAL THERAPIST

## 2021-02-15 PROCEDURE — 97112 NEUROMUSCULAR REEDUCATION: CPT | Performed by: PHYSICAL THERAPIST

## 2021-02-15 PROCEDURE — 97530 THERAPEUTIC ACTIVITIES: CPT | Performed by: PHYSICAL THERAPIST

## 2021-02-15 NOTE — PROGRESS NOTES
Daily Note     Today's date: 2/15/2021  Patient name: Vikki Poe  : 1945  MRN: [id number]  Referring provider: Gio Hughes DO  Dx:   Encounter Diagnosis     ICD-10-CM    1  Chronic right-sided low back pain without sciatica  M54 5     G89 29                   Subjective: Patient reports she is feeling better with her sciatica and she had seen the doctor last week and he is happy with her progress  She is able to roll in bed, and she can bend down without gasping for pain  Objective: See treatment diary below      Assessment: Tolerated treatment well  Patient would benefit from continued PT  Pt reports she still has sciatic symptoms into popliteal fossa though is not tender today with manuals to piriformis  Tenderness at trochanteric bursa persists  She moves slowly through core stability exercises  Progress core exercises NV if tolerates  Plan: Progress treatment as tolerated         Precautions: None       Manuals 1/25 1/28 2/3 2/5 2/10 2/15    Piriformis release 6' 6' 6' 6' 6' 6'    Laser to piriformis 8' 8' 8' 8' 8' 8'    Therabar roller to TFL/ITB 6' 4' 4' 4' 4' 4'              Neuro Re-Ed          Seated TA          Seated TA with march x10 bl  x10 BL x10 BL x15 BL 15 BL Nv    Seated TA with kick           Seated TA with iso hip abd/add 15x5" ea  15x5"ea 15x5" ea 15x5" ea 15x5" ea 15x5"ea      Seated BKFO                              Ther Ex          nustep 4' lvl 3 4' lv 1 4' lv 1 defers DC DC    Supine piriformis stretch with towel          Hamstring stretch at steps           TB side step Y TB 2 laps  YTB 3 laps YTB 3 laps YTB 2 laps YTB 2 laps NV    gastroc SB stretch 20: x3  20"x3 20"x3 20"x3 20"x3 NV                                            Ther Activity                              Gait Training                              Modalities

## 2021-02-25 ENCOUNTER — APPOINTMENT (OUTPATIENT)
Dept: PHYSICAL THERAPY | Facility: CLINIC | Age: 76
End: 2021-02-25
Payer: MEDICARE

## 2021-03-03 ENCOUNTER — LAB (OUTPATIENT)
Dept: LAB | Facility: CLINIC | Age: 76
End: 2021-03-03
Payer: MEDICARE

## 2021-03-03 DIAGNOSIS — E11.22 TYPE 2 DIABETES MELLITUS WITH STAGE 3 CHRONIC KIDNEY DISEASE, WITHOUT LONG-TERM CURRENT USE OF INSULIN, UNSPECIFIED WHETHER STAGE 3A OR 3B CKD (HCC): ICD-10-CM

## 2021-03-03 DIAGNOSIS — D64.9 ANEMIA, UNSPECIFIED TYPE: ICD-10-CM

## 2021-03-03 DIAGNOSIS — E78.2 MIXED HYPERLIPIDEMIA: ICD-10-CM

## 2021-03-03 DIAGNOSIS — N18.30 TYPE 2 DIABETES MELLITUS WITH STAGE 3 CHRONIC KIDNEY DISEASE, WITHOUT LONG-TERM CURRENT USE OF INSULIN, UNSPECIFIED WHETHER STAGE 3A OR 3B CKD (HCC): ICD-10-CM

## 2021-03-03 DIAGNOSIS — I10 BENIGN ESSENTIAL HYPERTENSION: ICD-10-CM

## 2021-03-03 DIAGNOSIS — E03.9 ACQUIRED HYPOTHYROIDISM: ICD-10-CM

## 2021-03-03 LAB
ALBUMIN SERPL BCP-MCNC: 3.6 G/DL (ref 3.5–5)
ALP SERPL-CCNC: 83 U/L (ref 46–116)
ALT SERPL W P-5'-P-CCNC: 25 U/L (ref 12–78)
ANION GAP SERPL CALCULATED.3IONS-SCNC: 8 MMOL/L (ref 4–13)
AST SERPL W P-5'-P-CCNC: 12 U/L (ref 5–45)
BASOPHILS # BLD AUTO: 0.04 THOUSANDS/ΜL (ref 0–0.1)
BASOPHILS NFR BLD AUTO: 0 % (ref 0–1)
BILIRUB SERPL-MCNC: 0.35 MG/DL (ref 0.2–1)
BUN SERPL-MCNC: 31 MG/DL (ref 5–25)
CALCIUM SERPL-MCNC: 9.5 MG/DL (ref 8.3–10.1)
CHLORIDE SERPL-SCNC: 107 MMOL/L (ref 100–108)
CHOLEST SERPL-MCNC: 223 MG/DL (ref 50–200)
CO2 SERPL-SCNC: 25 MMOL/L (ref 21–32)
CREAT SERPL-MCNC: 1.38 MG/DL (ref 0.6–1.3)
EOSINOPHIL # BLD AUTO: 0.11 THOUSAND/ΜL (ref 0–0.61)
EOSINOPHIL NFR BLD AUTO: 1 % (ref 0–6)
ERYTHROCYTE [DISTWIDTH] IN BLOOD BY AUTOMATED COUNT: 13.4 % (ref 11.6–15.1)
GFR SERPL CREATININE-BSD FRML MDRD: 37 ML/MIN/1.73SQ M
GLUCOSE P FAST SERPL-MCNC: 136 MG/DL (ref 65–99)
HCT VFR BLD AUTO: 35.6 % (ref 34.8–46.1)
HDLC SERPL-MCNC: 36 MG/DL
HGB BLD-MCNC: 11.3 G/DL (ref 11.5–15.4)
IMM GRANULOCYTES # BLD AUTO: 0.04 THOUSAND/UL (ref 0–0.2)
IMM GRANULOCYTES NFR BLD AUTO: 0 % (ref 0–2)
LDLC SERPL CALC-MCNC: 139 MG/DL (ref 0–100)
LYMPHOCYTES # BLD AUTO: 1.92 THOUSANDS/ΜL (ref 0.6–4.47)
LYMPHOCYTES NFR BLD AUTO: 18 % (ref 14–44)
MCH RBC QN AUTO: 28.8 PG (ref 26.8–34.3)
MCHC RBC AUTO-ENTMCNC: 31.7 G/DL (ref 31.4–37.4)
MCV RBC AUTO: 91 FL (ref 82–98)
MONOCYTES # BLD AUTO: 1.07 THOUSAND/ΜL (ref 0.17–1.22)
MONOCYTES NFR BLD AUTO: 10 % (ref 4–12)
NEUTROPHILS # BLD AUTO: 7.4 THOUSANDS/ΜL (ref 1.85–7.62)
NEUTS SEG NFR BLD AUTO: 71 % (ref 43–75)
NONHDLC SERPL-MCNC: 187 MG/DL
NRBC BLD AUTO-RTO: 0 /100 WBCS
PLATELET # BLD AUTO: 284 THOUSANDS/UL (ref 149–390)
PMV BLD AUTO: 11 FL (ref 8.9–12.7)
POTASSIUM SERPL-SCNC: 3.8 MMOL/L (ref 3.5–5.3)
PROT SERPL-MCNC: 7.2 G/DL (ref 6.4–8.2)
RBC # BLD AUTO: 3.93 MILLION/UL (ref 3.81–5.12)
SODIUM SERPL-SCNC: 140 MMOL/L (ref 136–145)
T3FREE SERPL-MCNC: 2.02 PG/ML (ref 2.3–4.2)
TRIGL SERPL-MCNC: 240 MG/DL
TSH SERPL DL<=0.05 MIU/L-ACNC: 3.29 UIU/ML (ref 0.36–3.74)
WBC # BLD AUTO: 10.58 THOUSAND/UL (ref 4.31–10.16)

## 2021-03-03 PROCEDURE — 84443 ASSAY THYROID STIM HORMONE: CPT

## 2021-03-03 PROCEDURE — 85025 COMPLETE CBC W/AUTO DIFF WBC: CPT

## 2021-03-03 PROCEDURE — 84481 FREE ASSAY (FT-3): CPT

## 2021-03-03 PROCEDURE — 80053 COMPREHEN METABOLIC PANEL: CPT

## 2021-03-03 PROCEDURE — 80061 LIPID PANEL: CPT

## 2021-03-04 ENCOUNTER — APPOINTMENT (OUTPATIENT)
Dept: PHYSICAL THERAPY | Facility: CLINIC | Age: 76
End: 2021-03-04
Payer: MEDICARE

## 2021-03-05 ENCOUNTER — OFFICE VISIT (OUTPATIENT)
Dept: PHYSICAL THERAPY | Facility: CLINIC | Age: 76
End: 2021-03-05
Payer: MEDICARE

## 2021-03-05 DIAGNOSIS — G89.29 CHRONIC RIGHT-SIDED LOW BACK PAIN WITHOUT SCIATICA: Primary | ICD-10-CM

## 2021-03-05 DIAGNOSIS — M54.50 CHRONIC RIGHT-SIDED LOW BACK PAIN WITHOUT SCIATICA: Primary | ICD-10-CM

## 2021-03-05 PROCEDURE — 97140 MANUAL THERAPY 1/> REGIONS: CPT | Performed by: PHYSICAL THERAPIST

## 2021-03-05 PROCEDURE — 97112 NEUROMUSCULAR REEDUCATION: CPT | Performed by: PHYSICAL THERAPIST

## 2021-03-05 NOTE — PROGRESS NOTES
Daily Note and Discharge     Today's date: 3/5/2021  Patient name: Juan Renteria  : 1945  MRN: [id number]  Referring provider: Dominga Villareal DO  Dx:   Encounter Diagnosis     ICD-10-CM    1  Chronic right-sided low back pain without sciatica  M54 5     G89 29                   Subjective: Patient reports she has felt improvement in her sciatic pain and is able to function as normal  She has returned to work and is most limited due to her knee pain       Objective: See treatment diary below      Assessment: Tolerated treatment well  Patient has demonstrated overall progression  She has less pain and improved function  She has returned to work and demonstrates fair form with exercises and has fair carryover with core stability work  Patient wishes to be dc at this time       Plan: DC with HEP     Precautions: None       Manuals 1/25 1/28 2/3 2/5 2/10 2/15 3/5   Piriformis release 6' 6' 6' 6' 6' 6' 6'   Laser to piriformis 8' 8' 8' 8' 8' 8'    Therabar roller to TFL/ITB 6' 4' 4' 4' 4' 4' 4'             Neuro Re-Ed          Seated TA          Seated TA with march x10 bl  x10 BL x10 BL x15 BL 15 BL Nv 2x10   Seated TA with kick           Seated TA with iso hip abd/add 15x5" ea  15x5"ea 15x5" ea 15x5" ea 15x5" ea 15x5"ea   15x10" ea   Seated BKFO          Seated TA with MB push outs        Y MB 2x10              Ther Ex          nustep 4' lvl 3 4' lv 1 4' lv 1 defers DC DC dc   Supine piriformis stretch with towel          Hamstring stretch at steps           TB side step Y TB 2 laps  YTB 3 laps YTB 3 laps YTB 2 laps YTB 2 laps NV    gastroc SB stretch 20: x3  20"x3 20"x3 20"x3 20"x3 NV                                            Ther Activity                              Gait Training                              Modalities

## 2021-03-09 ENCOUNTER — OFFICE VISIT (OUTPATIENT)
Dept: FAMILY MEDICINE CLINIC | Facility: OTHER | Age: 76
End: 2021-03-09
Payer: MEDICARE

## 2021-03-09 VITALS
SYSTOLIC BLOOD PRESSURE: 130 MMHG | WEIGHT: 233 LBS | RESPIRATION RATE: 18 BRPM | BODY MASS INDEX: 45.75 KG/M2 | HEIGHT: 60 IN | OXYGEN SATURATION: 98 % | DIASTOLIC BLOOD PRESSURE: 80 MMHG | TEMPERATURE: 98.4 F | HEART RATE: 69 BPM

## 2021-03-09 DIAGNOSIS — E78.2 MIXED HYPERLIPIDEMIA: ICD-10-CM

## 2021-03-09 DIAGNOSIS — N18.30 TYPE 2 DIABETES MELLITUS WITH STAGE 3 CHRONIC KIDNEY DISEASE, WITHOUT LONG-TERM CURRENT USE OF INSULIN, UNSPECIFIED WHETHER STAGE 3A OR 3B CKD (HCC): Primary | ICD-10-CM

## 2021-03-09 DIAGNOSIS — E03.9 ACQUIRED HYPOTHYROIDISM: ICD-10-CM

## 2021-03-09 DIAGNOSIS — M35.3 POLYMYALGIA RHEUMATICA (HCC): ICD-10-CM

## 2021-03-09 DIAGNOSIS — N18.30 STAGE 3 CHRONIC KIDNEY DISEASE, UNSPECIFIED WHETHER STAGE 3A OR 3B CKD (HCC): ICD-10-CM

## 2021-03-09 DIAGNOSIS — E11.22 TYPE 2 DIABETES MELLITUS WITH STAGE 3 CHRONIC KIDNEY DISEASE, WITHOUT LONG-TERM CURRENT USE OF INSULIN, UNSPECIFIED WHETHER STAGE 3A OR 3B CKD (HCC): Primary | ICD-10-CM

## 2021-03-09 DIAGNOSIS — I10 BENIGN ESSENTIAL HYPERTENSION: ICD-10-CM

## 2021-03-09 LAB — SL AMB POCT HEMOGLOBIN AIC: 7.3 (ref ?–6.5)

## 2021-03-09 PROCEDURE — 99214 OFFICE O/P EST MOD 30 MIN: CPT | Performed by: FAMILY MEDICINE

## 2021-03-09 PROCEDURE — 83036 HEMOGLOBIN GLYCOSYLATED A1C: CPT | Performed by: FAMILY MEDICINE

## 2021-03-09 NOTE — PROGRESS NOTES
Assessment/Plan:    No problem-specific Assessment & Plan notes found for this encounter  Diagnoses and all orders for this visit:    Type 2 diabetes mellitus with stage 3 chronic kidney disease, without long-term current use of insulin, unspecified whether stage 3a or 3b CKD ([facility] [address] )  Comments:   A1c 7 3%  Reviewed the importance healthy diet and regular physical activity, particularly the role they play in managing sugars  Recheck labs in 3 months  Orders:  -     POCT hemoglobin A1c  -     Comprehensive metabolic panel; Future  -     Hemoglobin A1C; Future  -     Lipid panel; Future  -     Microalbumin / creatinine urine ratio; Future  -     TSH, 3rd generation with Free T4 reflex; Future    Benign essential hypertension  Comments:   BP stable, goal less than 140/90  Continue lisinopril, metoprolol, hydrochlorothiazide  Orders:  -     Comprehensive metabolic panel; Future  -     Lipid panel; Future  -     TSH, 3rd generation with Free T4 reflex; Future    Stage 3 chronic kidney disease, unspecified whether stage 3a or 3b CKD  Comments:   GFR 37, creatinine 1 38  Continue to monitor closely  Urine for microalbumin due at next visit  Orders:  -     Comprehensive metabolic panel; Future  -     Microalbumin / creatinine urine ratio; Future    Mixed hyperlipidemia  Comments:  Patient declines statin at this time in favor of lifestyle modifications  Recheck labs in 3 months, plan to revisit discussion at that time  37 7 ASCVD risk  Orders:  -     Comprehensive metabolic panel; Future  -     Lipid panel; Future    Polymyalgia rheumatica (HCC)  Comments:  Possible exacerbation  Check sed rate and follow-up pending results  Orders:  -     Sedimentation rate, automated; Future    Acquired hypothyroidism  -     TSH, 3rd generation with Free T4 reflex; Future    Other orders  -     Cancel: POCT hemoglobin A1c        Return in about 3 months (around 6/10/2021) for Recheck DM, thyroid      The patient indicates understanding of these issues and agrees with the plan  Subjective:      Patient ID: Rahul Verdin is a 76 y o  female  Pt presents for DM follow up  Also c/o pain in shoulder girdle x 1-2 weeks  Shoulders feel achy, weak  She is concerned her polymyalgia rheumatica may be flaring again    Diabetes  She presents for her follow-up diabetic visit  She has type 2 diabetes mellitus  Her disease course has been stable  There are no hypoglycemic associated symptoms  Pertinent negatives for hypoglycemia include no dizziness, headaches, nervousness/anxiousness or sweats  There are no diabetic associated symptoms  Pertinent negatives for diabetes include no blurred vision, no chest pain and no fatigue  There are no hypoglycemic complications  Diabetic complications include nephropathy  Pertinent negatives for diabetic complications include no autonomic neuropathy, CVA, heart disease, peripheral neuropathy, PVD or retinopathy  Risk factors for coronary artery disease include post-menopausal, obesity, hypertension, family history, dyslipidemia and diabetes mellitus  Current diabetic treatment includes diet  She is compliant with treatment all of the time  Her weight is stable  She is following a generally healthy diet  Meal planning includes avoidance of concentrated sweets and ADA exchanges  She has had a previous visit with a dietitian  She participates in exercise intermittently  Her breakfast blood glucose is taken between 7-8 am  Her breakfast blood glucose range is generally 110-130 mg/dl  An ACE inhibitor/angiotensin II receptor blocker is being taken  She does not see a podiatrist Eye exam is current  Hypertension  This is a chronic problem  The current episode started more than 1 year ago  The problem has been waxing and waning since onset  The problem is controlled   Pertinent negatives include no anxiety, blurred vision, chest pain, headaches, malaise/fatigue, neck pain, orthopnea, palpitations, peripheral edema, PND, shortness of breath or sweats  Agents associated with hypertension include thyroid hormones  Risk factors for coronary artery disease include post-menopausal state, obesity, family history, dyslipidemia and diabetes mellitus  Past treatments include ACE inhibitors, diuretics and beta blockers  The current treatment provides moderate improvement  Compliance problems include diet, exercise and psychosocial issues  Hypertensive end-organ damage includes kidney disease  There is no history of angina, CAD/MI, CVA, heart failure, left ventricular hypertrophy, PVD or retinopathy  Identifiable causes of hypertension include chronic renal disease and a thyroid problem  There is no history of a hypertension causing med  Hyperlipidemia  This is a chronic problem  The current episode started more than 1 year ago  The problem is uncontrolled  Exacerbating diseases include chronic renal disease, diabetes, hypothyroidism and obesity  She has no history of liver disease  There are no known factors aggravating her hyperlipidemia  Associated symptoms include myalgias  Pertinent negatives include no chest pain, focal sensory loss, focal weakness, leg pain or shortness of breath  Current antihyperlipidemic treatment includes diet change  Compliance problems include adherence to diet, adherence to exercise and psychosocial issues  Risk factors for coronary artery disease include post-menopausal, obesity, hypertension, family history, dyslipidemia and diabetes mellitus         The following portions of the patient's history were reviewed and updated as appropriate: allergies, current medications, past family history, past medical history, past social history, past surgical history and problem list       Current Outpatient Medications:     Acetaminophen 500 MG, Take 1,000 mg by mouth daily , Disp: , Rfl:     BETIMOL 0 5 % ophthalmic solution, INSTILL 1 DROP INTO THE AFFECTED EYE(S) EVERY DAY, Disp: , Rfl:     Diclofenac Sodium (VOLTAREN) 1 %, Apply 2 g topically 3 (three) times a day as needed (Pain), Disp: 1 Tube, Rfl: 1    hydrochlorothiazide (HYDRODIURIL) 25 mg tablet, Take 1 tablet (25 mg total) by mouth daily, Disp: 90 tablet, Rfl: 1    ibuprofen (ADVIL) 200 mg tablet, Take 200 mg by mouth every 6 (six) hours as needed for mild pain, Disp: , Rfl:     latanoprost (XALATAN) 0 005 % ophthalmic solution, INSTILL 1 DROP IN EACH EYE AT BEDTIME, Disp: , Rfl: 5    levothyroxine 100 mcg tablet, Take 1 tablet (100 mcg total) by mouth daily, Disp: 90 tablet, Rfl: 1    lisinopril (ZESTRIL) 20 mg tablet, Take 1 tablet (20 mg total) by mouth daily, Disp: 90 tablet, Rfl: 1    metoprolol succinate (TOPROL-XL) 50 mg 24 hr tablet, Take 1 tablet (50 mg total) by mouth daily, Disp: 90 tablet, Rfl: 1    temazepam (RESTORIL) 15 mg capsule, Take 1 capsule (15 mg total) by mouth daily at bedtime as needed for sleep, Disp: 30 capsule, Rfl: 0    timolol (TIMOPTIC) 0 5 % ophthalmic solution, , Disp: , Rfl:     tiZANidine (ZANAFLEX) 4 mg tablet, Take 1 tablet (4 mg total) by mouth 2 (two) times a day as needed for muscle spasms, Disp: 30 tablet, Rfl: 0    methylPREDNISolone 4 MG tablet therapy pack, Use as directed on package (Patient not taking: Reported on 2/4/2021), Disp: 1 each, Rfl: 1      Review of Systems   Constitutional: Negative for activity change, fatigue, fever and malaise/fatigue  HENT: Negative for congestion, ear pain, sinus pain and sore throat  Eyes: Negative for blurred vision, pain and itching  Respiratory: Negative for cough and shortness of breath  Cardiovascular: Negative for chest pain, palpitations, orthopnea and PND  Gastrointestinal: Negative for abdominal pain, constipation, diarrhea, nausea and vomiting  Endocrine: Negative for cold intolerance and heat intolerance  Genitourinary: Negative for dysuria  Musculoskeletal: Positive for arthralgias and myalgias  Negative for neck pain     Skin: Negative for color change and rash  Neurological: Negative for dizziness, focal weakness, syncope and headaches  Hematological: Negative for adenopathy  Psychiatric/Behavioral: Negative for behavioral problems, dysphoric mood and sleep disturbance  The patient is not nervous/anxious  Objective:      /80   Pulse 69   Temp 98 4 °F (36 9 °C)   Resp 18   Ht 5' (1 524 m)   Wt 106 kg (233 lb)   SpO2 98%   BMI 45 50 kg/m²          Physical Exam  Vitals signs and nursing note reviewed  Constitutional:       General: She is not in acute distress  Appearance: She is well-developed  She is obese  She is not ill-appearing  HENT:      Head: Normocephalic and atraumatic  Right Ear: External ear normal       Left Ear: External ear normal       Nose: Nose normal    Eyes:      General: No scleral icterus  Conjunctiva/sclera: Conjunctivae normal       Pupils: Pupils are equal, round, and reactive to light  Neck:      Musculoskeletal: Normal range of motion and neck supple  Thyroid: No thyromegaly  Cardiovascular:      Rate and Rhythm: Normal rate and regular rhythm  Heart sounds: Normal heart sounds  No murmur  Pulmonary:      Effort: Pulmonary effort is normal  No respiratory distress  Breath sounds: Normal breath sounds  No wheezing  Abdominal:      General: Bowel sounds are normal  There is no distension  Palpations: Abdomen is soft  Tenderness: There is no abdominal tenderness  Musculoskeletal: Normal range of motion  General: Tenderness ( noted diffusely In bilateral shoulders) present  No swelling or deformity  Right lower leg: No edema  Left lower leg: No edema  Lymphadenopathy:      Cervical: No cervical adenopathy  Skin:     General: Skin is warm and dry  Coloration: Skin is not jaundiced  Findings: No rash  Neurological:      General: No focal deficit present        Mental Status: She is alert and oriented to person, place, and time  Cranial Nerves: No cranial nerve deficit        Gait: Gait normal    Psychiatric:         Mood and Affect: Mood normal          Behavior: Behavior normal

## 2021-03-12 ENCOUNTER — APPOINTMENT (OUTPATIENT)
Dept: LAB | Facility: CLINIC | Age: 76
End: 2021-03-12
Payer: MEDICARE

## 2021-03-12 ENCOUNTER — TELEPHONE (OUTPATIENT)
Dept: FAMILY MEDICINE CLINIC | Facility: OTHER | Age: 76
End: 2021-03-12

## 2021-03-12 DIAGNOSIS — M35.3 POLYMYALGIA RHEUMATICA (HCC): ICD-10-CM

## 2021-03-12 DIAGNOSIS — M35.3 POLYMYALGIA RHEUMATICA (HCC): Primary | ICD-10-CM

## 2021-03-12 LAB — ERYTHROCYTE [SEDIMENTATION RATE] IN BLOOD: 66 MM/HOUR (ref 0–29)

## 2021-03-12 PROCEDURE — 85652 RBC SED RATE AUTOMATED: CPT

## 2021-03-12 PROCEDURE — 36415 COLL VENOUS BLD VENIPUNCTURE: CPT

## 2021-03-12 RX ORDER — PREDNISONE 2.5 MG
15 TABLET ORAL DAILY
Qty: 180 TABLET | Refills: 1 | Status: SHIPPED | OUTPATIENT
Start: 2021-03-12 | End: 2021-04-19 | Stop reason: SDUPTHER

## 2021-03-12 NOTE — TELEPHONE ENCOUNTER
Patient called and saw her blood work results online and would like to know if you want to start her on some prednisone   Please advise

## 2021-03-15 NOTE — TELEPHONE ENCOUNTER
Patient aware and just would like you to know that she is having foot pain now which sh can barely walk on it, she scheduled f/u and will start prednisone

## 2021-04-12 DIAGNOSIS — I10 ESSENTIAL HYPERTENSION: ICD-10-CM

## 2021-04-12 DIAGNOSIS — I10 BENIGN ESSENTIAL HYPERTENSION: ICD-10-CM

## 2021-04-12 RX ORDER — LISINOPRIL 20 MG/1
TABLET ORAL
Qty: 90 TABLET | Refills: 1 | OUTPATIENT
Start: 2021-04-12

## 2021-04-12 RX ORDER — METOPROLOL SUCCINATE 50 MG/1
TABLET, EXTENDED RELEASE ORAL
Qty: 90 TABLET | Refills: 1 | OUTPATIENT
Start: 2021-04-12

## 2021-04-19 ENCOUNTER — TELEMEDICINE (OUTPATIENT)
Dept: FAMILY MEDICINE CLINIC | Facility: OTHER | Age: 76
End: 2021-04-19
Payer: MEDICARE

## 2021-04-19 VITALS — BODY MASS INDEX: 45.75 KG/M2 | WEIGHT: 233 LBS | HEIGHT: 60 IN

## 2021-04-19 DIAGNOSIS — M35.3 POLYMYALGIA RHEUMATICA (HCC): Primary | ICD-10-CM

## 2021-04-19 PROCEDURE — 99213 OFFICE O/P EST LOW 20 MIN: CPT | Performed by: FAMILY MEDICINE

## 2021-04-19 RX ORDER — PREDNISONE 2.5 MG
TABLET ORAL
Qty: 180 TABLET | Refills: 1
Start: 2021-04-19 | End: 2021-06-14

## 2021-04-19 NOTE — PROGRESS NOTES
Virtual Regular Visit      Assessment/Plan:    Problem List Items Addressed This Visit        Other    Polymyalgia rheumatica ([facility] [address] ) - Primary    Relevant Medications    predniSONE 2 5 mg tablet    Other Relevant Orders    Sedimentation rate, automated        Return for Next scheduled follow up  The patient indicates understanding of these issues and agrees with the plan  Reason for visit is   Chief Complaint   Patient presents with    Virtual Regular Visit        Encounter provider Tiff Butts DO    Provider located at [address] [place] [address] [id number]  [facility] [id number]      Recent Visits  Date Type Provider Dept   04/19/21 Telemedicine Karla Joseph DO [facility]   Showing recent visits within past 7 days and meeting all other requirements     Future Appointments  No visits were found meeting these conditions  Showing future appointments within next 150 days and meeting all other requirements        The patient was identified by name and date of birth  Tomeka Mena was informed that this is a telemedicine visit and that the visit is being conducted through [address] [facility] and patient was informed that this is a secure, HIPAA-compliant platform  She agrees to proceed     My office door was closed  No one else was in the room  She acknowledged consent and understanding of privacy and security of the video platform  The patient has agreed to participate and understands they can discontinue the visit at any time  Patient is aware this is a billable service  Subjective  Tomeka Mena is a 76 y o  female        HPI   Pt presents for f/u PMR flare  Last visit 3/9 -- pt reported then a 1-2 wk h/o shoulder girdle pain/weakness  ESR obtained 3/12/21 was elevated at 66 and pt was subsequently started on prednisone 15 mg daily  She reports good medication compliance and tolerance  Now reporting that shoulders are "mostly better" but not 100%  Noted a significant, concurrent pain in her foot that did resolve completely with the prednisone prescribed  Has not seen her rheumatologist (Dr Julieth Sarmiento) for this episode  No significant side effects or adverse reactions reported to prednisone          Past Medical History:   Diagnosis Date    Anemia     Arthritis     Hypertension     Polymyalgia rheumatica ([address] )     Shoulder impingement syndrome     last assessed 12/27/16       Past Surgical History:   Procedure Laterality Date    ABDOMINAL SURGERY      last assessed 11/1/16    OOPHORECTOMY Right     OTHER SURGICAL HISTORY Right 2017    shoulder    TOTAL ABDOMINAL HYSTERECTOMY  30 year ago    WRIST FRACTURE SURGERY Right 2017       Current Outpatient Medications   Medication Sig Dispense Refill    Acetaminophen 500 MG Take 1,000 mg by mouth daily       BETIMOL 0 5 % ophthalmic solution INSTILL 1 DROP INTO THE AFFECTED EYE(S) EVERY DAY      Diclofenac Sodium (VOLTAREN) 1 % Apply 2 g topically 3 (three) times a day as needed (Pain) 1 Tube 1    hydrochlorothiazide (HYDRODIURIL) 25 mg tablet Take 1 tablet (25 mg total) by mouth daily 90 tablet 1    ibuprofen (ADVIL) 200 mg tablet Take 200 mg by mouth every 6 (six) hours as needed for mild pain      latanoprost (XALATAN) 0 005 % ophthalmic solution INSTILL 1 DROP IN EACH EYE AT BEDTIME  5    levothyroxine 100 mcg tablet Take 1 tablet (100 mcg total) by mouth daily 90 tablet 1    predniSONE 2 5 mg tablet Take 5 tablets (12 5 mg total) by mouth daily for 14 days, THEN 4 tablets (10 mg total) daily for 14 days, THEN 3 tablets (7 5 mg total) daily for 14 days, THEN 2 tablets (5 mg total) daily for 14 days   180 tablet 1    temazepam (RESTORIL) 15 mg capsule Take 1 capsule (15 mg total) by mouth daily at bedtime as needed for sleep 30 capsule 0    timolol (TIMOPTIC) 0 5 % ophthalmic solution       tiZANidine (ZANAFLEX) 4 mg tablet Take 1 tablet (4 mg total) by mouth 2 (two) times a day as needed for muscle spasms 30 tablet 0  lisinopril (ZESTRIL) 20 mg tablet Take 1 tablet (20 mg total) by mouth daily 90 tablet 1    metoprolol succinate (TOPROL-XL) 50 mg 24 hr tablet Take 1 tablet (50 mg total) by mouth daily 90 tablet 1     No current facility-administered medications for this visit  Allergies   Allergen Reactions    Hydromorphone Dizziness and Other (See Comments)     Severe vertigo     Tdap [Tetanus-Diphth-Acell Pertussis] Swelling    Propoxyphene Dizziness     Darvon also known as       Review of Systems   Constitutional: Negative for activity change, fatigue and fever  HENT: Negative for congestion, ear pain, sinus pain and sore throat  Eyes: Negative for pain and itching  Respiratory: Negative for cough and shortness of breath  Cardiovascular: Negative for chest pain and palpitations  Gastrointestinal: Negative for abdominal pain, constipation, diarrhea, nausea and vomiting  Endocrine: Negative for cold intolerance and heat intolerance  Genitourinary: Negative for dysuria  Musculoskeletal: Positive for arthralgias and myalgias  Mild aches in b/l shoulders per HPI -- improving with prednisone   Skin: Negative for color change and rash  Neurological: Negative for dizziness, syncope and headaches  Hematological: Negative for adenopathy  Psychiatric/Behavioral: Negative for behavioral problems, dysphoric mood and sleep disturbance  The patient is not nervous/anxious  Video Exam    Vitals:    04/19/21 1421   Weight: 106 kg (233 lb)   Height: 5' (1 524 m)       Physical Exam  Constitutional:       General: She is not in acute distress  Appearance: Normal appearance  She is well-developed  She is not ill-appearing  Comments: Observations limited by video visit   HENT:      Head: Normocephalic and atraumatic  Eyes:      General: No scleral icterus  Right eye: No discharge  Left eye: No discharge        Conjunctiva/sclera: Conjunctivae normal       Pupils: Pupils are equal, round, and reactive to light  Neck:      Musculoskeletal: Normal range of motion  Pulmonary:      Effort: Pulmonary effort is normal  No respiratory distress  Skin:     Coloration: Skin is not pale  Findings: No erythema  Neurological:      Mental Status: She is alert and oriented to person, place, and time  Cranial Nerves: No cranial nerve deficit  Coordination: Coordination normal    Psychiatric:         Mood and Affect: Mood normal          Behavior: Behavior normal           I spent 15 minutes directly with the patient during this visit      VIRTUAL VISIT DISCLAIMER    Germán Moran acknowledges that she has consented to an online visit or consultation  She understands that the online visit is based solely on information provided by her, and that, in the absence of a face-to-face physical evaluation by the physician, the diagnosis she receives is both limited and provisional in terms of accuracy and completeness  This is not intended to replace a full medical face-to-face evaluation by the physician  Germán Moran understands and accepts these terms

## 2021-04-20 DIAGNOSIS — I10 BENIGN ESSENTIAL HYPERTENSION: ICD-10-CM

## 2021-04-20 DIAGNOSIS — I10 ESSENTIAL HYPERTENSION: ICD-10-CM

## 2021-04-20 DIAGNOSIS — M35.3 POLYMYALGIA RHEUMATICA (HCC): ICD-10-CM

## 2021-04-20 RX ORDER — LISINOPRIL 20 MG/1
20 TABLET ORAL DAILY
Qty: 90 TABLET | Refills: 1 | Status: SHIPPED | OUTPATIENT
Start: 2021-04-20 | End: 2021-07-22 | Stop reason: SDUPTHER

## 2021-04-20 RX ORDER — PREDNISONE 2.5 MG
TABLET ORAL
Qty: 180 TABLET | Refills: 1 | OUTPATIENT
Start: 2021-04-20

## 2021-04-20 RX ORDER — METOPROLOL SUCCINATE 50 MG/1
50 TABLET, EXTENDED RELEASE ORAL DAILY
Qty: 90 TABLET | Refills: 1 | Status: SHIPPED | OUTPATIENT
Start: 2021-04-20 | End: 2021-07-22 | Stop reason: SDUPTHER

## 2021-04-28 ENCOUNTER — LAB (OUTPATIENT)
Dept: LAB | Facility: CLINIC | Age: 76
End: 2021-04-28
Payer: MEDICARE

## 2021-04-28 DIAGNOSIS — M35.3 POLYMYALGIA RHEUMATICA (HCC): ICD-10-CM

## 2021-04-28 LAB — ERYTHROCYTE [SEDIMENTATION RATE] IN BLOOD: 29 MM/HOUR (ref 0–29)

## 2021-04-28 PROCEDURE — 36415 COLL VENOUS BLD VENIPUNCTURE: CPT

## 2021-04-28 PROCEDURE — 85652 RBC SED RATE AUTOMATED: CPT

## 2021-04-29 NOTE — RESULT ENCOUNTER NOTE
Kevin Goldstein,    Your Sed Rate is improving nicely! Please continue the prednisone as we discussed at your last visit      Take care,  Dr Mona Ag

## 2021-06-10 ENCOUNTER — OFFICE VISIT (OUTPATIENT)
Dept: FAMILY MEDICINE CLINIC | Facility: OTHER | Age: 76
End: 2021-06-10
Payer: MEDICARE

## 2021-06-10 VITALS
HEART RATE: 86 BPM | DIASTOLIC BLOOD PRESSURE: 82 MMHG | TEMPERATURE: 98.4 F | SYSTOLIC BLOOD PRESSURE: 148 MMHG | RESPIRATION RATE: 18 BRPM | BODY MASS INDEX: 46.53 KG/M2 | HEIGHT: 60 IN | OXYGEN SATURATION: 97 % | WEIGHT: 237 LBS

## 2021-06-10 DIAGNOSIS — N18.30 TYPE 2 DIABETES MELLITUS WITH STAGE 3 CHRONIC KIDNEY DISEASE, WITHOUT LONG-TERM CURRENT USE OF INSULIN, UNSPECIFIED WHETHER STAGE 3A OR 3B CKD (HCC): Primary | ICD-10-CM

## 2021-06-10 DIAGNOSIS — I10 BENIGN ESSENTIAL HYPERTENSION: ICD-10-CM

## 2021-06-10 DIAGNOSIS — H26.9 CATARACT OF BOTH EYES, UNSPECIFIED CATARACT TYPE: ICD-10-CM

## 2021-06-10 DIAGNOSIS — E11.22 TYPE 2 DIABETES MELLITUS WITH STAGE 3 CHRONIC KIDNEY DISEASE, WITHOUT LONG-TERM CURRENT USE OF INSULIN, UNSPECIFIED WHETHER STAGE 3A OR 3B CKD (HCC): Primary | ICD-10-CM

## 2021-06-10 DIAGNOSIS — E03.9 ACQUIRED HYPOTHYROIDISM: ICD-10-CM

## 2021-06-10 LAB — SL AMB POCT HEMOGLOBIN AIC: 7.5 (ref ?–6.5)

## 2021-06-10 PROCEDURE — 99214 OFFICE O/P EST MOD 30 MIN: CPT | Performed by: FAMILY MEDICINE

## 2021-06-10 PROCEDURE — 83036 HEMOGLOBIN GLYCOSYLATED A1C: CPT | Performed by: FAMILY MEDICINE

## 2021-06-10 NOTE — PROGRESS NOTES
Assessment/Plan:    No problem-specific Assessment & Plan notes found for this encounter  Diagnoses and all orders for this visit:    Type 2 diabetes mellitus with stage 3 chronic kidney disease, without long-term current use of insulin, unspecified whether stage 3a or 3b CKD ([facility] [address] )  Comments:  Stable, despite slight increase in A1c  Recheck labs prior to f/u in 3 months  Cut back on bread! Orders:  -     POCT hemoglobin A1c  -     Ambulatory referral to Ophthalmology; Future    Acquired hypothyroidism  Comments:  Stable, continue levothyroxine    Benign essential hypertension  Comments:  BP goal <140/90  Increase exercise, decrease salt intake  Continue lisinopril, hctz, metoprolol    Body mass index (BMI) 45 0-49 9, adult (HCC)    Cataract of both eyes, unspecified cataract type  Comments:  Referred to [facility] as her ophthalmologist is retiring        BMI Counseling: Body mass index is 46 29 kg/m²  The BMI is above normal  Nutrition recommendations include decreasing portion sizes, encouraging healthy choices of fruits and vegetables, decreasing fast food intake, consuming healthier snacks, limiting drinks that contain sugar, moderation in carbohydrate intake, increasing intake of lean protein, reducing intake of saturated and trans fat and reducing intake of cholesterol  Exercise recommendations include exercising 3-5 times per week  Falls Plan of Care: Medications that increase falls were reviewed  Return in about 3 months (around 9/10/2021) for Recheck DM, HTN  The patient indicates understanding of these issues and agrees with the plan  Subjective:      Patient ID: Jarred Masters is a 76 y o  female  Diabetes  She presents for her follow-up diabetic visit  She has type 2 diabetes mellitus  Her disease course has been stable  There are no hypoglycemic associated symptoms   Pertinent negatives for hypoglycemia include no dizziness, headaches, nervousness/anxiousness, sweats or tremors  There are no diabetic associated symptoms  Pertinent negatives for diabetes include no blurred vision, no chest pain, no fatigue, no visual change and no weight loss  There are no hypoglycemic complications  Diabetic complications include nephropathy  Pertinent negatives for diabetic complications include no autonomic neuropathy, CVA, heart disease, peripheral neuropathy, PVD or retinopathy  Risk factors for coronary artery disease include diabetes mellitus, dyslipidemia, family history, hypertension, obesity, post-menopausal and sedentary lifestyle  Current diabetic treatment includes diet  She is compliant with treatment most of the time  She is following a generally healthy diet  Meal planning includes avoidance of concentrated sweets  She has not had a previous visit with a dietitian  She participates in exercise intermittently  An ACE inhibitor/angiotensin II receptor blocker is being taken  She does not see a podiatrist Eye exam is current  Hypertension  This is a chronic problem  The current episode started more than 1 year ago  The problem has been waxing and waning since onset  The problem is uncontrolled  Pertinent negatives include no anxiety, blurred vision, chest pain, headaches, malaise/fatigue, neck pain, orthopnea, palpitations, peripheral edema, PND, shortness of breath or sweats  Agents associated with hypertension include NSAIDs and thyroid hormones  Risk factors for coronary artery disease include sedentary lifestyle, post-menopausal state, obesity, family history, dyslipidemia and diabetes mellitus  Past treatments include ACE inhibitors, diuretics and beta blockers  The current treatment provides moderate improvement  Compliance problems include diet, exercise and psychosocial issues  There is no history of angina, kidney disease, CAD/MI, CVA, heart failure, left ventricular hypertrophy, PVD or retinopathy   Identifiable causes of hypertension include a hypertension causing med and a thyroid problem  There is no history of chronic renal disease  Thyroid Problem  Presents for follow-up visit  Patient reports no anxiety, cold intolerance, constipation, depressed mood, diaphoresis, diarrhea, dry skin, fatigue, hair loss, heat intolerance, hoarse voice, leg swelling, nail problem, palpitations, tremors, visual change, weight gain or weight loss  The symptoms have been stable  There is no history of heart failure  The following portions of the patient's history were reviewed and updated as appropriate: allergies, current medications, past family history, past medical history, past social history, past surgical history and problem list       Current Outpatient Medications:     Acetaminophen 500 MG, Take 1,000 mg by mouth daily , Disp: , Rfl:     BETIMOL 0 5 % ophthalmic solution, INSTILL 1 DROP INTO THE AFFECTED EYE(S) EVERY DAY, Disp: , Rfl:     hydrochlorothiazide (HYDRODIURIL) 25 mg tablet, Take 1 tablet (25 mg total) by mouth daily, Disp: 90 tablet, Rfl: 1    ibuprofen (ADVIL) 200 mg tablet, Take 200 mg by mouth every 6 (six) hours as needed for mild pain, Disp: , Rfl:     latanoprost (XALATAN) 0 005 % ophthalmic solution, INSTILL 1 DROP IN EACH EYE AT BEDTIME, Disp: , Rfl: 5    levothyroxine 100 mcg tablet, Take 1 tablet (100 mcg total) by mouth daily, Disp: 90 tablet, Rfl: 1    lisinopril (ZESTRIL) 20 mg tablet, Take 1 tablet (20 mg total) by mouth daily, Disp: 90 tablet, Rfl: 1    metoprolol succinate (TOPROL-XL) 50 mg 24 hr tablet, Take 1 tablet (50 mg total) by mouth daily, Disp: 90 tablet, Rfl: 1    predniSONE 2 5 mg tablet, Take 5 tablets (12 5 mg total) by mouth daily for 14 days, THEN 4 tablets (10 mg total) daily for 14 days, THEN 3 tablets (7 5 mg total) daily for 14 days, THEN 2 tablets (5 mg total) daily for 14 days  , Disp: 180 tablet, Rfl: 1    temazepam (RESTORIL) 15 mg capsule, Take 1 capsule (15 mg total) by mouth daily at bedtime as needed for sleep, Disp: 30 capsule, Rfl: 0    timolol (TIMOPTIC) 0 5 % ophthalmic solution, , Disp: , Rfl:     tiZANidine (ZANAFLEX) 4 mg tablet, Take 1 tablet (4 mg total) by mouth 2 (two) times a day as needed for muscle spasms, Disp: 30 tablet, Rfl: 0    Diclofenac Sodium (VOLTAREN) 1 %, Apply 2 g topically 3 (three) times a day as needed (Pain) (Patient not taking: Reported on 6/10/2021), Disp: 1 Tube, Rfl: 1      Review of Systems   Constitutional: Negative for activity change, diaphoresis, fatigue, fever, malaise/fatigue, weight gain and weight loss  HENT: Negative for congestion, ear pain, hoarse voice, sinus pain and sore throat  Eyes: Negative for blurred vision, pain and itching  Respiratory: Negative for cough and shortness of breath  Cardiovascular: Negative for chest pain, palpitations, orthopnea and PND  Gastrointestinal: Negative for abdominal pain, constipation, diarrhea, nausea and vomiting  Endocrine: Negative for cold intolerance and heat intolerance  Genitourinary: Negative for dysuria  Musculoskeletal: Negative for myalgias and neck pain  Skin: Negative for color change and rash  Neurological: Negative for dizziness, tremors, syncope and headaches  Hematological: Negative for adenopathy  Psychiatric/Behavioral: Negative for behavioral problems, dysphoric mood and sleep disturbance  The patient is not nervous/anxious  Objective:      /82   Pulse 86   Temp 98 4 °F (36 9 °C)   Resp 18   Ht 5' (1 524 m)   Wt 108 kg (237 lb)   SpO2 97%   BMI 46 29 kg/m²          Physical Exam  Vitals signs and nursing note reviewed  Constitutional:       General: She is not in acute distress  Appearance: Normal appearance  She is well-developed  She is obese  She is not ill-appearing  HENT:      Head: Normocephalic and atraumatic  Right Ear: External ear normal       Left Ear: External ear normal       Nose: Nose normal    Eyes:      General: No scleral icterus  Conjunctiva/sclera: Conjunctivae normal       Pupils: Pupils are equal, round, and reactive to light  Neck:      Musculoskeletal: Normal range of motion and neck supple  Thyroid: No thyromegaly  Cardiovascular:      Rate and Rhythm: Normal rate and regular rhythm  Pulses: no weak pulses          Dorsalis pedis pulses are 2+ on the right side and 2+ on the left side  Posterior tibial pulses are 2+ on the right side and 2+ on the left side  Heart sounds: Normal heart sounds  No murmur  Pulmonary:      Effort: Pulmonary effort is normal  No respiratory distress  Breath sounds: Normal breath sounds  No wheezing  Abdominal:      General: Bowel sounds are normal  There is no distension  Palpations: Abdomen is soft  Tenderness: There is no abdominal tenderness  Musculoskeletal: Normal range of motion  General: No tenderness  Right lower leg: No edema  Left lower leg: No edema  Feet:      Right foot:      Skin integrity: No ulcer, skin breakdown, erythema, warmth, callus or dry skin  Left foot:      Skin integrity: No ulcer, skin breakdown, erythema, warmth, callus or dry skin  Lymphadenopathy:      Cervical: No cervical adenopathy  Skin:     General: Skin is warm and dry  Coloration: Skin is not jaundiced  Findings: No rash  Neurological:      General: No focal deficit present  Mental Status: She is alert and oriented to person, place, and time  Cranial Nerves: No cranial nerve deficit  Gait: Gait normal    Psychiatric:         Mood and Affect: Mood normal          Behavior: Behavior normal            Patient's shoes and socks removed  Right Foot/Ankle   Right Foot Inspection  Skin Exam: skin normal and skin intact no dry skin, no warmth, no callus, no erythema, no maceration, no abnormal color, no pre-ulcer, no ulcer and no callus                          Toe Exam: ROM and strength within normal limits  Sensory Vibration: intact  Proprioception: intact   Monofilament testing: intact  Vascular  Capillary refills: < 3 seconds  The right DP pulse is 2+  The right PT pulse is 2+  Left Foot/Ankle  Left Foot Inspection  Skin Exam: skin normal and skin intactno dry skin, no warmth, no erythema, no maceration, normal color, no pre-ulcer, no ulcer and no callus                         Toe Exam: ROM and strength within normal limits                   Sensory   Vibration: intact  Proprioception: intact  Monofilament: intact  Vascular  Capillary refills: < 3 seconds  The left DP pulse is 2+  The left PT pulse is 2+  Assign Risk Category:  No deformity present; No loss of protective sensation;  No weak pulses       Risk: 0

## 2021-07-22 ENCOUNTER — TELEPHONE (OUTPATIENT)
Dept: OBGYN CLINIC | Facility: CLINIC | Age: 76
End: 2021-07-22

## 2021-07-22 DIAGNOSIS — I10 ESSENTIAL HYPERTENSION: ICD-10-CM

## 2021-07-22 DIAGNOSIS — E03.9 HYPOTHYROIDISM, UNSPECIFIED TYPE: ICD-10-CM

## 2021-07-22 DIAGNOSIS — I10 BENIGN ESSENTIAL HYPERTENSION: ICD-10-CM

## 2021-07-23 RX ORDER — LEVOTHYROXINE SODIUM 0.1 MG/1
100 TABLET ORAL DAILY
Qty: 90 TABLET | Refills: 1 | Status: SHIPPED | OUTPATIENT
Start: 2021-07-23 | End: 2022-01-26 | Stop reason: SDUPTHER

## 2021-07-23 RX ORDER — METOPROLOL SUCCINATE 50 MG/1
50 TABLET, EXTENDED RELEASE ORAL DAILY
Qty: 90 TABLET | Refills: 1 | Status: SHIPPED | OUTPATIENT
Start: 2021-07-23 | End: 2022-04-08

## 2021-07-23 RX ORDER — LISINOPRIL 20 MG/1
20 TABLET ORAL DAILY
Qty: 90 TABLET | Refills: 1 | Status: SHIPPED | OUTPATIENT
Start: 2021-07-23 | End: 2022-04-08

## 2021-07-23 RX ORDER — HYDROCHLOROTHIAZIDE 25 MG/1
25 TABLET ORAL DAILY
Qty: 90 TABLET | Refills: 1 | Status: SHIPPED | OUTPATIENT
Start: 2021-07-23 | End: 2022-02-14 | Stop reason: SDUPTHER

## 2021-07-23 NOTE — TELEPHONE ENCOUNTER
Have not seen her since February  Muscle relaxants are not to be taken long term 
LM on  for return call  Please relay message as stated above   Thank you
Patient returning call   Patient scheduled a follow up appointment 8/23
Pt contacted Call Center requested refill of their medication  Medication Name: tiZANidine (ZANAFLEX)       Dosage of Med: 4mg      Frequency of Med: Take 1 tablet (4 mg total) by mouth 2 (two) times a day as needed for muscle spasms      Remaining Medication: 1 left       Pharmacy and Location: [facility]         Pt  Preferred Callback Phone Number:  [phone]      Thank you  * PLEASE ADVISE PATIENTS:    REFILL REQUESTS WILL BE PROCESSED WITHIN 24-48 HOURS  *
[id number] Detailed

## 2021-07-27 ENCOUNTER — OFFICE VISIT (OUTPATIENT)
Dept: OBGYN CLINIC | Facility: CLINIC | Age: 76
End: 2021-07-27
Payer: MEDICARE

## 2021-07-27 VITALS
HEART RATE: 76 BPM | WEIGHT: 237 LBS | RESPIRATION RATE: 17 BRPM | HEIGHT: 62 IN | SYSTOLIC BLOOD PRESSURE: 129 MMHG | DIASTOLIC BLOOD PRESSURE: 72 MMHG | BODY MASS INDEX: 43.61 KG/M2

## 2021-07-27 DIAGNOSIS — M17.0 PRIMARY OSTEOARTHRITIS OF BOTH KNEES: Primary | ICD-10-CM

## 2021-07-27 PROCEDURE — 20610 DRAIN/INJ JOINT/BURSA W/O US: CPT | Performed by: ORTHOPAEDIC SURGERY

## 2021-07-27 PROCEDURE — 99214 OFFICE O/P EST MOD 30 MIN: CPT | Performed by: ORTHOPAEDIC SURGERY

## 2021-07-27 RX ORDER — BETAMETHASONE SODIUM PHOSPHATE AND BETAMETHASONE ACETATE 3; 3 MG/ML; MG/ML
12 INJECTION, SUSPENSION INTRA-ARTICULAR; INTRALESIONAL; INTRAMUSCULAR; SOFT TISSUE
Status: COMPLETED | OUTPATIENT
Start: 2021-07-27 | End: 2021-07-27

## 2021-07-27 RX ORDER — LIDOCAINE HYDROCHLORIDE 10 MG/ML
1 INJECTION, SOLUTION INFILTRATION; PERINEURAL
Status: COMPLETED | OUTPATIENT
Start: 2021-07-27 | End: 2021-07-27

## 2021-07-27 RX ORDER — BUPIVACAINE HYDROCHLORIDE 2.5 MG/ML
1 INJECTION, SOLUTION INFILTRATION; PERINEURAL
Status: COMPLETED | OUTPATIENT
Start: 2021-07-27 | End: 2021-07-27

## 2021-07-27 RX ADMIN — BETAMETHASONE SODIUM PHOSPHATE AND BETAMETHASONE ACETATE 12 MG: 3; 3 INJECTION, SUSPENSION INTRA-ARTICULAR; INTRALESIONAL; INTRAMUSCULAR; SOFT TISSUE at 11:58

## 2021-07-27 RX ADMIN — LIDOCAINE HYDROCHLORIDE 1 ML: 10 INJECTION, SOLUTION INFILTRATION; PERINEURAL at 11:58

## 2021-07-27 RX ADMIN — BUPIVACAINE HYDROCHLORIDE 1 ML: 2.5 INJECTION, SOLUTION INFILTRATION; PERINEURAL at 11:58

## 2021-07-27 NOTE — PROGRESS NOTES
Assessment/Plan:  1  Primary osteoarthritis of both knees  Injection procedure prior authorization    Large joint arthrocentesis: bilateral knee     Patient Active Problem List   Diagnosis    Acute hip pain    Acute pain of both shoulders    Allergic rhinitis    Benign essential hypertension    Closed fracture of proximal end of right humerus with routine healing    Fracture of right distal radius    Hypothyroidism    Insomnia    Microscopic hematuria    Morbid obesity ([address] )    Obstructive sleep apnea    Other fatigue    Subacromial impingement of left shoulder    Subacromial impingement of right shoulder    Urticaria    Vitamin D deficiency    Type 2 diabetes mellitus with stage 3 chronic kidney disease, without long-term current use of insulin ([facility])    Primary osteoarthritis of both knees    Osteoarthritis of both knees    CKD (chronic kidney disease) stage 3, GFR 30-59 ml/min ([facility])    Anemia    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HCC)    Polymyalgia rheumatica ([facility])    Mixed hyperlipidemia    Chronic right-sided low back pain without sciatica       Discussion/Summary:    76 y o  female  with bilateral knee pain secondary to osteoarthritis     Patient was offered accepted received bilateral intra-articular cortisone injections today she tolerated this well   She will follow-up in 2 weeks for [place] injections into bilateral knees   She will continue to take over-the-counter analgesics as needed for pain   If he injections we perform over the next several weeks are not effective enough and relieving her pain, will recommend pain management referral for medical management for her arthritic pain     The assessment and plan were formulated by Dr Alexandra Smith and I assisted in carrying it out  Subjective:   Patient ID: Felicity Jordan is a 76 y o  female   HPI    Patient presents to the office for follow up of bilateral knee pain osteoarthritis   Since the last visit, the patient reports persistent pain in the bilateral knees  She has had cortisone injections in January this lasted for some months  She returns now resting repeat injections into the knee  She reports left knee is worse than right  She notes crepitus of range of motion  pain with weight-bearing activities  Better with rest   She takes Tylenol and Advil over-the-counter for pain relief     Denies any numbness tingling fevers chills  Patient denies any new injuries to the left knee or right knee since the last visit  The following portions of the patient's history were reviewed and updated as appropriate: allergies, current medications, past family history, past social history, past surgical history and problem list     Social History     Socioeconomic History    Marital status:      Spouse name: Not on file    Number of children: 11    Years of education: 12    Highest education level: Not on file   Occupational History    Occupation: RN   Tobacco Use    Smoking status: Former Smoker     Packs/day: 0 50     Quit date:      Years since quittin 5    Smokeless tobacco: Never Used    Tobacco comment: 40 years ago    Vaping Use    Vaping Use: Former   Substance and Sexual Activity    Alcohol use: Yes     Comment: rarely ; occasional     Drug use: No    Sexual activity: Not on file   Other Topics Concern    Not on file   Social History Narrative    Drinks coffee     Social Determinants of Health     Financial Resource Strain:     Difficulty of Paying Living Expenses:    Food Insecurity:     Worried About Running Out of Food in the Last Year:     [address] in the Last Year:    Transportation Needs:     Lack of Transportation (Medical):      Lack of Transportation (Non-Medical):    Physical Activity:     Days of Exercise per Week:     Minutes of Exercise per Session:    Stress:     Feeling of Stress :    Social Connections:     Frequency of Communication with Friends and Family:     Frequency of Social Gatherings with Friends and Family:     Attends Zoroastrianism Services:     Active Member of Clubs or Organizations:     Attends Club or Organization Meetings:     Marital Status:    Intimate Partner Violence:     Fear of Current or Ex-Partner:     Emotionally Abused:     Physically Abused:     Sexually Abused:      Past Medical History:   Diagnosis Date    Anemia     Arthritis     Hypertension     [facility]ym[facility]a [facility] [address]     Shoulder impingement syndrome     last assessed 12/27/16     Past Surgical History:   Procedure Laterality Date    ABDOMINAL SURGERY      last assessed 11/1/16    OOPHORECTOMY Right     OTHER SURGICAL HISTORY Right 2017    shoulder    TOTAL ABDOMINAL HYSTERECTOMY  30 year ago    WRIST FRACTURE SURGERY Right 2017     Allergies   Allergen Reactions    Hydromorphone Dizziness and Other (See Comments)     Severe vertigo     Tdap [Tetanus-Diphth-Acell Pertussis] Swelling    Propoxyphene Dizziness     Darvon also known as     Current Outpatient Medications on File Prior to Visit   Medication Sig Dispense Refill    Acetaminophen 500 MG Take 1,000 mg by mouth daily       BETIMOL 0 5 % ophthalmic solution INSTILL 1 DROP INTO THE AFFECTED EYE(S) EVERY DAY      hydrochlorothiazide (HYDRODIURIL) 25 mg tablet Take 1 tablet (25 mg total) by mouth daily 90 tablet 1    ibuprofen (ADVIL) 200 mg tablet Take 200 mg by mouth every 6 (six) hours as needed for mild pain      latanoprost (XALATAN) 0 005 % ophthalmic solution INSTILL 1 DROP IN EACH EYE AT BEDTIME  5    levothyroxine 100 mcg tablet Take 1 tablet (100 mcg total) by mouth daily 90 tablet 1    lisinopril (ZESTRIL) 20 mg tablet Take 1 tablet (20 mg total) by mouth daily 90 tablet 1    metoprolol succinate (TOPROL-XL) 50 mg 24 hr tablet Take 1 tablet (50 mg total) by mouth daily 90 tablet 1    Multiple Vitamins-Minerals (OCUVITE ADULT 50+ PO) Take 1 tablet by mouth daily      temazepam (RESTORIL) 15 mg capsule Take 1 capsule (15 mg total) by mouth daily at bedtime as needed for sleep 30 capsule 0    timolol (TIMOPTIC) 0 5 % ophthalmic solution       tiZANidine (ZANAFLEX) 4 mg tablet Take 1 tablet (4 mg total) by mouth 2 (two) times a day as needed for muscle spasms 30 tablet 0    Diclofenac Sodium (VOLTAREN) 1 % Apply 2 g topically 3 (three) times a day as needed (Pain) (Patient not taking: Reported on 6/10/2021) 1 Tube 1     No current facility-administered medications on file prior to visit  Review of Systems  See HPI    Objective:    Vitals:    07/27/21 1131   BP: 129/72   Pulse: 76   Resp: 17       Physical Exam  Constitutional:       Appearance: She is well-developed  HENT:      Head: Normocephalic and atraumatic  Eyes:      General: No scleral icterus  Conjunctiva/sclera: Conjunctivae normal    Cardiovascular:      Comments: No discernible arrhthymias  Pulmonary:      Effort: Pulmonary effort is normal  No respiratory distress  Breath sounds: No stridor  Abdominal:      General: There is no distension  Palpations: Abdomen is soft  Musculoskeletal:      Cervical back: Neck supple  Right knee: No effusion  Left knee: No effusion  Skin:     General: Skin is warm and dry  Findings: No erythema  Neurological:      Mental Status: She is alert and oriented to person, place, and time  Psychiatric:         Behavior: Behavior normal          Right Knee Exam     Muscle Strength   The patient has normal right knee strength  Tenderness   The patient is experiencing tenderness in the medial joint line      Range of Motion   Extension:  -5 abnormal   Flexion: normal     Tests   Varus: negative Valgus: negative    Other   Erythema: absent  Scars: absent  Sensation: normal  Pulse: present  Swelling: none  Effusion: no effusion present    Comments:  No ecchymosis or deformity  Crepitus with range of motion       Left Knee Exam     Muscle Strength   The patient has normal left knee strength  Tenderness   The patient is experiencing tenderness in the medial joint line  Range of Motion   Extension:  -5 abnormal   Flexion: normal     Tests   Varus: negative Valgus: negative    Other   Erythema: absent  Scars: absent  Sensation: normal  Pulse: present  Swelling: none  Effusion: no effusion present    Comments:  No ecchymosis or deformity  Crepitus the range of motion            I have personally reviewed pertinent films in PACS  Large joint arthrocentesis: bilateral knee  Universal Protocol:  Consent given by: patient  Time out: Immediately prior to procedure a "time out" was called to verify the correct patient, procedure, equipment, support staff and site/side marked as required  Site marked: the operative site was marked  Supporting Documentation  Indications: pain   Procedure Details  Location: knee - bilateral knee  Preparation: Patient was prepped and draped in the usual sterile fashion  Needle size: 22 G  Approach: anterolateral    Medications (Right): 1 mL lidocaine 1 %; 12 mg betamethasone acetate-betamethasone sodium phosphate 6 (3-3) mg/mL; 1 mL bupivacaine 0 25 %Medications (Left): 1 mL lidocaine 1 %; 12 mg betamethasone acetate-betamethasone sodium phosphate 6 (3-3) mg/mL; 1 mL bupivacaine 0 25 %   Patient tolerance: patient tolerated the procedure well with no immediate complications  Dressing:  Sterile dressing applied            Portions of the record may have been created with voice recognition software  Occasional wrong word or "sound a like" substitutions may have occurred due to the inherent limitations of voice recognition software  Read the chart carefully and recognize, using context, where substitutions have occurred

## 2021-08-19 ENCOUNTER — PROCEDURE VISIT (OUTPATIENT)
Dept: OBGYN CLINIC | Facility: CLINIC | Age: 76
End: 2021-08-19
Payer: MEDICARE

## 2021-08-19 VITALS — WEIGHT: 237 LBS | HEIGHT: 62 IN | BODY MASS INDEX: 43.61 KG/M2

## 2021-08-19 DIAGNOSIS — M17.0 PRIMARY OSTEOARTHRITIS OF BOTH KNEES: Primary | ICD-10-CM

## 2021-08-19 PROCEDURE — 20610 DRAIN/INJ JOINT/BURSA W/O US: CPT | Performed by: ORTHOPAEDIC SURGERY

## 2021-08-19 RX ORDER — HYALURONATE SODIUM 10 MG/ML
20 SYRINGE (ML) INTRAARTICULAR
Status: COMPLETED | OUTPATIENT
Start: 2021-08-19 | End: 2021-08-19

## 2021-08-19 RX ADMIN — Medication 20 MG: at 12:03

## 2021-08-19 NOTE — PROGRESS NOTES
Large joint arthrocentesis: bilateral knee  Universal Protocol:  Risks and benefits: risks, benefits and alternatives were discussed  Consent given by: patient  Time out: Immediately prior to procedure a "time out" was called to verify the correct patient, procedure, equipment, support staff and site/side marked as required  Timeout called at: 8/19/2021 12:00 PM   Patient understanding: patient states understanding of the procedure being performed  Required items: required blood products, implants, devices, and special equipment available  Patient identity confirmed: verbally with patient    Supporting Documentation  Indications: pain   Procedure Details  Location: knee - bilateral knee  Preparation: Patient was prepped and draped in the usual sterile fashion  Needle size: 22 G  Approach: anterolateral    Medications (Right): 20 mg Sodium Hyaluronate 20 MG/2MLMedications (Left): 20 mg Sodium Hyaluronate 20 MG/2ML   Patient tolerance: patient tolerated the procedure well with no immediate complications  Dressing:  Sterile dressing applied          Patient presenting for first Euflexxa injection  Doing well, no change in her history  Reports approximately 3 days of relief from previous bilateral CSI  Follow up next week for second Euflexxa injection

## 2021-08-23 ENCOUNTER — OFFICE VISIT (OUTPATIENT)
Dept: OBGYN CLINIC | Facility: CLINIC | Age: 76
End: 2021-08-23
Payer: MEDICARE

## 2021-08-23 VITALS
WEIGHT: 237 LBS | HEIGHT: 62 IN | BODY MASS INDEX: 43.61 KG/M2 | DIASTOLIC BLOOD PRESSURE: 82 MMHG | HEART RATE: 79 BPM | SYSTOLIC BLOOD PRESSURE: 137 MMHG

## 2021-08-23 DIAGNOSIS — M54.50 CHRONIC RIGHT-SIDED LOW BACK PAIN WITHOUT SCIATICA: Primary | ICD-10-CM

## 2021-08-23 DIAGNOSIS — G89.29 CHRONIC RIGHT-SIDED LOW BACK PAIN WITHOUT SCIATICA: Primary | ICD-10-CM

## 2021-08-23 PROCEDURE — 99214 OFFICE O/P EST MOD 30 MIN: CPT | Performed by: PHYSICAL MEDICINE & REHABILITATION

## 2021-08-23 RX ORDER — TIZANIDINE 4 MG/1
4 TABLET ORAL 2 TIMES DAILY PRN
Qty: 30 TABLET | Refills: 0 | Status: SHIPPED | OUTPATIENT
Start: 2021-08-23 | End: 2022-03-03

## 2021-08-23 RX ORDER — METHYLPREDNISOLONE 4 MG/1
TABLET ORAL
Qty: 1 EACH | Refills: 0 | Status: SHIPPED | OUTPATIENT
Start: 2021-08-23 | End: 2022-03-03

## 2021-08-23 NOTE — PROGRESS NOTES
1  Chronic right-sided low back pain without sciatica  methylPREDNISolone 4 MG tablet therapy pack    MRI lumbar spine wo contrast    tiZANidine (ZANAFLEX) 4 mg tablet     Orders Placed This Encounter   Procedures    MRI lumbar spine wo contrast        Impression:  Patient presents in follow-up with low-back pain that is predominantly due to right piriformis syndrome/myofascial spasticity and postural/core instability  There are no concerning neurological deficits      Patient has used methocarbamol, Tylenol, diclofenac gel and methyl-salicylate patches, tizanidine and ibuprofen  She has been through over 6 weeks of physical therapy and home exercise program     She continues to have right radicular lumbar pain  At this juncture, we will obtain an MRI of her lumbar spine  I have prescribed her a medrol dose pack along with a refill of her tizanidine  She should avoid taking it with restoril  RTC after MRI of lumbar spine  Imaging Studies (I personally reviewed images in PACS and report):  Lumbar spine x-rays that are most recent to this encounter were reviewed  These images show diffuse disc space narrowing in the lower lumbar spine  There is grade 1 anterolisthesis of L4 on L5  There is lower lumbar facet hypertrophy  No acute osseous abnormalities  Return for F/U after MRI  Patient is in agreement with the above plan  HPI:  Marely Hinds is a 68 y o  female  who presents in follow up  Here for   Chief Complaint   Patient presents with    Lower Back - Follow-up, Pain     Onset: Chronic  Trajectory of symptoms: She is doing well  However, after running out of her muscle relaxant, her pain is starting to come back  It is not as bad as it was but it is there  Review of Systems   Constitutional: Positive for activity change  Negative for fever  HENT: Negative for trouble swallowing  Eyes: Negative for visual disturbance  Respiratory: Negative for shortness of breath  Cardiovascular: Negative for chest pain  Gastrointestinal: Negative for abdominal pain  Denies bowel incontinence  Endocrine: Negative for polydipsia  Genitourinary:        Denies urinary incontinence  Musculoskeletal: Positive for back pain  Negative for gait problem  Skin: Negative for rash  Allergic/Immunologic: Negative for immunocompromised state  Neurological: Negative for weakness and numbness  Denies saddle anesthesia  Hematological: Does not bruise/bleed easily  Psychiatric/Behavioral: Negative for confusion         Following history reviewed and updated:  Past Medical History:   Diagnosis Date    Anemia     Arthritis     Hypertension     [address] [address]a [facility] [address]     Shoulder impingement syndrome     last assessed 16     Past Surgical History:   Procedure Laterality Date    ABDOMINAL SURGERY      last assessed 16    OOPHORECTOMY Right     OTHER SURGICAL HISTORY Right 2017    shoulder    TOTAL ABDOMINAL HYSTERECTOMY  30 year ago    WRIST FRACTURE SURGERY Right 2017     Social History   Social History     Substance and Sexual Activity   Alcohol Use Yes    Comment: rarely ; occasional      Social History     Substance and Sexual Activity   Drug Use No     Social History     Tobacco Use   Smoking Status Former Smoker    Packs/day: 0 50    Quit date: 0    Years since quittin 6   Smokeless Tobacco Never Used   Tobacco Comment    40 years ago      Family History   Problem Relation Age of Onset   Cheng Alexdebby Hypertension Mother    Cheng Alexdebby Glaucoma Mother     Stroke Mother     Gout Father     COPD Father     Heart disease Father     Kidney failure Sister         chronic    Gout Sister     Multiple sclerosis Sister     Hypertension Sister     Alcohol abuse Brother     Cancer Brother     Gout Brother     Coronary artery disease Brother         [id number]  (2019)    No Known Problems Daughter     No Known Problems Maternal Grandmother     No Known Problems Maternal Grandfather     No Known Problems Paternal Grandmother     No Known Problems Paternal Grandfather     Lung cancer Paternal Uncle     No Known Problems Sister     No Known Problems Daughter     No Known Problems Son     No Known Problems Son     No Known Problems Son      Allergies   Allergen Reactions    Hydromorphone Dizziness and Other (See Comments)     Severe vertigo     Tdap [Tetanus-Diphth-Acell Pertussis] Swelling    Propoxyphene Dizziness     Darvon also known as        Constitutional:  /82   Pulse 79   Ht 5' 2" (1 575 m)   Wt 108 kg (237 lb)   BMI 43 35 kg/m²    General: NAD  Eyes: Clear sclerae  ENT: No inflammation, lesion, or mass of lips  No tracheal deviation  Musculoskeletal: As mentioned below  Integumentary: No visible rashes or skin lesions  Pulmonary/Chest: Effort normal  No respiratory distress  Neuro: CN's grossly intact, FINN  Psych: Normal affect and judgement  Vascular: WWP  Back Exam     Tenderness   The patient is experiencing tenderness in the lumbar  Range of Motion   Extension: normal   Flexion: abnormal     Muscle Strength   The patient has normal back strength      Reflexes   Patellar: 3/4  Achilles: 2/4    Other   Sensation: normal  Erythema: no back redness  Scars: absent             Procedures

## 2021-08-26 ENCOUNTER — PROCEDURE VISIT (OUTPATIENT)
Dept: OBGYN CLINIC | Facility: CLINIC | Age: 76
End: 2021-08-26
Payer: MEDICARE

## 2021-08-26 VITALS — HEIGHT: 62 IN | WEIGHT: 237 LBS | BODY MASS INDEX: 43.61 KG/M2

## 2021-08-26 DIAGNOSIS — M17.0 PRIMARY OSTEOARTHRITIS OF BOTH KNEES: Primary | ICD-10-CM

## 2021-08-26 PROCEDURE — 20610 DRAIN/INJ JOINT/BURSA W/O US: CPT | Performed by: ORTHOPAEDIC SURGERY

## 2021-08-26 RX ORDER — HYALURONATE SODIUM 10 MG/ML
20 SYRINGE (ML) INTRAARTICULAR
Status: COMPLETED | OUTPATIENT
Start: 2021-08-26 | End: 2021-08-26

## 2021-08-26 RX ADMIN — Medication 20 MG: at 11:04

## 2021-08-26 NOTE — PROGRESS NOTES
1  Primary osteoarthritis of both knees       Patient is here for her 2nd injection of Euflexxa into the bilateral knee  Patient reports improvement  Physical exam of the knee shows no effusion no ecchymosis  All other organ systems normal    Large joint arthrocentesis: bilateral knee  Universal Protocol:  Consent: Verbal consent obtained  Written consent not obtained  Risks and benefits: risks, benefits and alternatives were discussed  Consent given by: patient  Time out: Immediately prior to procedure a "time out" was called to verify the correct patient, procedure, equipment, support staff and site/side marked as required  Patient understanding: patient states understanding of the procedure being performed  Site marked: the operative site was marked  Patient identity confirmed: verbally with patient    Supporting Documentation  Indications: pain and joint swelling   Procedure Details  Location: knee - bilateral knee  Preparation: Patient was prepped and draped in the usual sterile fashion  Needle size: 22 G    Medications (Right): 20 mg Sodium Hyaluronate 20 MG/2MLMedications (Left): 20 mg Sodium Hyaluronate 20 MG/2ML   Patient tolerance: patient tolerated the procedure well with no immediate complications  Dressing:  Sterile dressing applied          Patient tolerated procedure follow up 1 week

## 2021-09-02 ENCOUNTER — PROCEDURE VISIT (OUTPATIENT)
Dept: OBGYN CLINIC | Facility: CLINIC | Age: 76
End: 2021-09-02
Payer: MEDICARE

## 2021-09-02 VITALS — HEIGHT: 62 IN | WEIGHT: 237 LBS | BODY MASS INDEX: 43.61 KG/M2

## 2021-09-02 DIAGNOSIS — M17.0 PRIMARY OSTEOARTHRITIS OF BOTH KNEES: Primary | ICD-10-CM

## 2021-09-02 PROCEDURE — 20610 DRAIN/INJ JOINT/BURSA W/O US: CPT | Performed by: ORTHOPAEDIC SURGERY

## 2021-09-02 RX ORDER — HYALURONATE SODIUM 10 MG/ML
20 SYRINGE (ML) INTRAARTICULAR
Status: COMPLETED | OUTPATIENT
Start: 2021-09-02 | End: 2021-09-02

## 2021-09-02 RX ADMIN — Medication 20 MG: at 11:45

## 2021-09-02 NOTE — PROGRESS NOTES
1  Primary osteoarthritis of both knees       Patient is here for her  3rd injection of  Euflexxa into the bilateral knee  Patient reports improvement  All organ systems normal  Physical exam of the knee shows no effusion no ecchymosis  Large joint arthrocentesis: L knee  Universal Protocol:  Consent given by: patient  Time out: Immediately prior to procedure a "time out" was called to verify the correct patient, procedure, equipment, support staff and site/side marked as required  Supporting Documentation  Indications: pain   Procedure Details  Location: knee - L knee  Needle size: 22 G  Ultrasound guidance: no  Approach: anterolateral  Medications administered: 20 mg Sodium Hyaluronate 20 MG/2ML    Patient tolerance: patient tolerated the procedure well with no immediate complications    Large joint arthrocentesis: R knee  Universal Protocol:  Consent given by: patient    Supporting Documentation  Indications: pain   Procedure Details  Location: knee - R knee  Needle size: 22 G  Ultrasound guidance: no  Approach: anterolateral  Medications administered: 20 mg Sodium Hyaluronate 20 MG/2ML    Patient tolerance: patient tolerated the procedure well with no immediate complications          Patient tolerated procedure follow up  P r n

## 2021-09-08 ENCOUNTER — LAB (OUTPATIENT)
Dept: LAB | Facility: CLINIC | Age: 76
End: 2021-09-08
Payer: MEDICARE

## 2021-09-08 DIAGNOSIS — E11.22 TYPE 2 DIABETES MELLITUS WITH STAGE 3 CHRONIC KIDNEY DISEASE, WITHOUT LONG-TERM CURRENT USE OF INSULIN, UNSPECIFIED WHETHER STAGE 3A OR 3B CKD (HCC): ICD-10-CM

## 2021-09-08 DIAGNOSIS — N18.30 STAGE 3 CHRONIC KIDNEY DISEASE, UNSPECIFIED WHETHER STAGE 3A OR 3B CKD (HCC): ICD-10-CM

## 2021-09-08 DIAGNOSIS — N18.30 TYPE 2 DIABETES MELLITUS WITH STAGE 3 CHRONIC KIDNEY DISEASE, WITHOUT LONG-TERM CURRENT USE OF INSULIN, UNSPECIFIED WHETHER STAGE 3A OR 3B CKD (HCC): ICD-10-CM

## 2021-09-08 DIAGNOSIS — E03.9 ACQUIRED HYPOTHYROIDISM: ICD-10-CM

## 2021-09-08 DIAGNOSIS — I10 BENIGN ESSENTIAL HYPERTENSION: ICD-10-CM

## 2021-09-08 DIAGNOSIS — E78.2 MIXED HYPERLIPIDEMIA: ICD-10-CM

## 2021-09-08 LAB
ALBUMIN SERPL BCP-MCNC: 3.6 G/DL (ref 3.5–5)
ALP SERPL-CCNC: 75 U/L (ref 46–116)
ALT SERPL W P-5'-P-CCNC: 26 U/L (ref 12–78)
ANION GAP SERPL CALCULATED.3IONS-SCNC: 7 MMOL/L (ref 4–13)
AST SERPL W P-5'-P-CCNC: 14 U/L (ref 5–45)
BILIRUB SERPL-MCNC: 0.4 MG/DL (ref 0.2–1)
BUN SERPL-MCNC: 31 MG/DL (ref 5–25)
CALCIUM SERPL-MCNC: 9.3 MG/DL (ref 8.3–10.1)
CHLORIDE SERPL-SCNC: 104 MMOL/L (ref 100–108)
CHOLEST SERPL-MCNC: 216 MG/DL (ref 50–200)
CO2 SERPL-SCNC: 27 MMOL/L (ref 21–32)
CREAT SERPL-MCNC: 1.27 MG/DL (ref 0.6–1.3)
CREAT UR-MCNC: 308 MG/DL
EST. AVERAGE GLUCOSE BLD GHB EST-MCNC: 160 MG/DL
GFR SERPL CREATININE-BSD FRML MDRD: 41 ML/MIN/1.73SQ M
GLUCOSE P FAST SERPL-MCNC: 126 MG/DL (ref 65–99)
HBA1C MFR BLD: 7.2 %
HDLC SERPL-MCNC: 43 MG/DL
LDLC SERPL CALC-MCNC: 130 MG/DL (ref 0–100)
MICROALBUMIN UR-MCNC: 17.6 MG/L (ref 0–20)
MICROALBUMIN/CREAT 24H UR: 6 MG/G CREATININE (ref 0–30)
NONHDLC SERPL-MCNC: 173 MG/DL
POTASSIUM SERPL-SCNC: 3.7 MMOL/L (ref 3.5–5.3)
PROT SERPL-MCNC: 7.4 G/DL (ref 6.4–8.2)
SODIUM SERPL-SCNC: 138 MMOL/L (ref 136–145)
TRIGL SERPL-MCNC: 217 MG/DL
TSH SERPL DL<=0.05 MIU/L-ACNC: 1.63 UIU/ML (ref 0.36–3.74)

## 2021-09-08 PROCEDURE — 82570 ASSAY OF URINE CREATININE: CPT

## 2021-09-08 PROCEDURE — 84443 ASSAY THYROID STIM HORMONE: CPT

## 2021-09-08 PROCEDURE — 80061 LIPID PANEL: CPT

## 2021-09-08 PROCEDURE — 36415 COLL VENOUS BLD VENIPUNCTURE: CPT

## 2021-09-08 PROCEDURE — 80053 COMPREHEN METABOLIC PANEL: CPT

## 2021-09-08 PROCEDURE — 82043 UR ALBUMIN QUANTITATIVE: CPT

## 2021-09-08 PROCEDURE — 83036 HEMOGLOBIN GLYCOSYLATED A1C: CPT

## 2021-09-09 NOTE — RESULT ENCOUNTER NOTE
Please inform pt that labs are stable  Continue current medications and continue to engage in healthy lifestyle (diet, exercise)  Thanks!   Brady Gale, DO

## 2021-09-24 ENCOUNTER — OFFICE VISIT (OUTPATIENT)
Dept: FAMILY MEDICINE CLINIC | Facility: OTHER | Age: 76
End: 2021-09-24
Payer: MEDICARE

## 2021-09-24 VITALS
DIASTOLIC BLOOD PRESSURE: 76 MMHG | SYSTOLIC BLOOD PRESSURE: 132 MMHG | TEMPERATURE: 98 F | BODY MASS INDEX: 42.68 KG/M2 | RESPIRATION RATE: 18 BRPM | HEART RATE: 78 BPM | HEIGHT: 62 IN | OXYGEN SATURATION: 98 % | WEIGHT: 231.9 LBS

## 2021-09-24 DIAGNOSIS — E11.22 TYPE 2 DIABETES MELLITUS WITH STAGE 3 CHRONIC KIDNEY DISEASE, WITHOUT LONG-TERM CURRENT USE OF INSULIN, UNSPECIFIED WHETHER STAGE 3A OR 3B CKD (HCC): ICD-10-CM

## 2021-09-24 DIAGNOSIS — G47.00 INSOMNIA, UNSPECIFIED TYPE: ICD-10-CM

## 2021-09-24 DIAGNOSIS — N18.30 TYPE 2 DIABETES MELLITUS WITH STAGE 3 CHRONIC KIDNEY DISEASE, WITHOUT LONG-TERM CURRENT USE OF INSULIN, UNSPECIFIED WHETHER STAGE 3A OR 3B CKD (HCC): ICD-10-CM

## 2021-09-24 DIAGNOSIS — I10 BENIGN ESSENTIAL HYPERTENSION: Primary | ICD-10-CM

## 2021-09-24 PROCEDURE — 99213 OFFICE O/P EST LOW 20 MIN: CPT | Performed by: FAMILY MEDICINE

## 2021-09-24 RX ORDER — TEMAZEPAM 15 MG/1
15 CAPSULE ORAL
Qty: 30 CAPSULE | Refills: 0 | Status: SHIPPED | OUTPATIENT
Start: 2021-09-24 | End: 2022-03-04

## 2021-09-24 NOTE — PROGRESS NOTES
Assessment/Plan:     Diagnoses and all orders for this visit:    Benign essential hypertension  -Patient's BP well controlled at132/76 today  Recommend she monitor BP at home a few times a week   -Continue on Lisinopril 20mg once daily and Toprol-XL 50mg once daily  -Continue on DASH diet    Type 2 diabetes mellitus with stage 3 chronic kidney disease, without long-term current use of insulin, unspecified whether stage 3a or 3b CKD (HCC)  -Last hemoglobin A1C 7 2   -Advised patient to stick to diabetic diet   -Will check POCT hemoglobin A1C at next visit     Insomnia, unspecified type  -     temazepam (RESTORIL) 15 mg capsule; Take 1 capsule (15 mg total) by mouth daily at bedtime as needed for sleep      Return in about 3 months (around 12/24/2021) for Annual wellness visit and recheck Hypertension/T2DM  The patient indicates understanding of these issues and agrees with the plan  Subjective:      Patient ID: Anamaria Sethi is a 68 y o  female  HPI   Patient presents for follow up of Hypertension  She states she does not regularly monitor BP at home  Patient reports daily compliance with Lisinopril and Metoprolol  She denies any unwanted side effects of these medications  Does not exercise  Patient reports she has been limited sodium in her diet to under 2g daily  Patient also presents for follow up of Diabetes which is diet controlled  Patient's hemoglobin A1C 9/8/21 was 7 2 down from 7 5 in June of 2021  Patient reports that she has been limiting refined carbohydrates/concentrated sweets in her diet  She denies any visual disturbances and states she is up to date with her eye exam which was done earlier this summer in [place] by Dr José Gore  She denies any numbness or tingling of her extremities  Patient has not been on a Statin despite her significantly elevated ASCVD risk score of 44% and declines at this time stating numerous friends and family members have had bad side effects on Statins       The following portions of the patient's history were reviewed and updated as appropriate: allergies, current medications, past family history, past medical history, past social history, past surgical history and problem list     Review of Systems   Constitutional: Negative for appetite change, chills, fever and unexpected weight change  HENT: Negative for ear pain and sore throat  Eyes: Negative for pain and visual disturbance  Respiratory: Negative for cough and shortness of breath  Cardiovascular: Negative for chest pain, palpitations and leg swelling  Gastrointestinal: Negative for abdominal pain and vomiting  Genitourinary: Negative for dysuria and hematuria  Skin: Negative for color change and rash  Neurological: Negative for dizziness, seizures, syncope, light-headedness and headaches  All other systems reviewed and are negative  Objective:  /76   Pulse 78   Temp 98 °F (36 7 °C)   Resp 18   Ht 5' 2" (1 575 m)   Wt 105 kg (231 lb 14 4 oz)   SpO2 98%   BMI 42 42 kg/m²        Physical Exam  Vitals reviewed  Constitutional:       General: She is not in acute distress  Appearance: Normal appearance  She is obese  She is not ill-appearing, toxic-appearing or diaphoretic  HENT:      Head: Normocephalic and atraumatic  Right Ear: External ear normal       Left Ear: External ear normal    Eyes:      General: No scleral icterus  Right eye: No discharge  Left eye: No discharge  Extraocular Movements: Extraocular movements intact  Conjunctiva/sclera: Conjunctivae normal    Cardiovascular:      Rate and Rhythm: Normal rate and regular rhythm  Pulses: Normal pulses  Heart sounds: Normal heart sounds  No murmur heard  No friction rub  No gallop  Pulmonary:      Effort: Pulmonary effort is normal  No respiratory distress  Breath sounds: Normal breath sounds  No wheezing, rhonchi or rales     Musculoskeletal:         General: No swelling, tenderness, deformity or signs of injury  Cervical back: Normal range of motion and neck supple  Right lower leg: No edema  Left lower leg: No edema  Skin:     General: Skin is warm and dry  Capillary Refill: Capillary refill takes less than 2 seconds  Neurological:      General: No focal deficit present  Mental Status: She is alert and oriented to person, place, and time        Coordination: Coordination normal       Gait: Gait normal    Psychiatric:         Mood and Affect: Mood normal          Behavior: Behavior normal

## 2021-10-19 ENCOUNTER — HOSPITAL ENCOUNTER (OUTPATIENT)
Dept: MRI IMAGING | Facility: HOSPITAL | Age: 76
Discharge: HOME | End: 2021-10-19
Attending: PHYSICAL MEDICINE & REHABILITATION
Payer: MEDICARE

## 2021-10-19 DIAGNOSIS — G89.29 CHRONIC RIGHT-SIDED LOW BACK PAIN WITHOUT SCIATICA: ICD-10-CM

## 2021-10-19 DIAGNOSIS — M54.50 CHRONIC RIGHT-SIDED LOW BACK PAIN WITHOUT SCIATICA: ICD-10-CM

## 2021-10-19 PROCEDURE — G1004 CDSM NDSC: HCPCS

## 2021-10-19 PROCEDURE — 72148 MRI LUMBAR SPINE W/O DYE: CPT

## 2021-10-26 ENCOUNTER — OFFICE VISIT (OUTPATIENT)
Dept: OBGYN CLINIC | Facility: CLINIC | Age: 76
End: 2021-10-26
Payer: MEDICARE

## 2021-10-26 VITALS — HEIGHT: 62 IN | BODY MASS INDEX: 41.41 KG/M2 | WEIGHT: 225 LBS

## 2021-10-26 DIAGNOSIS — G89.29 CHRONIC RIGHT-SIDED LOW BACK PAIN WITHOUT SCIATICA: ICD-10-CM

## 2021-10-26 DIAGNOSIS — M54.50 CHRONIC RIGHT-SIDED LOW BACK PAIN WITHOUT SCIATICA: ICD-10-CM

## 2021-10-26 DIAGNOSIS — M47.816 FACET HYPERTROPHY OF LUMBAR REGION: Primary | ICD-10-CM

## 2021-10-26 PROCEDURE — 99213 OFFICE O/P EST LOW 20 MIN: CPT | Performed by: PHYSICAL MEDICINE & REHABILITATION

## 2021-11-29 ENCOUNTER — IMMUNIZATIONS (OUTPATIENT)
Dept: FAMILY MEDICINE CLINIC | Facility: OTHER | Age: 76
End: 2021-11-29
Payer: MEDICARE

## 2021-11-29 DIAGNOSIS — Z23 ENCOUNTER FOR IMMUNIZATION: Primary | ICD-10-CM

## 2021-11-29 PROCEDURE — G0008 ADMIN INFLUENZA VIRUS VAC: HCPCS

## 2021-11-29 PROCEDURE — 90662 IIV NO PRSV INCREASED AG IM: CPT

## 2021-12-22 ENCOUNTER — OFFICE VISIT (OUTPATIENT)
Dept: OBGYN CLINIC | Facility: CLINIC | Age: 76
End: 2021-12-22
Payer: MEDICARE

## 2021-12-22 VITALS — HEIGHT: 62 IN | WEIGHT: 225 LBS | BODY MASS INDEX: 41.41 KG/M2

## 2021-12-22 DIAGNOSIS — M17.0 PRIMARY OSTEOARTHRITIS OF BOTH KNEES: Primary | ICD-10-CM

## 2021-12-22 PROCEDURE — 99214 OFFICE O/P EST MOD 30 MIN: CPT | Performed by: ORTHOPAEDIC SURGERY

## 2021-12-22 PROCEDURE — 20610 DRAIN/INJ JOINT/BURSA W/O US: CPT | Performed by: ORTHOPAEDIC SURGERY

## 2021-12-22 RX ORDER — BUPIVACAINE HYDROCHLORIDE 2.5 MG/ML
1 INJECTION, SOLUTION INFILTRATION; PERINEURAL
Status: COMPLETED | OUTPATIENT
Start: 2021-12-22 | End: 2021-12-22

## 2021-12-22 RX ORDER — LIDOCAINE HYDROCHLORIDE 10 MG/ML
1 INJECTION, SOLUTION INFILTRATION; PERINEURAL
Status: COMPLETED | OUTPATIENT
Start: 2021-12-22 | End: 2021-12-22

## 2021-12-22 RX ORDER — BETAMETHASONE SODIUM PHOSPHATE AND BETAMETHASONE ACETATE 3; 3 MG/ML; MG/ML
12 INJECTION, SUSPENSION INTRA-ARTICULAR; INTRALESIONAL; INTRAMUSCULAR; SOFT TISSUE
Status: COMPLETED | OUTPATIENT
Start: 2021-12-22 | End: 2021-12-22

## 2021-12-22 RX ADMIN — BUPIVACAINE HYDROCHLORIDE 1 ML: 2.5 INJECTION, SOLUTION INFILTRATION; PERINEURAL at 14:31

## 2021-12-22 RX ADMIN — BETAMETHASONE SODIUM PHOSPHATE AND BETAMETHASONE ACETATE 12 MG: 3; 3 INJECTION, SUSPENSION INTRA-ARTICULAR; INTRALESIONAL; INTRAMUSCULAR; SOFT TISSUE at 14:31

## 2021-12-22 RX ADMIN — LIDOCAINE HYDROCHLORIDE 1 ML: 10 INJECTION, SOLUTION INFILTRATION; PERINEURAL at 14:31

## 2022-01-03 ENCOUNTER — TELEPHONE (OUTPATIENT)
Dept: LAB | Facility: HOSPITAL | Age: 77
End: 2022-01-03

## 2022-01-03 ENCOUNTER — TELEPHONE (OUTPATIENT)
Dept: FAMILY MEDICINE CLINIC | Facility: OTHER | Age: 77
End: 2022-01-03

## 2022-01-03 NOTE — TELEPHONE ENCOUNTER
Daughter Burke Gregorio called and left a message she would like the dr to call her regarding patient ~ said patient has not been feeling well, she wont go to the ER, wont let family call the squad, they feel she is dehydrated and they would like to know if there is a way to get a nurse to come to the house and do home IV for the patient?      Daughter said her and the sister are taking care of patient but dont know what else to do    They would like guidance     And please call daughter at [phone]    Thank you

## 2022-01-03 NOTE — TELEPHONE ENCOUNTER
Spoke with daughter Mark Beasley and I gave her the number for the lab now that comes to the house to draw them  Daughter said she was going to call and set that up and did offer them a follow up appointment in the office for 1/4/2022, but unsure if they will be able to get her out of the house since she is on the 2nd floor of the [facility]  Daughter really wants an order to just give mom (pt) a home infusion of saline because   "all she needs is that" per daughter and they don't want to take the patient to the ER since we are in a pandemic ~ but explained we need the blood work done so we know how to treat her       Daughter will call us to cancel if they don't want to keep the appointment for tomorrow

## 2022-01-03 NOTE — TELEPHONE ENCOUNTER
Please advise her that Dr Eleonora Merchant is out today and will return tomorrow  In the mean time have daughter get her evaluated in person  If they are not doing so for any reason then I can offer to do labs to evaluate her with CBC metabolic panel to check electrolytes and urinalysis to check for infection  I also ordered COVID test     ER precautions should be advised as well  Thanks!

## 2022-01-10 ENCOUNTER — TELEPHONE (OUTPATIENT)
Dept: LAB | Facility: HOSPITAL | Age: 77
End: 2022-01-10

## 2022-01-25 ENCOUNTER — IMMUNIZATIONS (OUTPATIENT)
Dept: FAMILY MEDICINE CLINIC | Facility: HOSPITAL | Age: 77
End: 2022-01-25

## 2022-01-25 DIAGNOSIS — Z23 ENCOUNTER FOR IMMUNIZATION: Primary | ICD-10-CM

## 2022-01-25 PROCEDURE — 0064A COVID-19 MODERNA VACC 0.25 ML BOOSTER: CPT

## 2022-01-25 PROCEDURE — 91306 COVID-19 MODERNA VACC 0.25 ML BOOSTER: CPT

## 2022-01-26 DIAGNOSIS — E03.9 HYPOTHYROIDISM, UNSPECIFIED TYPE: ICD-10-CM

## 2022-01-26 RX ORDER — LEVOTHYROXINE SODIUM 0.1 MG/1
100 TABLET ORAL DAILY
Qty: 90 TABLET | Refills: 1 | Status: SHIPPED | OUTPATIENT
Start: 2022-01-26 | End: 2022-07-11

## 2022-02-01 ENCOUNTER — APPOINTMENT (OUTPATIENT)
Dept: LAB | Facility: CLINIC | Age: 77
End: 2022-02-01
Payer: MEDICARE

## 2022-02-01 DIAGNOSIS — M25.511 RIGHT SHOULDER PAIN, UNSPECIFIED CHRONICITY: ICD-10-CM

## 2022-02-01 DIAGNOSIS — M35.3 POLYMYALGIA RHEUMATICA (HCC): ICD-10-CM

## 2022-02-01 DIAGNOSIS — M25.512 LEFT SHOULDER PAIN, UNSPECIFIED CHRONICITY: ICD-10-CM

## 2022-02-01 DIAGNOSIS — M10.9 GOUT, UNSPECIFIED CAUSE, UNSPECIFIED CHRONICITY, UNSPECIFIED SITE: ICD-10-CM

## 2022-02-01 DIAGNOSIS — R53.1 WEAKNESS GENERALIZED: ICD-10-CM

## 2022-02-01 LAB
ALBUMIN SERPL BCP-MCNC: 3.4 G/DL (ref 3.5–5)
ALP SERPL-CCNC: 80 U/L (ref 46–116)
ALT SERPL W P-5'-P-CCNC: 23 U/L (ref 12–78)
ANION GAP SERPL CALCULATED.3IONS-SCNC: 10 MMOL/L (ref 4–13)
AST SERPL W P-5'-P-CCNC: 10 U/L (ref 5–45)
BACTERIA UR QL AUTO: NORMAL /HPF
BASOPHILS # BLD AUTO: 0.07 THOUSANDS/ΜL (ref 0–0.1)
BASOPHILS NFR BLD AUTO: 1 % (ref 0–1)
BILIRUB SERPL-MCNC: 0.33 MG/DL (ref 0.2–1)
BILIRUB UR QL STRIP: NEGATIVE
BUN SERPL-MCNC: 28 MG/DL (ref 5–25)
CALCIUM ALBUM COR SERPL-MCNC: 10.2 MG/DL (ref 8.3–10.1)
CALCIUM SERPL-MCNC: 9.7 MG/DL (ref 8.3–10.1)
CHLORIDE SERPL-SCNC: 104 MMOL/L (ref 100–108)
CLARITY UR: CLEAR
CO2 SERPL-SCNC: 25 MMOL/L (ref 21–32)
COLOR UR: YELLOW
CREAT SERPL-MCNC: 1.29 MG/DL (ref 0.6–1.3)
CRP SERPL QL: 9.2 MG/L
CRYOGLOB RF SER-ACNC: ABNORMAL [IU]/ML
EOSINOPHIL # BLD AUTO: 0.13 THOUSAND/ΜL (ref 0–0.61)
EOSINOPHIL NFR BLD AUTO: 1 % (ref 0–6)
ERYTHROCYTE [DISTWIDTH] IN BLOOD BY AUTOMATED COUNT: 14.9 % (ref 11.6–15.1)
GFR SERPL CREATININE-BSD FRML MDRD: 40 ML/MIN/1.73SQ M
GLUCOSE P FAST SERPL-MCNC: 140 MG/DL (ref 65–99)
GLUCOSE UR STRIP-MCNC: NEGATIVE MG/DL
HCT VFR BLD AUTO: 36.6 % (ref 34.8–46.1)
HGB BLD-MCNC: 12 G/DL (ref 11.5–15.4)
HGB UR QL STRIP.AUTO: NEGATIVE
IMM GRANULOCYTES # BLD AUTO: 0.04 THOUSAND/UL (ref 0–0.2)
IMM GRANULOCYTES NFR BLD AUTO: 0 % (ref 0–2)
KETONES UR STRIP-MCNC: NEGATIVE MG/DL
LEUKOCYTE ESTERASE UR QL STRIP: NEGATIVE
LYMPHOCYTES # BLD AUTO: 2.09 THOUSANDS/ΜL (ref 0.6–4.47)
LYMPHOCYTES NFR BLD AUTO: 23 % (ref 14–44)
MCH RBC QN AUTO: 29.3 PG (ref 26.8–34.3)
MCHC RBC AUTO-ENTMCNC: 32.8 G/DL (ref 31.4–37.4)
MCV RBC AUTO: 90 FL (ref 82–98)
MONOCYTES # BLD AUTO: 0.9 THOUSAND/ΜL (ref 0.17–1.22)
MONOCYTES NFR BLD AUTO: 10 % (ref 4–12)
NEUTROPHILS # BLD AUTO: 5.77 THOUSANDS/ΜL (ref 1.85–7.62)
NEUTS SEG NFR BLD AUTO: 65 % (ref 43–75)
NITRITE UR QL STRIP: NEGATIVE
NON-SQ EPI CELLS URNS QL MICRO: NORMAL /HPF
NRBC BLD AUTO-RTO: 0 /100 WBCS
PH UR STRIP.AUTO: 6 [PH]
PLATELET # BLD AUTO: 293 THOUSANDS/UL (ref 149–390)
PMV BLD AUTO: 10.5 FL (ref 8.9–12.7)
POTASSIUM SERPL-SCNC: 4.1 MMOL/L (ref 3.5–5.3)
PROT SERPL-MCNC: 7.3 G/DL (ref 6.4–8.2)
PROT UR STRIP-MCNC: ABNORMAL MG/DL
RBC # BLD AUTO: 4.09 MILLION/UL (ref 3.81–5.12)
RBC #/AREA URNS AUTO: NORMAL /HPF
RHEUMATOID FACT SER QL LA: POSITIVE
SODIUM SERPL-SCNC: 139 MMOL/L (ref 136–145)
SP GR UR STRIP.AUTO: >=1.03 (ref 1–1.03)
URATE SERPL-MCNC: 10.5 MG/DL (ref 2–6.8)
UROBILINOGEN UR QL STRIP.AUTO: 0.2 E.U./DL
WBC # BLD AUTO: 9 THOUSAND/UL (ref 4.31–10.16)
WBC #/AREA URNS AUTO: NORMAL /HPF

## 2022-02-01 PROCEDURE — 84550 ASSAY OF BLOOD/URIC ACID: CPT

## 2022-02-01 PROCEDURE — 86430 RHEUMATOID FACTOR TEST QUAL: CPT

## 2022-02-01 PROCEDURE — 85025 COMPLETE CBC W/AUTO DIFF WBC: CPT

## 2022-02-01 PROCEDURE — 86200 CCP ANTIBODY: CPT

## 2022-02-01 PROCEDURE — 36415 COLL VENOUS BLD VENIPUNCTURE: CPT

## 2022-02-01 PROCEDURE — 86431 RHEUMATOID FACTOR QUANT: CPT

## 2022-02-01 PROCEDURE — 80053 COMPREHEN METABOLIC PANEL: CPT

## 2022-02-01 PROCEDURE — 86140 C-REACTIVE PROTEIN: CPT

## 2022-02-01 PROCEDURE — 81001 URINALYSIS AUTO W/SCOPE: CPT

## 2022-02-02 ENCOUNTER — TELEPHONE (OUTPATIENT)
Dept: FAMILY MEDICINE CLINIC | Facility: OTHER | Age: 77
End: 2022-02-02

## 2022-02-02 NOTE — TELEPHONE ENCOUNTER
----- Message from Bita Nguyen MD sent at 2/1/2022  2:11 PM EST -----  Please schedule a visit with PCP to discuss lab results  She is also overdue for AWV  Thanks!

## 2022-02-04 LAB — CCP AB SER IA-ACNC: 3.7

## 2022-02-14 DIAGNOSIS — I10 BENIGN ESSENTIAL HYPERTENSION: ICD-10-CM

## 2022-02-14 RX ORDER — HYDROCHLOROTHIAZIDE 25 MG/1
25 TABLET ORAL DAILY
Qty: 90 TABLET | Refills: 1 | Status: SHIPPED | OUTPATIENT
Start: 2022-02-14 | End: 2022-04-18

## 2022-02-14 NOTE — TELEPHONE ENCOUNTER
Please advise patient the Rx is refilled today  For the lipid panel I would wait for the visit to address when it should be repeated as Dr Bernie Groves is out of office this week  Thanks!

## 2022-02-14 NOTE — TELEPHONE ENCOUNTER
Patient would like to know if she should get the lipid panel done before her upcoming appointment on 3/3/2022    Please advise   Thank you

## 2022-02-24 ENCOUNTER — APPOINTMENT (OUTPATIENT)
Dept: LAB | Facility: CLINIC | Age: 77
End: 2022-02-24
Payer: MEDICARE

## 2022-02-24 DIAGNOSIS — M10.9 GOUT, UNSPECIFIED CAUSE, UNSPECIFIED CHRONICITY, UNSPECIFIED SITE: ICD-10-CM

## 2022-02-24 DIAGNOSIS — M35.3 POLYMYALGIA RHEUMATICA (HCC): ICD-10-CM

## 2022-02-24 DIAGNOSIS — M25.511 RIGHT SHOULDER PAIN, UNSPECIFIED CHRONICITY: ICD-10-CM

## 2022-02-24 DIAGNOSIS — M25.512 LEFT SHOULDER PAIN, UNSPECIFIED CHRONICITY: ICD-10-CM

## 2022-02-24 LAB
CRP SERPL QL: 7.6 MG/L
URATE SERPL-MCNC: 10.8 MG/DL (ref 2–6.8)

## 2022-02-24 PROCEDURE — 84550 ASSAY OF BLOOD/URIC ACID: CPT

## 2022-02-24 PROCEDURE — 86140 C-REACTIVE PROTEIN: CPT

## 2022-03-03 ENCOUNTER — OFFICE VISIT (OUTPATIENT)
Dept: FAMILY MEDICINE CLINIC | Facility: OTHER | Age: 77
End: 2022-03-03
Payer: MEDICARE

## 2022-03-03 VITALS
BODY MASS INDEX: 40.78 KG/M2 | OXYGEN SATURATION: 98 % | TEMPERATURE: 97.8 F | HEIGHT: 62 IN | SYSTOLIC BLOOD PRESSURE: 126 MMHG | HEART RATE: 69 BPM | DIASTOLIC BLOOD PRESSURE: 76 MMHG | RESPIRATION RATE: 18 BRPM | WEIGHT: 221.6 LBS

## 2022-03-03 DIAGNOSIS — N18.30 TYPE 2 DIABETES MELLITUS WITH STAGE 3 CHRONIC KIDNEY DISEASE, WITHOUT LONG-TERM CURRENT USE OF INSULIN, UNSPECIFIED WHETHER STAGE 3A OR 3B CKD (HCC): Primary | ICD-10-CM

## 2022-03-03 DIAGNOSIS — E11.22 TYPE 2 DIABETES MELLITUS WITH STAGE 3 CHRONIC KIDNEY DISEASE, WITHOUT LONG-TERM CURRENT USE OF INSULIN, UNSPECIFIED WHETHER STAGE 3A OR 3B CKD (HCC): Primary | ICD-10-CM

## 2022-03-03 DIAGNOSIS — E03.9 ACQUIRED HYPOTHYROIDISM: ICD-10-CM

## 2022-03-03 DIAGNOSIS — G47.00 INSOMNIA, UNSPECIFIED TYPE: ICD-10-CM

## 2022-03-03 DIAGNOSIS — I10 BENIGN ESSENTIAL HYPERTENSION: ICD-10-CM

## 2022-03-03 DIAGNOSIS — E78.2 MIXED HYPERLIPIDEMIA: ICD-10-CM

## 2022-03-03 LAB — SL AMB POCT HEMOGLOBIN AIC: 6.6 (ref ?–6.5)

## 2022-03-03 PROCEDURE — 83036 HEMOGLOBIN GLYCOSYLATED A1C: CPT | Performed by: FAMILY MEDICINE

## 2022-03-03 PROCEDURE — 99214 OFFICE O/P EST MOD 30 MIN: CPT | Performed by: FAMILY MEDICINE

## 2022-03-03 RX ORDER — TRAZODONE HYDROCHLORIDE 50 MG/1
50 TABLET ORAL
Qty: 30 TABLET | Refills: 1 | Status: SHIPPED | OUTPATIENT
Start: 2022-03-03

## 2022-03-03 NOTE — PROGRESS NOTES
Assessment/Plan:    No problem-specific Assessment & Plan notes found for this encounter  Diagnoses and all orders for this visit:    Type 2 diabetes mellitus with stage 3 chronic kidney disease, without long-term current use of insulin, unspecified whether stage 3a or 3b CKD (HCC)  Comments:  A1c 6 6% (at goal)  Continue dietary modifications  Orders:  -     POCT hemoglobin A1c    Acquired hypothyroidism  Comments:  Stable on levothyroxine 100 mcg daily    Benign essential hypertension  Comments:  BP goal <140/90 in the setting of DM+CKD  Continue hctz, lisinopril, metoprolol    Mixed hyperlipidemia  Comments:  LDL goal <100  ASCVD Risk 38 8%  Will continue to discuss pros/cons of statin therapy with pt    Insomnia, unspecified type  -     traZODone (DESYREL) 50 mg tablet; Take 1 tablet (50 mg total) by mouth daily at bedtime as needed for sleep          Return in about 6 weeks (around 4/14/2022) for AWV  The patient indicates understanding of these issues and agrees with the plan  Subjective:      Patient ID: Najma Mar is a 68 y o  female  Lab Results       Component                Value               Date                       HGBA1C                   6 6 (A)             03/03/2022                Diabetes  She presents for her follow-up diabetic visit  She has type 2 diabetes mellitus  Her disease course has been stable  There are no hypoglycemic associated symptoms  Pertinent negatives for hypoglycemia include no dizziness, headaches, nervousness/anxiousness, sweats or tremors  There are no diabetic associated symptoms  Pertinent negatives for diabetes include no blurred vision, no chest pain, no fatigue, no visual change and no weight loss  There are no hypoglycemic complications  Diabetic complications include nephropathy (CKD)  Pertinent negatives for diabetic complications include no CVA, heart disease, peripheral neuropathy, PVD or retinopathy   Risk factors for coronary artery disease include diabetes mellitus, dyslipidemia, family history, hypertension, obesity and post-menopausal  Current diabetic treatment includes diet  She is compliant with treatment all of the time  She is following a generally healthy diet  Meal planning includes avoidance of concentrated sweets  She has had a previous visit with a dietitian  She participates in exercise intermittently  An ACE inhibitor/angiotensin II receptor blocker is being taken  Eye exam is not current  Thyroid Problem  Presents for follow-up visit  Patient reports no anxiety, cold intolerance, constipation, depressed mood, diaphoresis, diarrhea, dry skin, fatigue, hair loss, heat intolerance, hoarse voice, leg swelling, nail problem, palpitations, tremors, visual change, weight gain or weight loss  The symptoms have been stable  Her past medical history is significant for diabetes and hyperlipidemia  There is no history of heart failure  Hypertension  This is a chronic problem  The problem has been waxing and waning since onset  The problem is controlled  Pertinent negatives include no anxiety, blurred vision, chest pain, headaches, malaise/fatigue, neck pain, orthopnea, palpitations, peripheral edema, PND, shortness of breath or sweats  Agents associated with hypertension include thyroid hormones  Risk factors for coronary artery disease include post-menopausal state, obesity, family history, dyslipidemia and diabetes mellitus  Past treatments include ACE inhibitors, diuretics and beta blockers  The current treatment provides moderate improvement  Compliance problems include diet, exercise and psychosocial issues  Hypertensive end-organ damage includes kidney disease  There is no history of angina, CAD/MI, CVA, heart failure, left ventricular hypertrophy, PVD or retinopathy  Identifiable causes of hypertension include chronic renal disease and a thyroid problem  There is no history of a hypertension causing med     Hyperlipidemia  This is a chronic problem  The current episode started more than 1 year ago  The problem is controlled  Exacerbating diseases include chronic renal disease, diabetes, hypothyroidism and obesity  She has no history of liver disease or nephrotic syndrome  There are no known factors aggravating her hyperlipidemia  Pertinent negatives include no chest pain, focal sensory loss, focal weakness, leg pain, myalgias or shortness of breath  Current antihyperlipidemic treatment includes diet change  Compliance problems include adherence to diet, adherence to exercise and psychosocial issues  Risk factors for coronary artery disease include post-menopausal, obesity, hypertension, family history, diabetes mellitus and dyslipidemia         The following portions of the patient's history were reviewed and updated as appropriate: allergies, current medications, past family history, past medical history, past social history, past surgical history and problem list       Current Outpatient Medications:     Acetaminophen 500 MG, Take 1,000 mg by mouth daily , Disp: , Rfl:     BETIMOL 0 5 % ophthalmic solution, INSTILL 1 DROP INTO THE AFFECTED EYE(S) EVERY DAY, Disp: , Rfl:     hydrochlorothiazide (HYDRODIURIL) 25 mg tablet, Take 1 tablet (25 mg total) by mouth daily, Disp: 90 tablet, Rfl: 1    ibuprofen (ADVIL) 200 mg tablet, Take 200 mg by mouth every 6 (six) hours as needed for mild pain, Disp: , Rfl:     latanoprost (XALATAN) 0 005 % ophthalmic solution, INSTILL 1 DROP IN EACH EYE AT BEDTIME, Disp: , Rfl: 5    levothyroxine 100 mcg tablet, Take 1 tablet (100 mcg total) by mouth daily, Disp: 90 tablet, Rfl: 1    lisinopril (ZESTRIL) 20 mg tablet, Take 1 tablet (20 mg total) by mouth daily, Disp: 90 tablet, Rfl: 1    metoprolol succinate (TOPROL-XL) 50 mg 24 hr tablet, Take 1 tablet (50 mg total) by mouth daily, Disp: 90 tablet, Rfl: 1    Multiple Vitamins-Minerals (OCUVITE ADULT 50+ PO), Take 1 tablet by mouth daily, Disp: , Rfl:    timolol (TIMOPTIC) 0 5 % ophthalmic solution, , Disp: , Rfl:     traZODone (DESYREL) 50 mg tablet, Take 1 tablet (50 mg total) by mouth daily at bedtime as needed for sleep, Disp: 30 tablet, Rfl: 1      Review of Systems   Constitutional: Negative for activity change, diaphoresis, fatigue, fever, malaise/fatigue, weight gain and weight loss  HENT: Negative for congestion, ear pain, hoarse voice, sinus pain and sore throat  Eyes: Negative for blurred vision, pain and itching  Respiratory: Negative for cough and shortness of breath  Cardiovascular: Negative for chest pain, palpitations, orthopnea and PND  Gastrointestinal: Negative for abdominal pain, constipation, diarrhea, nausea and vomiting  Endocrine: Negative for cold intolerance and heat intolerance  Genitourinary: Negative for dysuria  Musculoskeletal: Negative for myalgias and neck pain  Skin: Negative for color change and rash  Neurological: Negative for dizziness, tremors, focal weakness, syncope and headaches  Hematological: Negative for adenopathy  Psychiatric/Behavioral: Positive for sleep disturbance (insomnia/poor sleep despite temazepam)  Negative for behavioral problems and dysphoric mood  The patient is not nervous/anxious  Objective:      /76   Pulse 69   Temp 97 8 °F (36 6 °C)   Resp 18   Ht 5' 2" (1 575 m)   Wt 101 kg (221 lb 9 6 oz)   SpO2 98%   BMI 40 53 kg/m²          Physical Exam  Vitals and nursing note reviewed  Constitutional:       General: She is not in acute distress  Appearance: Normal appearance  She is well-developed  She is not ill-appearing  Comments: Body mass index is 40 53 kg/m²  HENT:      Head: Normocephalic and atraumatic  Right Ear: External ear normal       Left Ear: External ear normal       Nose: Nose normal    Eyes:      General: No scleral icterus       Conjunctiva/sclera: Conjunctivae normal       Pupils: Pupils are equal, round, and reactive to light    Neck:      Thyroid: No thyromegaly  Cardiovascular:      Rate and Rhythm: Normal rate and regular rhythm  Heart sounds: Normal heart sounds  No murmur heard  Pulmonary:      Effort: Pulmonary effort is normal  No respiratory distress  Breath sounds: Normal breath sounds  No wheezing  Abdominal:      General: Bowel sounds are normal  There is no distension  Palpations: Abdomen is soft  Tenderness: There is no abdominal tenderness  Musculoskeletal:         General: No tenderness  Normal range of motion  Cervical back: Normal range of motion and neck supple  Right lower leg: No edema  Left lower leg: No edema  Lymphadenopathy:      Cervical: No cervical adenopathy  Skin:     General: Skin is warm and dry  Coloration: Skin is not jaundiced  Findings: No rash  Neurological:      General: No focal deficit present  Mental Status: She is alert and oriented to person, place, and time  Cranial Nerves: No cranial nerve deficit        Gait: Gait normal    Psychiatric:         Mood and Affect: Mood normal          Behavior: Behavior normal

## 2022-03-28 ENCOUNTER — OFFICE VISIT (OUTPATIENT)
Dept: OBGYN CLINIC | Facility: CLINIC | Age: 77
End: 2022-03-28
Payer: MEDICARE

## 2022-03-28 ENCOUNTER — TELEPHONE (OUTPATIENT)
Dept: OBGYN CLINIC | Facility: HOSPITAL | Age: 77
End: 2022-03-28

## 2022-03-28 VITALS
HEART RATE: 76 BPM | WEIGHT: 221 LBS | DIASTOLIC BLOOD PRESSURE: 71 MMHG | BODY MASS INDEX: 40.67 KG/M2 | HEIGHT: 62 IN | SYSTOLIC BLOOD PRESSURE: 148 MMHG

## 2022-03-28 DIAGNOSIS — M17.12 PRIMARY OSTEOARTHRITIS OF LEFT KNEE: ICD-10-CM

## 2022-03-28 DIAGNOSIS — M17.0 PRIMARY OSTEOARTHRITIS OF BOTH KNEES: Primary | ICD-10-CM

## 2022-03-28 PROCEDURE — 99213 OFFICE O/P EST LOW 20 MIN: CPT | Performed by: ORTHOPAEDIC SURGERY

## 2022-03-28 PROCEDURE — 20610 DRAIN/INJ JOINT/BURSA W/O US: CPT | Performed by: ORTHOPAEDIC SURGERY

## 2022-03-28 RX ORDER — BETAMETHASONE SODIUM PHOSPHATE AND BETAMETHASONE ACETATE 3; 3 MG/ML; MG/ML
12 INJECTION, SUSPENSION INTRA-ARTICULAR; INTRALESIONAL; INTRAMUSCULAR; SOFT TISSUE
Status: COMPLETED | OUTPATIENT
Start: 2022-03-28 | End: 2022-03-28

## 2022-03-28 RX ORDER — LIDOCAINE HYDROCHLORIDE 10 MG/ML
1 INJECTION, SOLUTION INFILTRATION; PERINEURAL
Status: COMPLETED | OUTPATIENT
Start: 2022-03-28 | End: 2022-03-28

## 2022-03-28 RX ORDER — BUPIVACAINE HYDROCHLORIDE 2.5 MG/ML
1 INJECTION, SOLUTION INFILTRATION; PERINEURAL
Status: COMPLETED | OUTPATIENT
Start: 2022-03-28 | End: 2022-03-28

## 2022-03-28 RX ADMIN — BUPIVACAINE HYDROCHLORIDE 1 ML: 2.5 INJECTION, SOLUTION INFILTRATION; PERINEURAL at 13:20

## 2022-03-28 RX ADMIN — BETAMETHASONE SODIUM PHOSPHATE AND BETAMETHASONE ACETATE 12 MG: 3; 3 INJECTION, SUSPENSION INTRA-ARTICULAR; INTRALESIONAL; INTRAMUSCULAR; SOFT TISSUE at 13:20

## 2022-03-28 RX ADMIN — LIDOCAINE HYDROCHLORIDE 1 ML: 10 INJECTION, SOLUTION INFILTRATION; PERINEURAL at 13:20

## 2022-03-28 NOTE — TELEPHONE ENCOUNTER
While attempting to schedule patient with Dr Romana Prose, [employer] disconnected and call was disconnected

## 2022-03-28 NOTE — PROGRESS NOTES
Assessment:  1  Primary osteoarthritis of both knees  Large joint arthrocentesis   2  Primary osteoarthritis of left knee  Medial  Knee Brace     Patient Active Problem List   Diagnosis    Acute hip pain    Acute pain of both shoulders    Allergic rhinitis    Benign essential hypertension    Closed fracture of proximal end of right humerus with routine healing    Fracture of right distal radius    Hypothyroidism    Insomnia    Microscopic hematuria    Morbid obesity ([facility])    Obstructive sleep apnea    Other fatigue    Subacromial impingement of left shoulder    Subacromial impingement of right shoulder    Urticaria    Vitamin D deficiency    Type 2 diabetes mellitus with stage 3 chronic kidney disease, without long-term current use of insulin ([facility])    Primary osteoarthritis of both knees    Osteoarthritis of both knees    CKD (chronic kidney disease) stage 3, GFR 30-59 ml/min ([facility])    Anemia    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[facility])    Polymyalgia rheumatica ([facility])    Mixed hyperlipidemia    Chronic right-sided low back pain without sciatica    Facet hypertrophy of lumbar region       Plan:    68 y o  female  with bilateral knee osteoarthritis left worse than right     Bilateral intra-articular cortisone injections given today patient tolerated these well    Patient will consider a new medial  brace for the left knee   Discussed with the patient that as she is having limited relief from cortisone injections no relief from Visco injections that we could consider total knee arthroplasty  Patient is not ready to move forward with surgery at this time  She understands her A1c has to remain under 7, BMI under 40  She has some medical comorbidities but none that would absolutely does qualify her she does have CKD stage 3   She will follow-up as needed       The assessment and plan were formulated by Dr Radha Simmons and I assisted in carrying it out        Subjective:   Patient ID: Sandy Frye is a 68 y o  female   HPI    Patient presents to the office for follow up of bilateral knee pain  Since the last visit, the patient reports persistent pain in the bilateral knees  Pain is worse than left knee the right knee  Pain is worse with weight-bearing activities and better with rest   He is in the anterior medial knees  Denies any new injuries or trauma to the knee since her last visit the office  Reports very limited relief but a week or less from last cortisone injections  The following portions of the patient's history were reviewed and updated as appropriate: allergies, current medications, past family history, past social history, past surgical history and problem list     Social History     Socioeconomic History    Marital status:       Spouse name: Not on file    Number of children: 11    Years of education: 12    Highest education level: Not on file   Occupational History    Occupation: RN   Tobacco Use    Smoking status: Former Smoker     Packs/day: 0 50     Quit date:      Years since quittin 2    Smokeless tobacco: Never Used    Tobacco comment: 40 years ago    Vaping Use    Vaping Use: Former   Substance and Sexual Activity    Alcohol use: Yes     Comment: rarely ; occasional     Drug use: No    Sexual activity: Not on file   Other Topics Concern    Not on file   Social History Narrative    Drinks coffee     Social Determinants of Health     Financial Resource Strain: Not on file   Food Insecurity: Not on file   Transportation Needs: Not on file   Physical Activity: Not on file   Stress: Not on file   Social Connections: Not on file   Intimate Partner Violence: Not on file   Housing Stability: Not on file     Past Medical History:   Diagnosis Date    Anemia     Arthritis     Hypertension     [facility]ymyalgia rheumatica ([facility] [address] )     Shoulder impingement syndrome     last assessed 16     Past Surgical History:   Procedure Laterality Date    ABDOMINAL SURGERY      last assessed 11/1/16    OOPHORECTOMY Right     OTHER SURGICAL HISTORY Right 2017    shoulder    TOTAL ABDOMINAL HYSTERECTOMY  30 year ago    WRIST FRACTURE SURGERY Right 2017     Allergies   Allergen Reactions    Hydromorphone Dizziness and Other (See Comments)     Severe vertigo     Tdap [Tetanus-Diphth-Acell Pertussis] Swelling     Current Outpatient Medications on File Prior to Visit   Medication Sig Dispense Refill    Acetaminophen 500 MG Take 1,000 mg by mouth daily       BETIMOL 0 5 % ophthalmic solution INSTILL 1 DROP INTO THE AFFECTED EYE(S) EVERY DAY      hydrochlorothiazide (HYDRODIURIL) 25 mg tablet Take 1 tablet (25 mg total) by mouth daily 90 tablet 1    ibuprofen (ADVIL) 200 mg tablet Take 200 mg by mouth every 6 (six) hours as needed for mild pain      latanoprost (XALATAN) 0 005 % ophthalmic solution INSTILL 1 DROP IN EACH EYE AT BEDTIME  5    levothyroxine 100 mcg tablet Take 1 tablet (100 mcg total) by mouth daily 90 tablet 1    lisinopril (ZESTRIL) 20 mg tablet Take 1 tablet (20 mg total) by mouth daily 90 tablet 1    metoprolol succinate (TOPROL-XL) 50 mg 24 hr tablet Take 1 tablet (50 mg total) by mouth daily 90 tablet 1    Multiple Vitamins-Minerals (OCUVITE ADULT 50+ PO) Take 1 tablet by mouth daily      timolol (TIMOPTIC) 0 5 % ophthalmic solution       traZODone (DESYREL) 50 mg tablet Take 1 tablet (50 mg total) by mouth daily at bedtime as needed for sleep 30 tablet 1     No current facility-administered medications on file prior to visit  Review of Systems  See HPi    Objective:    Vitals:    03/28/22 1249   BP: 148/71   Pulse: 76       Physical Exam  Constitutional:       Appearance: She is well-developed  HENT:      Head: Normocephalic and atraumatic  Eyes:      General: No scleral icterus  Conjunctiva/sclera: Conjunctivae normal    Pulmonary:      Effort: Pulmonary effort is normal  No respiratory distress  Breath sounds:  No stridor  Abdominal:      General: There is no distension  Palpations: Abdomen is soft  Musculoskeletal:      Cervical back: Neck supple  Right knee: No effusion  Left knee: No effusion  Skin:     General: Skin is warm and dry  Findings: No erythema  Neurological:      Mental Status: She is alert and oriented to person, place, and time  Psychiatric:         Behavior: Behavior normal          Right Knee Exam     Tenderness   The patient is experiencing tenderness in the medial joint line  Range of Motion   Extension: normal   Flexion: abnormal     Tests   Varus: negative Valgus: negative    Other   Erythema: absent  Scars: absent  Sensation: normal  Pulse: present  Swelling: none  Effusion: no effusion present    Comments:  No ecchymosis       Left Knee Exam     Tenderness   The patient is experiencing tenderness in the medial joint line  Range of Motion   Extension: normal   Flexion: abnormal     Tests   Varus: negative Valgus: negative    Other   Erythema: absent  Scars: absent  Sensation: normal  Pulse: present  Swelling: none  Effusion: no effusion present    Comments:  No ecchymosis             I have personally reviewed pertinent films in PACS  Large joint arthrocentesis: bilateral knee  Universal Protocol:  Consent given by: patient  Time out: Immediately prior to procedure a "time out" was called to verify the correct patient, procedure, equipment, support staff and site/side marked as required    Site marked: the operative site was marked  Supporting Documentation  Indications: pain   Procedure Details  Location: knee - bilateral knee  Preparation: Patient was prepped and draped in the usual sterile fashion  Needle size: 22 G  Approach: anterolateral    Medications (Right): 1 mL lidocaine 1 %; 12 mg betamethasone acetate-betamethasone sodium phosphate 6 (3-3) mg/mL; 1 mL bupivacaine 0 25 %Medications (Left): 1 mL lidocaine 1 %; 12 mg betamethasone acetate-betamethasone sodium phosphate 6 (3-3) mg/mL; 1 mL bupivacaine 0 25 %   Patient tolerance: patient tolerated the procedure well with no immediate complications  Dressing:  Sterile dressing applied            Portions of the record may have been created with voice recognition software  Occasional wrong word or "sound a like" substitutions may have occurred due to the inherent limitations of voice recognition software  Read the chart carefully and recognize, using context, where substitutions have occurred

## 2022-03-30 ENCOUNTER — APPOINTMENT (OUTPATIENT)
Dept: LAB | Facility: CLINIC | Age: 77
End: 2022-03-30
Payer: MEDICARE

## 2022-03-30 DIAGNOSIS — E79.0 URICACIDEMIA: ICD-10-CM

## 2022-03-30 DIAGNOSIS — M35.3 POLYMYALGIA RHEUMATICA (HCC): ICD-10-CM

## 2022-03-30 LAB
CRP SERPL QL: <3 MG/L
ERYTHROCYTE [SEDIMENTATION RATE] IN BLOOD: 51 MM/HOUR (ref 0–29)
URATE SERPL-MCNC: 9.5 MG/DL (ref 2–6.8)

## 2022-03-30 PROCEDURE — 86140 C-REACTIVE PROTEIN: CPT

## 2022-03-30 PROCEDURE — 36415 COLL VENOUS BLD VENIPUNCTURE: CPT

## 2022-03-30 PROCEDURE — 84550 ASSAY OF BLOOD/URIC ACID: CPT

## 2022-03-30 PROCEDURE — 85652 RBC SED RATE AUTOMATED: CPT

## 2022-04-08 DIAGNOSIS — I10 ESSENTIAL HYPERTENSION: ICD-10-CM

## 2022-04-08 DIAGNOSIS — I10 BENIGN ESSENTIAL HYPERTENSION: ICD-10-CM

## 2022-04-08 RX ORDER — LISINOPRIL 20 MG/1
TABLET ORAL
Qty: 90 TABLET | Refills: 1 | Status: SHIPPED | OUTPATIENT
Start: 2022-04-08 | End: 2022-07-21 | Stop reason: SDUPTHER

## 2022-04-08 RX ORDER — METOPROLOL SUCCINATE 50 MG/1
TABLET, EXTENDED RELEASE ORAL
Qty: 90 TABLET | Refills: 1 | Status: SHIPPED | OUTPATIENT
Start: 2022-04-08

## 2022-04-09 ENCOUNTER — NURSE TRIAGE (OUTPATIENT)
Dept: OTHER | Facility: OTHER | Age: 77
End: 2022-04-09

## 2022-04-09 NOTE — TELEPHONE ENCOUNTER
Pt informed she should be seen at a UC within 24 hours  Pt unsure if she would follow advice  Pt informed to call office Monday if she did not follow care advice given  Reason for Disposition   Bad or foul-smelling urine    Answer Assessment - Initial Assessment Questions  1  SYMPTOM: "What's the main symptom you're concerned about?" (e g , frequency, incontinence)      No pain with urination but bladder pain, no blood  States possibly cloudy urine and mild foul smelling  2  ONSET: "When did the pain start?"      4/8    3  PAIN: "Is there any pain?" If Yes, ask: "How bad is it?" (Scale: 1-10; mild, moderate, severe)      Tyelnol for bladder pain that is constant and cramping  5/10 with tylenol     4  CAUSE: "What do you think is causing the symptoms?"      Possible uti    5   OTHER SYMPTOMS: "Do you have any other symptoms?" (e g , fever, flank pain, blood in urine, pain with urination)      Denies fever or chills    Protocols used: [facility]

## 2022-04-09 NOTE — TELEPHONE ENCOUNTER
Regarding: Requesting antibiotics for uti  ----- Message from Li Goss, [address] sent at 4/9/2022  9:15 AM EDT -----  "I have a uti and Id like a script for  antibiotics  "

## 2022-04-11 ENCOUNTER — TELEPHONE (OUTPATIENT)
Dept: FAMILY MEDICINE CLINIC | Facility: OTHER | Age: 77
End: 2022-04-11

## 2022-04-11 ENCOUNTER — OFFICE VISIT (OUTPATIENT)
Dept: FAMILY MEDICINE CLINIC | Facility: OTHER | Age: 77
End: 2022-04-11
Payer: MEDICARE

## 2022-04-11 VITALS
BODY MASS INDEX: 40.12 KG/M2 | OXYGEN SATURATION: 95 % | WEIGHT: 218 LBS | DIASTOLIC BLOOD PRESSURE: 70 MMHG | SYSTOLIC BLOOD PRESSURE: 112 MMHG | HEIGHT: 62 IN | RESPIRATION RATE: 22 BRPM | TEMPERATURE: 97.9 F | HEART RATE: 83 BPM

## 2022-04-11 DIAGNOSIS — N30.00 ACUTE CYSTITIS WITHOUT HEMATURIA: Primary | ICD-10-CM

## 2022-04-11 LAB
SL AMB  POCT GLUCOSE, UA: NORMAL
SL AMB LEUKOCYTE ESTERASE,UA: NORMAL
SL AMB POCT BILIRUBIN,UA: NORMAL
SL AMB POCT BLOOD,UA: NORMAL
SL AMB POCT CLARITY,UA: CLEAR
SL AMB POCT COLOR,UA: YELLOW
SL AMB POCT KETONES,UA: NORMAL
SL AMB POCT NITRITE,UA: NORMAL
SL AMB POCT PH,UA: 6
SL AMB POCT SPECIFIC GRAVITY,UA: 1.02
SL AMB POCT URINE PROTEIN: NORMAL
SL AMB POCT UROBILINOGEN: 0.2

## 2022-04-11 PROCEDURE — 81002 URINALYSIS NONAUTO W/O SCOPE: CPT | Performed by: FAMILY MEDICINE

## 2022-04-11 PROCEDURE — 99213 OFFICE O/P EST LOW 20 MIN: CPT | Performed by: FAMILY MEDICINE

## 2022-04-11 RX ORDER — ASPIRIN 81 MG/1
81 TABLET ORAL DAILY
COMMUNITY

## 2022-04-11 RX ORDER — SULFAMETHOXAZOLE AND TRIMETHOPRIM 800; 160 MG/1; MG/1
1 TABLET ORAL 2 TIMES DAILY
Qty: 6 TABLET | Refills: 0 | Status: SHIPPED | OUTPATIENT
Start: 2022-04-11 | End: 2022-04-14

## 2022-04-11 NOTE — TELEPHONE ENCOUNTER
The patient called asking if you were going to call the pharmacy about the OTC Azo, so she wouldn't have to go into the store    Please advise    Thank you!

## 2022-04-11 NOTE — PATIENT INSTRUCTIONS
Urinary Tract Infection in Older Adults   WHAT YOU NEED TO KNOW:   A urinary tract infection (UTI) is caused by bacteria that get inside your urinary tract  Your urinary tract includes your kidneys, ureters, bladder, and urethra  Urine is made in your kidneys, and it flows from the ureters to the bladder  Urine leaves the bladder through the urethra  A UTI is more common in your lower urinary tract, which includes your bladder and urethra  DISCHARGE INSTRUCTIONS:   Seek care immediately if:   · You are urinating very little or not at all  · You are vomiting  · You have a high fever with shaking chills  · You have side or back pain that gets worse  Call your doctor if:   · You have a fever  · You are a woman and you have increased white or yellow discharge from your vagina  · You do not feel better after 2 days of taking antibiotics  · You have questions or concerns about your condition or care  Medicines:   · Medicines  help treat the bacterial infection or decrease pain and burning when you urinate  You may also need medicines to decrease the urge to urinate often  If you have UTIs often (called recurrent UTIs), you may be given antibiotics to take regularly  You will be given directions for when and how to use antibiotics  The goal is to prevent UTIs but not cause antibiotic resistance by using antibiotics too often  · Take your medicine as directed  Contact your healthcare provider if you think your medicine is not helping or if you have side effects  Tell him or her if you are allergic to any medicine  Keep a list of the medicines, vitamins, and herbs you take  Include the amounts, and when and why you take them  Bring the list or the pill bottles to follow-up visits  Carry your medicine list with you in case of an emergency  Self-care:   · Drink liquids as directed  Liquids can help flush bacteria from your urinary tract   Ask how much liquid to drink each day and which liquids are best for you  You may need to drink more liquids than usual to help flush out the bacteria  Do not drink alcohol, caffeine, and citrus juices  These can irritate your bladder and increase your symptoms  · Apply heat  on your abdomen for 20 to 30 minutes every 2 hours for as many days as directed  Heat helps decrease discomfort and pressure in your bladder  Prevent a UTI:   · Urinate when you feel the urge  Do not hold your urine  Bacteria can grow if urine stays in the bladder too long  It may be helpful to urinate at least every 3 to 4 hours  · Urinate after you have sex  to flush away bacteria that can enter your urinary tract during sex  · Wear cotton underwear and clothes that are loose  Tight pants and nylon underwear can trap moisture and cause bacteria to grow  · Cranberry juice or cranberry supplements  may help prevent UTIs  Your healthcare provider can recommend the right juice or supplement for you  · Women should wipe front to back  after urinating or having a bowel movement  This may prevent germs from getting into the urinary tract  Do not douche or use feminine deodorants  These can change the chemical balance in your vagina  You may also be given vaginal estrogen medicine  This medicine helps prevent recurrent UTIs in women who have gone through menopause or are in paxton-menopause  Follow up with your doctor as directed:  Write down your questions so you remember to ask them during your visits  © Copyright [employer] 2022 Information is for End User's use only and may not be sold, redistributed or otherwise used for commercial purposes  All illustrations and images included in CareNotes® are the copyrighted property of A D A M [employer] Inc  or [facility]   The above information is an  only  It is not intended as medical advice for individual conditions or treatments   Talk to your doctor, nurse or pharmacist before following any medical regimen to see if it is safe and effective for you

## 2022-04-11 NOTE — TELEPHONE ENCOUNTER
Omar Dorsey,    Please let her know that since this is OTC she can go ahead and pick it up  This does not have to be sent over via script      Thanks,  [place]

## 2022-04-14 ENCOUNTER — TELEPHONE (OUTPATIENT)
Dept: FAMILY MEDICINE CLINIC | Facility: OTHER | Age: 77
End: 2022-04-14

## 2022-04-14 NOTE — TELEPHONE ENCOUNTER
Patient called and would like the results of her UA that was sent out for further testing      She would like a call back on her cell phone     Thank you

## 2022-04-14 NOTE — TELEPHONE ENCOUNTER
DR Shamika Zuniga,     Pt calling asking for lab results from urine sample  There is no order for sample to be sent out, only POCT urine which was negative  Do you want pt to come back in to give sample to send to lab ? Or I can place an order for pt to go to to LAB and give sample  Please advise  Thank you !     Minoo Daley MA

## 2022-04-16 NOTE — TELEPHONE ENCOUNTER
Hi Ladies,    I see patient has a follow up scheduled on 4/18  If she continues to have symptoms despite the abx I would consider sending out for a urine culture      Thanks,  Dr WILLETT

## 2022-04-16 NOTE — PROGRESS NOTES
Assessment/Plan:    No problem-specific Assessment & Plan notes found for this encounter  Diagnoses and all orders for this visit:    Acute cystitis without hematuria  -  Patient with dysuria x 3 days, will trial on abx for presumed acute cystitis     - sulfamethoxazole-trimethoprim (BACTRIM DS) 800-160 mg per tablet; Take 1 tablet by mouth 2 (two) times a day for 3 days  -     POCT urine dip    Other orders  -     aspirin (ECOTRIN LOW STRENGTH) 81 mg EC tablet; Take 81 mg by mouth daily        Subjective:      Patient ID: Rc Plaza is a 68 y o  female  Urinary Tract Infection   This is a new problem  The current episode started in the past 7 days  The problem occurs every urination  The problem has been gradually worsening  The quality of the pain is described as burning  The pain is moderate  There has been no fever  There is no history of pyelonephritis  Associated symptoms include urgency  Pertinent negatives include no chills, discharge, flank pain or hematuria  She has tried increased fluids for the symptoms  The following portions of the patient's history were reviewed and updated as appropriate: allergies, current medications, past family history, past medical history, past social history, past surgical history and problem list     Review of Systems   Constitutional: Negative for chills  Genitourinary: Positive for dysuria and urgency  Negative for flank pain, hematuria and vaginal discharge  Objective:      /70 (BP Location: Left arm, Patient Position: Sitting, Cuff Size: Large)   Pulse 83   Temp 97 9 °F (36 6 °C) (Temporal)   Resp 22   Ht 5' 2" (1 575 m)   Wt 98 9 kg (218 lb)   SpO2 95%   BMI 39 87 kg/m²          Physical Exam  Constitutional:       Appearance: She is well-developed  HENT:      Head: Normocephalic and atraumatic        Nose: Nose normal    Eyes:      Conjunctiva/sclera: Conjunctivae normal    Cardiovascular:      Rate and Rhythm: Normal rate and regular rhythm  Heart sounds: Normal heart sounds  Pulmonary:      Effort: Pulmonary effort is normal       Breath sounds: Normal breath sounds  Abdominal:      General: Bowel sounds are normal       Palpations: Abdomen is soft  Tenderness: There is no right CVA tenderness or left CVA tenderness  Musculoskeletal:      Cervical back: Normal range of motion and neck supple  Skin:     General: Skin is warm and dry  Neurological:      Mental Status: She is alert and oriented to person, place, and time

## 2022-04-18 ENCOUNTER — OFFICE VISIT (OUTPATIENT)
Dept: FAMILY MEDICINE CLINIC | Facility: OTHER | Age: 77
End: 2022-04-18
Payer: MEDICARE

## 2022-04-18 ENCOUNTER — LAB (OUTPATIENT)
Dept: LAB | Facility: CLINIC | Age: 77
End: 2022-04-18
Payer: MEDICARE

## 2022-04-18 VITALS
SYSTOLIC BLOOD PRESSURE: 126 MMHG | HEIGHT: 62 IN | TEMPERATURE: 97.8 F | RESPIRATION RATE: 18 BRPM | WEIGHT: 217.1 LBS | DIASTOLIC BLOOD PRESSURE: 74 MMHG | OXYGEN SATURATION: 98 % | BODY MASS INDEX: 39.95 KG/M2 | HEART RATE: 62 BPM

## 2022-04-18 DIAGNOSIS — M10.9 ACUTE GOUT OF RIGHT FOOT, UNSPECIFIED CAUSE: ICD-10-CM

## 2022-04-18 DIAGNOSIS — E11.22 TYPE 2 DIABETES MELLITUS WITH STAGE 3 CHRONIC KIDNEY DISEASE, WITHOUT LONG-TERM CURRENT USE OF INSULIN, UNSPECIFIED WHETHER STAGE 3A OR 3B CKD (HCC): ICD-10-CM

## 2022-04-18 DIAGNOSIS — E79.0 HYPERURICEMIA: ICD-10-CM

## 2022-04-18 DIAGNOSIS — I10 BENIGN ESSENTIAL HYPERTENSION: ICD-10-CM

## 2022-04-18 DIAGNOSIS — Z00.00 MEDICARE ANNUAL WELLNESS VISIT, SUBSEQUENT: Primary | ICD-10-CM

## 2022-04-18 DIAGNOSIS — E78.2 MIXED HYPERLIPIDEMIA: ICD-10-CM

## 2022-04-18 DIAGNOSIS — N18.30 TYPE 2 DIABETES MELLITUS WITH STAGE 3 CHRONIC KIDNEY DISEASE, WITHOUT LONG-TERM CURRENT USE OF INSULIN, UNSPECIFIED WHETHER STAGE 3A OR 3B CKD (HCC): ICD-10-CM

## 2022-04-18 LAB
CHOLEST SERPL-MCNC: 188 MG/DL
HDLC SERPL-MCNC: 42 MG/DL
LDLC SERPL CALC-MCNC: 107 MG/DL (ref 0–100)
TRIGL SERPL-MCNC: 197 MG/DL
URATE SERPL-MCNC: 9.6 MG/DL (ref 2–6.8)

## 2022-04-18 PROCEDURE — G0439 PPPS, SUBSEQ VISIT: HCPCS | Performed by: FAMILY MEDICINE

## 2022-04-18 PROCEDURE — 84550 ASSAY OF BLOOD/URIC ACID: CPT

## 2022-04-18 PROCEDURE — 1123F ACP DISCUSS/DSCN MKR DOCD: CPT | Performed by: FAMILY MEDICINE

## 2022-04-18 PROCEDURE — 80061 LIPID PANEL: CPT

## 2022-04-18 PROCEDURE — 36415 COLL VENOUS BLD VENIPUNCTURE: CPT

## 2022-04-18 RX ORDER — PREDNISONE 1 MG/1
TABLET ORAL
Qty: 20 TABLET | Refills: 0 | Status: SHIPPED | OUTPATIENT
Start: 2022-04-18 | End: 2022-04-26

## 2022-04-18 NOTE — PROGRESS NOTES
Assessment and Plan:     Problem List Items Addressed This Visit        Endocrine    Type 2 diabetes mellitus with stage 3 chronic kidney disease, without long-term current use of insulin ([address] )    Relevant Orders    Lipid Panel with Direct LDL reflex       Cardiovascular and Mediastinum    Benign essential hypertension    Relevant Orders    Lipid Panel with Direct LDL reflex       Other    Mixed hyperlipidemia    Relevant Orders    Lipid Panel with Direct LDL reflex      Other Visit Diagnoses     Medicare annual wellness visit, subsequent    -  Primary    Hyperuricemia        Relevant Orders    Uric acid    Acute gout of right foot, unspecified cause        Relevant Medications    predniSONE 5 mg tablet    Other Relevant Orders    Uric acid        BMI Counseling: Body mass index is 39 71 kg/m²  The BMI is above normal  Nutrition recommendations include decreasing portion sizes, encouraging healthy choices of fruits and vegetables, decreasing fast food intake, consuming healthier snacks, limiting drinks that contain sugar, moderation in carbohydrate intake, increasing intake of lean protein, reducing intake of saturated and trans fat and reducing intake of cholesterol  Exercise recommendations include moderate physical activity 150 minutes/week  Rationale for BMI follow-up plan is due to patient being overweight or obese  Depression Screening and Follow-up Plan: Patient was screened for depression during today's encounter  They screened negative with a PHQ-2 score of 0  Preventive health issues were discussed with patient, and age appropriate screening tests were ordered as noted in patient's After Visit Summary  Personalized health advice and appropriate referrals for health education or preventive services given if needed, as noted in patient's After Visit Summary  Return in about 3 months (around 7/18/2022) for Recheck DM      The patient indicates understanding of these issues and agrees with the plan            History of Present Illness:     Patient presents for Medicare Annual Wellness visit    Patient Care Team:  Jerome Rangel DO as PCP - MD Dr Geoff Veras (Ophthalmology)  Randall Smiley DO (Orthopedic Surgery)     Problem List:     Patient Active Problem List   Diagnosis    Acute hip pain    Acute pain of both shoulders    Allergic rhinitis    Benign essential hypertension    Closed fracture of proximal end of right humerus with routine healing    Fracture of right distal radius    Hypothyroidism    Insomnia    Microscopic hematuria    Morbid obesity ([address] )    Obstructive sleep apnea    Other fatigue    Subacromial impingement of left shoulder    Subacromial impingement of right shoulder    Urticaria    Vitamin D deficiency    Type 2 diabetes mellitus with stage 3 chronic kidney disease, without long-term current use of insulin ([address] )    Primary osteoarthritis of both knees    Osteoarthritis of both knees    CKD (chronic kidney disease) stage 3, GFR 30-59 ml/min ([facility])    Anemia    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[address] )    Polymyalgia rheumatica ([address] )    Mixed hyperlipidemia    Chronic right-sided low back pain without sciatica    Facet hypertrophy of lumbar region      Past Medical and Surgical History:     Past Medical History:   Diagnosis Date    Anemia     Arthritis     Hypertension     Polymyalgia rheumatica ([address] )     Shoulder impingement syndrome     last assessed 12/27/16     Past Surgical History:   Procedure Laterality Date    ABDOMINAL SURGERY      last assessed 11/1/16    OOPHORECTOMY Right     OTHER SURGICAL HISTORY Right 2017    shoulder    TOTAL ABDOMINAL HYSTERECTOMY  30 year ago    WRIST FRACTURE SURGERY Right 2017      Family History:     Family History   Problem Relation Age of Onset    Hypertension Mother     Glaucoma Mother     Stroke Mother     Gout Father     COPD Father     Heart disease Father     Kidney failure Sister         chronic    Gout Sister     Multiple sclerosis Sister     Hypertension Sister     Alcohol abuse Brother     Cancer Brother     Gout Brother     Coronary artery disease Brother         CABGx4  (2019)    No Known Problems Daughter     No Known Problems Maternal Grandmother     No Known Problems Maternal Grandfather     No Known Problems Paternal Grandmother     No Known Problems Paternal Grandfather     Lung cancer Paternal Uncle     No Known Problems Sister     No Known Problems Daughter     No Known Problems Son     No Known Problems Son     No Known Problems Son       Social History:     Social History     Socioeconomic History    Marital status:       Spouse name: None    Number of children: 11    Years of education: 12    Highest education level: None   Occupational History    Occupation: RN   Tobacco Use    Smoking status: Former Smoker     Packs/day: 0 50     Quit date:      Years since quittin 3    Smokeless tobacco: Never Used    Tobacco comment: 40 years ago    Vaping Use    Vaping Use: Former   Substance and Sexual Activity    Alcohol use: Yes     Comment: rarely ; occasional     Drug use: No    Sexual activity: None   Other Topics Concern    None   Social History Narrative    Drinks coffee     Social Determinants of Health     Financial Resource Strain: Not on file   Food Insecurity: Not on file   Transportation Needs: Not on file   Physical Activity: Not on file   Stress: Not on file   Social Connections: Not on file   Intimate Partner Violence: Not on file   Housing Stability: Not on file      Medications and Allergies:     Current Outpatient Medications   Medication Sig Dispense Refill    Acetaminophen 500 MG Take 1,000 mg by mouth daily       aspirin (ECOTRIN LOW STRENGTH) 81 mg EC tablet Take 81 mg by mouth daily      BETIMOL 0 5 % ophthalmic solution INSTILL 1 DROP INTO THE AFFECTED EYE(S) EVERY DAY      ibuprofen (ADVIL) 200 mg tablet Take 200 mg by mouth every 6 (six) hours as needed for mild pain      latanoprost (XALATAN) 0 005 % ophthalmic solution INSTILL 1 DROP IN EACH EYE AT BEDTIME  5    levothyroxine 100 mcg tablet Take 1 tablet (100 mcg total) by mouth daily 90 tablet 1    lisinopril (ZESTRIL) 20 mg tablet TAKE 1 TABLET BY MOUTH EVERY DAY 90 tablet 1    metoprolol succinate (TOPROL-XL) 50 mg 24 hr tablet TAKE 1 TABLET BY MOUTH EVERY DAY 90 tablet 1    Multiple Vitamins-Minerals (OCUVITE ADULT 50+ PO) Take 1 tablet by mouth daily      timolol (TIMOPTIC) 0 5 % ophthalmic solution       traZODone (DESYREL) 50 mg tablet Take 1 tablet (50 mg total) by mouth daily at bedtime as needed for sleep 30 tablet 1    predniSONE 5 mg tablet Take 4 tablets (20 mg total) by mouth daily for 2 days, THEN 3 tablets (15 mg total) daily for 2 days, THEN 2 tablets (10 mg total) daily for 2 days, THEN 1 tablet (5 mg total) daily for 2 days  20 tablet 0     No current facility-administered medications for this visit       Allergies   Allergen Reactions    Hydromorphone Dizziness and Other (See Comments)     Severe vertigo     Tdap [Tetanus-Diphth-Acell Pertussis] Swelling      Immunizations:     Immunization History   Administered Date(s) Administered    COVID-19 MODERNA VACC 0 25 ML IM BOOSTER 01/25/2022    COVID-19 MODERNA VACC 0 5 ML IM 01/15/2021, 02/12/2021    Influenza Split High Dose Preservative Free IM 11/17/2016    Influenza, high dose seasonal 0 7 mL 10/24/2019, 11/03/2020, 11/29/2021    Influenza, seasonal, injectable 1945, 11/04/2009, 01/01/2013    Pneumococcal Conjugate 13-Valent 05/23/2017    Pneumococcal Polysaccharide PPV23 05/29/2018    Tuberculin Skin Test-PPD Intradermal 07/31/2017, 08/16/2017      Health Maintenance:         Topic Date Due    Breast Cancer Screening: Mammogram  10/30/2022    Hepatitis C Screening  Completed     There are no preventive care reminders to display for this patient  Medicare Health Risk Assessment:     /74   Pulse 62   Temp 97 8 °F (36 6 °C)   Resp 18   Ht 5' 2" (1 575 m)   Wt 98 5 kg (217 lb 1 6 oz)   SpO2 98%   BMI 39 71 kg/m²      Toyin Valdivia is here for her Subsequent Wellness visit  Last Medicare Wellness visit information reviewed, patient interviewed and updates made to the record today  Health Risk Assessment:   Patient rates overall health as good  Patient feels that their physical health rating is slightly worse  Patient is satisfied with their life  Eyesight was rated as same  Hearing was rated as same  Patient feels that their emotional and mental health rating is same  Patients states they are never, rarely angry  Patient states they are sometimes unusually tired/fatigued  Pain experienced in the last 7 days has been some  Patient's pain rating has been 6/10  Patient states that she has experienced no weight loss or gain in last 6 months  Depression Screening:   PHQ-2 Score: 0      Fall Risk Screening: In the past year, patient has experienced: no history of falling in past year      Urinary Incontinence Screening:   Patient has not leaked urine accidently in the last six months  Home Safety:  Patient does not have trouble with stairs inside or outside of their home  Patient has working smoke alarms and has working carbon monoxide detector  Home safety hazards include: none  Nutrition:   Current diet is Regular  Medications:   Patient is currently taking over-the-counter supplements  OTC medications include: see medication list  Patient is able to manage medications  Activities of Daily Living (ADLs)/Instrumental Activities of Daily Living (IADLs):   Walk and transfer into and out of bed and chair?: Yes  Dress and groom yourself?: Yes    Bathe or shower yourself?: Yes    Feed yourself?  Yes  Do your laundry/housekeeping?: Yes  Manage your money, pay your bills and track your expenses?: Yes  Make your own meals?: Yes Do your own shopping?: Yes    Previous Hospitalizations:   Any hospitalizations or ED visits within the last 12 months?: No      Advance Care Planning:   Living will: No    Durable POA for healthcare: No    Advanced directive: No      Cognitive Screening:   Provider or family/friend/caregiver concerned regarding cognition?: No    PREVENTIVE SCREENINGS      Cardiovascular Screening:    General: Screening Not Indicated and History Lipid Disorder      Diabetes Screening:     General: Screening Not Indicated and History Diabetes      Colorectal Cancer Screening:     General: Screening Not Indicated      Breast Cancer Screening:     General: Screening Current      Cervical Cancer Screening:    General: Screening Not Indicated      Osteoporosis Screening:    General: Screening Current      Abdominal Aortic Aneurysm (AAA) Screening:        General: Screening Not Indicated      Lung Cancer Screening:     General: Screening Not Indicated      Hepatitis C Screening:    General: Screening Current    Screening, Brief Intervention, and Referral to Treatment (SBIRT)    Screening  Typical number of drinks in a day: 0  Typical number of drinks in a week: 0  Interpretation: Low risk drinking behavior  Single Item Drug Screening:  How often have you used an illegal drug (including marijuana) or a prescription medication for non-medical reasons in the past year? never    Single Item Drug Screen Score: 0  Interpretation: Negative screen for possible drug use disorder      Review of Systems   Constitutional: Negative for appetite change, fatigue, fever and unexpected weight change  HENT: Negative for congestion, dental problem, ear pain, postnasal drip, sore throat and tinnitus  Eyes: Negative for pain, discharge and visual disturbance  Respiratory: Negative for cough, shortness of breath and wheezing  Cardiovascular: Negative for chest pain, palpitations and leg swelling     Gastrointestinal: Negative for abdominal pain, constipation, diarrhea, nausea and vomiting  Endocrine: Negative for cold intolerance and heat intolerance  Genitourinary: Negative for difficulty urinating, dysuria, flank pain and urgency  Musculoskeletal: Negative for arthralgias, back pain, joint swelling and myalgias  Right foot gout flare since last night   Skin: Negative for rash and wound  Allergic/Immunologic: Negative for immunocompromised state  Neurological: Negative for dizziness, syncope, speech difficulty, weakness and numbness  Hematological: Negative for adenopathy  Does not bruise/bleed easily  Psychiatric/Behavioral: Negative for confusion, dysphoric mood and sleep disturbance  The patient is not nervous/anxious  /74   Pulse 62   Temp 97 8 °F (36 6 °C)   Resp 18   Ht 5' 2" (1 575 m)   Wt 98 5 kg (217 lb 1 6 oz)   SpO2 98%   BMI 39 71 kg/m²     Physical Exam  Vitals and nursing note reviewed  Constitutional:       General: She is not in acute distress  Appearance: Normal appearance  She is well-developed  She is not ill-appearing  Comments: Body mass index is 39 71 kg/m²  HENT:      Head: Normocephalic and atraumatic  Right Ear: Hearing, tympanic membrane, ear canal and external ear normal       Left Ear: Hearing, tympanic membrane, ear canal and external ear normal       Nose: Nose normal       Mouth/Throat:      Mouth: Mucous membranes are moist       Pharynx: Oropharynx is clear  Uvula midline  Eyes:      General: No scleral icterus  Conjunctiva/sclera: Conjunctivae normal       Pupils: Pupils are equal, round, and reactive to light  Neck:      Thyroid: No thyromegaly  Cardiovascular:      Rate and Rhythm: Normal rate and regular rhythm  Heart sounds: Normal heart sounds  No murmur heard  Pulmonary:      Effort: Pulmonary effort is normal  No respiratory distress  Breath sounds: Normal breath sounds  No wheezing     Abdominal:      General: Bowel sounds are normal  There is no distension  Palpations: Abdomen is soft  Tenderness: There is no abdominal tenderness  Musculoskeletal:         General: Tenderness (dorsal surface right foot) present  Normal range of motion  Cervical back: Normal range of motion and neck supple  Right lower leg: No edema  Left lower leg: No edema  Lymphadenopathy:      Cervical: No cervical adenopathy  Skin:     General: Skin is warm and dry  Coloration: Skin is not jaundiced  Findings: No erythema or rash  Neurological:      General: No focal deficit present  Mental Status: She is alert and oriented to person, place, and time  Cranial Nerves: No cranial nerve deficit        Gait: Gait normal    Psychiatric:         Mood and Affect: Mood normal          Behavior: Behavior normal            Karla Solano, DO

## 2022-04-20 ENCOUNTER — TELEPHONE (OUTPATIENT)
Dept: FAMILY MEDICINE CLINIC | Facility: OTHER | Age: 77
End: 2022-04-20

## 2022-04-20 NOTE — RESULT ENCOUNTER NOTE
Please inform pt that labs are stable -- Uric Acid was elevated (9 6) as expected  Continue current medications and continue to engage in healthy lifestyle (diet, exercise)  Consider allopurinol after gout flare subsides  Thanks!   Aby Linares, DO

## 2022-04-20 NOTE — TELEPHONE ENCOUNTER
----- Message from Aleksandar Ariza DO sent at 4/20/2022  8:22 AM EDT -----  Please inform pt that labs are stable -- Uric Acid was elevated (9 6) as expected  Continue current medications and continue to engage in healthy lifestyle (diet, exercise)  Consider allopurinol after gout flare subsides  Thanks!   Aleksandar Ariza DO

## 2022-05-03 NOTE — TELEPHONE ENCOUNTER
EMERGENCY DEPARTMENT ENCOUNTER      CHIEF COMPLAINT    Near syncope    HISTORY OF PRESENT ILLNESS   Adele King is a 75 year old female who presents to the emergency room today for evaluation of near syncope.  The patient is s/p hip replacement 4/7/22.  She was at PT today and during therapy became suddenly diaphoretic, nausea and lightheaded.  She sat down and symptoms eventually passed.  Therapist checked vitals and her blood pressure was low, and pulse was 39.  The blood pressure improved after time, but the pulse remained low in the 40s.  The patient is currently feeling better.  She is feeling at baseline.  No chest pain now or during or preceeding event.  No shortness of breath.   reports that he thinks that her metoprolol was doubled at hospital discharge.  He states that she is taking 2 tablets in the morning and 1 at night.    ALLERGIES    ALLERGIES:   Allergen Reactions   • Penicillins GI UPSET     No appetite, lethargic  Per MD Paulino, no reaction with Ancef noted.       CURRENT MEDICATIONS    No current facility-administered medications for this encounter.     Current Outpatient Medications   Medication Sig Dispense Refill   • colchicine (COLCRYS) 0.6 MG tablet Take 2 tablets by mouth today, then 1 tablet daily until gone for gout. 7 tablet 3   • ondansetron (Zofran ODT) 4 MG disintegrating tablet Place 1 tablet onto the tongue every 8 hours as needed for Nausea. 20 tablet 0   • aspirin 81 MG EC tablet Take 1 tablet by mouth 2 times daily. 60 tablet 0   • acetaminophen (TYLENOL) 500 MG tablet Take 2 tablets by mouth every 8 hours.     • docusate sodium-sennosides (SENOKOT S) 50-8.6 MG per tablet Take 2 tablets by mouth nightly. 30 tablet 1   • oxyCODONE, IMM REL, (ROXICODONE) 5 MG immediate release tablet Take 1-2 tablets by mouth every 4 hours as needed for Pain. 40 tablet 0   • enalapril (VASOTEC) 20 MG tablet Take 1 tablet by mouth daily. (Patient taking differently: Take 20 mg by mouth  Please advise to start the prednisone and if the pain is getting worse despite the therapy she should be seen in the office  Thanks  nightly. ) 90 tablet 2   • pravastatin (PRAVACHOL) 20 MG tablet Take 1 tablet by mouth daily. (Patient taking differently: Take 20 mg by mouth nightly. ) 90 tablet 2   • metFORMIN (GLUCOPHAGE-XR) 500 MG 24 hr tablet TAKE 2 TABLETS BY MOUTH ONCE DAILY IN THE MORNING AND 1 TABLET WITH DINNER 270 tablet 1   • potassium chloride (KLOR-CON) 10 MEQ ER tablet Take 1 tablet by mouth 2 times daily. 180 tablet 1   • hydrochlorothiazide (HYDRODIURIL) 25 MG tablet Take 1 tablet by mouth daily. 90 tablet 3   • verapamil 120 MG tablet Take 1 tablet by mouth 2 times daily. For blood pressure. (Patient taking differently: Take 120 mg by mouth 2 times daily. For blood pressure. One mid-afternoon and one at night) 180 tablet 1   • metoPROLOL tartrate (LOPRESSOR) 100 MG tablet Take 1 tablet by mouth every morning. For blood pressure (Patient taking differently: Take 100 mg by mouth nightly. For blood pressure) 90 tablet 1   • DISPENSE      • vitamin E 400 UNIT capsule Take 400 Units by mouth daily.     • cholecalciferol (VITAMIN D3) 1000 UNITS tablet Take 1,000 Units by mouth daily.     • Multiple Vitamin (MULTI-VITAMIN PO) Take 1 tablet by mouth daily.     • CALCIUM 600 + D 600-200 MG-UNIT PO TABS 2 tabs daily 0 0       PAST MEDICAL HISTORY    Past Medical History:   Diagnosis Date   • Breast cancer (CMS/HCC)    • Diabetes mellitus type II 8/22/2011 2/7/2003    • Diabetes mellitus type II 8/22/2011 2/7/2003    • Essential hypertension 8/22/2011 2/26/2003    • Estrogen receptor positive status (ER+) 8/22/2011    3/14/2003    • Hyperlipidemia 10/20/2011   • Liver disease    • Osteoarth NOS-l/leg 1/14/2011   • Osteopenia 10/08/2010       SURGICAL HISTORY    Past Surgical History:   Procedure Laterality Date   • Colonoscopy  11/20/2006   • Colonoscopy diagnostic  11/20/2006    [facility]    • Dexa bone density axial skeleton  07/14/2008   • Extracapsular cataract removal w insert io lens prosth wo ecp Right 05/10/2021    zcboo +9.5   Joseph Cardoso MD   • Extracapsular cataract removal w insert io lens prosth wo ecp Left 2021    zcboo +10.00   Joseph Cardoso MD   • Mastectomy partial  04/15/2008   • Skin biopsy Right 2018    bridge of nose   • Total hip replacement Left 2022    [place], Left SEUN, Direct Anterior approach, DePuy implants   • Tubal ligation     • Us guide breast fna/biopsy/wire loc left Left 2017    Left breast subareolar 12:00 biopsy.  Ultrasound enhanced ribbon clip placed.       SOCIAL HISTORY    Social History     Tobacco Use   • Smoking status: Former Smoker     Quit date: 1983     Years since quittin.3   • Smokeless tobacco: Never Used   Vaping Use   • Vaping Use: never used   Substance Use Topics   • Alcohol use: Yes     Alcohol/week: 7.0 standard drinks     Types: 7 Standard drinks or equivalent per week   • Drug use: No       FAMILY HISTORY    Family History   Problem Relation Age of Onset   • Ophthalmology Mother         Glaucoma   • Hearing Loss Mother    • Cancer Mother    • Emphysema Father        REVIEW OF SYSTEMS    Constitutional:  Denies fever or chills.   Eyes:  Denies blurry or double vision.   HENT:  Denies nasal congestion or sore throat.   Respiratory:  Denies cough or shortness of breath.   Cardiovascular:  Denies chest pain or lower extremity edema.   Gastrointestinal:  Positive nausea and vomiting.  Denies abdominal pain, bloody stools or diarrhea.   Genitourinary:  Denies dysuria, frequency, urgency.   Musculoskeletal:  Denies back pain or joint pain.   Integument:  Denies rash or lesions.   Neurologic:  Positive near syncope.  Positive dizziness.  Denies headache, focal weakness or sensory changes.    Psychiatric:  Denies depression or anxiety.     PHYSICAL EXAM    ED Triage Vitals [22 1443]   /63   Heart Rate (!) 41   Resp 18   Temp 97.1 °F (36.2 °C)   SpO2 99 %      Vitals:    22 1800 22 1803 22 1816 22 1826   BP: (!) 170/74   134/85 (!) 152/69   BP Location:   LUE - Left upper extremity LUE - Left upper extremity   Patient Position:   Semi-Castro's Semi-Castro's   Pulse: (!) 48 (!) 48 (!) 52 (!) 48   Resp: 18 20 18 19   Temp:   98.3 °F (36.8 °C) 97.9 °F (36.6 °C)   TempSrc:   Oral Oral   SpO2:   99% 100%   Weight:           Pulse Ox Interpretation:  99% on room air  Constitutional:  Well developed, well nourished. No acute distress, non-toxic appearance.   HENT:  Head atraumatic. External ears normal. External nose normal. Oropharynx moist.  Respiratory:  No respiratory distress, normal breath sounds. No crackles. No wheezing.   Cardiovascular:  Bradycardic, normal rhythm. No murmurs, gallops, or rubs.  Gastrointestinal:  Soft, nondistended. Normal bowel sounds, nontender. No hepatomegaly, splenomegaly, mass, rebound or guarding.   Genitourinary:  No costovertebral angle tenderness.   Musculoskeletal:  No extremity edema, tenderness, or deformities.  Neurologic:  Alert & oriented x 3.  Normal motor function. Normal sensory function. No focal deficits noted.   Psychiatric:  Speech and behavior appropriate.     EKG    EKG Interpretation  Time: 14:57 PM  Rate: 44/min  Rhythm: sinus bradycardia   Abnormality: No STEMI  Reviewed by: Dr. Perrin.  Final interpretation per cardiology.    RADIOLOGY    No orders to display         LABS    Results for orders placed or performed during the hospital encounter of 05/03/22   Comprehensive Metabolic Panel   Result Value    Fasting Status     Sodium 138    Potassium 3.6    Chloride 102    Carbon Dioxide 25    Anion Gap 15    Glucose 143 (H)    BUN 23 (H)    Creatinine 0.97 (H)    Glomerular Filtration Rate 61     Comment: eGFR results = or >60 mL/min/1.73m2 = Normal kidney function. Estimated GFR calculated using the CKD-EPI-R (2021) equation that does not include race in the creatinine calculation.    BUN/ Creatinine Ratio 24    Calcium 9.4    Bilirubin, Total 0.9    GOT/AST 26    GPT/ALT 19    Alkaline  Phosphatase 80    Albumin 4.0    Protein, Total 7.8    Globulin 3.8    A/G Ratio 1.1   CBC with Automated Differential (performable only)   Result Value    WBC 8.3    RBC 4.13    HGB 13.5    HCT 39.9    MCV 96.6    MCH 32.7    MCHC 33.8    RDW-CV 13.0    RDW-SD 45.8        NRBC 0    Neutrophil, Percent 71    Lymphocytes, Percent 20    Mono, Percent 6    Eosinophils, Percent 2    Basophils, Percent 1    Immature Granulocytes 0    Absolute Neutrophils 5.9    Absolute Lymphocytes 1.6    Absolute Monocytes 0.5    Absolute Eosinophils  0.2    Absolute Basophils 0.0    Absolute Immmature Granulocytes 0.0   Rapid SARS-CoV-2 by PCR    Specimen: Nasal, Mid-turbinate; Swab   Result Value    Rapid SARS-COV-2 by PCR Not Detected    Isolation Guidelines      Comment: Do not use this test result as the sole decision-maker for discontinuation of isolation.   Clinical evaluation should be considered for other respiratory illness requiring transmission-based isolation.    -    No fever (<99.0 F/37.2 C) for at least 24 hours without the use of fever-reducing medications    AND  -    Respiratory symptoms have improved or resolved (e.g. cough, shortness of breath)     AND  -    COVID-19 negative test    See COVID-19 Deisolation Resource Guide    Procedural Comment      Comment: SARS-CoV-2 nucleic acid has not been detected indicating the absence of COVID-19.       Testing was performed using the [employer] Xpert Xpress SARS-CoV-2 RT-PCR assay that has been given Emergency Use Authorization (EUA) by the [place] Food and Drug Administration (FDA).  These results are considered definitive and do not need to be confirmed by another method.   GLUCOSE, BEDSIDE - POINT OF CARE   Result Value    GLUCOSE, BEDSIDE - POINT OF CARE 158 (H)         ED COURSE & MEDICAL DECISION MAKING    75-year-old female patient who presents to the emergency department today for evaluation after near syncopal episode of physical therapy today and  subsequent continued bradycardia.   reports that her metoprolol was doubled at hospital discharge from her hip surgery however I am not able to verify this.  I see a single dose of metoprolol prescribed from October and I do not see any changes made to this.  I do see a medication, metformin, that she is taking twice in the morning and once in the evening.  I was able to clarify with the  and the patient and she is indeed taking the metformin as prescribed and not the metoprolol twice daily.  She confirms that she is taking the metoprolol only once daily, at night time.  It is still possible that the bradycardia is related to the beta-blocker use, however given that she has been on this for some time and has never had this extensive of a bradycardia leads to the possibility that there may be another underlying process that led to her near syncope today and persistent bradycardia.  I ordered labs including CBC, CMP, EKG.    Labs reviewed.  There are no acute findings on the CBC and CMP.  Given the persistent bradycardia she will be hospitalized for observation.  This may truly be related to her beta-blocker use, but other processes cannot be eliminated.  I discussed with Dr. Araujo, hospitalist who accepts the patient for hospitalization, does request consultation with electrophysiology.  I spoke with Dr. Garcia, who will see the patient in consultation.    Impression:  The primary encounter diagnosis was Bradycardia. A diagnosis of Near syncope was also pertinent to this visit.    The patient is not expected to have a 2 midnight stay in the hospital.       Attending physician:Discussed with Dr. Aydin Smith, ROBBIE  05/03/22 [id number]

## 2022-05-20 ENCOUNTER — APPOINTMENT (OUTPATIENT)
Dept: LAB | Facility: CLINIC | Age: 77
End: 2022-05-20
Payer: MEDICARE

## 2022-05-20 DIAGNOSIS — M35.3 POLYMYALGIA RHEUMATICA (HCC): ICD-10-CM

## 2022-05-20 LAB
CRP SERPL QL: 19.4 MG/L
ERYTHROCYTE [SEDIMENTATION RATE] IN BLOOD: 84 MM/HOUR (ref 0–29)
URATE SERPL-MCNC: 9.2 MG/DL (ref 2–6.8)

## 2022-05-20 PROCEDURE — 84550 ASSAY OF BLOOD/URIC ACID: CPT

## 2022-05-20 PROCEDURE — 86140 C-REACTIVE PROTEIN: CPT

## 2022-05-20 PROCEDURE — 85652 RBC SED RATE AUTOMATED: CPT

## 2022-05-20 PROCEDURE — 36415 COLL VENOUS BLD VENIPUNCTURE: CPT

## 2022-07-06 DIAGNOSIS — G47.00 INSOMNIA, UNSPECIFIED TYPE: ICD-10-CM

## 2022-07-06 DIAGNOSIS — I10 BENIGN ESSENTIAL HYPERTENSION: ICD-10-CM

## 2022-07-06 RX ORDER — TEMAZEPAM 15 MG/1
15 CAPSULE ORAL
Qty: 30 CAPSULE | Refills: 0 | Status: SHIPPED | OUTPATIENT
Start: 2022-07-06

## 2022-07-06 RX ORDER — HYDROCHLOROTHIAZIDE 25 MG/1
25 TABLET ORAL DAILY
Qty: 90 TABLET | Refills: 1 | Status: SHIPPED | OUTPATIENT
Start: 2022-07-06

## 2022-07-06 NOTE — TELEPHONE ENCOUNTER
Patient called for these refills and she wants to continue taking the Hydrochlorothiazide and she wants to go back on the Temazepam 15 mg     She does want to discontinue the Trazodone it would clog her nose and she uses a CPAP machine so it didn't help her     Please advise   Thank you

## 2022-07-10 DIAGNOSIS — E03.9 HYPOTHYROIDISM, UNSPECIFIED TYPE: ICD-10-CM

## 2022-07-11 DIAGNOSIS — E03.9 HYPOTHYROIDISM, UNSPECIFIED TYPE: ICD-10-CM

## 2022-07-11 RX ORDER — LEVOTHYROXINE SODIUM 0.1 MG/1
TABLET ORAL
Qty: 90 TABLET | Refills: 1 | Status: SHIPPED | OUTPATIENT
Start: 2022-07-11 | End: 2022-07-21 | Stop reason: SDUPTHER

## 2022-07-11 RX ORDER — LEVOTHYROXINE SODIUM 0.1 MG/1
100 TABLET ORAL DAILY
Qty: 90 TABLET | Refills: 1 | OUTPATIENT
Start: 2022-07-11

## 2022-07-13 ENCOUNTER — OFFICE VISIT (OUTPATIENT)
Dept: OBGYN CLINIC | Facility: CLINIC | Age: 77
End: 2022-07-13
Payer: MEDICARE

## 2022-07-13 VITALS — BODY MASS INDEX: 39.93 KG/M2 | HEIGHT: 62 IN | WEIGHT: 217 LBS

## 2022-07-13 DIAGNOSIS — M17.0 PRIMARY OSTEOARTHRITIS OF BOTH KNEES: Primary | ICD-10-CM

## 2022-07-13 PROCEDURE — 99214 OFFICE O/P EST MOD 30 MIN: CPT | Performed by: PHYSICIAN ASSISTANT

## 2022-07-13 PROCEDURE — 20610 DRAIN/INJ JOINT/BURSA W/O US: CPT | Performed by: PHYSICIAN ASSISTANT

## 2022-07-13 RX ORDER — BETAMETHASONE SODIUM PHOSPHATE AND BETAMETHASONE ACETATE 3; 3 MG/ML; MG/ML
12 INJECTION, SUSPENSION INTRA-ARTICULAR; INTRALESIONAL; INTRAMUSCULAR; SOFT TISSUE
Status: COMPLETED | OUTPATIENT
Start: 2022-07-13 | End: 2022-07-13

## 2022-07-13 RX ORDER — LIDOCAINE HYDROCHLORIDE 10 MG/ML
1 INJECTION, SOLUTION INFILTRATION; PERINEURAL
Status: COMPLETED | OUTPATIENT
Start: 2022-07-13 | End: 2022-07-13

## 2022-07-13 RX ORDER — BUPIVACAINE HYDROCHLORIDE 2.5 MG/ML
1 INJECTION, SOLUTION INFILTRATION; PERINEURAL
Status: COMPLETED | OUTPATIENT
Start: 2022-07-13 | End: 2022-07-13

## 2022-07-13 RX ADMIN — BETAMETHASONE SODIUM PHOSPHATE AND BETAMETHASONE ACETATE 12 MG: 3; 3 INJECTION, SUSPENSION INTRA-ARTICULAR; INTRALESIONAL; INTRAMUSCULAR; SOFT TISSUE at 16:01

## 2022-07-13 RX ADMIN — LIDOCAINE HYDROCHLORIDE 1 ML: 10 INJECTION, SOLUTION INFILTRATION; PERINEURAL at 16:01

## 2022-07-13 RX ADMIN — BUPIVACAINE HYDROCHLORIDE 1 ML: 2.5 INJECTION, SOLUTION INFILTRATION; PERINEURAL at 16:01

## 2022-07-13 NOTE — PROGRESS NOTES
Assessment:  1  Primary osteoarthritis of both knees  Large joint arthrocentesis: bilateral knee     Patient Active Problem List   Diagnosis    Acute hip pain    Acute pain of both shoulders    Allergic rhinitis    Benign essential hypertension    Closed fracture of proximal end of right humerus with routine healing    Fracture of right distal radius    Hypothyroidism    Insomnia    Microscopic hematuria    Morbid obesity ([facility])    Obstructive sleep apnea    Other fatigue    Subacromial impingement of left shoulder    Subacromial impingement of right shoulder    Urticaria    Vitamin D deficiency    Type 2 diabetes mellitus with stage 3 chronic kidney disease, without long-term current use of insulin ([facility])    Primary osteoarthritis of both knees    Osteoarthritis of both knees    CKD (chronic kidney disease) stage 3, GFR 30-59 ml/min ([facility])    Anemia    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[facility])    Polymyalgia rheumatica ([facility])    Mixed hyperlipidemia    Chronic right-sided low back pain without sciatica    Facet hypertrophy of lumbar region       Plan:    68 y o  female  with bilateral knee osteoarthritis    Decision was made to proceed with bilateral intra-articular cortisone injections patient tolerated this well  She will follow-up in 3 months for repeat injections if needed or sooner if necessary       The assessment and plan were formulated by Dr aTcos Greer and I assisted in carrying it out  Subjective:   Patient ID: Diallo Land is a 68 y o  female   HPI    Patient presents to the office for follow up of bilateral knee osteoarthritis  Since the last visit, the patient reports worsening pain in the bilateral knees  She reports that the last cortisone injections last about 3 months    She is going to be seeing a vascular doctor regarding some erythema and swelling of lower legs peer     The following portions of the patient's history were reviewed and updated as appropriate: allergies, current medications, past family history, past social history, past surgical history and problem list     Social History     Socioeconomic History    Marital status:       Spouse name: Not on file    Number of children: 11    Years of education: 12    Highest education level: Not on file   Occupational History    Occupation: RN   Tobacco Use    Smoking status: Former Smoker     Packs/day: 0 50     Quit date:      Years since quittin 5    Smokeless tobacco: Never Used    Tobacco comment: 40 years ago    Vaping Use    Vaping Use: Former   Substance and Sexual Activity    Alcohol use: Yes     Comment: rarely ; occasional     Drug use: No    Sexual activity: Not on file   Other Topics Concern    Not on file   Social History Narrative    Drinks coffee     Social Determinants of Health     Financial Resource Strain: Not on file   Food Insecurity: Not on file   Transportation Needs: Not on file   Physical Activity: Not on file   Stress: Not on file   Social Connections: Not on file   Intimate Partner Violence: Not on file   Housing Stability: Not on file     Past Medical History:   Diagnosis Date    Anemia     Arthritis     Hypertension     [facility]ymyalgia rheumatica ([facility] [address] )     Shoulder impingement syndrome     last assessed 16     Past Surgical History:   Procedure Laterality Date    ABDOMINAL SURGERY      last assessed 16    OOPHORECTOMY Right     OTHER SURGICAL HISTORY Right 2017    shoulder    TOTAL ABDOMINAL HYSTERECTOMY  30 year ago    WRIST FRACTURE SURGERY Right 2017     Allergies   Allergen Reactions    Hydromorphone Dizziness and Other (See Comments)     Severe vertigo     Tdap [Tetanus-Diphth-Acell Pertussis] Swelling     Current Outpatient Medications on File Prior to Visit   Medication Sig Dispense Refill    Acetaminophen 500 MG Take 1,000 mg by mouth daily       aspirin (ECOTRIN LOW STRENGTH) 81 mg EC tablet Take 81 mg by mouth daily      BETIMOL 0 5 % ophthalmic solution INSTILL 1 DROP INTO THE AFFECTED EYE(S) EVERY DAY      hydrochlorothiazide (HYDRODIURIL) 25 mg tablet Take 1 tablet (25 mg total) by mouth daily 90 tablet 1    ibuprofen (MOTRIN) 200 mg tablet Take 200 mg by mouth every 6 (six) hours as needed for mild pain      latanoprost (XALATAN) 0 005 % ophthalmic solution INSTILL 1 DROP IN EACH EYE AT BEDTIME  5    levothyroxine 100 mcg tablet TAKE 1 TABLET BY MOUTH EVERY DAY 90 tablet 1    lisinopril (ZESTRIL) 20 mg tablet TAKE 1 TABLET BY MOUTH EVERY DAY 90 tablet 1    metoprolol succinate (TOPROL-XL) 50 mg 24 hr tablet TAKE 1 TABLET BY MOUTH EVERY DAY 90 tablet 1    Multiple Vitamins-Minerals (OCUVITE ADULT 50+ PO) Take 1 tablet by mouth daily      temazepam (RESTORIL) 15 mg capsule Take 1 capsule (15 mg total) by mouth daily at bedtime as needed for sleep 30 capsule 0    timolol (TIMOPTIC) 0 5 % ophthalmic solution       traZODone (DESYREL) 50 mg tablet Take 1 tablet (50 mg total) by mouth daily at bedtime as needed for sleep (Patient not taking: Reported on 7/13/2022) 30 tablet 1     No current facility-administered medications on file prior to visit  Review of Systems  See HPi    Objective: There were no vitals filed for this visit  Physical Exam    Ortho Exam   Mediolateral joint line tenderness in the bilateral knees range of motion within functional limits  No ecchymosis no erythema no effusion  Neurovascular intact bilaterally    I have personally reviewed pertinent films in PACS  Large joint arthrocentesis: bilateral knee  Universal Protocol:  Consent given by: patient  Time out: Immediately prior to procedure a "time out" was called to verify the correct patient, procedure, equipment, support staff and site/side marked as required    Site marked: the operative site was marked  Supporting Documentation  Indications: pain   Procedure Details  Location: knee - bilateral knee  Preparation: Patient was prepped and draped in the usual sterile fashion  Needle size: 22 G  Approach: anterolateral    Medications (Right): 1 mL lidocaine 1 %; 12 mg betamethasone acetate-betamethasone sodium phosphate 6 (3-3) mg/mL; 1 mL bupivacaine 0 25 %Medications (Left): 1 mL lidocaine 1 %; 12 mg betamethasone acetate-betamethasone sodium phosphate 6 (3-3) mg/mL; 1 mL bupivacaine 0 25 %   Patient tolerance: patient tolerated the procedure well with no immediate complications  Dressing:  Sterile dressing applied                Portions of the record may have been created with voice recognition software  Occasional wrong word or "sound a like" substitutions may have occurred due to the inherent limitations of voice recognition software  Read the chart carefully and recognize, using context, where substitutions have occurred

## 2022-07-21 DIAGNOSIS — I10 BENIGN ESSENTIAL HYPERTENSION: ICD-10-CM

## 2022-07-21 DIAGNOSIS — E03.9 HYPOTHYROIDISM, UNSPECIFIED TYPE: ICD-10-CM

## 2022-07-21 RX ORDER — LEVOTHYROXINE SODIUM 0.1 MG/1
TABLET ORAL
Qty: 90 TABLET | Refills: 1 | OUTPATIENT
Start: 2022-07-21

## 2022-07-21 RX ORDER — LEVOTHYROXINE SODIUM 0.1 MG/1
100 TABLET ORAL DAILY
Qty: 90 TABLET | Refills: 1 | Status: SHIPPED | OUTPATIENT
Start: 2022-07-21

## 2022-07-21 RX ORDER — LISINOPRIL 20 MG/1
20 TABLET ORAL DAILY
Qty: 90 TABLET | Refills: 1 | Status: SHIPPED | OUTPATIENT
Start: 2022-07-21

## 2022-08-02 ENCOUNTER — APPOINTMENT (OUTPATIENT)
Dept: LAB | Facility: CLINIC | Age: 77
End: 2022-08-02
Payer: MEDICARE

## 2022-08-02 DIAGNOSIS — M35.3 POLYMYALGIA RHEUMATICA (HCC): ICD-10-CM

## 2022-08-02 PROCEDURE — 36415 COLL VENOUS BLD VENIPUNCTURE: CPT

## 2022-08-04 ENCOUNTER — APPOINTMENT (OUTPATIENT)
Dept: LAB | Facility: CLINIC | Age: 77
End: 2022-08-04
Payer: MEDICARE

## 2022-08-04 DIAGNOSIS — M35.3 POLYMYALGIA RHEUMATICA (HCC): ICD-10-CM

## 2022-08-04 LAB
CRP SERPL QL: 6.8 MG/L
ERYTHROCYTE [SEDIMENTATION RATE] IN BLOOD: 52 MM/HOUR (ref 0–29)
URATE SERPL-MCNC: 9.1 MG/DL (ref 2–7.5)

## 2022-08-04 PROCEDURE — 86140 C-REACTIVE PROTEIN: CPT

## 2022-08-04 PROCEDURE — 85652 RBC SED RATE AUTOMATED: CPT

## 2022-08-04 PROCEDURE — 36415 COLL VENOUS BLD VENIPUNCTURE: CPT

## 2022-08-04 PROCEDURE — 84550 ASSAY OF BLOOD/URIC ACID: CPT

## 2022-08-19 ENCOUNTER — RA CDI HCC (OUTPATIENT)
Dept: OTHER | Facility: HOSPITAL | Age: 77
End: 2022-08-19

## 2022-08-19 NOTE — PROGRESS NOTES
[facility]  coding opportunities     [id number]     Chart Reviewed number of suggestions sent to Provider: 1     Patients Insurance     Medicare Insurance: [employer]

## 2022-08-25 ENCOUNTER — OFFICE VISIT (OUTPATIENT)
Dept: FAMILY MEDICINE CLINIC | Facility: OTHER | Age: 77
End: 2022-08-25
Payer: MEDICARE

## 2022-08-25 VITALS
DIASTOLIC BLOOD PRESSURE: 84 MMHG | TEMPERATURE: 98 F | SYSTOLIC BLOOD PRESSURE: 130 MMHG | BODY MASS INDEX: 41.44 KG/M2 | WEIGHT: 225.2 LBS | OXYGEN SATURATION: 98 % | RESPIRATION RATE: 18 BRPM | HEIGHT: 62 IN | HEART RATE: 63 BPM

## 2022-08-25 DIAGNOSIS — E11.22 TYPE 2 DIABETES MELLITUS WITH STAGE 3 CHRONIC KIDNEY DISEASE, WITHOUT LONG-TERM CURRENT USE OF INSULIN, UNSPECIFIED WHETHER STAGE 3A OR 3B CKD (HCC): Primary | ICD-10-CM

## 2022-08-25 DIAGNOSIS — N18.32 STAGE 3B CHRONIC KIDNEY DISEASE (HCC): ICD-10-CM

## 2022-08-25 DIAGNOSIS — M35.3 POLYMYALGIA RHEUMATICA (HCC): ICD-10-CM

## 2022-08-25 DIAGNOSIS — N18.30 TYPE 2 DIABETES MELLITUS WITH STAGE 3 CHRONIC KIDNEY DISEASE, WITHOUT LONG-TERM CURRENT USE OF INSULIN, UNSPECIFIED WHETHER STAGE 3A OR 3B CKD (HCC): Primary | ICD-10-CM

## 2022-08-25 LAB — SL AMB POCT HEMOGLOBIN AIC: 6.5 (ref ?–6.5)

## 2022-08-25 PROCEDURE — 99213 OFFICE O/P EST LOW 20 MIN: CPT | Performed by: FAMILY MEDICINE

## 2022-08-25 PROCEDURE — 83036 HEMOGLOBIN GLYCOSYLATED A1C: CPT | Performed by: FAMILY MEDICINE

## 2022-08-25 RX ORDER — ALLOPURINOL 100 MG/1
TABLET ORAL
COMMUNITY
Start: 2022-08-09

## 2022-08-25 NOTE — PROGRESS NOTES
Assessment/Plan:    Aaron Salinas is a 67 y/o F with PMH significant for DM2, HTN, CKD, and hypothyroidism presenting today for DM2 check  DM2:  Continue to control with diet, follow up in Jan 2023 to repeat CMP, Lipid panel  Encourage regular exercise  Subjective:      Patient ID: Anmol Liang is a 68 y o  female  Diet has been ok, eating breads, avoiding sweets  Pt reports she walks up and down stairs in her home, tried to increase walking but knee pain is limiting this  Pt reports all medications are "fine", trying to reduce number of advil she takes (currently takes approx 2 advil per week)  Diabetes  She presents for her follow-up diabetic visit  She has type 2 diabetes mellitus  Her disease course has been stable  Pertinent negatives for hypoglycemia include no confusion, dizziness, headaches, nervousness/anxiousness, sweats or tremors  Pertinent negatives for diabetes include no blurred vision, no chest pain, no fatigue, no foot paresthesias, no polydipsia, no polyuria and no weakness  Pertinent negatives for hypoglycemia complications include no blackouts  Symptoms are stable  Pertinent negatives for diabetic complications include no autonomic neuropathy or retinopathy  Risk factors for coronary artery disease include diabetes mellitus, dyslipidemia and hypertension  Current diabetic treatment includes diet  She is compliant with treatment most of the time  She is following a generally healthy and high fiber diet  She never participates in exercise  She does not see a podiatrist Eye exam is current         Past Medical History:   Diagnosis Date    Anemia     Arthritis     Hypertension     [facility]a [facility] [address]     Shoulder impingement syndrome     last assessed 12/27/16       Past Surgical History:   Procedure Laterality Date    ABDOMINAL SURGERY      last assessed 11/1/16    OOPHORECTOMY Right     OTHER SURGICAL HISTORY Right 2017    shoulder    TOTAL ABDOMINAL HYSTERECTOMY  30 year ago    WRIST FRACTURE SURGERY Right 2017       Family History   Problem Relation Age of Onset    Hypertension Mother    Ardeth Needs Glaucoma Mother     Stroke Mother     Gout Father     COPD Father     Heart disease Father     Kidney failure Sister         chronic    Gout Sister     Multiple sclerosis Sister     Hypertension Sister     Alcohol abuse Brother     Cancer Brother     Gout Brother     Coronary artery disease Brother         CABGx4  (2019)    No Known Problems Daughter     No Known Problems Maternal Grandmother     No Known Problems Maternal Grandfather     No Known Problems Paternal Grandmother     No Known Problems Paternal Grandfather     Lung cancer Paternal Uncle     No Known Problems Sister     No Known Problems Daughter     No Known Problems Son     No Known Problems Son     No Known Problems Son        Social History     Socioeconomic History    Marital status:       Spouse name: Not on file    Number of children: 11    Years of education: 12    Highest education level: Not on file   Occupational History    Occupation: RN   Tobacco Use    Smoking status: Former Smoker     Packs/day: 0 50     Quit date:      Years since quittin 6    Smokeless tobacco: Never Used    Tobacco comment: 40 years ago    Vaping Use    Vaping Use: Former   Substance and Sexual Activity    Alcohol use: Yes     Comment: rarely ; occasional     Drug use: No    Sexual activity: Not on file   Other Topics Concern    Not on file   Social History Narrative    Drinks coffee     Social Determinants of Health     Financial Resource Strain: Not on file   Food Insecurity: Not on file   Transportation Needs: Not on file   Physical Activity: Not on file   Stress: Not on file   Social Connections: Not on file   Intimate Partner Violence: Not on file   Housing Stability: Not on file         Current Outpatient Medications:     Acetaminophen 500 MG, Take 1,000 mg by mouth daily , Disp: , Rfl:   allopurinol (ZYLOPRIM) 100 mg tablet, TAKE 1/2 TABLET BY MOUTH EVERY DAY FOR 1 WEEK, THEN TAKE 1 TABLET BY MOUTH EVERY DAY FOR 1 WEEK, THEN TAKE 1 AND 1/2 TABLETS BY MOUTH ONE TI, Disp: , Rfl:     aspirin (ECOTRIN LOW STRENGTH) 81 mg EC tablet, Take 81 mg by mouth daily, Disp: , Rfl:     BETIMOL 0 5 % ophthalmic solution, INSTILL 1 DROP INTO THE AFFECTED EYE(S) EVERY DAY, Disp: , Rfl:     hydrochlorothiazide (HYDRODIURIL) 25 mg tablet, Take 1 tablet (25 mg total) by mouth daily (Patient taking differently: Take 25 mg by mouth daily as needed), Disp: 90 tablet, Rfl: 1    ibuprofen (MOTRIN) 200 mg tablet, Take 200 mg by mouth every 6 (six) hours as needed for mild pain, Disp: , Rfl:     latanoprost (XALATAN) 0 005 % ophthalmic solution, INSTILL 1 DROP IN EACH EYE AT BEDTIME, Disp: , Rfl: 5    levothyroxine 100 mcg tablet, Take 1 tablet (100 mcg total) by mouth daily, Disp: 90 tablet, Rfl: 1    lisinopril (ZESTRIL) 20 mg tablet, Take 1 tablet (20 mg total) by mouth daily, Disp: 90 tablet, Rfl: 1    metoprolol succinate (TOPROL-XL) 50 mg 24 hr tablet, TAKE 1 TABLET BY MOUTH EVERY DAY, Disp: 90 tablet, Rfl: 1    Multiple Vitamins-Minerals (OCUVITE ADULT 50+ PO), Take 1 tablet by mouth daily, Disp: , Rfl:     temazepam (RESTORIL) 15 mg capsule, Take 1 capsule (15 mg total) by mouth daily at bedtime as needed for sleep, Disp: 30 capsule, Rfl: 0    timolol (TIMOPTIC) 0 5 % ophthalmic solution, , Disp: , Rfl:     Allergies   Allergen Reactions    Hydromorphone Dizziness and Other (See Comments)     Severe vertigo     Tdap [Tetanus-Diphth-Acell Pertussis] Swelling           Review of Systems   Constitutional: Negative for activity change, fatigue, fever and unexpected weight change  HENT: Negative for congestion, ear pain, sinus pain and sore throat  Eyes: Negative for blurred vision, pain and itching  Respiratory: Negative for cough and shortness of breath      Cardiovascular: Negative for chest pain and palpitations  Gastrointestinal: Negative for abdominal pain, constipation, diarrhea, nausea and vomiting  Endocrine: Negative for cold intolerance, heat intolerance, polydipsia and polyuria  Genitourinary: Negative for dysuria  Musculoskeletal: Negative for myalgias  Skin: Positive for color change  Negative for rash  Prescribed compression stockings, purple discoloration on bilateral shins  Neurological: Negative for dizziness, tremors, syncope, weakness and headaches  Hematological: Negative for adenopathy  Psychiatric/Behavioral: Negative for behavioral problems, confusion, dysphoric mood and sleep disturbance  The patient is not nervous/anxious  Objective:      /84   Pulse 63   Temp 98 °F (36 7 °C)   Resp 18   Ht 5' 2" (1 575 m)   Wt 102 kg (225 lb 3 2 oz)   SpO2 98%   BMI 41 19 kg/m²          Physical Exam  Vitals and nursing note reviewed  Constitutional:       General: She is not in acute distress  Appearance: Normal appearance  She is well-developed  She is not ill-appearing  Comments: Body mass index is 41 19 kg/m²  HENT:      Head: Normocephalic and atraumatic  Right Ear: External ear normal       Left Ear: External ear normal       Nose: Nose normal    Eyes:      General: No scleral icterus  Conjunctiva/sclera: Conjunctivae normal       Pupils: Pupils are equal, round, and reactive to light  Neck:      Thyroid: No thyromegaly  Cardiovascular:      Rate and Rhythm: Normal rate and regular rhythm  Pulses: no weak pulses          Dorsalis pedis pulses are 2+ on the right side and 2+ on the left side  Posterior tibial pulses are 2+ on the right side and 2+ on the left side  Heart sounds: Normal heart sounds  No murmur heard  Pulmonary:      Effort: Pulmonary effort is normal  No respiratory distress  Breath sounds: Normal breath sounds  No wheezing     Abdominal:      General: Bowel sounds are normal  There is no distension  Palpations: Abdomen is soft  Tenderness: There is no abdominal tenderness  Musculoskeletal:         General: No tenderness  Normal range of motion  Cervical back: Normal range of motion and neck supple  Right lower leg: No edema  Left lower leg: No edema  Feet:      Right foot:      Skin integrity: No ulcer, skin breakdown, erythema, warmth, callus or dry skin  Left foot:      Skin integrity: No ulcer, skin breakdown, erythema, warmth, callus or dry skin  Lymphadenopathy:      Cervical: No cervical adenopathy  Skin:     General: Skin is warm and dry  Coloration: Skin is not jaundiced  Findings: No rash  Neurological:      General: No focal deficit present  Mental Status: She is alert and oriented to person, place, and time  Cranial Nerves: No cranial nerve deficit  Gait: Gait normal    Psychiatric:         Mood and Affect: Mood normal          Behavior: Behavior normal          Patient's shoes and socks removed  Right Foot/Ankle   Right Foot Inspection  Skin Exam: skin normal and skin intact  No dry skin, no warmth, no callus, no erythema, no maceration, no abnormal color, no pre-ulcer, no ulcer and no callus  Toe Exam: ROM and strength within normal limits  Sensory   Vibration: intact  Proprioception: intact  Monofilament testing: intact    Vascular  Capillary refills: < 3 seconds  The right DP pulse is 2+  The right PT pulse is 2+  Left Foot/Ankle  Left Foot Inspection  Skin Exam: skin normal and skin intact  No dry skin, no warmth, no erythema, no maceration, normal color, no pre-ulcer, no ulcer and no callus  Toe Exam: ROM and strength within normal limits  Sensory   Vibration: intact  Proprioception: intact  Monofilament testing: intact    Vascular  Capillary refills: < 3 seconds  The left DP pulse is 2+  The left PT pulse is 2+       Assign Risk Category  No deformity present  No loss of protective sensation  No weak pulses  Risk: 0

## 2022-10-05 ENCOUNTER — OFFICE VISIT (OUTPATIENT)
Dept: OBGYN CLINIC | Facility: CLINIC | Age: 77
End: 2022-10-05
Payer: MEDICARE

## 2022-10-05 VITALS — WEIGHT: 225 LBS | HEIGHT: 62 IN | BODY MASS INDEX: 41.41 KG/M2

## 2022-10-05 DIAGNOSIS — M17.0 PRIMARY OSTEOARTHRITIS OF BOTH KNEES: Primary | ICD-10-CM

## 2022-10-05 PROCEDURE — 99213 OFFICE O/P EST LOW 20 MIN: CPT | Performed by: PHYSICIAN ASSISTANT

## 2022-10-05 PROCEDURE — 20610 DRAIN/INJ JOINT/BURSA W/O US: CPT | Performed by: PHYSICIAN ASSISTANT

## 2022-10-05 RX ORDER — BUPIVACAINE HYDROCHLORIDE 2.5 MG/ML
1 INJECTION, SOLUTION INFILTRATION; PERINEURAL
Status: COMPLETED | OUTPATIENT
Start: 2022-10-05 | End: 2022-10-05

## 2022-10-05 RX ORDER — BETAMETHASONE SODIUM PHOSPHATE AND BETAMETHASONE ACETATE 3; 3 MG/ML; MG/ML
12 INJECTION, SUSPENSION INTRA-ARTICULAR; INTRALESIONAL; INTRAMUSCULAR; SOFT TISSUE
Status: COMPLETED | OUTPATIENT
Start: 2022-10-05 | End: 2022-10-05

## 2022-10-05 RX ORDER — BETAMETHASONE SODIUM PHOSPHATE AND BETAMETHASONE ACETATE 3; 3 MG/ML; MG/ML
6 INJECTION, SUSPENSION INTRA-ARTICULAR; INTRALESIONAL; INTRAMUSCULAR; SOFT TISSUE
Status: COMPLETED | OUTPATIENT
Start: 2022-10-05 | End: 2022-10-05

## 2022-10-05 RX ADMIN — BETAMETHASONE SODIUM PHOSPHATE AND BETAMETHASONE ACETATE 12 MG: 3; 3 INJECTION, SUSPENSION INTRA-ARTICULAR; INTRALESIONAL; INTRAMUSCULAR; SOFT TISSUE at 13:32

## 2022-10-05 RX ADMIN — BUPIVACAINE HYDROCHLORIDE 1 ML: 2.5 INJECTION, SOLUTION INFILTRATION; PERINEURAL at 13:32

## 2022-10-05 RX ADMIN — BETAMETHASONE SODIUM PHOSPHATE AND BETAMETHASONE ACETATE 6 MG: 3; 3 INJECTION, SUSPENSION INTRA-ARTICULAR; INTRALESIONAL; INTRAMUSCULAR; SOFT TISSUE at 13:32

## 2022-10-05 NOTE — PROGRESS NOTES
Assessment:  1  Primary osteoarthritis of both knees  Large joint arthrocentesis: bilateral knee     Patient Active Problem List   Diagnosis    Acute hip pain    Acute pain of both shoulders    Allergic rhinitis    Benign essential hypertension    Closed fracture of proximal end of right humerus with routine healing    Fracture of right distal radius    Hypothyroidism    Insomnia    Microscopic hematuria    [facility])    Obstructive sleep apnea    Other fatigue    Subacromial impingement of left shoulder    Subacromial impingement of right shoulder    Urticaria    Vitamin D deficiency    Type 2 diabetes mellitus with stage 3 chronic kidney disease, without long-term current use of insulin ([facility])    Primary osteoarthritis of both knees    Osteoarthritis of both knees    CKD (chronic kidney disease) stage 3, GFR 30-59 ml/min ([facility])    Anemia    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HCC)    Polymyalgia rheumatica ([facility])    Mixed hyperlipidemia    Chronic right-sided low back pain without sciatica    Facet hypertrophy of lumbar region       Plan:    68 y o  female  with bilateral knee osteoarthritis, history of gout    Bilateral intra-articular cortisone injections given today patient tolerated this well  Patient may follow-up in 3 months for another cortisone injection if needed did discuss possibly trying a Toradol injection into the knees in about 6 weeks  She does have CKD most recent GFR of 40 she will talk her PCP to see if they feel intra-articular Toradol would be safe for her       The assessment and plan were formulated by Dr Diane Lopez and I assisted in carrying it out  Subjective:   Patient ID: Farheen Centeno is a 68 y o  female   HPI    Patient presents to the office for follow up of bilateral knee osteoarthritis  Since the last visit, the patient reports no relief in pain from most recent Visco injections    She gets about 1-2 weeks of relief from cortisone injections in the knees   She is seeing a rheumatologist she has had persistently elevated inflammatory markers  She has just started taking allopurinol denies any acute gout flares in her knees         The following portions of the patient's history were reviewed and updated as appropriate: allergies, current medications, past family history, past social history, past surgical history and problem list     Social History     Socioeconomic History    Marital status:       Spouse name: Not on file    Number of children: 11    Years of education: 12    Highest education level: Not on file   Occupational History    Occupation: RN   Tobacco Use    Smoking status: Former Smoker     Packs/day: 0 50     Quit date:      Years since quittin 7    Smokeless tobacco: Never Used    Tobacco comment: 40 years ago    Vaping Use    Vaping Use: Former   Substance and Sexual Activity    Alcohol use: Yes     Comment: rarely ; occasional     Drug use: No    Sexual activity: Not on file   Other Topics Concern    Not on file   Social History Narrative    Drinks coffee     Social Determinants of Health     Financial Resource Strain: Not on file   Food Insecurity: Not on file   Transportation Needs: Not on file   Physical Activity: Not on file   Stress: Not on file   Social Connections: Not on file   Intimate Partner Violence: Not on file   Housing Stability: Not on file     Past Medical History:   Diagnosis Date    Anemia     Arthritis     Hypertension     [facility]ymyalgia rheumatica ([facility] [address]     Shoulder impingement syndrome     last assessed 16     Past Surgical History:   Procedure Laterality Date    ABDOMINAL SURGERY      last assessed 16    OOPHORECTOMY Right     OTHER SURGICAL HISTORY Right 2017    shoulder    TOTAL ABDOMINAL HYSTERECTOMY  30 year ago    WRIST FRACTURE SURGERY Right 2017     Allergies   Allergen Reactions    Hydromorphone Dizziness and Other (See Comments)     Severe vertigo     Tdap [Tetanus-Diphth-Acell Pertussis] Swelling     Current Outpatient Medications on File Prior to Visit   Medication Sig Dispense Refill    Acetaminophen 500 MG Take 1,000 mg by mouth daily       allopurinol (ZYLOPRIM) 100 mg tablet TAKE 1/2 TABLET BY MOUTH EVERY DAY FOR 1 WEEK, THEN TAKE 1 TABLET BY MOUTH EVERY DAY FOR 1 WEEK, THEN TAKE 1 AND 1/2 TABLETS BY MOUTH ONE TI      aspirin (ECOTRIN LOW STRENGTH) 81 mg EC tablet Take 81 mg by mouth daily      BETIMOL 0 5 % ophthalmic solution INSTILL 1 DROP INTO THE AFFECTED EYE(S) EVERY DAY      hydrochlorothiazide (HYDRODIURIL) 25 mg tablet Take 1 tablet (25 mg total) by mouth daily (Patient taking differently: Take 25 mg by mouth daily as needed) 90 tablet 1    ibuprofen (MOTRIN) 200 mg tablet Take 200 mg by mouth every 6 (six) hours as needed for mild pain      latanoprost (XALATAN) 0 005 % ophthalmic solution INSTILL 1 DROP IN EACH EYE AT BEDTIME  5    levothyroxine 100 mcg tablet Take 1 tablet (100 mcg total) by mouth daily 90 tablet 1    lisinopril (ZESTRIL) 20 mg tablet Take 1 tablet (20 mg total) by mouth daily 90 tablet 1    metoprolol succinate (TOPROL-XL) 50 mg 24 hr tablet TAKE 1 TABLET BY MOUTH EVERY DAY 90 tablet 1    Multiple Vitamins-Minerals (OCUVITE ADULT 50+ PO) Take 1 tablet by mouth daily      temazepam (RESTORIL) 15 mg capsule Take 1 capsule (15 mg total) by mouth daily at bedtime as needed for sleep 30 capsule 0    timolol (TIMOPTIC) 0 5 % ophthalmic solution        No current facility-administered medications on file prior to visit  Review of Systems  See HPi    Objective: There were no vitals filed for this visit  Physical Exam  Musculoskeletal:      Right knee: No effusion  Left knee: No effusion  Right Knee Exam     Muscle Strength   The patient has normal right knee strength  Tenderness   The patient is experiencing tenderness in the medial joint line      Range of Motion   Extension:  -5 abnormal     Tests Varus: negative Valgus: negative    Other   Erythema: absent  Scars: absent  Sensation: normal  Pulse: present  Swelling: mild  Effusion: no effusion present    Comments:  No ecchymosis      Left Knee Exam     Muscle Strength   The patient has normal left knee strength  Tenderness   The patient is experiencing tenderness in the medial joint line  Range of Motion   Extension:  -10 abnormal     Tests   Varus: negative Valgus: negative    Other   Erythema: absent  Scars: absent  Sensation: normal  Pulse: present  Swelling: mild  Effusion: no effusion present    Comments:  No ecchymosis,            I have personally reviewed pertinent films in PACS  Large joint arthrocentesis: bilateral knee  Universal Protocol:  Consent given by: patient  Time out: Immediately prior to procedure a "time out" was called to verify the correct patient, procedure, equipment, support staff and site/side marked as required  Site marked: the operative site was marked  Supporting Documentation  Indications: pain   Procedure Details  Location: knee - bilateral knee  Preparation: Patient was prepped and draped in the usual sterile fashion  Needle size: 22 G  Approach: anterolateral    Medications (Right): 12 mg betamethasone acetate-betamethasone sodium phosphate 6 (3-3) mg/mL; 1 mL bupivacaine 0 25 %; 6 mg betamethasone acetate-betamethasone sodium phosphate 6 (3-3) mg/mLMedications (Left): 12 mg betamethasone acetate-betamethasone sodium phosphate 6 (3-3) mg/mL; 1 mL bupivacaine 0 25 %; 6 mg betamethasone acetate-betamethasone sodium phosphate 6 (3-3) mg/mL   Patient tolerance: patient tolerated the procedure well with no immediate complications  Dressing:  Sterile dressing applied                Portions of the record may have been created with voice recognition software  Occasional wrong word or "sound a like" substitutions may have occurred due to the inherent limitations of voice recognition software    Read the chart carefully and recognize, using context, where substitutions have occurred

## 2022-10-06 ENCOUNTER — TELEPHONE (OUTPATIENT)
Dept: FAMILY MEDICINE CLINIC | Facility: OTHER | Age: 77
End: 2022-10-06

## 2022-10-06 NOTE — TELEPHONE ENCOUNTER
Hello! Please reassure her that I do not see any problem with her trying a toradol injection  Thanks!   Patricia Wells

## 2022-10-06 NOTE — TELEPHONE ENCOUNTER
The patient called seeking your advise  She has been going to Dr Tino Méndez for knee injections of cortizone & lidocaine  Dr Tino Méndez suggested trying injectable toradol  She would like your thoughts if the toradol will be safe  She also said hello    Please advise    Thank you!

## 2022-10-11 DIAGNOSIS — I10 ESSENTIAL HYPERTENSION: ICD-10-CM

## 2022-10-11 RX ORDER — METOPROLOL SUCCINATE 50 MG/1
TABLET, EXTENDED RELEASE ORAL
Qty: 90 TABLET | Refills: 1 | Status: SHIPPED | OUTPATIENT
Start: 2022-10-11

## 2022-10-20 ENCOUNTER — OFFICE VISIT (OUTPATIENT)
Dept: FAMILY MEDICINE CLINIC | Facility: OTHER | Age: 77
End: 2022-10-20
Payer: MEDICARE

## 2022-10-20 VITALS
BODY MASS INDEX: 41.04 KG/M2 | HEIGHT: 62 IN | RESPIRATION RATE: 20 BRPM | OXYGEN SATURATION: 96 % | HEART RATE: 72 BPM | DIASTOLIC BLOOD PRESSURE: 68 MMHG | TEMPERATURE: 98 F | WEIGHT: 223 LBS | SYSTOLIC BLOOD PRESSURE: 118 MMHG

## 2022-10-20 DIAGNOSIS — R09.81 NASAL CONGESTION: ICD-10-CM

## 2022-10-20 DIAGNOSIS — J18.9 PNEUMONIA DUE TO INFECTIOUS ORGANISM, UNSPECIFIED LATERALITY, UNSPECIFIED PART OF LUNG: Primary | ICD-10-CM

## 2022-10-20 LAB
SARS-COV-2 AG UPPER RESP QL IA: NEGATIVE
VALID CONTROL: NORMAL

## 2022-10-20 PROCEDURE — 87811 SARS-COV-2 COVID19 W/OPTIC: CPT | Performed by: FAMILY MEDICINE

## 2022-10-20 PROCEDURE — 99213 OFFICE O/P EST LOW 20 MIN: CPT | Performed by: FAMILY MEDICINE

## 2022-10-20 RX ORDER — AMOXICILLIN 500 MG/1
1000 CAPSULE ORAL EVERY 8 HOURS SCHEDULED
Qty: 42 CAPSULE | Refills: 0 | Status: SHIPPED | OUTPATIENT
Start: 2022-10-20 | End: 2022-10-27

## 2022-10-20 RX ORDER — AMOXICILLIN 500 MG/1
500 CAPSULE ORAL EVERY 8 HOURS SCHEDULED
Qty: 21 CAPSULE | Refills: 0 | Status: SHIPPED | OUTPATIENT
Start: 2022-10-20 | End: 2022-10-20 | Stop reason: DRUGHIGH

## 2022-10-20 NOTE — PROGRESS NOTES
Name: Nicolle Wolfe      : 1945      MRN: [id number]  Encounter Provider: Pro Holguin DO  Encounter Date: 10/20/2022   Encounter department: [address] [place] [address] Dr Sparrow  Pneumonia due to infectious organism, unspecified laterality, unspecified part of lung  Assessment & Plan:  Patient presenting with a week and a half of upper and lower respiratory infection symptoms consisting of rhinorrhea, PND, chest congestion, productive cough, SOB  She denies any fevers, chills, N/V or decreased oral intake  On physical examination her lungs sound CTA b/l  Concerned for possible developing pneumonia as patient mentions that her clear sputum coughs have transitioned to brown/green  Patient is also with a week and a half of symptoms that are not improving and potentially worsening  COVID test negative  Plan  - Amoxacillin 1g TID for 7 days   - F/u next week if symptoms are not improving     Orders:  -     amoxicillin (AMOXIL) 500 mg capsule; Take 2 capsules (1,000 mg total) by mouth every 8 (eight) hours for 7 days    2  Nasal congestion  -     POCT Rapid Covid Ag         Subjective      Nicolle Wolfe is a 68 YOF presenting with CC of a week and a half of productive cough, congestion, rhinorrhea, and PND  Patient mentions that her sputum has transitioned from being clear phlegm to a brown/greenish color over the last several days  Pt does not endorse specific fevers but will says she's hot and cold on and off  Denies body aches or chills  Denies sore throat or adenopathy  No sick contacts  Pt has not tried any medications besides benadryl once to help her sleep and tylenol for arthritis  The cough has worsened since then and the pt is concerned that her infection may be becoming "bacterial"  She is also complaining of shortness of breath more than usual during her regular daily activities       The pt has not tried any OTC decongestants due to personal preference and she states that they don't work for her  Review of Systems   Constitutional: Positive for chills  Negative for fever  HENT: Positive for congestion, postnasal drip, rhinorrhea and sneezing  Negative for ear pain, sinus pressure, sinus pain, sore throat, tinnitus, trouble swallowing and voice change  Eyes: Negative for visual disturbance  Respiratory: Positive for cough, chest tightness and shortness of breath  Negative for wheezing and stridor  Cardiovascular: Negative for chest pain  Gastrointestinal: Negative for abdominal distention, constipation, diarrhea, nausea and vomiting  Genitourinary: Negative for dysuria and frequency  Skin: Negative for rash  Neurological: Negative for dizziness, weakness, light-headedness and headaches  Hematological: Negative for adenopathy  Psychiatric/Behavioral: Negative for behavioral problems         Current Outpatient Medications on File Prior to Visit   Medication Sig   • Acetaminophen 500 MG Take 1,000 mg by mouth daily    • allopurinol (ZYLOPRIM) 100 mg tablet TAKE 1/2 TABLET BY MOUTH EVERY DAY FOR 1 WEEK, THEN TAKE 1 TABLET BY MOUTH EVERY DAY FOR 1 WEEK, THEN TAKE 1 AND 1/2 TABLETS BY MOUTH ONE TI   • BETIMOL 0 5 % ophthalmic solution INSTILL 1 DROP INTO THE AFFECTED EYE(S) EVERY DAY   • hydrochlorothiazide (HYDRODIURIL) 25 mg tablet Take 1 tablet (25 mg total) by mouth daily (Patient taking differently: Take 25 mg by mouth daily as needed)   • ibuprofen (MOTRIN) 200 mg tablet Take 200 mg by mouth every 6 (six) hours as needed for mild pain   • latanoprost (XALATAN) 0 005 % ophthalmic solution INSTILL 1 DROP IN [facility] EYE AT BEDTIME   • levothyroxine 100 mcg tablet Take 1 tablet (100 mcg total) by mouth daily   • lisinopril (ZESTRIL) 20 mg tablet Take 1 tablet (20 mg total) by mouth daily   • metoprolol succinate (TOPROL-XL) 50 mg 24 hr tablet TAKE 1 TABLET BY MOUTH EVERY DAY   • Multiple Vitamins-Minerals (OCUVITE ADULT 50+ PO) Take 1 tablet by mouth daily • temazepam (RESTORIL) 15 mg capsule Take 1 capsule (15 mg total) by mouth daily at bedtime as needed for sleep   • timolol (TIMOPTIC) 0 5 % ophthalmic solution    • aspirin (ECOTRIN LOW STRENGTH) 81 mg EC tablet Take 81 mg by mouth daily       Objective     /68   Pulse 72   Temp 98 °F (36 7 °C)   Resp 20   Ht 5' 2" (1 575 m)   Wt 101 kg (223 lb)   SpO2 96%   BMI 40 79 kg/m²     Physical Exam  Constitutional:       General: She is not in acute distress  Appearance: She is well-developed  She is not ill-appearing  HENT:      Head: Normocephalic and atraumatic  Mouth/Throat:      Mouth: Mucous membranes are moist       Pharynx: Oropharynx is clear  No pharyngeal swelling or oropharyngeal exudate  Eyes:      Extraocular Movements: Extraocular movements intact  Cardiovascular:      Rate and Rhythm: Normal rate and regular rhythm  Pulmonary:      Breath sounds: No decreased breath sounds, wheezing, rhonchi or rales  Abdominal:      Palpations: Abdomen is soft  Musculoskeletal:      Cervical back: Neck supple  Right lower leg: No edema  Left lower leg: No edema  Lymphadenopathy:      Cervical: No cervical adenopathy  Skin:     General: Skin is warm and dry  Neurological:      Mental Status: She is alert and oriented to person, place, and time         Kody Fox DO

## 2022-10-23 PROBLEM — J18.9 PNEUMONIA DUE TO INFECTIOUS ORGANISM: Status: ACTIVE | Noted: 2022-10-23

## 2022-11-11 DIAGNOSIS — I10 ESSENTIAL HYPERTENSION: ICD-10-CM

## 2022-11-11 RX ORDER — METOPROLOL SUCCINATE 50 MG/1
50 TABLET, EXTENDED RELEASE ORAL DAILY
Qty: 90 TABLET | Refills: 1 | Status: SHIPPED | OUTPATIENT
Start: 2022-11-11

## 2022-11-11 NOTE — TELEPHONE ENCOUNTER
The patient called on Monday for a refill and she never received it    Can we fill today please, she only has a few pills left    Thank you

## 2022-11-17 ENCOUNTER — OFFICE VISIT (OUTPATIENT)
Dept: OBGYN CLINIC | Facility: CLINIC | Age: 77
End: 2022-11-17

## 2022-11-17 VITALS
WEIGHT: 223 LBS | HEART RATE: 83 BPM | DIASTOLIC BLOOD PRESSURE: 75 MMHG | BODY MASS INDEX: 41.04 KG/M2 | HEIGHT: 62 IN | SYSTOLIC BLOOD PRESSURE: 172 MMHG

## 2022-11-17 DIAGNOSIS — M17.12 PRIMARY OSTEOARTHRITIS OF LEFT KNEE: Primary | ICD-10-CM

## 2022-11-17 RX ORDER — ROPIVACAINE HYDROCHLORIDE 5 MG/ML
10 INJECTION, SOLUTION EPIDURAL; INFILTRATION; PERINEURAL
Status: COMPLETED | OUTPATIENT
Start: 2022-11-17 | End: 2022-11-17

## 2022-11-17 RX ORDER — KETOROLAC TROMETHAMINE 30 MG/ML
60 INJECTION, SOLUTION INTRAMUSCULAR; INTRAVENOUS
Status: COMPLETED | OUTPATIENT
Start: 2022-11-17 | End: 2022-11-17

## 2022-11-17 RX ADMIN — ROPIVACAINE HYDROCHLORIDE 10 ML: 5 INJECTION, SOLUTION EPIDURAL; INFILTRATION; PERINEURAL at 13:31

## 2022-11-17 RX ADMIN — KETOROLAC TROMETHAMINE 60 MG: 30 INJECTION, SOLUTION INTRAMUSCULAR; INTRAVENOUS at 13:31

## 2022-11-17 NOTE — PROGRESS NOTES
1  Primary osteoarthritis of left knee  Large joint arthrocentesis: L knee        Patient is here for her  injection of Toradol into the left knee  Patient reports 3 weeks of relief from most recent cortisone injections into the bilateral knees  Spoke to her family doctor who felt a Toradol injection inside the joint would be appropriate  Left knee is worse than the right knee  Gerhardt Sep Physical exam of the knee shows no effusion no ecchymosis  All other organ systems normal    Large joint arthrocentesis: L knee  Universal Protocol:  Consent given by: patient  Time out: Immediately prior to procedure a "time out" was called to verify the correct patient, procedure, equipment, support staff and site/side marked as required  Site marked: the operative site was marked  Supporting Documentation  Indications: pain   Procedure Details  Location: knee - L knee  Preparation: Patient was prepped and draped in the usual sterile fashion  Needle size: 22 G  Approach: anterolateral  Medications administered: 60 mg ketorolac 60 mg/2 mL; 10 mL ropivacaine 0 5 %    Patient tolerance: patient tolerated the procedure well with no immediate complications  Dressing:  Sterile dressing applied          Patient tolerated procedure follow up in 1-2 weeks for Toradol injection into the right knee    We are spacing out the injections to less than any potential strain on her kidneys from the NSAID

## 2022-11-21 ENCOUNTER — APPOINTMENT (OUTPATIENT)
Dept: LAB | Facility: CLINIC | Age: 77
End: 2022-11-21

## 2022-11-21 DIAGNOSIS — E79.0 URICACIDEMIA: ICD-10-CM

## 2022-11-21 DIAGNOSIS — Z79.899 ENCOUNTER FOR LONG-TERM (CURRENT) USE OF OTHER MEDICATIONS: ICD-10-CM

## 2022-11-21 DIAGNOSIS — M35.3 POLYMYALGIA RHEUMATICA (HCC): ICD-10-CM

## 2022-11-21 LAB — ERYTHROCYTE [SEDIMENTATION RATE] IN BLOOD: 74 MM/HOUR (ref 0–29)

## 2022-11-22 LAB
ALBUMIN SERPL BCP-MCNC: 3.4 G/DL (ref 3.5–5)
ALP SERPL-CCNC: 83 U/L (ref 46–116)
ALT SERPL W P-5'-P-CCNC: 21 U/L (ref 12–78)
ANION GAP SERPL CALCULATED.3IONS-SCNC: 7 MMOL/L (ref 4–13)
AST SERPL W P-5'-P-CCNC: 11 U/L (ref 5–45)
BILIRUB SERPL-MCNC: 0.3 MG/DL (ref 0.2–1)
BUN SERPL-MCNC: 23 MG/DL (ref 5–25)
CALCIUM ALBUM COR SERPL-MCNC: 10.1 MG/DL (ref 8.3–10.1)
CALCIUM SERPL-MCNC: 9.6 MG/DL (ref 8.3–10.1)
CHLORIDE SERPL-SCNC: 109 MMOL/L (ref 96–108)
CO2 SERPL-SCNC: 23 MMOL/L (ref 21–32)
CREAT SERPL-MCNC: 1.18 MG/DL (ref 0.6–1.3)
CRP SERPL QL: 11.2 MG/L
GFR SERPL CREATININE-BSD FRML MDRD: 44 ML/MIN/1.73SQ M
GLUCOSE P FAST SERPL-MCNC: 121 MG/DL (ref 65–99)
POTASSIUM SERPL-SCNC: 4.2 MMOL/L (ref 3.5–5.3)
PROT SERPL-MCNC: 7.1 G/DL (ref 6.4–8.4)
SODIUM SERPL-SCNC: 139 MMOL/L (ref 135–147)
URATE SERPL-MCNC: 6.5 MG/DL (ref 2–7.5)

## 2022-12-01 ENCOUNTER — OFFICE VISIT (OUTPATIENT)
Dept: OBGYN CLINIC | Facility: CLINIC | Age: 77
End: 2022-12-01

## 2022-12-01 VITALS — BODY MASS INDEX: 41.04 KG/M2 | HEIGHT: 62 IN | WEIGHT: 223 LBS

## 2022-12-01 DIAGNOSIS — M17.0 PRIMARY OSTEOARTHRITIS OF BOTH KNEES: Primary | ICD-10-CM

## 2022-12-01 RX ORDER — KETOROLAC TROMETHAMINE 30 MG/ML
60 INJECTION, SOLUTION INTRAMUSCULAR; INTRAVENOUS
Status: COMPLETED | OUTPATIENT
Start: 2022-12-01 | End: 2022-12-01

## 2022-12-01 RX ORDER — PREDNISONE 1 MG/1
TABLET ORAL
COMMUNITY
Start: 2022-11-28

## 2022-12-01 RX ADMIN — KETOROLAC TROMETHAMINE 60 MG: 30 INJECTION, SOLUTION INTRAMUSCULAR; INTRAVENOUS at 15:07

## 2022-12-01 NOTE — PROGRESS NOTES
1  Primary osteoarthritis of both knees  Large joint arthrocentesis: R knee        Patient is here for her  injection of Toradol into the right knee  Patient reports 3 weeks of relief from most recent cortisone injections into the bilateral knees  Spoke to her family doctor who felt a Toradol injection inside the joint would be appropriate  She received a Toradol injection into the left knee 11/17/2022 which she states has provided her relief  Physical exam of the knee shows no effusion no ecchymosis  All other organ systems normal    Large joint arthrocentesis: R knee  Universal Protocol:  Consent: Verbal consent obtained    Risks and benefits: risks, benefits and alternatives were discussed  Consent given by: patient  Timeout called at: 12/1/2022 3:06 PM   Patient understanding: patient states understanding of the procedure being performed    Supporting Documentation  Indications: pain and diagnostic evaluation   Procedure Details  Location: knee - R knee  Needle size: 22 G  Ultrasound guidance: no  Medications administered: 60 mg ketorolac 60 mg/2 mL    Patient tolerance: patient tolerated the procedure well with no immediate complications  Dressing:  Sterile dressing applied

## 2022-12-22 PROBLEM — J18.9 PNEUMONIA DUE TO INFECTIOUS ORGANISM: Status: RESOLVED | Noted: 2022-10-23 | Resolved: 2022-12-22

## 2023-01-01 DIAGNOSIS — I10 BENIGN ESSENTIAL HYPERTENSION: ICD-10-CM

## 2023-01-03 RX ORDER — HYDROCHLOROTHIAZIDE 25 MG/1
TABLET ORAL
Qty: 90 TABLET | Refills: 1 | Status: SHIPPED | OUTPATIENT
Start: 2023-01-03

## 2023-01-04 ENCOUNTER — APPOINTMENT (OUTPATIENT)
Dept: LAB | Facility: CLINIC | Age: 78
End: 2023-01-04

## 2023-01-04 DIAGNOSIS — N18.32 STAGE 3B CHRONIC KIDNEY DISEASE (HCC): ICD-10-CM

## 2023-01-04 DIAGNOSIS — Z79.899 ENCOUNTER FOR LONG-TERM (CURRENT) USE OF OTHER MEDICATIONS: ICD-10-CM

## 2023-01-04 DIAGNOSIS — E11.22 TYPE 2 DIABETES MELLITUS WITH STAGE 3 CHRONIC KIDNEY DISEASE, WITHOUT LONG-TERM CURRENT USE OF INSULIN, UNSPECIFIED WHETHER STAGE 3A OR 3B CKD (HCC): ICD-10-CM

## 2023-01-04 DIAGNOSIS — N18.30 TYPE 2 DIABETES MELLITUS WITH STAGE 3 CHRONIC KIDNEY DISEASE, WITHOUT LONG-TERM CURRENT USE OF INSULIN, UNSPECIFIED WHETHER STAGE 3A OR 3B CKD (HCC): ICD-10-CM

## 2023-01-04 DIAGNOSIS — M35.3 POLYMYALGIA RHEUMATICA (HCC): Primary | ICD-10-CM

## 2023-01-04 DIAGNOSIS — E79.0 URICACIDEMIA: ICD-10-CM

## 2023-01-04 LAB
25(OH)D3 SERPL-MCNC: 10.6 NG/ML (ref 30–100)
ALBUMIN SERPL BCP-MCNC: 3.7 G/DL (ref 3.5–5)
ALP SERPL-CCNC: 87 U/L (ref 46–116)
ALT SERPL W P-5'-P-CCNC: 25 U/L (ref 12–78)
ANION GAP SERPL CALCULATED.3IONS-SCNC: 7 MMOL/L (ref 4–13)
AST SERPL W P-5'-P-CCNC: 13 U/L (ref 5–45)
BILIRUB SERPL-MCNC: 0.4 MG/DL (ref 0.2–1)
BUN SERPL-MCNC: 32 MG/DL (ref 5–25)
CALCIUM SERPL-MCNC: 9.8 MG/DL (ref 8.3–10.1)
CHLORIDE SERPL-SCNC: 107 MMOL/L (ref 96–108)
CHOLEST SERPL-MCNC: 191 MG/DL
CO2 SERPL-SCNC: 26 MMOL/L (ref 21–32)
CREAT SERPL-MCNC: 1.24 MG/DL (ref 0.6–1.3)
CREAT UR-MCNC: 147 MG/DL
CRP SERPL QL: 5.4 MG/L
ERYTHROCYTE [SEDIMENTATION RATE] IN BLOOD: 41 MM/HOUR (ref 0–29)
GFR SERPL CREATININE-BSD FRML MDRD: 41 ML/MIN/1.73SQ M
GLUCOSE P FAST SERPL-MCNC: 117 MG/DL (ref 65–99)
HDLC SERPL-MCNC: 49 MG/DL
LDLC SERPL CALC-MCNC: 106 MG/DL (ref 0–100)
MAGNESIUM SERPL-MCNC: 2.2 MG/DL (ref 1.6–2.6)
MICROALBUMIN UR-MCNC: 35 MG/L (ref 0–20)
MICROALBUMIN/CREAT 24H UR: 24 MG/G CREATININE (ref 0–30)
PHOSPHATE SERPL-MCNC: 3.8 MG/DL (ref 2.3–4.1)
POTASSIUM SERPL-SCNC: 4.3 MMOL/L (ref 3.5–5.3)
PROT SERPL-MCNC: 7.3 G/DL (ref 6.4–8.4)
SODIUM SERPL-SCNC: 140 MMOL/L (ref 135–147)
TRIGL SERPL-MCNC: 181 MG/DL
URATE SERPL-MCNC: 5.4 MG/DL (ref 2–7.5)

## 2023-01-06 ENCOUNTER — TELEPHONE (OUTPATIENT)
Dept: OBGYN CLINIC | Facility: HOSPITAL | Age: 78
End: 2023-01-06

## 2023-01-06 NOTE — TELEPHONE ENCOUNTER
Caller: Keshia Lunch     Doctor: Halle Smalls     Reason for call: Patient would like to know who you would recommend for carpal tunnel surgery that failed for her grandson  Did let her know we do have hand drs and she would like your option  Please advise       Call back#: [phone]

## 2023-01-06 NOTE — TELEPHONE ENCOUNTER
Called and left msg to give name of hand surgeon  When pt returns call, please recommend that grandson/family call to schedule with Dr Apple Garibay since pt is calling from [facility]

## 2023-01-06 NOTE — TELEPHONE ENCOUNTER
Caller: Patient    Doctor: Hasmukh Sharma    Reason for call: Patient called back and I was able to relay the message about Dr Rhett Garcia  Patient was thankful to all of you      Call back#: N/A

## 2023-01-12 DIAGNOSIS — G47.00 INSOMNIA, UNSPECIFIED TYPE: ICD-10-CM

## 2023-01-12 RX ORDER — TEMAZEPAM 15 MG/1
15 CAPSULE ORAL
Qty: 30 CAPSULE | Refills: 0 | Status: SHIPPED | OUTPATIENT
Start: 2023-01-12

## 2023-01-13 DIAGNOSIS — E03.9 HYPOTHYROIDISM, UNSPECIFIED TYPE: ICD-10-CM

## 2023-01-13 DIAGNOSIS — I10 BENIGN ESSENTIAL HYPERTENSION: ICD-10-CM

## 2023-01-13 RX ORDER — LISINOPRIL 20 MG/1
TABLET ORAL
Qty: 90 TABLET | Refills: 1 | Status: SHIPPED | OUTPATIENT
Start: 2023-01-13

## 2023-01-13 RX ORDER — LEVOTHYROXINE SODIUM 0.1 MG/1
TABLET ORAL
Qty: 90 TABLET | Refills: 1 | Status: SHIPPED | OUTPATIENT
Start: 2023-01-13

## 2023-03-10 ENCOUNTER — RA CDI HCC (OUTPATIENT)
Dept: OTHER | Facility: HOSPITAL | Age: 78
End: 2023-03-10

## 2023-03-10 NOTE — PROGRESS NOTES
[facility]  coding opportunities     E66 [id number]   Chart Reviewed number of suggestions sent to Provider: 1     Patients Insurance     Medicare Insurance: [employer]

## 2023-03-15 PROBLEM — G89.29 CHRONIC RIGHT-SIDED LOW BACK PAIN WITHOUT SCIATICA: Status: RESOLVED | Noted: 2020-12-31 | Resolved: 2023-03-15

## 2023-03-15 PROBLEM — M54.50 CHRONIC RIGHT-SIDED LOW BACK PAIN WITHOUT SCIATICA: Status: RESOLVED | Noted: 2020-12-31 | Resolved: 2023-03-15

## 2023-03-16 ENCOUNTER — OFFICE VISIT (OUTPATIENT)
Dept: FAMILY MEDICINE CLINIC | Facility: OTHER | Age: 78
End: 2023-03-16

## 2023-03-16 VITALS
WEIGHT: 232.6 LBS | HEART RATE: 64 BPM | HEIGHT: 62 IN | SYSTOLIC BLOOD PRESSURE: 134 MMHG | DIASTOLIC BLOOD PRESSURE: 90 MMHG | BODY MASS INDEX: 42.8 KG/M2

## 2023-03-16 DIAGNOSIS — Z12.31 ENCOUNTER FOR SCREENING MAMMOGRAM FOR BREAST CANCER: ICD-10-CM

## 2023-03-16 DIAGNOSIS — E11.22 TYPE 2 DIABETES MELLITUS WITH STAGE 3 CHRONIC KIDNEY DISEASE, WITHOUT LONG-TERM CURRENT USE OF INSULIN, UNSPECIFIED WHETHER STAGE 3A OR 3B CKD (HCC): ICD-10-CM

## 2023-03-16 DIAGNOSIS — E55.9 VITAMIN D DEFICIENCY: ICD-10-CM

## 2023-03-16 DIAGNOSIS — I10 BENIGN ESSENTIAL HYPERTENSION: Primary | ICD-10-CM

## 2023-03-16 DIAGNOSIS — N18.30 TYPE 2 DIABETES MELLITUS WITH STAGE 3 CHRONIC KIDNEY DISEASE, WITHOUT LONG-TERM CURRENT USE OF INSULIN, UNSPECIFIED WHETHER STAGE 3A OR 3B CKD (HCC): ICD-10-CM

## 2023-03-16 LAB — SL AMB POCT HEMOGLOBIN AIC: 7.1 (ref ?–6.5)

## 2023-03-16 RX ORDER — ERGOCALCIFEROL 1.25 MG/1
50000 CAPSULE ORAL WEEKLY
Qty: 12 CAPSULE | Refills: 0 | Status: SHIPPED | OUTPATIENT
Start: 2023-03-16 | End: 2023-06-02

## 2023-03-16 NOTE — PROGRESS NOTES
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 PMH of T2DM, HTN, hypothyroidism, and CKD who presents for follow-up of T2DM and HTN management  1  Benign essential hypertension  /90 at this appointment  Pt taking HCTZ once weekly as needed for swelling         -     Continue lisinopril 20 mg PO daily        -     Continue metoprolol 50 mg PO daily        -     Continue HCTZ 25 mg PO daily        -     Counseled pt on ace bandage application as alternative to compression stockings  2  Type 2 diabetes mellitus with stage 3 chronic kidney disease, without long-term current use of insulin, unspecified whether stage 3a or 3b CKD (HCC)  POCT hemoglobin A1c 7 1% 3/16/23   -     Will hold on medication management at this time    3  Encounter for screening mammogram for breast cancer  -     Mammo screening bilateral w 3d & cad; Future; Expected date: 2023    4  Vitamin D deficiency  Vitamin D 25 hydroxy level 23 10 6   -     ergocalciferol (VITAMIN D2) 50,000 units; Take 1 capsule (50,000 Units total) by mouth once a week for 12 doses      BMI Counseling: Body mass index is 42 54 kg/m²  The BMI is above normal  Nutrition recommendations include decreasing portion sizes, encouraging healthy choices of fruits and vegetables, decreasing fast food intake, consuming healthier snacks, limiting drinks that contain sugar, moderation in carbohydrate intake, increasing intake of lean protein, reducing intake of saturated and trans fat and reducing intake of cholesterol  Exercise recommendations include moderate physical activity 150 minutes/week  Rationale for BMI follow-up plan is due to patient being overweight or obese       Depression Screening and Follow-up Plan: Patient was screened for depression during today's encounter  They screened negative with a PHQ-2 score of 0  Return in about 6 weeks (around 4/27/2023) for AWV  The patient indicates understanding of these issues and agrees with the plan  Subjective      Taking amoxicillin 500 mg TID for cracked tooth dentist prescribed by dentist  No other concerns at this time  Hypertension  This is a chronic problem  The current episode started more than 1 year ago  The problem has been waxing and waning since onset  The problem is controlled  Associated symptoms include shortness of breath (unchanged)  Pertinent negatives include no anxiety, blurred vision, chest pain, headaches, malaise/fatigue, neck pain, orthopnea, palpitations, peripheral edema, PND or sweats  Agents associated with hypertension include thyroid hormones and NSAIDs  Risk factors for coronary artery disease include obesity, post-menopausal state, dyslipidemia, diabetes mellitus and sedentary lifestyle  Past treatments include beta blockers, diuretics and ACE inhibitors  The current treatment provides moderate improvement  Compliance problems include exercise and medication side effects (HCTZ worsens gout  Takes HCTZ once a week  )  Hypertensive end-organ damage includes kidney disease  There is no history of angina, CAD/MI, CVA, heart failure, left ventricular hypertrophy, PVD or retinopathy  Identifiable causes of hypertension include chronic renal disease and a thyroid problem  There is no history of a hypertension causing med  Diabetes  She presents for her follow-up diabetic visit  She has type 2 diabetes mellitus  Her disease course has been stable  There are no hypoglycemic associated symptoms  Pertinent negatives for hypoglycemia include no confusion, dizziness, headaches, nervousness/anxiousness, seizures or sweats   Pertinent negatives for diabetes include no blurred vision, no chest pain, no fatigue, no foot paresthesias, no foot ulcerations, no polydipsia, no polyphagia, no polyuria, no visual change, no weakness and no weight loss  There are no hypoglycemic complications  Pertinent negatives for hypoglycemia complications include no hospitalization  Symptoms are stable  Diabetic complications include nephropathy  Pertinent negatives for diabetic complications include no autonomic neuropathy, CVA, heart disease, peripheral neuropathy, PVD or retinopathy  Risk factors for coronary artery disease include diabetes mellitus, dyslipidemia, obesity, post-menopausal, hypertension and sedentary lifestyle  Current diabetic treatment includes diet  She is compliant with treatment most of the time  Her weight is increasing steadily  She is following a generally healthy diet  Meal planning includes avoidance of concentrated sweets  She has had a previous visit with a dietitian  She rarely participates in exercise  Home blood sugar record trend: Not monitoring sugars at home  An ACE inhibitor/angiotensin II receptor blocker is being taken  She does not see a podiatrist Eye exam is current (next appointment 3/20)  Review of Systems   Constitutional: Negative for activity change, chills, fatigue, fever, malaise/fatigue and weight loss  HENT: Negative for congestion, ear pain, sinus pain and sore throat  Eyes: Negative for blurred vision, pain and itching  Respiratory: Positive for shortness of breath (unchanged)  Negative for cough and chest tightness  Cardiovascular: Negative for chest pain, palpitations, orthopnea and PND  Gastrointestinal: Negative for abdominal pain, constipation, diarrhea, nausea and vomiting  Endocrine: Negative for cold intolerance, heat intolerance, polydipsia, polyphagia and polyuria  Genitourinary: Negative for dysuria and flank pain  Musculoskeletal: Positive for arthralgias (chronic)  Negative for myalgias and neck pain  Skin: Negative for color change and rash     Neurological: Negative for dizziness, seizures, syncope, weakness and headaches  Hematological: Negative for adenopathy  Psychiatric/Behavioral: Negative for behavioral problems, confusion, dysphoric mood and sleep disturbance  The patient is not nervous/anxious  Current Outpatient Medications on File Prior to Visit   Medication Sig   • temazepam (RESTORIL) 15 mg capsule Take 1 capsule (15 mg total) by mouth daily at bedtime as needed for sleep   • Acetaminophen 500 MG Take 1,000 mg by mouth daily    • allopurinol (ZYLOPRIM) 100 mg tablet TAKE 1/2 TABLET BY MOUTH EVERY DAY FOR 1 WEEK, THEN TAKE 1 TABLET BY MOUTH EVERY DAY FOR 1 WEEK, THEN TAKE 1 AND 1/2 TABLETS BY MOUTH ONE TI   • aspirin (ECOTRIN LOW STRENGTH) 81 mg EC tablet Take 81 mg by mouth daily   • BETIMOL 0 5 % ophthalmic solution INSTILL 1 DROP INTO THE AFFECTED EYE(S) EVERY DAY   • hydrochlorothiazide (HYDRODIURIL) 25 mg tablet TAKE 1 TABLET BY MOUTH EVERY DAY   • ibuprofen (MOTRIN) 200 mg tablet Take 200 mg by mouth every 6 (six) hours as needed for mild pain   • latanoprost (XALATAN) 0 005 % ophthalmic solution INSTILL 1 DROP IN EACH EYE AT BEDTIME   • levothyroxine 100 mcg tablet TAKE 1 TABLET BY MOUTH EVERY DAY   • lisinopril (ZESTRIL) 20 mg tablet TAKE 1 TABLET BY MOUTH EVERY DAY   • metoprolol succinate (TOPROL-XL) 50 mg 24 hr tablet Take 1 tablet (50 mg total) by mouth daily   • Multiple Vitamins-Minerals (OCUVITE ADULT 50+ PO) Take 1 tablet by mouth daily   • predniSONE 5 mg tablet TAKE 4 TABLETS BY MOUTH EVERY DAY FOR 3 DAYS, THEN 3 TABLETS DAILY FOR 4 DAYS THEN 2 TABLETS DAILY FOR 7 DAYS THEN START 1 TABLET DAILY   • timolol (TIMOPTIC) 0 5 % ophthalmic solution        Objective     /90 (BP Location: Left arm, Patient Position: Sitting, Cuff Size: Standard)   Pulse 64   Ht 5' 2" (1 575 m)   Wt 106 kg (232 lb 9 6 oz)   BMI 42 54 kg/m²     Physical Exam  Vitals and nursing note reviewed  Constitutional:       General: She is not in acute distress  Appearance: She is well-developed   She is not ill-appearing  Comments: Body mass index is 42 54 kg/m²  HENT:      Head: Normocephalic and atraumatic  Right Ear: External ear normal       Left Ear: External ear normal       Nose: Nose normal    Eyes:      General: No scleral icterus  Conjunctiva/sclera: Conjunctivae normal       Pupils: Pupils are equal, round, and reactive to light  Neck:      Thyroid: No thyromegaly  Cardiovascular:      Rate and Rhythm: Normal rate and regular rhythm  Pulses: Normal pulses  Heart sounds: Normal heart sounds  No murmur heard  Pulmonary:      Effort: Pulmonary effort is normal  No respiratory distress  Breath sounds: Normal breath sounds  No wheezing  Abdominal:      General: Bowel sounds are normal  There is no distension  Palpations: Abdomen is soft  Tenderness: There is no abdominal tenderness  Musculoskeletal:         General: No tenderness  Normal range of motion  Cervical back: Normal range of motion and neck supple  Right lower leg: Edema (trace) present  Left lower leg: Edema (trace) present  Lymphadenopathy:      Cervical: No cervical adenopathy  Skin:     General: Skin is warm and dry  Coloration: Skin is not jaundiced  Findings: No rash  Neurological:      General: No focal deficit present  Mental Status: She is alert and oriented to person, place, and time  Cranial Nerves: No cranial nerve deficit     Psychiatric:         Mood and Affect: Mood normal          Behavior: Behavior normal           Labs reviewed: ESR, CRP, CMP, lipid panel, uric acid, magnesium, phosphorus, vitamin D  Imaging reviewed: N/A      Enrrique Avila DO

## 2023-04-23 DIAGNOSIS — I10 ESSENTIAL HYPERTENSION: ICD-10-CM

## 2023-04-24 RX ORDER — METOPROLOL SUCCINATE 50 MG/1
TABLET, EXTENDED RELEASE ORAL
Qty: 90 TABLET | Refills: 1 | Status: SHIPPED | OUTPATIENT
Start: 2023-04-24

## 2023-05-03 ENCOUNTER — APPOINTMENT (OUTPATIENT)
Dept: LAB | Facility: CLINIC | Age: 78
End: 2023-05-03

## 2023-05-03 DIAGNOSIS — M35.3 POLYMYALGIA RHEUMATICA (HCC): ICD-10-CM

## 2023-05-03 LAB
CRP SERPL QL: 4.2 MG/L
ERYTHROCYTE [SEDIMENTATION RATE] IN BLOOD: 31 MM/HOUR (ref 0–29)

## 2023-05-22 ENCOUNTER — CONSULT (OUTPATIENT)
Dept: FAMILY MEDICINE CLINIC | Facility: OTHER | Age: 78
End: 2023-05-22

## 2023-05-22 VITALS
SYSTOLIC BLOOD PRESSURE: 142 MMHG | WEIGHT: 232.3 LBS | TEMPERATURE: 98 F | OXYGEN SATURATION: 98 % | DIASTOLIC BLOOD PRESSURE: 95 MMHG | HEART RATE: 57 BPM | BODY MASS INDEX: 42.75 KG/M2 | HEIGHT: 62 IN | RESPIRATION RATE: 18 BRPM

## 2023-05-22 DIAGNOSIS — I10 BENIGN ESSENTIAL HYPERTENSION: ICD-10-CM

## 2023-05-22 DIAGNOSIS — E11.22 TYPE 2 DIABETES MELLITUS WITH STAGE 3 CHRONIC KIDNEY DISEASE, WITHOUT LONG-TERM CURRENT USE OF INSULIN, UNSPECIFIED WHETHER STAGE 3A OR 3B CKD (HCC): ICD-10-CM

## 2023-05-22 DIAGNOSIS — R05.1 ACUTE COUGH: ICD-10-CM

## 2023-05-22 DIAGNOSIS — Z01.818 PREOPERATIVE CLEARANCE: Primary | ICD-10-CM

## 2023-05-22 DIAGNOSIS — N18.30 TYPE 2 DIABETES MELLITUS WITH STAGE 3 CHRONIC KIDNEY DISEASE, WITHOUT LONG-TERM CURRENT USE OF INSULIN, UNSPECIFIED WHETHER STAGE 3A OR 3B CKD (HCC): ICD-10-CM

## 2023-05-22 DIAGNOSIS — R06.02 SHORTNESS OF BREATH: ICD-10-CM

## 2023-05-22 LAB
SARS-COV-2 AG UPPER RESP QL IA: NEGATIVE
VALID CONTROL: NORMAL

## 2023-05-22 NOTE — PROGRESS NOTES
"PRE-OPERATIVE EXAMINATION  Sherlyn Alcaraz  1945    Sherlyn Alcaraz is a 68 y o  female with pmh of HTN, ONDINA, T2DM, CKD, Hypothyroidism who is planning to undergo cataract surgery under IV sedation by Dr Charla Smart on 5/30 and 6/13/2023  The procedure is indicated for the following condition: Cataracts  Patient has not had complications with anesthesia in the past     ROS:   • Chest pain: no   • Shortness of breath: yes - has respiratory symtpoms for about a week and a half with cough, SOB, allergies  Cough is productive of clear sputum  On Amoxicillin for 5 days and planning for 7 day course  She is also taking OTC allergy pill daily  • Shortness of breath with exertion: yes  • Orthopnea: no  • Dizziness: no  • Unexplained weight change: no    PMH:  • CAD: no  • HTN: yes  • CKD: yes  • DM: yes on insulin: no  • History of CVA: no    She  reports that she quit smoking about 25 years ago  Her smoking use included cigarettes  She smoked an average of  5 packs per day  She has never used smokeless tobacco  She reports current alcohol use  She reports that she does not use drugs  /95   Pulse 57   Temp 98 °F (36 7 °C)   Resp 18   Ht 5' 2\" (1 575 m)   Wt 105 kg (232 lb 4 8 oz)   SpO2 98%   BMI 42 49 kg/m²        Physical Exam  Vitals reviewed  Constitutional:       General: She is not in acute distress  Appearance: She is obese  She is not ill-appearing  HENT:      Head: Normocephalic and atraumatic  Nose:      Comments: Audible upper airway congestion  Eyes:      Extraocular Movements: Extraocular movements intact  Conjunctiva/sclera: Conjunctivae normal    Cardiovascular:      Rate and Rhythm: Normal rate and regular rhythm  Heart sounds: Normal heart sounds  Pulmonary:      Effort: Pulmonary effort is normal       Breath sounds: Normal breath sounds  No wheezing or rales  Musculoskeletal:      Right lower leg: No edema  Left lower leg: No edema     Neurological:      Mental " Status: She is alert  Psychiatric:         Mood and Affect: Mood normal          Behavior: Behavior normal          Revised Cardiac Risk Index (RCRI) for Pre-Operative Risk   (estimates risk of cardiac complications after noncardiac surgery)    · High-risk surgery: No 0   · Intraperitoneal, intrathoracic, suprainguinal vascular  · History of ischemic heart disease: No 0   · Hx of MI, (+) exercise test, current chest pain considered due to myocardial ischemia, use of nitrate therapy or ECG with pathological Q waves)  · History of CHF: No 0   · Pulmonary edema, B/L rales or S3 gallop; LEBLANC, orthopnea, PND, CXR showing pulmonary vascular redistribution)  · History of cerebrovascular disease: No 0   · Prior TIA or stroke  · Pre-operative treatment with insulin: No 0   · Pre-operative creatinine >2 mg/dL: No 0     RCRI Scoring:  · 0 points: Class I Risk, 3 9% 30-day risk of death, MI, or cardiac arrest    Lab Results   Component Value Date    CREATININE 1 24 01/04/2023       Heri Hanna was seen today for pre-op exam     Diagnoses and all orders for this visit:    Preoperative clearance  -     ECG 12 lead; Future    Benign essential hypertension  -     ECG 12 lead; Future    Type 2 diabetes mellitus with stage 3 chronic kidney disease, without long-term current use of insulin, unspecified whether stage 3a or 3b CKD ([facility] [address] )  -     ECG 12 lead; Future    Acute cough  -     POCT Rapid Covid Ag    Shortness of breath  -     POCT Rapid Covid Ag        Recommendations:  Hannah Umanzor is undergoing an elective Minimal risk surgery, Cataract repair  She is RCRI class I risk (0) with 3 9% 30-day risk of death, MI, or cardiac arrest  She may proceed with surgery as planned pending normal ECG  When ECG results return, will complete clearance form and fax it to her surgeon's office  At this time, patient with SOB suspected in setting of acute URI   Continue previously prescribed medications for management of this, but did recommend patient postpone surgery 1-2 weeks until her SOB resolves  covid test done in office today was negative  Discussed with Dr Antwon Fonseca, who is in agreement with the plan as outlined above

## 2023-05-24 ENCOUNTER — OFFICE VISIT (OUTPATIENT)
Dept: LAB | Facility: CLINIC | Age: 78
End: 2023-05-24

## 2023-05-24 DIAGNOSIS — I10 BENIGN ESSENTIAL HYPERTENSION: ICD-10-CM

## 2023-05-24 DIAGNOSIS — Z01.818 PREOPERATIVE CLEARANCE: ICD-10-CM

## 2023-05-24 DIAGNOSIS — N18.30 TYPE 2 DIABETES MELLITUS WITH STAGE 3 CHRONIC KIDNEY DISEASE, WITHOUT LONG-TERM CURRENT USE OF INSULIN, UNSPECIFIED WHETHER STAGE 3A OR 3B CKD (HCC): ICD-10-CM

## 2023-05-24 DIAGNOSIS — E11.22 TYPE 2 DIABETES MELLITUS WITH STAGE 3 CHRONIC KIDNEY DISEASE, WITHOUT LONG-TERM CURRENT USE OF INSULIN, UNSPECIFIED WHETHER STAGE 3A OR 3B CKD (HCC): ICD-10-CM

## 2023-05-24 LAB
ATRIAL RATE: 64 BPM
P AXIS: 35 DEGREES
PR INTERVAL: 136 MS
QRS AXIS: 3 DEGREES
QRSD INTERVAL: 70 MS
QT INTERVAL: 410 MS
QTC INTERVAL: 422 MS
T WAVE AXIS: 39 DEGREES
VENTRICULAR RATE: 64 BPM

## 2023-05-25 ENCOUNTER — HOSPITAL ENCOUNTER (OUTPATIENT)
Dept: RADIOLOGY | Facility: MEDICAL CENTER | Age: 78
Discharge: HOME | End: 2023-05-25

## 2023-05-25 VITALS — WEIGHT: 232 LBS | HEIGHT: 62 IN | BODY MASS INDEX: 42.69 KG/M2

## 2023-05-25 DIAGNOSIS — Z12.31 ENCOUNTER FOR SCREENING MAMMOGRAM FOR BREAST CANCER: ICD-10-CM

## 2023-05-26 ENCOUNTER — TELEPHONE (OUTPATIENT)
Dept: FAMILY MEDICINE CLINIC | Facility: OTHER | Age: 78
End: 2023-05-26

## 2023-05-26 NOTE — TELEPHONE ENCOUNTER
It's in there from 5/24 under Cardiology tab  She said she had it done here at [facility]

## 2023-05-26 NOTE — TELEPHONE ENCOUNTER
Pt states she got her EKG done for her pre op last Monday  (surgery schedule for 6/14)  She is asking about the EKG results

## 2023-05-26 NOTE — RESULT ENCOUNTER NOTE
Please inform pt that mammo looks good -- repeat in 1 year  Thanks!   Rena Zaragoza, DO
Satisfactory

## 2023-05-28 DIAGNOSIS — E55.9 VITAMIN D DEFICIENCY: ICD-10-CM

## 2023-05-30 RX ORDER — ERGOCALCIFEROL 1.25 MG/1
CAPSULE ORAL
Qty: 12 CAPSULE | Refills: 0 | Status: SHIPPED | OUTPATIENT
Start: 2023-05-30

## 2023-06-02 ENCOUNTER — TELEPHONE (OUTPATIENT)
Dept: FAMILY MEDICINE CLINIC | Facility: OTHER | Age: 78
End: 2023-06-02

## 2023-06-09 ENCOUNTER — RA CDI HCC (OUTPATIENT)
Dept: OTHER | Facility: HOSPITAL | Age: 78
End: 2023-06-09

## 2023-06-09 NOTE — PROGRESS NOTES
[facility]  coding opportunities     E66 01   Chart Reviewed number of suggestions sent to Provider: 1     Patients Insurance     Medicare Insurance: [employer]

## 2023-06-15 DIAGNOSIS — G47.00 INSOMNIA, UNSPECIFIED TYPE: ICD-10-CM

## 2023-06-15 RX ORDER — TEMAZEPAM 15 MG/1
15 CAPSULE ORAL
Qty: 30 CAPSULE | Refills: 0 | Status: SHIPPED | OUTPATIENT
Start: 2023-06-15

## 2023-06-15 NOTE — TELEPHONE ENCOUNTER
The patient called stating her BP was very high the day she had her cataract surgery 202/86  They checked it 4 times but she didn't have all the numbers  She checked it again this morning and it is 187/84  She is asking if you can increase her BP med? She is having her other eye done on 6/27 and she is afraid they won't do it, if her BP is high  I told her she will most likely need an appt but she only wants to see you and you have nothing available    Please advise    Thank you!

## 2023-06-15 NOTE — TELEPHONE ENCOUNTER
PDMP consulted and appropriate -- no red flags  PLEASE RECOMMEND THAT SHE INCREASE HER LISINOPRIL TO 40 MG DAILY -- NEEDS TO BE SEEN NEXT WEEK EVEN IF NOT WITH ME  Thanks!   Josh Crespo, DO

## 2023-06-20 ENCOUNTER — OFFICE VISIT (OUTPATIENT)
Dept: FAMILY MEDICINE CLINIC | Facility: OTHER | Age: 78
End: 2023-06-20

## 2023-06-20 VITALS
WEIGHT: 231 LBS | HEART RATE: 73 BPM | RESPIRATION RATE: 17 BRPM | HEIGHT: 62 IN | TEMPERATURE: 98.7 F | BODY MASS INDEX: 42.51 KG/M2 | OXYGEN SATURATION: 96 % | SYSTOLIC BLOOD PRESSURE: 170 MMHG | DIASTOLIC BLOOD PRESSURE: 68 MMHG

## 2023-06-20 DIAGNOSIS — I10 BENIGN ESSENTIAL HYPERTENSION: Primary | ICD-10-CM

## 2023-06-20 DIAGNOSIS — E11.22 TYPE 2 DIABETES MELLITUS WITH STAGE 3 CHRONIC KIDNEY DISEASE, WITHOUT LONG-TERM CURRENT USE OF INSULIN, UNSPECIFIED WHETHER STAGE 3A OR 3B CKD (HCC): ICD-10-CM

## 2023-06-20 DIAGNOSIS — N18.30 TYPE 2 DIABETES MELLITUS WITH STAGE 3 CHRONIC KIDNEY DISEASE, WITHOUT LONG-TERM CURRENT USE OF INSULIN, UNSPECIFIED WHETHER STAGE 3A OR 3B CKD (HCC): ICD-10-CM

## 2023-06-20 RX ORDER — PREDNISOLONE ACETATE 10 MG/ML
SUSPENSION/ DROPS OPHTHALMIC
COMMUNITY
Start: 2023-05-15

## 2023-06-20 RX ORDER — HYDROCHLOROTHIAZIDE 25 MG/1
25 TABLET ORAL DAILY
Qty: 30 TABLET | Refills: 1 | Status: SHIPPED | OUTPATIENT
Start: 2023-06-20 | End: 2023-07-20

## 2023-06-20 NOTE — PATIENT INSTRUCTIONS
Please take lisinopril 20mg and HCTZ 25mg each morning  Please take lisinopril 20mg and metoprolol succinate 50mg each night

## 2023-06-20 NOTE — PROGRESS NOTES
Name: Earlene Felty      : 1945      MRN: [id number]  Encounter Provider: Samina Reich DO  Encounter Date: 2023   Encounter department: [address] [place] [address]      1  Benign essential hypertension  -     hydrochlorothiazide (HYDRODIURIL) 25 mg tablet; Take 1 tablet (25 mg total) by mouth daily    2  Type 2 diabetes mellitus with stage 3 chronic kidney disease, without long-term current use of insulin, unspecified whether stage 3a or 3b CKD (HCC)    Pt has had highly elevated BP recently with SBP as high as >200 when she had cataract surgery  She continues to have elevated BP today  She is recommended to begin taking lisinopril 20mg in AM and HCTZ 25mg in AM  She should continue to take toprol XL 50mg in evening and add lisinopril 20mg in evening  She is recommended to take her BP daily at home and call if she has any concerning symptoms of headache, sudden visual changes, or lightheadedness  Also patient is interested in medication like ozempic weekly to help control blood glucose and help her lose weight  After discussing risks, benefits, and possible side effects, pt would like to hold off for now and take more time to consider her options  Body mass index is 42 25 kg/m²  Recommend incorporating a more whole foods plant-predominant diet along with decreasing consumption of red meats and processed foods  Per AHA guidelines, recommend moderate-vigorous intensity exercise for 30 minutes a day for 5 days a week or a total of 150 min/week as tolerated  Nutrition Assessment and Intervention:     New Nutrition Prescription completed with patient      Physical Activity Assessment and Intervention:    Exercise capacity assessment recommended            The patient indicates understanding of these issues and agrees with the plan  Return in about 4 weeks (around 2023) for Recheck BP or annual     Parkroselia Avila  is a 68 y o   who presents for f/u of HTN  She states that she does not take her BP at home but recently at her cataract surgery it was SBP>200  Currently they take lisinopril 40mg daily and toprol xl 50mg nightly to treat HTN and are compliant with their medication  When BP is high they state they do not have headache, abdominal pain  Aggravating factors are: stress, anxiety, CKD  Relieving factors are: relaxing, eating low sodium foods  Associated symptoms are: none  Was previously on HCTZ and she stopped taking due to concerns for gout flareups a few months ago  Review of Systems   Constitutional: Negative for chills and fever  HENT: Negative for congestion and sore throat  Respiratory: Negative for chest tightness and shortness of breath (due to deconditioning; not SOB at rest)  Cardiovascular: Negative for chest pain, palpitations and leg swelling  Gastrointestinal: Negative for abdominal pain and nausea  Genitourinary: Negative for dysuria and pelvic pain  Musculoskeletal: Positive for myalgias  Negative for back pain  Current Outpatient Medications on File Prior to Visit   Medication Sig   • Acetaminophen 500 MG Take 1,000 mg by mouth daily    • allopurinol (ZYLOPRIM) 100 mg tablet TAKE 1/2 TABLET BY MOUTH EVERY DAY FOR 1 WEEK, THEN TAKE 1 TABLET BY MOUTH EVERY DAY FOR 1 WEEK, THEN TAKE 1 AND 1/2 TABLETS BY MOUTH ONE TI   • BETIMOL 0 5 % ophthalmic solution INSTILL 1 DROP INTO THE AFFECTED EYE(S) EVERY DAY   • ergocalciferol (VITAMIN D2) 50,000 units TAKE 1 CAPSULE BY MOUTH ONCE A WEEK FOR 12 DOSES     • ibuprofen (MOTRIN) 200 mg tablet Take 200 mg by mouth every 6 (six) hours as needed for mild pain   • latanoprost (XALATAN) 0 005 % ophthalmic solution INSTILL 1 DROP IN EACH EYE AT BEDTIME   • levothyroxine 100 mcg tablet TAKE 1 TABLET BY MOUTH EVERY DAY   • lisinopril (ZESTRIL) 20 mg tablet TAKE 1 TABLET BY MOUTH EVERY DAY   • metoprolol succinate (TOPROL-XL) 50 mg 24 hr tablet TAKE 1 TABLET BY MOUTH EVERY DAY   • "prednisoLONE acetate (PRED FORTE) 1 % ophthalmic suspension INSTILL 1 DROP FOUR TIMES DAILY INTO AFFECTED EYE(S) AS DIRECTED AFTER SURGERY   • predniSONE 5 mg tablet TAKE 4 TABLETS BY MOUTH EVERY DAY FOR 3 DAYS, THEN 3 TABLETS DAILY FOR 4 DAYS THEN 2 TABLETS DAILY FOR 7 DAYS THEN START 1 TABLET DAILY   • temazepam (RESTORIL) 15 mg capsule Take 1 capsule (15 mg total) by mouth daily at bedtime as needed for sleep   • timolol (TIMOPTIC) 0 5 % ophthalmic solution    • Multiple Vitamins-Minerals (OCUVITE ADULT 50+ PO) Take 1 tablet by mouth daily (Patient not taking: Reported on 4/14/2023)   • [DISCONTINUED] hydrochlorothiazide (HYDRODIURIL) 25 mg tablet TAKE 1 TABLET BY MOUTH EVERY DAY (Patient taking differently: as needed)       Objective     /68   Pulse 73   Temp 98 7 °F (37 1 °C)   Resp 17   Ht 5' 2\" (1 575 m)   Wt 105 kg (231 lb)   SpO2 96%   BMI 42 25 kg/m²     Physical Exam  Constitutional:       General: She is not in acute distress  Appearance: She is not ill-appearing  HENT:      Head: Normocephalic  Right Ear: External ear normal       Left Ear: External ear normal       Nose: No congestion  Mouth/Throat:      Mouth: Mucous membranes are moist    Eyes:      General: No scleral icterus  Extraocular Movements: Extraocular movements intact  Pupils: Pupils are equal, round, and reactive to light  Cardiovascular:      Rate and Rhythm: Normal rate and regular rhythm  Pulses: Normal pulses  Heart sounds: Normal heart sounds  Pulmonary:      Effort: Pulmonary effort is normal  No respiratory distress  Abdominal:      General: Bowel sounds are normal       Palpations: Abdomen is soft  Tenderness: There is no abdominal tenderness  Musculoskeletal:      Cervical back: Normal range of motion  No erythema  Right lower leg: Edema (1+ nonpitting) present  Left lower leg: Edema (1+ nonpitting) present     Skin:     Capillary Refill: Capillary refill takes " less than 2 seconds  Coloration: Skin is not jaundiced  Findings: No erythema or rash  Neurological:      Mental Status: She is alert and oriented to person, place, and time  Psychiatric:         Attention and Perception: Attention and perception normal          Mood and Affect: Mood normal          Behavior: Behavior normal  Behavior is cooperative         Samina Reich DO

## 2023-07-08 DIAGNOSIS — E03.9 HYPOTHYROIDISM, UNSPECIFIED TYPE: ICD-10-CM

## 2023-07-08 DIAGNOSIS — I10 BENIGN ESSENTIAL HYPERTENSION: ICD-10-CM

## 2023-07-10 RX ORDER — LISINOPRIL 20 MG/1
TABLET ORAL
Qty: 90 TABLET | Refills: 1 | Status: SHIPPED | OUTPATIENT
Start: 2023-07-10 | End: 2023-07-12 | Stop reason: SDUPTHER

## 2023-07-10 RX ORDER — LEVOTHYROXINE SODIUM 0.1 MG/1
TABLET ORAL
Qty: 90 TABLET | Refills: 1 | Status: SHIPPED | OUTPATIENT
Start: 2023-07-10

## 2023-07-12 DIAGNOSIS — I10 BENIGN ESSENTIAL HYPERTENSION: ICD-10-CM

## 2023-07-12 RX ORDER — LISINOPRIL 20 MG/1
20 TABLET ORAL 2 TIMES DAILY
Qty: 180 TABLET | Refills: 1 | Status: SHIPPED | OUTPATIENT
Start: 2023-07-12

## 2023-07-12 NOTE — TELEPHONE ENCOUNTER
Pt takes lisinopril 20 mg two times daily. Confirmed per last visit note (6/20/23). Re-sent Rx refill.

## 2023-07-24 ENCOUNTER — OFFICE VISIT (OUTPATIENT)
Dept: OBGYN CLINIC | Facility: CLINIC | Age: 78
End: 2023-07-24
Payer: MEDICARE

## 2023-07-24 VITALS
HEIGHT: 62 IN | BODY MASS INDEX: 42.51 KG/M2 | SYSTOLIC BLOOD PRESSURE: 172 MMHG | DIASTOLIC BLOOD PRESSURE: 83 MMHG | HEART RATE: 72 BPM | WEIGHT: 231 LBS

## 2023-07-24 DIAGNOSIS — G89.29 BILATERAL CHRONIC KNEE PAIN: ICD-10-CM

## 2023-07-24 DIAGNOSIS — M25.562 BILATERAL CHRONIC KNEE PAIN: ICD-10-CM

## 2023-07-24 DIAGNOSIS — M25.561 BILATERAL CHRONIC KNEE PAIN: ICD-10-CM

## 2023-07-24 DIAGNOSIS — M17.0 PRIMARY OSTEOARTHRITIS OF BOTH KNEES: Primary | ICD-10-CM

## 2023-07-24 PROCEDURE — 20610 DRAIN/INJ JOINT/BURSA W/O US: CPT | Performed by: ORTHOPAEDIC SURGERY

## 2023-07-24 PROCEDURE — 99213 OFFICE O/P EST LOW 20 MIN: CPT | Performed by: ORTHOPAEDIC SURGERY

## 2023-07-24 RX ORDER — BUPIVACAINE HYDROCHLORIDE 2.5 MG/ML
2 INJECTION, SOLUTION INFILTRATION; PERINEURAL
Status: COMPLETED | OUTPATIENT
Start: 2023-07-24 | End: 2023-07-24

## 2023-07-24 RX ORDER — BETAMETHASONE SODIUM PHOSPHATE AND BETAMETHASONE ACETATE 3; 3 MG/ML; MG/ML
12 INJECTION, SUSPENSION INTRA-ARTICULAR; INTRALESIONAL; INTRAMUSCULAR; SOFT TISSUE
Status: COMPLETED | OUTPATIENT
Start: 2023-07-24 | End: 2023-07-24

## 2023-07-24 RX ADMIN — BETAMETHASONE SODIUM PHOSPHATE AND BETAMETHASONE ACETATE 12 MG: 3; 3 INJECTION, SUSPENSION INTRA-ARTICULAR; INTRALESIONAL; INTRAMUSCULAR; SOFT TISSUE at 17:30

## 2023-07-24 RX ADMIN — BUPIVACAINE HYDROCHLORIDE 2 ML: 2.5 INJECTION, SOLUTION INFILTRATION; PERINEURAL at 17:30

## 2023-07-24 NOTE — PROGRESS NOTES
Assessment:  1. Primary osteoarthritis of both knees        2. Bilateral chronic knee pain          Patient Active Problem List   Diagnosis   • Allergic rhinitis   • Benign essential hypertension   • Closed fracture of proximal end of right humerus with routine healing   • Fracture of right distal radius   • Hypothyroidism   • Insomnia   • Microscopic hematuria   • Morbid obesity ([address])   • Obstructive sleep apnea   • Other fatigue   • Vitamin D deficiency   • Type 2 diabetes mellitus with stage 3 chronic kidney disease, without long-term current use of insulin ([facility])   • Primary osteoarthritis of both knees   • Osteoarthritis of both knees   • CKD (chronic kidney disease) stage 3, GFR 30-59 ml/min ([facility])   • Anemia   • 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[facility])   • Polymyalgia rheumatica ([facility])   • Mixed hyperlipidemia   • Facet hypertrophy of lumbar region           Plan      68 y.o. female with bilateral knee osteoarthritis  · Repeat corticosteroid injections administered in clinic today. Procedure tolerated well, post injection protocol advised. · She will call if she wants to initiate VISCO supplementation again            Subjective:     Patient ID:     Ponce Byers 68 y.o. female      HPI    Patient presents for follow up evaluation bilateral knee pain secondary to osteoarthritis. Patient has been managing her symptoms with repeat injections. Her last one was 4/14/2023 CSI bilateral knee. Today she notes a return of her activity related knee pain. Pain is an ache in the medial knee, aggravated with weight bearing alleviated with rest. She would like repeat injections today. The following portions of the patient's history were reviewed and updated as appropriate: allergies, current medications, past family history, past social history, past surgical history and problem list.        Social History     Socioeconomic History   • Marital status:       Spouse name: Not on file   • Number of children: 5 • Years of education: 12   • Highest education level: Not on file   Occupational History   • Occupation: RN   Tobacco Use   • Smoking status: Former     Packs/day: 0.50     Types: Cigarettes     Quit date:      Years since quittin.5   • Smokeless tobacco: Never   • Tobacco comments:     40 years ago    Vaping Use   • Vaping Use: Former   Substance and Sexual Activity   • Alcohol use: Yes     Comment: rarely ; occasional    • Drug use: No   • Sexual activity: Not on file   Other Topics Concern   • Not on file   Social History Narrative    Drinks coffee     Social Determinants of Health     Financial Resource Strain: Not on file   Food Insecurity: Not on file   Transportation Needs: Not on file   Physical Activity: Not on file   Stress: Not on file   Social Connections: Not on file   Intimate Partner Violence: Not on file   Housing Stability: Not on file     Past Medical History:   Diagnosis Date   • Anemia    • Arthritis    • Chronic right-sided low back pain without sciatica 2020   • Hypertension    • Polymyalgia rheumatica ([address])    • Shoulder impingement syndrome     last assessed 16   • Subacromial impingement of left shoulder 2016   • Subacromial impingement of right shoulder 2016     Past Surgical History:   Procedure Laterality Date   • ABDOMINAL SURGERY      last assessed 16   • OOPHORECTOMY Right    • OTHER SURGICAL HISTORY Right 2017    shoulder   • TOTAL ABDOMINAL HYSTERECTOMY  30 year ago   • WRIST FRACTURE SURGERY Right 2017     Allergies   Allergen Reactions   • Hydromorphone Dizziness and Other (See Comments)     Severe vertigo    • Tdap [Tetanus-Diphth-Acell Pertussis] Swelling     Current Outpatient Medications on File Prior to Visit   Medication Sig Dispense Refill   • Acetaminophen 500 MG Take 1,000 mg by mouth daily      • allopurinol (ZYLOPRIM) 100 mg tablet TAKE 1/2 TABLET BY MOUTH EVERY DAY FOR 1 WEEK, THEN TAKE 1 TABLET BY MOUTH EVERY DAY FOR 1 WEEK, THEN TAKE 1 AND 1/2 TABLETS BY MOUTH ONE TI     • BETIMOL 0.5 % ophthalmic solution INSTILL 1 DROP INTO THE AFFECTED EYE(S) EVERY DAY     • ergocalciferol (VITAMIN D2) 50,000 units TAKE 1 CAPSULE BY MOUTH ONCE A WEEK FOR 12 DOSES. 12 capsule 0   • ibuprofen (MOTRIN) 200 mg tablet Take 200 mg by mouth every 6 (six) hours as needed for mild pain     • latanoprost (XALATAN) 0.005 % ophthalmic solution INSTILL 1 DROP IN EACH EYE AT BEDTIME  5   • levothyroxine 100 mcg tablet TAKE 1 TABLET BY MOUTH EVERYDAY 90 tablet 1   • lisinopril (ZESTRIL) 20 mg tablet Take 1 tablet (20 mg total) by mouth 2 (two) times a day 180 tablet 1   • metoprolol succinate (TOPROL-XL) 50 mg 24 hr tablet TAKE 1 TABLET BY MOUTH EVERY DAY 90 tablet 1   • prednisoLONE acetate (PRED FORTE) 1 % ophthalmic suspension INSTILL 1 DROP FOUR TIMES DAILY INTO AFFECTED EYE(S) AS DIRECTED AFTER SURGERY     • predniSONE 5 mg tablet TAKE 4 TABLETS BY MOUTH EVERY DAY FOR 3 DAYS, THEN 3 TABLETS DAILY FOR 4 DAYS THEN 2 TABLETS DAILY FOR 7 DAYS THEN START 1 TABLET DAILY     • temazepam (RESTORIL) 15 mg capsule Take 1 capsule (15 mg total) by mouth daily at bedtime as needed for sleep 30 capsule 0   • timolol (TIMOPTIC) 0.5 % ophthalmic solution      • hydrochlorothiazide (HYDRODIURIL) 25 mg tablet Take 1 tablet (25 mg total) by mouth daily 30 tablet 1   • Multiple Vitamins-Minerals (OCUVITE ADULT 50+ PO) Take 1 tablet by mouth daily (Patient not taking: Reported on 4/14/2023)       No current facility-administered medications on file prior to visit. Objective:    Review of Systems   Constitutional: Negative for chills and fever. HENT: Negative for ear pain and sore throat. Eyes: Negative for pain and visual disturbance. Respiratory: Negative for cough and shortness of breath. Cardiovascular: Negative for chest pain and palpitations. Gastrointestinal: Negative for abdominal pain and vomiting. Genitourinary: Negative for dysuria and hematuria. Musculoskeletal: Negative for arthralgias and back pain. Skin: Negative for color change and rash. Neurological: Negative for seizures and syncope. All other systems reviewed and are negative. Right Knee Exam     Tenderness   The patient is experiencing tenderness in the medial joint line. Tests   Varus: negative Valgus: negative    Other   Erythema: absent  Sensation: normal  Pulse: present  Swelling: none  Effusion: no effusion present      Left Knee Exam     Tenderness   The patient is experiencing tenderness in the medial joint line. Tests   Varus: negative Valgus: negative    Other   Erythema: absent  Sensation: normal  Pulse: present  Swelling: none  Effusion: no effusion present            Physical Exam  Vitals and nursing note reviewed. HENT:      Head: Normocephalic. Eyes:      Extraocular Movements: Extraocular movements intact. Cardiovascular:      Rate and Rhythm: Normal rate. Pulses: Normal pulses. Pulmonary:      Effort: Pulmonary effort is normal.   Musculoskeletal:         General: Normal range of motion. Cervical back: Normal range of motion. Right knee: No effusion. Left knee: No effusion. Skin:     General: Skin is warm and dry. Neurological:      General: No focal deficit present. Mental Status: She is alert. Psychiatric:         Behavior: Behavior normal.         Large joint arthrocentesis: bilateral knee  Universal Protocol:  Consent: Verbal consent obtained.   Risks and benefits: risks, benefits and alternatives were discussed  Consent given by: patient  Timeout called at: 7/24/2023 5:41 PM.  Patient understanding: patient states understanding of the procedure being performed  Patient identity confirmed: verbally with patient    Supporting Documentation  Indications: pain and diagnostic evaluation   Procedure Details  Location: knee - bilateral knee  Preparation: Patient was prepped and draped in the usual sterile fashion  Needle size: 22 G  Ultrasound guidance: no  Approach: anterolateral    Medications (Right): 2 mL bupivacaine 0.25 %; 12 mg betamethasone acetate-betamethasone sodium phosphate 6 (3-3) mg/mLMedications (Left): 2 mL bupivacaine 0.25 %; 12 mg betamethasone acetate-betamethasone sodium phosphate 6 (3-3) mg/mL   Patient tolerance: patient tolerated the procedure well with no immediate complications  Dressing:  Sterile dressing applied             I have personally reviewed pertinent films in PACS. Scribe Attestation    I,:  Perla Kim am acting as a scribe while in the presence of the attending physician.:       I,:  Lucy Park, DO personally performed the services described in this documentation    as scribed in my presence.:           Portions of the record may have been created with voice recognition software.  Occasional wrong word or "sound a like" substitutions may have occurred due to the inherent limitations of voice recognition software.  Read the chart carefully and recognize, using context, where substitutions have occurred.

## 2023-08-11 ENCOUNTER — RA CDI HCC (OUTPATIENT)
Dept: OTHER | Facility: HOSPITAL | Age: 78
End: 2023-08-11

## 2023-08-11 NOTE — PROGRESS NOTES
[facility] coding opportunities     E66.01     Chart Reviewed number of suggestions sent to Provider: 1     Patients Insurance     Medicare Insurance: [employer]

## 2023-08-17 ENCOUNTER — OFFICE VISIT (OUTPATIENT)
Dept: FAMILY MEDICINE CLINIC | Facility: OTHER | Age: 78
End: 2023-08-17
Payer: MEDICARE

## 2023-08-17 VITALS
WEIGHT: 229 LBS | TEMPERATURE: 98 F | DIASTOLIC BLOOD PRESSURE: 92 MMHG | SYSTOLIC BLOOD PRESSURE: 136 MMHG | OXYGEN SATURATION: 98 % | RESPIRATION RATE: 18 BRPM | BODY MASS INDEX: 42.14 KG/M2 | HEIGHT: 62 IN | HEART RATE: 68 BPM

## 2023-08-17 DIAGNOSIS — E11.22 TYPE 2 DIABETES MELLITUS WITH STAGE 3 CHRONIC KIDNEY DISEASE, WITHOUT LONG-TERM CURRENT USE OF INSULIN, UNSPECIFIED WHETHER STAGE 3A OR 3B CKD (HCC): ICD-10-CM

## 2023-08-17 DIAGNOSIS — N18.30 TYPE 2 DIABETES MELLITUS WITH STAGE 3 CHRONIC KIDNEY DISEASE, WITHOUT LONG-TERM CURRENT USE OF INSULIN, UNSPECIFIED WHETHER STAGE 3A OR 3B CKD (HCC): ICD-10-CM

## 2023-08-17 DIAGNOSIS — Z00.00 MEDICARE ANNUAL WELLNESS VISIT, SUBSEQUENT: Primary | ICD-10-CM

## 2023-08-17 DIAGNOSIS — N39.46 MIXED STRESS AND URGE INCONTINENCE: ICD-10-CM

## 2023-08-17 DIAGNOSIS — E55.9 VITAMIN D DEFICIENCY: ICD-10-CM

## 2023-08-17 DIAGNOSIS — E78.2 MIXED HYPERLIPIDEMIA: ICD-10-CM

## 2023-08-17 DIAGNOSIS — I10 BENIGN ESSENTIAL HYPERTENSION: ICD-10-CM

## 2023-08-17 DIAGNOSIS — N18.32 STAGE 3B CHRONIC KIDNEY DISEASE (HCC): ICD-10-CM

## 2023-08-17 DIAGNOSIS — E03.9 ACQUIRED HYPOTHYROIDISM: ICD-10-CM

## 2023-08-17 DIAGNOSIS — M35.3 POLYMYALGIA RHEUMATICA (HCC): ICD-10-CM

## 2023-08-17 DIAGNOSIS — R31.29 MICROSCOPIC HEMATURIA: ICD-10-CM

## 2023-08-17 DIAGNOSIS — D64.9 ANEMIA, UNSPECIFIED TYPE: ICD-10-CM

## 2023-08-17 LAB — SL AMB POCT HEMOGLOBIN AIC: 7 (ref ?–6.5)

## 2023-08-17 PROCEDURE — 99214 OFFICE O/P EST MOD 30 MIN: CPT | Performed by: FAMILY MEDICINE

## 2023-08-17 PROCEDURE — 83036 HEMOGLOBIN GLYCOSYLATED A1C: CPT | Performed by: FAMILY MEDICINE

## 2023-08-17 PROCEDURE — G0439 PPPS, SUBSEQ VISIT: HCPCS | Performed by: FAMILY MEDICINE

## 2023-08-17 RX ORDER — LISINOPRIL 20 MG/1
40 TABLET ORAL DAILY
Qty: 180 TABLET | Refills: 1
Start: 2023-08-17

## 2023-08-17 RX ORDER — ATORVASTATIN CALCIUM 10 MG/1
10 TABLET, FILM COATED ORAL DAILY
Qty: 90 TABLET | Refills: 1 | Status: SHIPPED | OUTPATIENT
Start: 2023-08-17

## 2023-08-17 NOTE — ASSESSMENT & PLAN NOTE
Stable, continue lifestyle modifications  Pt encouraged to begin statin therapy  Lab Results   Component Value Date    HGBA1C 7.0 (A) 08/17/2023

## 2023-08-17 NOTE — PROGRESS NOTES
Assessment and Plan:     Problem List Items Addressed This Visit        Endocrine    Hypothyroidism     Stable on levothyroxine 100 mcg         Relevant Orders    TSH, 3rd generation with Free T4 reflex    Type 2 diabetes mellitus with stage 3 chronic kidney disease, without long-term current use of insulin (formerly [facility])     Stable, continue lifestyle modifications  Pt encouraged to begin statin therapy  Lab Results   Component Value Date    HGBA1C 7.0 (A) 08/17/2023            Relevant Orders    POCT hemoglobin A1c (Completed)    Comprehensive metabolic panel    Albumin / creatinine urine ratio    Lipid Panel with Direct LDL reflex    Magnesium    Phosphorus    Vitamin D 25 hydroxy       Cardiovascular and Mediastinum    Benign essential hypertension     BP goal <140/90  Continue lisinopril 40 mg and metoprolol 50 mg  DASH diet and home monitoring recommended         Relevant Medications    lisinopril (ZESTRIL) 20 mg tablet    Other Relevant Orders    Comprehensive metabolic panel    Albumin / creatinine urine ratio    Lipid Panel with Direct LDL reflex       Genitourinary    Microscopic hematuria     Due for urinalysis         Relevant Orders    CBC and Platelet    Albumin / creatinine urine ratio    UA (URINE) with reflex to Scope    CKD (chronic kidney disease) stage 3, GFR 30-59 ml/min (formerly [facility])     Lab Results   Component Value Date    EGFR 41 01/04/2023    EGFR 44 11/21/2022    EGFR 40 02/01/2022    CREATININE 1.24 01/04/2023    CREATININE 1.18 11/21/2022    CREATININE 1.29 02/01/2022     Stable, routine monitoring labs ordered         Relevant Orders    Comprehensive metabolic panel    Albumin / creatinine urine ratio    UA (URINE) with reflex to Scope    Magnesium    Phosphorus    Vitamin D 25 hydroxy       Other    Vitamin D deficiency     Check labs in the setting of concurrent CKD         Relevant Orders    Vitamin D 25 hydroxy    Anemia     Currently asymptomatic  Check routine labs         Relevant Orders    CBC and Platelet    Polymyalgia rheumatica (HCC)     Followed by Rheum, no longer taking prednisone         Mixed hyperlipidemia     LDL goal <70  ASCVD Risk 48.3%  Begin atorvastatin 10 mg and titrate up as tolerated         Relevant Medications    atorvastatin (LIPITOR) 10 mg tablet    Other Relevant Orders    Comprehensive metabolic panel    Lipid Panel with Direct LDL reflex    Mixed stress and urge incontinence     Referral to PT for pelvic floor training         Relevant Orders    Ambulatory Referral to Physical Therapy   Other Visit Diagnoses     Medicare annual wellness visit, subsequent    -  Primary    Body mass index (BMI) 45.0-49.9, adult ([facility])            BMI Counseling: Body mass index is 41.88 kg/m². The BMI is above normal. Nutrition recommendations include decreasing portion sizes, encouraging healthy choices of fruits and vegetables, decreasing fast food intake, consuming healthier snacks, limiting drinks that contain sugar, moderation in carbohydrate intake, increasing intake of lean protein, reducing intake of saturated and trans fat and reducing intake of cholesterol. Exercise recommendations include exercising 3-5 times per week. Rationale for BMI follow-up plan is due to patient being overweight or obese. Urinary Incontinence Plan of Care: counseling topics discussed: practice Kegel (pelvic floor strengthening) exercises, use restroom every 2 hours, limit alcohol, caffeine, spicy foods, and acidic foods, limiting fluid intake 3-4 hours before bed and preventing constipation. Referral was placed for Physical therapy. Preventive health issues were discussed with patient, and age appropriate screening tests were ordered as noted in patient's After Visit Summary. Personalized health advice and appropriate referrals for health education or preventive services given if needed, as noted in patient's After Visit Summary. Return in about 6 months (around 2/17/2024) for Recheck T2DM, HTN, CKD. History of Present Illness:     Patient presents for a Medicare Wellness Visit    Diabetes  She presents for her follow-up diabetic visit. She has type 2 diabetes mellitus. Her disease course has been stable. There are no hypoglycemic associated symptoms. Pertinent negatives for hypoglycemia include no confusion, dizziness, headaches, nervousness/anxiousness, pallor, speech difficulty, sweats or tremors. Associated symptoms include fatigue. Pertinent negatives for diabetes include no blurred vision, no chest pain, no visual change, no weakness and no weight loss. There are no hypoglycemic complications. Pertinent negatives for diabetic complications include no CVA, heart disease, nephropathy, peripheral neuropathy, PVD or retinopathy. Risk factors for coronary artery disease include diabetes mellitus, dyslipidemia, family history, hypertension, obesity, post-menopausal and sedentary lifestyle. Current diabetic treatment includes diet. She is compliant with treatment most of the time. She is following a generally healthy diet. Meal planning includes avoidance of concentrated sweets. She has had a previous visit with a dietitian. She participates in exercise intermittently. An ACE inhibitor/angiotensin II receptor blocker is being taken. She does not see a podiatrist.Eye exam is not current. Thyroid Problem  Presents for follow-up visit. Symptoms include fatigue. Patient reports no anxiety, cold intolerance, constipation, depressed mood, diaphoresis, diarrhea, dry skin, hair loss, heat intolerance, hoarse voice, leg swelling, nail problem, palpitations, tremors, visual change, weight gain or weight loss. The symptoms have been stable. Her past medical history is significant for diabetes and hyperlipidemia. There is no history of heart failure. Hypertension  This is a chronic problem. The current episode started more than 1 year ago. The problem has been waxing and waning since onset.  The problem is controlled. Pertinent negatives include no anxiety, blurred vision, chest pain, headaches, malaise/fatigue, neck pain, orthopnea, palpitations, peripheral edema, PND, shortness of breath or sweats. There are no associated agents to hypertension. Risk factors for coronary artery disease include sedentary lifestyle, post-menopausal state, obesity, family history, dyslipidemia and diabetes mellitus. Past treatments include ACE inhibitors and beta blockers. The current treatment provides moderate improvement. Compliance problems include diet, exercise and psychosocial issues. Hypertensive end-organ damage includes kidney disease. There is no history of CAD/MI, CVA, heart failure, left ventricular hypertrophy, PVD or retinopathy. Identifiable causes of hypertension include chronic renal disease, sleep apnea and a thyroid problem. There is no history of a hypertension causing med. Hyperlipidemia  This is a chronic problem. The current episode started more than 1 year ago. The problem is uncontrolled. Exacerbating diseases include chronic renal disease, diabetes, hypothyroidism and obesity. There are no known factors aggravating her hyperlipidemia. Pertinent negatives include no chest pain, focal sensory loss, focal weakness, leg pain, myalgias or shortness of breath. Current antihyperlipidemic treatment includes diet change. The current treatment provides mild improvement of lipids. Compliance problems include adherence to diet, adherence to exercise and psychosocial issues. Risk factors for coronary artery disease include post-menopausal, a sedentary lifestyle, obesity, hypertension, family history, dyslipidemia and diabetes mellitus. Anemia  Presents for follow-up visit. There has been no abdominal pain, anorexia, bruising/bleeding easily, confusion, fever, leg swelling, light-headedness, malaise/fatigue, pallor, palpitations, paresthesias, pica or weight loss.  Signs of blood loss that are not present include hematemesis, hematochezia, melena, menorrhagia and vaginal bleeding. Past medical history includes chronic renal disease and hypothyroidism. There is no history of heart failure. There are no compliance problems. Patient Care Team:  Luna Pearl DO as PCP - MD Dr. Srinivasan Hanson (Ophthalmology)  Mandie Vazquez DO (Orthopedic Surgery)     Review of Systems:     Review of Systems   Constitutional: Positive for fatigue. Negative for appetite change, diaphoresis, fever, malaise/fatigue, unexpected weight change, weight gain and weight loss. HENT: Positive for hearing loss (stable). Negative for congestion, dental problem, ear pain, hoarse voice, postnasal drip, sore throat and tinnitus. Eyes: Negative for blurred vision, pain, discharge and visual disturbance. Respiratory: Negative for cough, shortness of breath and wheezing. Cardiovascular: Negative for chest pain, palpitations, orthopnea, leg swelling and PND. Gastrointestinal: Negative for abdominal pain, anorexia, constipation, diarrhea, hematemesis, hematochezia, melena, nausea and vomiting. Endocrine: Negative for cold intolerance and heat intolerance. Genitourinary: Negative for difficulty urinating, dysuria, flank pain, menorrhagia, urgency and vaginal bleeding. Musculoskeletal: Negative for arthralgias, back pain, joint swelling, myalgias and neck pain. Skin: Negative for pallor, rash and wound. Allergic/Immunologic: Negative for immunocompromised state. Neurological: Negative for dizziness, tremors, focal weakness, syncope, speech difficulty, weakness, light-headedness, numbness, headaches and paresthesias. Hematological: Negative for adenopathy. Does not bruise/bleed easily. Psychiatric/Behavioral: Negative for confusion, dysphoric mood and sleep disturbance. The patient is not nervous/anxious.          Problem List:     Patient Active Problem List   Diagnosis   • Allergic rhinitis   • Benign essential hypertension   • Closed fracture of proximal end of right humerus with routine healing   • Fracture of right distal radius   • Hypothyroidism   • Insomnia   • Microscopic hematuria   • Morbid obesity ([facility])   • Obstructive sleep apnea   • Other fatigue   • Vitamin D deficiency   • Type 2 diabetes mellitus with stage 3 chronic kidney disease, without long-term current use of insulin ([facility])   • Primary osteoarthritis of both knees   • Osteoarthritis of both knees   • CKD (chronic kidney disease) stage 3, GFR 30-59 ml/min ([facility])   • Anemia   • 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[facility])   • Polymyalgia rheumatica ([facility])   • Mixed hyperlipidemia   • Facet hypertrophy of lumbar region   • Mixed stress and urge incontinence      Past Medical and Surgical History:     Past Medical History:   Diagnosis Date   • Anemia    • Arthritis    • Chronic right-sided low back pain without sciatica 12/31/2020   • Hypertension    • Polymyalgia rheumatica ([address])    • Shoulder impingement syndrome     last assessed 12/27/16   • Subacromial impingement of left shoulder 11/1/2016   • Subacromial impingement of right shoulder 11/1/2016     Past Surgical History:   Procedure Laterality Date   • ABDOMINAL SURGERY      last assessed 11/1/16   • OOPHORECTOMY Right    • OTHER SURGICAL HISTORY Right 2017    shoulder   • TOTAL ABDOMINAL HYSTERECTOMY  30 year ago   • WRIST FRACTURE SURGERY Right 2017      Family History:     Family History   Problem Relation Age of Onset   • Hypertension Mother    • Glaucoma Mother    • Stroke Mother    • Gout Father    • COPD Father    • Heart disease Father    • Kidney failure Sister         chronic   • Gout Sister    • Multiple sclerosis Sister    • Hypertension Sister    • No Known Problems Sister    • Alcohol abuse Brother    • Cancer Brother    • Gout Brother    • Coronary artery disease Brother         CABGx4  (2019)   • Pancreatic cancer Brother    • No Known Problems Son    • No Known Problems Son    • No Known Problems Son    • No Known Problems Daughter    • No Known Problems Daughter    • No Known Problems Maternal Grandmother    • No Known Problems Maternal Grandfather    • No Known Problems Paternal Grandmother    • No Known Problems Paternal Grandfather    • Lung cancer Paternal Uncle       Social History:     Social History     Socioeconomic History   • Marital status:      Spouse name: None   • Number of children: 5   • Years of education: 12   • Highest education level: None   Occupational History   • Occupation: RN   Tobacco Use   • Smoking status: Former     Packs/day: 0.50     Types: Cigarettes     Quit date:      Years since quittin.6   • Smokeless tobacco: Never   • Tobacco comments:     40 years ago    Vaping Use   • Vaping Use: Former   Substance and Sexual Activity   • Alcohol use: Yes     Comment: rarely ; occasional    • Drug use: No   • Sexual activity: None   Other Topics Concern   • None   Social History Narrative    Drinks coffee     Social Determinants of Health     Financial Resource Strain: Low Risk  (2023)    Overall Financial Resource Strain (CARDIA)    • Difficulty of Paying Living Expenses: Not very hard   Food Insecurity: Not on file   Transportation Needs: No Transportation Needs (2023)    PRAPARE - Transportation    • Lack of Transportation (Medical): No    • Lack of Transportation (Non-Medical):  No   Physical Activity: Not on file   Stress: Not on file   Social Connections: Not on file   Intimate Partner Violence: Not on file   Housing Stability: Not on file      Medications and Allergies:     Current Outpatient Medications   Medication Sig Dispense Refill   • Acetaminophen 500 MG Take 1,000 mg by mouth daily      • allopurinol (ZYLOPRIM) 100 mg tablet TAKE 1/2 TABLET BY MOUTH EVERY DAY FOR 1 WEEK, THEN TAKE 1 TABLET BY MOUTH EVERY DAY FOR 1 WEEK, THEN TAKE 1 AND 1/2 TABLETS BY MOUTH ONE TI     • atorvastatin (LIPITOR) 10 mg tablet Take 1 tablet (10 mg total) by mouth daily 90 tablet 1   • BETIMOL 0.5 % ophthalmic solution INSTILL 1 DROP INTO THE AFFECTED EYE(S) EVERY DAY     • ibuprofen (MOTRIN) 200 mg tablet Take 200 mg by mouth every 6 (six) hours as needed for mild pain     • latanoprost (XALATAN) 0.005 % ophthalmic solution INSTILL 1 DROP IN EACH EYE AT BEDTIME  5   • levothyroxine 100 mcg tablet TAKE 1 TABLET BY MOUTH EVERYDAY 90 tablet 1   • lisinopril (ZESTRIL) 20 mg tablet Take 2 tablets (40 mg total) by mouth daily 180 tablet 1   • metoprolol succinate (TOPROL-XL) 50 mg 24 hr tablet TAKE 1 TABLET BY MOUTH EVERY DAY 90 tablet 1   • temazepam (RESTORIL) 15 mg capsule Take 1 capsule (15 mg total) by mouth daily at bedtime as needed for sleep 30 capsule 0   • timolol (TIMOPTIC) 0.5 % ophthalmic solution        No current facility-administered medications for this visit.      Allergies   Allergen Reactions   • Hydromorphone Dizziness and Other (See Comments)     Severe vertigo    • Tdap [Tetanus-Diphth-Acell Pertussis] Swelling      Immunizations:     Immunization History   Administered Date(s) Administered   • COVID-19 MODERNA VACC 0.25 ML IM BOOSTER 01/25/2022   • COVID-19 MODERNA VACC 0.5 ML IM 01/15/2021, 02/12/2021   • Influenza Split High Dose Preservative Free IM 11/17/2016   • Influenza, high dose seasonal 0.7 mL 10/24/2019, 11/03/2020, 11/29/2021   • Influenza, seasonal, injectable 1945, 11/04/2009, 01/01/2013   • Pneumococcal Conjugate 13-Valent 05/23/2017   • Pneumococcal Polysaccharide PPV23 05/29/2018   • Tuberculin Skin Test-PPD Intradermal 07/31/2017, 08/16/2017      Health Maintenance:         Topic Date Due   • Breast Cancer Screening: Mammogram  05/25/2025   • Hepatitis C Screening  Completed   • Colorectal Cancer Screening  Discontinued         Topic Date Due   • COVID-19 Vaccine (4 - Moderna series) 03/22/2022   • Influenza Vaccine (1) 09/01/2023      Medicare Screening Tests and Risk Assessments:     Jonathon Reyes is here for her Subsequent Wellness visit. Last Medicare Wellness visit information reviewed, patient interviewed and updates made to the record today. Health Risk Assessment:   Patient rates overall health as very good. Patient feels that their physical health rating is same. Patient is satisfied with their life. Eyesight was rated as same. Hearing was rated as same. Patient feels that their emotional and mental health rating is same. Patients states they are never, rarely angry. Patient states they are never, rarely unusually tired/fatigued. Pain experienced in the last 7 days has been some. Patient's pain rating has been 3/10. Patient states that she has experienced no weight loss or gain in last 6 months. Fall Risk Screening: In the past year, patient has experienced: no history of falling in past year      Urinary Incontinence Screening:   Patient has leaked urine accidently in the last six months. Home Safety:  Patient does not have trouble with stairs inside or outside of their home. Patient has working smoke alarms and has working carbon monoxide detector. Home safety hazards include: none. Nutrition:   Current diet is Regular. Medications:   Patient is not currently taking any over-the-counter supplements. Patient is able to manage medications. Activities of Daily Living (ADLs)/Instrumental Activities of Daily Living (IADLs):   Walk and transfer into and out of bed and chair?: Yes  Dress and groom yourself?: Yes    Bathe or shower yourself?: Yes    Feed yourself?  Yes  Do your laundry/housekeeping?: Yes  Manage your money, pay your bills and track your expenses?: Yes  Make your own meals?: Yes    Do your own shopping?: Yes    Previous Hospitalizations:   Any hospitalizations or ED visits within the last 12 months?: No      Advance Care Planning:   Living will: No    Durable POA for healthcare: No    Advanced directive: No    Advanced directive counseling given: Yes    Five wishes given: Yes      Cognitive Screening:   Provider or family/friend/caregiver concerned regarding cognition?: No    PREVENTIVE SCREENINGS      Cardiovascular Screening:    General: Screening Not Indicated and History Lipid Disorder      Diabetes Screening:     General: Screening Not Indicated and History Diabetes      Colorectal Cancer Screening:     General: Screening Not Indicated      Breast Cancer Screening:     General: Screening Current      Cervical Cancer Screening:    General: Screening Not Indicated      Osteoporosis Screening:    General: Patient Declines      Abdominal Aortic Aneurysm (AAA) Screening:        General: Screening Not Indicated      Lung Cancer Screening:     General: Screening Not Indicated      Hepatitis C Screening:    General: Screening Current    Screening, Brief Intervention, and Referral to Treatment (SBIRT)    Screening  Typical number of drinks in a day: 0  Typical number of drinks in a week: 0  Interpretation: Low risk drinking behavior. AUDIT-C Screenin) How often did you have a drink containing alcohol in the past year? monthly or less  2) How many drinks did you have on a typical day when you were drinking in the past year? 0  3) How often did you have 6 or more drinks on one occasion in the past year? never    AUDIT-C Score: 1  Interpretation: Score 0-2 (female): Negative screen for alcohol misuse    Single Item Drug Screening:  How often have you used an illegal drug (including marijuana) or a prescription medication for non-medical reasons in the past year? never    Single Item Drug Screen Score: 0  Interpretation: Negative screen for possible drug use disorder    Brief Intervention  Alcohol & drug use screenings were reviewed. No concerns regarding substance use disorder identified. Annual Depression Screening  Time spent screening and evaluating the patient for depression during today's encounter was 5 minutes.     Other Counseling Topics:   Car/seat belt/driving safety, skin self-exam, sunscreen and calcium and vitamin D intake and regular weightbearing exercise. No results found. Physical Exam:     /92   Pulse 68   Temp 98 °F (36.7 °C)   Resp 18   Ht 5' 2" (1.575 m)   Wt 104 kg (229 lb)   SpO2 98%   BMI 41.88 kg/m²     Physical Exam  Vitals and nursing note reviewed. Constitutional:       General: She is not in acute distress. Appearance: Normal appearance. She is well-developed. She is not ill-appearing. Comments: Body mass index is 41.88 kg/m². HENT:      Head: Normocephalic and atraumatic. Right Ear: Hearing, tympanic membrane, ear canal and external ear normal.      Left Ear: Hearing, tympanic membrane, ear canal and external ear normal.      Nose: Nose normal.      Mouth/Throat:      Mouth: Mucous membranes are moist.      Pharynx: Oropharynx is clear. Uvula midline. Eyes:      General: No scleral icterus. Conjunctiva/sclera: Conjunctivae normal.      Pupils: Pupils are equal, round, and reactive to light. Neck:      Thyroid: No thyromegaly. Cardiovascular:      Rate and Rhythm: Normal rate and regular rhythm. Pulses: no weak pulses          Dorsalis pedis pulses are 2+ on the right side and 2+ on the left side. Posterior tibial pulses are 2+ on the right side and 2+ on the left side. Heart sounds: Normal heart sounds. No murmur heard. Pulmonary:      Effort: Pulmonary effort is normal. No respiratory distress. Breath sounds: Normal breath sounds. No wheezing. Abdominal:      General: Bowel sounds are normal. There is no distension. Palpations: Abdomen is soft. Tenderness: There is no abdominal tenderness. Musculoskeletal:         General: No tenderness. Normal range of motion. Cervical back: Normal range of motion and neck supple. Right lower leg: No edema. Left lower leg: No edema.    Feet:      Right foot:      Skin integrity: No ulcer, skin breakdown, erythema, warmth, callus or dry skin. Left foot:      Skin integrity: No ulcer, skin breakdown, erythema, warmth, callus or dry skin. Lymphadenopathy:      Cervical: No cervical adenopathy. Skin:     General: Skin is warm and dry. Coloration: Skin is not jaundiced. Findings: No erythema or rash. Neurological:      General: No focal deficit present. Mental Status: She is alert and oriented to person, place, and time. Cranial Nerves: No cranial nerve deficit. Psychiatric:         Mood and Affect: Mood normal.         Behavior: Behavior normal.        Patient's shoes and socks removed. Right Foot/Ankle   Right Foot Inspection  Skin Exam: skin normal and skin intact. No dry skin, no warmth, no callus, no erythema, no maceration, no abnormal color, no pre-ulcer, no ulcer and no callus. Toe Exam: ROM and strength within normal limits. Sensory   Vibration: intact  Proprioception: intact  Monofilament testing: intact    Vascular  Capillary refills: < 3 seconds  The right DP pulse is 2+. The right PT pulse is 2+. Left Foot/Ankle  Left Foot Inspection  Skin Exam: skin normal and skin intact. No dry skin, no warmth, no erythema, no maceration, normal color, no pre-ulcer, no ulcer and no callus. Toe Exam: ROM and strength within normal limits. Sensory   Vibration: intact  Proprioception: intact  Monofilament testing: intact    Vascular  Capillary refills: < 3 seconds  The left DP pulse is 2+. The left PT pulse is 2+.      Assign Risk Category  No deformity present  No loss of protective sensation  No weak pulses  Risk: 0      Karla Solano DO

## 2023-08-17 NOTE — ASSESSMENT & PLAN NOTE
Lab Results   Component Value Date    EGFR 41 01/04/2023    EGFR 44 11/21/2022    EGFR 40 02/01/2022    CREATININE 1.24 01/04/2023    CREATININE 1.18 11/21/2022    CREATININE 1.29 02/01/2022     Stable, routine monitoring labs ordered

## 2023-08-17 NOTE — PATIENT INSTRUCTIONS
Medicare Preventive Visit Patient Instructions  Thank you for completing your Welcome to Medicare Visit or Medicare Annual Wellness Visit today. Your next wellness visit will be due in one year (8/17/2024). The screening/preventive services that you may require over the next 5-10 years are detailed below. Some tests may not apply to you based off risk factors and/or age. Screening tests ordered at today's visit but not completed yet may show as past due. Also, please note that scanned in results may not display below. Preventive Screenings:  Service Recommendations Previous Testing/Comments   Colorectal Cancer Screening  * Colonoscopy    * Fecal Occult Blood Test (FOBT)/Fecal Immunochemical Test (FIT)  * Fecal DNA/Cologuard Test  * Flexible Sigmoidoscopy Age: 43-73 years old   Colonoscopy: every 10 years (may be performed more frequently if at higher risk)  OR  FOBT/FIT: every 1 year  OR  Cologuard: every 3 years  OR  Sigmoidoscopy: every 5 years  Screening may be recommended earlier than age 39 if at higher risk for colorectal cancer. Also, an individualized decision between you and your healthcare provider will decide whether screening between the ages of 77-80 would be appropriate. Colonoscopy: Not on file  FOBT/FIT: 12/02/2020  Cologuard: Not on file  Sigmoidoscopy: Not on file          Breast Cancer Screening Age: 36 years old  Frequency: every 1-2 years  Not required if history of left and right mastectomy Mammogram: 05/25/2023    Screening Current   Cervical Cancer Screening Between the ages of 21-29, pap smear recommended once every 3 years. Between the ages of 32-69, can perform pap smear with HPV co-testing every 5 years.    Recommendations may differ for women with a history of total hysterectomy, cervical cancer, or abnormal pap smears in past. Pap Smear: Not on file    Screening Not Indicated   Hepatitis C Screening Once for adults born between 1945 and 1965  More frequently in patients at high risk for Hepatitis C Hep C Antibody: 12/10/2019    Screening Current   Diabetes Screening 1-2 times per year if you're at risk for diabetes or have pre-diabetes Fasting glucose: 117 mg/dL (1/4/2023)  A1C: 7.1 (3/16/2023)  Screening Not Indicated  History Diabetes   Cholesterol Screening Once every 5 years if you don't have a lipid disorder. May order more often based on risk factors. Lipid panel: 01/04/2023    Screening Not Indicated  History Lipid Disorder     Other Preventive Screenings Covered by Medicare:  1. Abdominal Aortic Aneurysm (AAA) Screening: covered once if your at risk. You're considered to be at risk if you have a family history of AAA. 2. Lung Cancer Screening: covers low dose CT scan once per year if you meet all of the following conditions: (1) Age 48-67; (2) No signs or symptoms of lung cancer; (3) Current smoker or have quit smoking within the last 15 years; (4) You have a tobacco smoking history of at least 20 pack years (packs per day multiplied by number of years you smoked); (5) You get a written order from a healthcare provider. 3. Glaucoma Screening: covered annually if you're considered high risk: (1) You have diabetes OR (2) Family history of glaucoma OR (3)  aged 48 and older OR (3)  American aged 72 and older  3. Osteoporosis Screening: covered every 2 years if you meet one of the following conditions: (1) You're estrogen deficient and at risk for osteoporosis based off medical history and other findings; (2) Have a vertebral abnormality; (3) On glucocorticoid therapy for more than 3 months; (4) Have primary hyperparathyroidism; (5) On osteoporosis medications and need to assess response to drug therapy. · Last bone density test (DXA Scan): 10/30/2020.  5. HIV Screening: covered annually if you're between the age of 15-65. Also covered annually if you are younger than 13 and older than 72 with risk factors for HIV infection.  For pregnant patients, it is covered up to 3 times per pregnancy. Immunizations:  Immunization Recommendations   Influenza Vaccine Annual influenza vaccination during flu season is recommended for all persons aged >= 6 months who do not have contraindications   Pneumococcal Vaccine   * Pneumococcal conjugate vaccine = PCV13 (Prevnar 13), PCV15 (Vaxneuvance), PCV20 (Prevnar 20)  * Pneumococcal polysaccharide vaccine = PPSV23 (Pneumovax) Adults 20-63 years old: 1-3 doses may be recommended based on certain risk factors  Adults 72 years old: 1-2 doses may be recommended based off what pneumonia vaccine you previously received   Hepatitis B Vaccine 3 dose series if at intermediate or high risk (ex: diabetes, end stage renal disease, liver disease)   Tetanus (Td) Vaccine - COST NOT COVERED BY MEDICARE PART B Following completion of primary series, a booster dose should be given every 10 years to maintain immunity against tetanus. Td may also be given as tetanus wound prophylaxis. Tdap Vaccine - COST NOT COVERED BY MEDICARE PART B Recommended at least once for all adults. For pregnant patients, recommended with each pregnancy. Shingles Vaccine (Shingrix) - COST NOT COVERED BY MEDICARE PART B  2 shot series recommended in those aged 48 and above     Health Maintenance Due:      Topic Date Due   • Breast Cancer Screening: Mammogram  05/25/2025   • Hepatitis C Screening  Completed   • Colorectal Cancer Screening  Discontinued     Immunizations Due:      Topic Date Due   • COVID-19 Vaccine (4 - Moderna series) 03/22/2022   • Influenza Vaccine (1) 09/01/2023     Advance Directives   What are advance directives? Advance directives are legal documents that state your wishes and plans for medical care. These plans are made ahead of time in case you lose your ability to make decisions for yourself. Advance directives can apply to any medical decision, such as the treatments you want, and if you want to donate organs.    What are the types of advance directives? There are many types of advance directives, and each state has rules about how to use them. You may choose a combination of any of the following:  · Living will: This is a written record of the treatment you want. You can also choose which treatments you do not want, which to limit, and which to stop at a certain time. This includes surgery, medicine, IV fluid, and tube feedings. · Durable power of  for healthcare [facility]): This is a written record that states who you want to make healthcare choices for you when you are unable to make them for yourself. This person, called a proxy, is usually a family member or a friend. You may choose more than 1 proxy. · Do not resuscitate (DNR) order:  A DNR order is used in case your heart stops beating or you stop breathing. It is a request not to have certain forms of treatment, such as CPR. A DNR order may be included in other types of advance directives. · Medical directive: This covers the care that you want if you are in a coma, near death, or unable to make decisions for yourself. You can list the treatments you want for each condition. Treatment may include pain medicine, surgery, blood transfusions, dialysis, IV or tube feedings, and a ventilator (breathing machine). · Values history: This document has questions about your views, beliefs, and how you feel and think about life. This information can help others choose the care that you would choose. Why are advance directives important? An advance directive helps you control your care. Although spoken wishes may be used, it is better to have your wishes written down. Spoken wishes can be misunderstood, or not followed. Treatments may be given even if you do not want them. An advance directive may make it easier for your family to make difficult choices about your care. Urinary Incontinence   Urinary incontinence (UI)  is when you lose control of your bladder.  UI develops because your bladder cannot store or empty urine properly. The 3 most common types of UI are stress incontinence, urge incontinence, or both. Medicines:   · May be given to help strengthen your bladder control. Report any side effects of medication to your healthcare provider. Do pelvic muscle exercises often:  Your pelvic muscles help you stop urinating. Squeeze these muscles tight for 5 seconds, then relax for 5 seconds. Gradually work up to squeezing for 10 seconds. Do 3 sets of 15 repetitions a day, or as directed. This will help strengthen your pelvic muscles and improve bladder control. Train your bladder:  Go to the bathroom at set times, such as every 2 hours, even if you do not feel the urge to go. You can also try to hold your urine when you feel the urge to go. For example, hold your urine for 5 minutes when you feel the urge to go. As that becomes easier, hold your urine for 10 minutes. Self-care:   · Keep a UI record. Write down how often you leak urine and how much you leak. Make a note of what you were doing when you leaked urine. · Drink liquids as directed. You may need to limit the amount of liquid you drink to help control your urine leakage. Do not drink any liquid right before you go to bed. Limit or do not have drinks that contain caffeine or alcohol. · Prevent constipation. Eat a variety of high-fiber foods. Good examples are high-fiber cereals, beans, vegetables, and whole-grain breads. Walking is the best way to trigger your intestines to have a bowel movement. · Exercise regularly and maintain a healthy weight. Weight loss and exercise will decrease pressure on your bladder and help you control your leakage. · Use a catheter as directed  to help empty your bladder. A catheter is a tiny, plastic tube that is put into your bladder to drain your urine. · Go to behavior therapy as directed. Behavior therapy may be used to help you learn to control your urge to urinate.     Weight Management Why it is important to manage your weight:  Being overweight increases your risk of health conditions such as heart disease, high blood pressure, type 2 diabetes, and certain types of cancer. It can also increase your risk for osteoarthritis, sleep apnea, and other respiratory problems. Aim for a slow, steady weight loss. Even a small amount of weight loss can lower your risk of health problems. How to lose weight safely:  A safe and healthy way to lose weight is to eat fewer calories and get regular exercise. You can lose up about 1 pound a week by decreasing the number of calories you eat by 500 calories each day. Healthy meal plan for weight management:  A healthy meal plan includes a variety of foods, contains fewer calories, and helps you stay healthy. A healthy meal plan includes the following:  · Eat whole-grain foods more often. A healthy meal plan should contain fiber. Fiber is the part of grains, fruits, and vegetables that is not broken down by your body. Whole-grain foods are healthy and provide extra fiber in your diet. Some examples of whole-grain foods are whole-wheat breads and pastas, oatmeal, brown rice, and bulgur. · Eat a variety of vegetables every day. Include dark, leafy greens such as spinach, kale, monroe greens, and mustard greens. Eat yellow and orange vegetables such as carrots, sweet potatoes, and winter squash. · Eat a variety of fruits every day. Choose fresh or canned fruit (canned in its own juice or light syrup) instead of juice. Fruit juice has very little or no fiber. · Eat low-fat dairy foods. Drink fat-free (skim) milk or 1% milk. Eat fat-free yogurt and low-fat cottage cheese. Try low-fat cheeses such as mozzarella and other reduced-fat cheeses. · Choose meat and other protein foods that are low in fat. Choose beans or other legumes such as split peas or lentils. Choose fish, skinless poultry (chicken or turkey), or lean cuts of red meat (beef or pork).  Before you cook meat or poultry, cut off any visible fat. · Use less fat and oil. Try baking foods instead of frying them. Add less fat, such as margarine, sour cream, regular salad dressing and mayonnaise to foods. Eat fewer high-fat foods. Some examples of high-fat foods include french fries, doughnuts, ice cream, and cakes. · Eat fewer sweets. Limit foods and drinks that are high in sugar. This includes candy, cookies, regular soda, and sweetened drinks. Exercise:  Exercise at least 30 minutes per day on most days of the week. Some examples of exercise include walking, biking, dancing, and swimming. You can also fit in more physical activity by taking the stairs instead of the elevator or parking farther away from stores. Ask your healthcare provider about the best exercise plan for you. © Copyright [employer] 2018 Information is for End User's use only and may not be sold, redistributed or otherwise used for commercial purposes.  All illustrations and images included in CareNotes® are the copyrighted property of [employer] or [address] [place] [address]

## 2023-08-17 NOTE — ASSESSMENT & PLAN NOTE
BP goal <140/90  Continue lisinopril 40 mg and metoprolol 50 mg  DASH diet and home monitoring recommended

## 2023-08-21 ENCOUNTER — LAB (OUTPATIENT)
Dept: LAB | Facility: CLINIC | Age: 78
End: 2023-08-21
Payer: MEDICARE

## 2023-08-21 DIAGNOSIS — E11.22 TYPE 2 DIABETES MELLITUS WITH STAGE 3 CHRONIC KIDNEY DISEASE, WITHOUT LONG-TERM CURRENT USE OF INSULIN, UNSPECIFIED WHETHER STAGE 3A OR 3B CKD (HCC): ICD-10-CM

## 2023-08-21 DIAGNOSIS — I10 BENIGN ESSENTIAL HYPERTENSION: ICD-10-CM

## 2023-08-21 DIAGNOSIS — E03.9 ACQUIRED HYPOTHYROIDISM: ICD-10-CM

## 2023-08-21 DIAGNOSIS — N18.30 TYPE 2 DIABETES MELLITUS WITH STAGE 3 CHRONIC KIDNEY DISEASE, WITHOUT LONG-TERM CURRENT USE OF INSULIN, UNSPECIFIED WHETHER STAGE 3A OR 3B CKD (HCC): ICD-10-CM

## 2023-08-21 DIAGNOSIS — E78.2 MIXED HYPERLIPIDEMIA: ICD-10-CM

## 2023-08-21 DIAGNOSIS — E55.9 VITAMIN D DEFICIENCY: ICD-10-CM

## 2023-08-21 DIAGNOSIS — N18.32 STAGE 3B CHRONIC KIDNEY DISEASE (HCC): ICD-10-CM

## 2023-08-21 DIAGNOSIS — D64.9 ANEMIA, UNSPECIFIED TYPE: ICD-10-CM

## 2023-08-21 DIAGNOSIS — R31.29 MICROSCOPIC HEMATURIA: ICD-10-CM

## 2023-08-21 LAB
25(OH)D3 SERPL-MCNC: 30.9 NG/ML (ref 30–100)
ALBUMIN SERPL BCP-MCNC: 3.6 G/DL (ref 3.5–5)
ALP SERPL-CCNC: 94 U/L (ref 46–116)
ALT SERPL W P-5'-P-CCNC: 24 U/L (ref 12–78)
ANION GAP SERPL CALCULATED.3IONS-SCNC: 8 MMOL/L
AST SERPL W P-5'-P-CCNC: 11 U/L (ref 5–45)
BILIRUB SERPL-MCNC: 0.37 MG/DL (ref 0.2–1)
BILIRUB UR QL STRIP: NEGATIVE
BUN SERPL-MCNC: 29 MG/DL (ref 5–25)
CALCIUM SERPL-MCNC: 9.8 MG/DL (ref 8.3–10.1)
CHLORIDE SERPL-SCNC: 107 MMOL/L (ref 96–108)
CHOLEST SERPL-MCNC: 203 MG/DL
CLARITY UR: CLEAR
CO2 SERPL-SCNC: 25 MMOL/L (ref 21–32)
COLOR UR: NORMAL
CREAT SERPL-MCNC: 1.25 MG/DL (ref 0.6–1.3)
CREAT UR-MCNC: 273 MG/DL
ERYTHROCYTE [DISTWIDTH] IN BLOOD BY AUTOMATED COUNT: 13.8 % (ref 11.6–15.1)
GFR SERPL CREATININE-BSD FRML MDRD: 41 ML/MIN/1.73SQ M
GLUCOSE P FAST SERPL-MCNC: 139 MG/DL (ref 65–99)
GLUCOSE UR STRIP-MCNC: NEGATIVE MG/DL
HCT VFR BLD AUTO: 40.6 % (ref 34.8–46.1)
HDLC SERPL-MCNC: 47 MG/DL
HGB BLD-MCNC: 12.6 G/DL (ref 11.5–15.4)
HGB UR QL STRIP.AUTO: NEGATIVE
KETONES UR STRIP-MCNC: NEGATIVE MG/DL
LDLC SERPL CALC-MCNC: 117 MG/DL (ref 0–100)
LEUKOCYTE ESTERASE UR QL STRIP: NEGATIVE
MAGNESIUM SERPL-MCNC: 2.1 MG/DL (ref 1.6–2.6)
MCH RBC QN AUTO: 28.9 PG (ref 26.8–34.3)
MCHC RBC AUTO-ENTMCNC: 31 G/DL (ref 31.4–37.4)
MCV RBC AUTO: 93 FL (ref 82–98)
MICROALBUMIN UR-MCNC: 50.1 MG/L (ref 0–20)
MICROALBUMIN/CREAT 24H UR: 18 MG/G CREATININE (ref 0–30)
NITRITE UR QL STRIP: NEGATIVE
PH UR STRIP.AUTO: 6 [PH]
PHOSPHATE SERPL-MCNC: 3.9 MG/DL (ref 2.3–4.1)
PLATELET # BLD AUTO: 287 THOUSANDS/UL (ref 149–390)
PMV BLD AUTO: 10.6 FL (ref 8.9–12.7)
POTASSIUM SERPL-SCNC: 4 MMOL/L (ref 3.5–5.3)
PROT SERPL-MCNC: 7.6 G/DL (ref 6.4–8.4)
PROT UR STRIP-MCNC: NEGATIVE MG/DL
RBC # BLD AUTO: 4.36 MILLION/UL (ref 3.81–5.12)
SODIUM SERPL-SCNC: 140 MMOL/L (ref 135–147)
SP GR UR STRIP.AUTO: 1.02 (ref 1–1.03)
TRIGL SERPL-MCNC: 197 MG/DL
TSH SERPL DL<=0.05 MIU/L-ACNC: 2.59 UIU/ML (ref 0.45–4.5)
UROBILINOGEN UR STRIP-ACNC: <2 MG/DL
WBC # BLD AUTO: 9.26 THOUSAND/UL (ref 4.31–10.16)

## 2023-08-21 PROCEDURE — 85027 COMPLETE CBC AUTOMATED: CPT

## 2023-08-21 PROCEDURE — 82570 ASSAY OF URINE CREATININE: CPT

## 2023-08-21 PROCEDURE — 80053 COMPREHEN METABOLIC PANEL: CPT

## 2023-08-21 PROCEDURE — 82043 UR ALBUMIN QUANTITATIVE: CPT

## 2023-08-21 PROCEDURE — 84100 ASSAY OF PHOSPHORUS: CPT

## 2023-08-21 PROCEDURE — 81003 URINALYSIS AUTO W/O SCOPE: CPT | Performed by: FAMILY MEDICINE

## 2023-08-21 PROCEDURE — 83735 ASSAY OF MAGNESIUM: CPT

## 2023-08-21 PROCEDURE — 80061 LIPID PANEL: CPT

## 2023-08-21 PROCEDURE — 36415 COLL VENOUS BLD VENIPUNCTURE: CPT

## 2023-08-21 PROCEDURE — 84443 ASSAY THYROID STIM HORMONE: CPT

## 2023-08-21 PROCEDURE — 82306 VITAMIN D 25 HYDROXY: CPT

## 2023-08-22 NOTE — RESULT ENCOUNTER NOTE
Please inform pt that labs are stable. Continue current medications and continue to engage in healthy lifestyle (diet, exercise). Thanks!   Shemar Griffin, DO

## 2023-09-14 ENCOUNTER — TELEPHONE (OUTPATIENT)
Dept: FAMILY MEDICINE CLINIC | Facility: OTHER | Age: 78
End: 2023-09-14

## 2023-09-14 NOTE — TELEPHONE ENCOUNTER
Hi, this is Mayito Bean birthday [id number]. I finished the prescription for the vitamin D that Doctor Maycol had to ask me to take. I don't know if she wants me to refill it or not. She can check my labs for that level and my number is [phone]. Thank you.

## 2023-09-15 NOTE — TELEPHONE ENCOUNTER
Vitamin D level in August looked good. She does not need any prescription strength Vitamin D. Recommend that she take 1974-0021 IU of Vitamin D (available OTC) daily to maintain this level. Thanks!   Ashly Myers, DO

## 2023-10-09 DIAGNOSIS — I10 ESSENTIAL HYPERTENSION: ICD-10-CM

## 2023-10-09 RX ORDER — METOPROLOL SUCCINATE 50 MG/1
TABLET, EXTENDED RELEASE ORAL
Qty: 90 TABLET | Refills: 1 | Status: SHIPPED | OUTPATIENT
Start: 2023-10-09

## 2023-10-20 ENCOUNTER — TELEPHONE (OUTPATIENT)
Dept: FAMILY MEDICINE CLINIC | Facility: OTHER | Age: 78
End: 2023-10-20

## 2023-10-20 NOTE — TELEPHONE ENCOUNTER
MEDICARE WELLNESS VISIT NOTE    HISTORY OF PRESENT ILLNESS:   Fe Cloud presents for her Subsequent Annual Medicare Wellness Visit.   She has complaints or concerns addressed in separate note.      Patient Care Team:  Indio Galarza DO as PCP - General (Family Practice)        Patient Active Problem List   Diagnosis   • Benign hypertension with chronic kidney disease, stage III (CMS/HCC)   • Dependent edema   • Unspecified vitamin D deficiency   • History of DVT of lower extremity   • Infection of total knee replacement (CMS/HCC)   • Chronic diarrhea   • Drug-induced dyspepsia   • Fall at home   • Erythema of hand   • Concussion syndrome   • Headache due to trauma   • Chronic back pain   • SI (sacroiliac) joint dysfunction   • Osteoporosis   • Medicare annual wellness visit, subsequent   • Type 2 diabetes mellitus without complication (CMS/HCC)   • Morbid obesity due to excess calories (CMS/[facility])   • Primary osteoarthritis involving multiple joints   • Bilateral hearing loss   • Pain of both wrist joints   • Sliding hiatal hernia   • Bilateral flank pain   • Metabolic syndrome         Past Medical History:   Diagnosis Date   • Adjustment disorder    • Allergy    • Blood clot associated with vein wall inflammation 2007    after left knee replacement   • CKD (chronic kidney disease), stage III (CMS/HCC)    • Diabetes mellitus (CMS/HCC)    • DJD (degenerative joint disease)    • Esophageal reflux    • Essential (primary) hypertension    • Gall stone Febuary 2013   • Obesity    • Osteoporosis 02/14/2013   • Pain of both wrist joints 12/15/2017   • Pancreatitis due to common bile duct stone Febuary 2013   • Vitamin D deficiency          Past Surgical History:   Procedure Laterality Date   • Cholecystectomy  2013   • Fracture surgery  2002    left arm   • Hysterectomy  01/01/2000   • Joint replacement      left done 2007 and then redone Feb 7,2013 due to infection   • Tonsillectomy and adenoidectomy  1970's        Called patient unable to leave any message no machine          Hi, this is Dickson Sheridan, birthday [address][birth year]. I'd like to make an appointment with Doctor Germania Nava. I need to discuss medication. I'd like to make some changes and they also have a spot on my back I'd like to take a look at. All right. There's no emergency, but I would appreciate an appointment closer than February for this medication adjustment. I'm [phone]. Thank you.   Social History     Tobacco Use   • Smoking status: Never Smoker   • Smokeless tobacco: Never Used   Vaping Use   • Vaping Use: never used   Substance Use Topics   • Alcohol use: No     Alcohol/week: 0.0 standard drinks   • Drug use: No     Drug use:    Drug Use:    No              Family History - Reviewed    Current Outpatient Medications   Medication Sig Dispense Refill   • furosemide (LASIX) 40 MG tablet Take 1 tablet by mouth 2 times daily. 180 tablet 1   • Naproxen Sodium (ALEVE PO) Take 1 tablet by mouth as needed.     • Omega-3 Fatty Acids (OMEGA-3 FISH OIL EX ST PO)      • acetaminophen (TYLENOL) 500 MG tablet Take 500 mg by mouth every 6 hours as needed for Pain.     • cholecalciferol (VITAMIN D3) 1000 UNITS tablet Take 5,000 Units by mouth daily.      • blood glucose (FREESTYLE TEST STRIPS) test strip Test blood sugar 1-2 times daily. Diagnosis: E11.9. Meter: One Touch Verio (Patient taking differently: Test blood sugar 1-2 times daily. Diagnosis: E11.9. Meter:Accu-Check Gayatri meter, test strips and lancets.) 100 each 2   • Blood Pressure Monitoring (Blood Pressure Cuff) Misc Check blood pressure 2-3 times weekly as advised 1 each 0   • sertraline (ZOLOFT) 25 MG tablet Take 1 tablet by mouth daily. 90 tablet 3   • Elastic Bandages & Supports (Medical Compression Stockings) Misc 2 each daily. Please provide with knee high zip up compression stockings with 20-30mmHg 4 each 2     No current facility-administered medications for this visit.        The following items on the Medicare Health Risk Assessment were found to be positive  3.) During the past 4 weeks, how would you rate your health?: Fair     4.) During the past 4 weeks, what was the hardest physical activity you could do for at least 2 minutes?: Very Light     5.) Do you do moderate to strenuous exercise (brisk walk) for about 20 minutes for 3 or more days per week?: No, I usually do not exercise this much     7a.) Have you had a fall in the past  year?: Yes     7b.) Do you feel unsteady when standing or walking?: Yes     7c.) Do you worry about falling?: Yes     11b.) Bowel control problems: Sometimes     11e.) Tiredness or Fatigue: Often     11f.) Feeling stressed or overwhelmed: Often     11j.) Driven/Ridden in a car without wearing your seatbelt: Sometimes     15.) How confident are you that you can control and manage most of your health problems?: Somewhat confident         Vision and Hearing screens: Not applicable    Advance Directive:   The patient has the following documents:  Power of  for Health Care    Cognitive/Functional Status: Mini-Cog performed with score of 5    Opioid Review: Fe is not taking opioid medications.    Recent PHQ 2/9 Score:    PHQ 2:  Date Adult PHQ 2 Score Adult PHQ 2 Interpretation   8/24/2021 2 No further screening needed       PHQ 9:  Date Adult PHQ 9 Score Adult PHQ 9 Interpretation   8/24/2021 7 Mild Depression       DEPRESSION ASSESSMENT/PLAN:  Start and/or continue medication.  See orders for details.      Body mass index is 38.89 kg/m².    BMI ASSESSMENT/PLAN:  Patient is obese.    Caloric restriction        Needed Screening/Treatment:   Immunizations reviewed and patient needs: Pneumococcal 23, Herpes Zoster and TDAP  Needed follow up:  None    See orders.   See Patient Instructions section.   Return in about 6 months (around 2/24/2022) for diabetes follow up with labs before visit.

## 2023-10-23 NOTE — TELEPHONE ENCOUNTER
Spoke with patient and she is scheduled with Dr Feng on 10/30/2023     Patient really would of liked to of been seen by Dr Ludy Izquierdo but due to pcp schedule unable to accommodate   Will put patient on recall list if any openings are available with pcp

## 2023-11-02 ENCOUNTER — OFFICE VISIT (OUTPATIENT)
Dept: FAMILY MEDICINE CLINIC | Facility: OTHER | Age: 78
End: 2023-11-02
Payer: MEDICARE

## 2023-11-02 VITALS
RESPIRATION RATE: 16 BRPM | SYSTOLIC BLOOD PRESSURE: 134 MMHG | DIASTOLIC BLOOD PRESSURE: 68 MMHG | HEART RATE: 69 BPM | BODY MASS INDEX: 42.33 KG/M2 | HEIGHT: 62 IN | WEIGHT: 230 LBS | TEMPERATURE: 98.4 F | OXYGEN SATURATION: 97 %

## 2023-11-02 DIAGNOSIS — Z23 ENCOUNTER FOR IMMUNIZATION: ICD-10-CM

## 2023-11-02 DIAGNOSIS — R06.09 DYSPNEA ON EXERTION: Primary | ICD-10-CM

## 2023-11-02 PROCEDURE — 99213 OFFICE O/P EST LOW 20 MIN: CPT | Performed by: STUDENT IN AN ORGANIZED HEALTH CARE EDUCATION/TRAINING PROGRAM

## 2023-11-02 NOTE — PROGRESS NOTES
Name: Jori Cano      : 1945      MRN: [id number]  Encounter Provider: Jacob Inman MD  Encounter Date: 2023   Encounter department: [address]     1. Dyspnea on exertion  Comments:  ECHO and CXR ordered to further evaluate LEBLANC with bilateral lower extremity edema. Serology reviewed, labs wnl. Orders:  -     Echo complete w/ contrast if indicated; Future; Expected date: 2023  -     XR chest pa & lateral; Future; Expected date: 2023    2. Encounter for immunization       Return in about 2 weeks (around 2023) for Recheck echo and beta blocker. Subjective      HPI    Patient presents because she is concerned that she is having side effects from her metoprolol. She complains of fatigue, insomnia and weight gain. She states that she has no trouble falling asleep but does have trouble staying asleep. She takes Restoril about once a week but hasn't noticed any significant change in insomnia. She wears her CPAP every night. The patient has been taking metoprolol for years, prevously without issue. She also complains of increased shortness of breath described as dyspnea on exertion. She gets short of breath when she goes up one flight of steps. She also gets short of breath with walking two blocks. She denies chest pain/pressure or palpitations. She does endorse bilateral lower extremity swelling. Ms. Carol Osborn is not taking her prescribed lipitor at this time. She does not want to be on a statin medication. Review of Systems   Constitutional:  Positive for fatigue. Negative for appetite change, chills, diaphoresis, fever and unexpected weight change. HENT:  Negative for congestion, ear pain, sneezing and sore throat. Eyes:  Negative for pain, redness and visual disturbance. Respiratory:  Positive for shortness of breath. Negative for apnea, cough, chest tightness and wheezing.     Cardiovascular:  Positive for leg swelling. Negative for chest pain and palpitations. Gastrointestinal:  Negative for abdominal pain and vomiting. Neurological:  Negative for seizures and syncope. All other systems reviewed and are negative. Current Outpatient Medications on File Prior to Visit   Medication Sig    Acetaminophen 500 MG Take 1,000 mg by mouth daily     allopurinol (ZYLOPRIM) 100 mg tablet TAKE 1/2 TABLET BY MOUTH EVERY DAY FOR 1 WEEK, THEN TAKE 1 TABLET BY MOUTH EVERY DAY FOR 1 WEEK, THEN TAKE 1 AND 1/2 TABLETS BY MOUTH ONE TI    ibuprofen (MOTRIN) 200 mg tablet Take 200 mg by mouth every 6 (six) hours as needed for mild pain    latanoprost (XALATAN) 0.005 % ophthalmic solution INSTILL 1 DROP IN EACH EYE AT BEDTIME    levothyroxine 100 mcg tablet TAKE 1 TABLET BY MOUTH EVERYDAY    lisinopril (ZESTRIL) 20 mg tablet Take 2 tablets (40 mg total) by mouth daily    metoprolol succinate (TOPROL-XL) 50 mg 24 hr tablet TAKE 1 TABLET BY MOUTH EVERY DAY    temazepam (RESTORIL) 15 mg capsule Take 1 capsule (15 mg total) by mouth daily at bedtime as needed for sleep    atorvastatin (LIPITOR) 10 mg tablet Take 1 tablet (10 mg total) by mouth daily (Patient not taking: Reported on 11/2/2023)    BETIMOL 0.5 % ophthalmic solution INSTILL 1 DROP INTO THE AFFECTED EYE(S) EVERY DAY (Patient not taking: Reported on 11/2/2023)    timolol (TIMOPTIC) 0.5 % ophthalmic solution  (Patient not taking: Reported on 11/2/2023)       Objective     /68   Pulse 69   Temp 98.4 °F (36.9 °C)   Resp 16   Ht 5' 2" (1.575 m)   Wt 104 kg (230 lb)   SpO2 97%   BMI 42.07 kg/m²     Physical Exam  Constitutional:       General: She is not in acute distress. Appearance: She is not toxic-appearing. HENT:      Head: Normocephalic and atraumatic. Nose: Nose normal.   Eyes:      Pupils: Pupils are equal, round, and reactive to light. Cardiovascular:      Rate and Rhythm: Normal rate and regular rhythm. Heart sounds: No murmur heard.      No friction rub. No gallop. Pulmonary:      Effort: Pulmonary effort is normal.      Breath sounds: No wheezing, rhonchi or rales. Abdominal:      General: There is no distension. Tenderness: There is no abdominal tenderness. Musculoskeletal:      Right lower leg: Edema present. Left lower leg: Edema present. Comments: 1+ pitting edema bilateral lower extremities   Skin:     Comments: Seborrheic keratosis mid back   Neurological:      Mental Status: She is alert.        Molly Keys MD

## 2023-11-09 ENCOUNTER — TELEPHONE (OUTPATIENT)
Age: 78
End: 2023-11-09

## 2023-11-09 NOTE — TELEPHONE ENCOUNTER
I called and spoke to the patient and answered her questions. She would like to have cortisone injections not Toradol injections. Can you please notate this in the visit notes for that visit next week.     We did also discuss BMI reduction under 40 to meet the criteria for possible total knee arthroplasty as well as some of the anticipated needs for postoperative period after total knee

## 2023-11-09 NOTE — TELEPHONE ENCOUNTER
Caller: Patient     Doctor: Kaylee Leon    Reason for call: Patient asking for a call back from the PA to discuss type of medication in the injection she will have on 11/13. She recalls that one worked better than another but can't remember which.     Call back#: [phone]

## 2023-11-13 ENCOUNTER — OFFICE VISIT (OUTPATIENT)
Dept: OBGYN CLINIC | Facility: CLINIC | Age: 78
End: 2023-11-13
Payer: MEDICARE

## 2023-11-13 VITALS
HEART RATE: 69 BPM | DIASTOLIC BLOOD PRESSURE: 68 MMHG | HEIGHT: 62 IN | RESPIRATION RATE: 18 BRPM | WEIGHT: 230 LBS | BODY MASS INDEX: 42.33 KG/M2 | SYSTOLIC BLOOD PRESSURE: 134 MMHG

## 2023-11-13 DIAGNOSIS — M17.0 PRIMARY OSTEOARTHRITIS OF BOTH KNEES: Primary | ICD-10-CM

## 2023-11-13 PROCEDURE — 99212 OFFICE O/P EST SF 10 MIN: CPT | Performed by: ORTHOPAEDIC SURGERY

## 2023-11-13 PROCEDURE — 20610 DRAIN/INJ JOINT/BURSA W/O US: CPT | Performed by: ORTHOPAEDIC SURGERY

## 2023-11-13 RX ORDER — LIDOCAINE HYDROCHLORIDE 10 MG/ML
1 INJECTION, SOLUTION INFILTRATION; PERINEURAL
Status: COMPLETED | OUTPATIENT
Start: 2023-11-13 | End: 2023-11-13

## 2023-11-13 RX ORDER — BUPIVACAINE HYDROCHLORIDE 2.5 MG/ML
1 INJECTION, SOLUTION INFILTRATION; PERINEURAL
Status: COMPLETED | OUTPATIENT
Start: 2023-11-13 | End: 2023-11-13

## 2023-11-13 RX ORDER — BETAMETHASONE SODIUM PHOSPHATE AND BETAMETHASONE ACETATE 3; 3 MG/ML; MG/ML
9 INJECTION, SUSPENSION INTRA-ARTICULAR; INTRALESIONAL; INTRAMUSCULAR; SOFT TISSUE
Status: COMPLETED | OUTPATIENT
Start: 2023-11-13 | End: 2023-11-13

## 2023-11-13 RX ADMIN — BUPIVACAINE HYDROCHLORIDE 1 ML: 2.5 INJECTION, SOLUTION INFILTRATION; PERINEURAL at 13:00

## 2023-11-13 RX ADMIN — BETAMETHASONE SODIUM PHOSPHATE AND BETAMETHASONE ACETATE 9 MG: 3; 3 INJECTION, SUSPENSION INTRA-ARTICULAR; INTRALESIONAL; INTRAMUSCULAR; SOFT TISSUE at 13:00

## 2023-11-13 RX ADMIN — LIDOCAINE HYDROCHLORIDE 1 ML: 10 INJECTION, SOLUTION INFILTRATION; PERINEURAL at 13:00

## 2023-11-13 NOTE — PROGRESS NOTES
Assessment:  1. Primary osteoarthritis of both knees  Large joint arthrocentesis: bilateral knee        Patient Active Problem List   Diagnosis    Allergic rhinitis    Benign essential hypertension    Closed fracture of proximal end of right humerus with routine healing    Fracture of right distal radius    Hypothyroidism    Insomnia    Microscopic hematuria    Morbid obesity (HCC)    Obstructive sleep apnea    Other fatigue    Vitamin D deficiency    Type 2 diabetes mellitus with stage 3 chronic kidney disease, without long-term current use of insulin ([facility])    Primary osteoarthritis of both knees    Osteoarthritis of both knees    CKD (chronic kidney disease) stage 3, GFR 30-59 ml/min ([facility])    Anemia    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HCC)    Polymyalgia rheumatica (HCC)    Mixed hyperlipidemia    Facet hypertrophy of lumbar region    Mixed stress and urge incontinence           Plan      Patient with bilateral knee pain due to osteoarthritis     Patient received bilateral knee steroid injection in the office today   She has no restrictions   Continue taking tylenol for pain relief   She is to follow up PRN for repeat CSI             Subjective:     Patient ID:    Chief Complaint:Celeste Garg 66 y.o. female      HPI    Patient comes in today with regards to bilateral knee pain due to osteoarthritis. Patient had bilateral knee steroid injections on 7/22/2023 and states she had relief until recently. She states she is having generalized bilateral knee pain. She states pain is worse with weightbearing and increased activities. Patient is taking Tylenol for pain relief. Patient's pain level today is an 8 out of 10. The following portions of the patient's history were reviewed and updated as appropriate: allergies, current medications, past family history, past social history, past surgical history and problem list.        Social History     Socioeconomic History    Marital status:       Spouse name: Not on file    Number of children: 5    Years of education: 16    Highest education level: Not on file   Occupational History    Occupation: RN   Tobacco Use    Smoking status: Former     Packs/day: 0.50     Types: Cigarettes     Quit date:      Years since quittin.8    Smokeless tobacco: Never    Tobacco comments:     40 years ago    Vaping Use    Vaping Use: Former   Substance and Sexual Activity    Alcohol use: Yes     Comment: rarely ; occasional     Drug use: No    Sexual activity: Not on file   Other Topics Concern    Not on file   Social History Narrative    Drinks coffee     Social Determinants of Health     Financial Resource Strain: Low Risk  (2023)    Overall Financial Resource Strain (CARDIA)     Difficulty of Paying Living Expenses: Not very hard   Food Insecurity: Not on file   Transportation Needs: No Transportation Needs (2023)    PRAPARE - Transportation     Lack of Transportation (Medical): No     Lack of Transportation (Non-Medical):  No   Physical Activity: Not on file   Stress: Not on file   Social Connections: Not on file   Intimate Partner Violence: Not on file   Housing Stability: Not on file     Past Medical History:   Diagnosis Date    Anemia     Arthritis     Chronic right-sided low back pain without sciatica 2020    Hypertension     Polymyalgia rheumatica ([address])     Shoulder impingement syndrome     last assessed 16    Subacromial impingement of left shoulder 2016    Subacromial impingement of right shoulder 2016     Past Surgical History:   Procedure Laterality Date    ABDOMINAL SURGERY      last assessed 16    OOPHORECTOMY Right     OTHER SURGICAL HISTORY Right 2017    shoulder    TOTAL ABDOMINAL HYSTERECTOMY  30 year ago    WRIST FRACTURE SURGERY Right 2017     Allergies   Allergen Reactions    Hydromorphone Dizziness and Other (See Comments)     Severe vertigo     Tdap [Tetanus-Diphth-Acell Pertussis] Swelling     Current Outpatient Medications on File Prior to Visit   Medication Sig Dispense Refill    Acetaminophen 500 MG Take 1,000 mg by mouth daily       allopurinol (ZYLOPRIM) 100 mg tablet TAKE 1/2 TABLET BY MOUTH EVERY DAY FOR 1 WEEK, THEN TAKE 1 TABLET BY MOUTH EVERY DAY FOR 1 WEEK, THEN TAKE 1 AND 1/2 TABLETS BY MOUTH ONE TI      ibuprofen (MOTRIN) 200 mg tablet Take 200 mg by mouth every 6 (six) hours as needed for mild pain      latanoprost (XALATAN) 0.005 % ophthalmic solution INSTILL 1 DROP IN EACH EYE AT BEDTIME  5    levothyroxine 100 mcg tablet TAKE 1 TABLET BY MOUTH EVERYDAY 90 tablet 1    lisinopril (ZESTRIL) 20 mg tablet Take 2 tablets (40 mg total) by mouth daily 180 tablet 1    metoprolol succinate (TOPROL-XL) 50 mg 24 hr tablet TAKE 1 TABLET BY MOUTH EVERY DAY 90 tablet 1    temazepam (RESTORIL) 15 mg capsule Take 1 capsule (15 mg total) by mouth daily at bedtime as needed for sleep 30 capsule 0    atorvastatin (LIPITOR) 10 mg tablet Take 1 tablet (10 mg total) by mouth daily (Patient not taking: Reported on 11/2/2023) 90 tablet 1    BETIMOL 0.5 % ophthalmic solution INSTILL 1 DROP INTO THE AFFECTED EYE(S) EVERY DAY (Patient not taking: Reported on 11/2/2023)      timolol (TIMOPTIC) 0.5 % ophthalmic solution  (Patient not taking: Reported on 11/2/2023)       No current facility-administered medications on file prior to visit. Objective:    Review of Systems   Constitutional:  Negative for chills and fever. HENT:  Negative for ear pain and sore throat. Eyes:  Negative for pain and visual disturbance. Respiratory:  Negative for cough and shortness of breath. Cardiovascular:  Negative for chest pain and palpitations. Gastrointestinal:  Negative for abdominal pain and vomiting. Genitourinary:  Negative for dysuria and hematuria. Musculoskeletal:  Positive for arthralgias. Negative for back pain. Skin:  Negative for color change and rash. Neurological:  Negative for seizures and syncope.    All other systems reviewed and are negative. Right Knee Exam     Tenderness   The patient is experiencing tenderness in the medial joint line. Range of Motion   Extension:  normal   Flexion:  normal     Other   Erythema: absent  Scars: absent  Swelling: mild  Effusion: no effusion present      Left Knee Exam     Tenderness   The patient is experiencing tenderness in the medial joint line. Range of Motion   Extension:  normal   Flexion:  normal     Other   Erythema: absent  Scars: absent  Swelling: mild  Effusion: no effusion present            Physical Exam  Musculoskeletal:      Right knee: No effusion. Left knee: No effusion. Large joint arthrocentesis: bilateral knee  Universal Protocol:  Consent: Verbal consent obtained. Risks and benefits: risks, benefits and alternatives were discussed  Consent given by: patient  Time out: Immediately prior to procedure a "time out" was called to verify the correct patient, procedure, equipment, support staff and site/side marked as required.   Timeout called at: 11/13/2023 1:26 PM.  Patient understanding: patient states understanding of the procedure being performed  Site marked: the operative site was marked  Supporting Documentation  Indications: pain   Procedure Details  Location: knee - bilateral knee  Needle size: 22 G  Approach: lateral    Medications (Right): 1 mL bupivacaine 0.25 %; 1 mL lidocaine 1 %; 9 mg betamethasone acetate-betamethasone sodium phosphate 6 (3-3) mg/mLMedications (Left): 1 mL bupivacaine 0.25 %; 1 mL lidocaine 1 %; 9 mg betamethasone acetate-betamethasone sodium phosphate 6 (3-3) mg/mL   Patient tolerance: patient tolerated the procedure well with no immediate complications  Dressing:  Sterile dressing applied           Scribe Attestation      I,:  Jennie Cotton am acting as a scribe while in the presence of the attending physician.:       I,:  Quinton Mai DO personally performed the services described in this documentation    as scribed in my presence.:                 Portions of the record may have been created with voice recognition software. Occasional wrong word or "sound a like" substitutions may have occurred due to the inherent limitations of voice recognition software. Read the chart carefully and recognize, using context, where substitutions have occurred.

## 2023-12-14 ENCOUNTER — APPOINTMENT (OUTPATIENT)
Dept: LAB | Facility: CLINIC | Age: 78
End: 2023-12-14
Payer: MEDICARE

## 2023-12-14 DIAGNOSIS — M10.9 GOUT, UNSPECIFIED CAUSE, UNSPECIFIED CHRONICITY, UNSPECIFIED SITE: ICD-10-CM

## 2023-12-14 DIAGNOSIS — Z79.899 ENCOUNTER FOR LONG-TERM (CURRENT) USE OF OTHER MEDICATIONS: ICD-10-CM

## 2023-12-14 DIAGNOSIS — M35.3 POLYMYALGIA RHEUMATICA (HCC): ICD-10-CM

## 2023-12-14 LAB
ALBUMIN SERPL BCP-MCNC: 4 G/DL (ref 3.5–5)
ALP SERPL-CCNC: 93 U/L (ref 34–104)
ALT SERPL W P-5'-P-CCNC: 23 U/L (ref 7–52)
ANION GAP SERPL CALCULATED.3IONS-SCNC: 11 MMOL/L
AST SERPL W P-5'-P-CCNC: 19 U/L (ref 13–39)
BILIRUB SERPL-MCNC: 0.28 MG/DL (ref 0.2–1)
BUN SERPL-MCNC: 24 MG/DL (ref 5–25)
CALCIUM SERPL-MCNC: 9.1 MG/DL (ref 8.4–10.2)
CHLORIDE SERPL-SCNC: 103 MMOL/L (ref 96–108)
CO2 SERPL-SCNC: 25 MMOL/L (ref 21–32)
CREAT SERPL-MCNC: 1.14 MG/DL (ref 0.6–1.3)
CRP SERPL QL: 8.4 MG/L
ERYTHROCYTE [SEDIMENTATION RATE] IN BLOOD: 58 MM/HOUR (ref 0–29)
GFR SERPL CREATININE-BSD FRML MDRD: 46 ML/MIN/1.73SQ M
GLUCOSE P FAST SERPL-MCNC: 126 MG/DL (ref 65–99)
POTASSIUM SERPL-SCNC: 4.1 MMOL/L (ref 3.5–5.3)
PROT SERPL-MCNC: 6.7 G/DL (ref 6.4–8.4)
SODIUM SERPL-SCNC: 139 MMOL/L (ref 135–147)
URATE SERPL-MCNC: 5.5 MG/DL (ref 2–7.5)

## 2023-12-14 PROCEDURE — 85652 RBC SED RATE AUTOMATED: CPT

## 2023-12-14 PROCEDURE — 86140 C-REACTIVE PROTEIN: CPT

## 2023-12-14 PROCEDURE — 80053 COMPREHEN METABOLIC PANEL: CPT

## 2023-12-14 PROCEDURE — 36415 COLL VENOUS BLD VENIPUNCTURE: CPT

## 2023-12-14 PROCEDURE — 84550 ASSAY OF BLOOD/URIC ACID: CPT

## 2023-12-15 ENCOUNTER — RA CDI HCC (OUTPATIENT)
Dept: OTHER | Facility: HOSPITAL | Age: 78
End: 2023-12-15

## 2023-12-15 NOTE — PROGRESS NOTES
[facility] [address] [facility] coding opportunities          Chart Reviewed number of suggestions sent to Provider: 1     Patients Insurance   E66.01  Medicare Insurance: Medicare

## 2023-12-19 ENCOUNTER — TELEPHONE (OUTPATIENT)
Dept: FAMILY MEDICINE CLINIC | Facility: OTHER | Age: 78
End: 2023-12-19

## 2023-12-19 NOTE — TELEPHONE ENCOUNTER
SUJATHA    Pt called and cancelled her upcoming appointment since she is unable to get scheduled for her ECHO until end of jan so she will call back to r/s that

## 2023-12-24 DIAGNOSIS — I10 BENIGN ESSENTIAL HYPERTENSION: ICD-10-CM

## 2023-12-26 RX ORDER — LISINOPRIL 20 MG/1
20 TABLET ORAL 2 TIMES DAILY
Qty: 180 TABLET | Refills: 1 | Status: SHIPPED | OUTPATIENT
Start: 2023-12-26

## 2024-01-05 DIAGNOSIS — E03.9 HYPOTHYROIDISM, UNSPECIFIED TYPE: ICD-10-CM

## 2024-01-05 RX ORDER — LEVOTHYROXINE SODIUM 0.1 MG/1
100 TABLET ORAL DAILY
Qty: 90 TABLET | Refills: 1 | Status: SHIPPED | OUTPATIENT
Start: 2024-01-05

## 2024-01-26 ENCOUNTER — HOSPITAL ENCOUNTER (OUTPATIENT)
Dept: NON INVASIVE DIAGNOSTICS | Facility: CLINIC | Age: 79
Discharge: HOME | End: 2024-01-26
Payer: MEDICARE

## 2024-01-26 ENCOUNTER — HOSPITAL ENCOUNTER (OUTPATIENT)
Dept: RADIOLOGY | Facility: HOSPITAL | Age: 79
Discharge: HOME | End: 2024-01-26
Payer: MEDICARE

## 2024-01-26 VITALS
SYSTOLIC BLOOD PRESSURE: 134 MMHG | HEIGHT: 62 IN | WEIGHT: 229.28 LBS | HEART RATE: 65 BPM | DIASTOLIC BLOOD PRESSURE: 68 MMHG | BODY MASS INDEX: 42.19 KG/M2

## 2024-01-26 DIAGNOSIS — R06.09 DYSPNEA ON EXERTION: ICD-10-CM

## 2024-01-26 LAB
AORTIC ROOT: 3.1 CM
APICAL FOUR CHAMBER EJECTION FRACTION: 58 %
ASCENDING AORTA: 3 CM
BSA FOR ECHO PROCEDURE: 2.03 M2
E WAVE DECELERATION TIME: 249 MS
E/A RATIO: 0.73
FRACTIONAL SHORTENING: 30 (ref 28–44)
INTERVENTRICULAR SEPTUM IN DIASTOLE (PARASTERNAL SHORT AXIS VIEW): 1.1 CM
INTERVENTRICULAR SEPTUM: 1.1 CM (ref 0.6–1.1)
LAAS-AP2: 20.8 CM2
LAAS-AP4: 21.8 CM2
LEFT ATRIUM AREA SYSTOLE SINGLE PLANE A4C: 22.7 CM2
LEFT ATRIUM SIZE: 4.3 CM
LEFT ATRIUM VOLUME (MOD BIPLANE): 66 ML
LEFT ATRIUM VOLUME INDEX (MOD BIPLANE): 32.5 ML/M2
LEFT INTERNAL DIMENSION IN SYSTOLE: 3.2 CM (ref 2.1–4)
LEFT VENTRICULAR INTERNAL DIMENSION IN DIASTOLE: 4.6 CM (ref 3.5–6)
LEFT VENTRICULAR POSTERIOR WALL IN END DIASTOLE: 1.1 CM
LEFT VENTRICULAR STROKE VOLUME: 56 ML
LVSV (TEICH): 56 ML
MV E'TISSUE VEL-SEP: 5 CM/S
MV PEAK A VEL: 0.94 M/S
MV PEAK E VEL: 69 CM/S
MV STENOSIS PRESSURE HALF TIME: 72 MS
MV VALVE AREA P 1/2 METHOD: 3.06
RIGHT ATRIUM AREA SYSTOLE A4C: 14.8 CM2
RIGHT VENTRICLE ID DIMENSION: 2.9 CM
SL CV LEFT ATRIUM LENGTH A2C: 5.6 CM
SL CV LV EF: 65
SL CV PED ECHO LEFT VENTRICLE DIASTOLIC VOLUME (MOD BIPLANE) 2D: 98 ML
SL CV PED ECHO LEFT VENTRICLE SYSTOLIC VOLUME (MOD BIPLANE) 2D: 42 ML
TRICUSPID ANNULAR PLANE SYSTOLIC EXCURSION: 2.1 CM

## 2024-01-26 PROCEDURE — 93306 TTE W/DOPPLER COMPLETE: CPT

## 2024-01-26 PROCEDURE — 93306 TTE W/DOPPLER COMPLETE: CPT | Performed by: INTERNAL MEDICINE

## 2024-01-26 PROCEDURE — 71046 X-RAY EXAM CHEST 2 VIEWS: CPT

## 2024-01-29 DIAGNOSIS — G47.00 INSOMNIA, UNSPECIFIED TYPE: ICD-10-CM

## 2024-01-29 RX ORDER — TEMAZEPAM 15 MG/1
15 CAPSULE ORAL
Qty: 30 CAPSULE | Refills: 0 | Status: SHIPPED | OUTPATIENT
Start: 2024-01-29

## 2024-01-29 NOTE — TELEPHONE ENCOUNTER
Hi, this is LETHA Mcdowell, HRT birthday [id number]. I need a refill on Temazepam 15 milligrams. It's one PO PRN and the pharmacy is [facility] on [address] in [place]. Thank you.

## 2024-01-29 NOTE — TELEPHONE ENCOUNTER
Neck pain since last year and r arm numbness since 3 weeks Requested medication(s) are due for refill today: Yes  Patient has already received a courtesy refill: No  Other reason request has been forwarded to provider:

## 2024-02-15 ENCOUNTER — TELEPHONE (OUTPATIENT)
Dept: FAMILY MEDICINE CLINIC | Facility: OTHER | Age: 79
End: 2024-02-15

## 2024-02-15 ENCOUNTER — OFFICE VISIT (OUTPATIENT)
Dept: FAMILY MEDICINE CLINIC | Facility: OTHER | Age: 79
End: 2024-02-15
Payer: MEDICARE

## 2024-02-15 VITALS
HEART RATE: 59 BPM | OXYGEN SATURATION: 97 % | BODY MASS INDEX: 42.18 KG/M2 | DIASTOLIC BLOOD PRESSURE: 90 MMHG | SYSTOLIC BLOOD PRESSURE: 172 MMHG | HEIGHT: 62 IN | TEMPERATURE: 97.1 F | RESPIRATION RATE: 18 BRPM | WEIGHT: 229.2 LBS

## 2024-02-15 DIAGNOSIS — N18.31 TYPE 2 DIABETES MELLITUS WITH STAGE 3A CHRONIC KIDNEY DISEASE, WITHOUT LONG-TERM CURRENT USE OF INSULIN (HCC): Primary | ICD-10-CM

## 2024-02-15 DIAGNOSIS — N18.31 STAGE 3A CHRONIC KIDNEY DISEASE (HCC): ICD-10-CM

## 2024-02-15 DIAGNOSIS — E66.01 MORBID OBESITY (HCC): ICD-10-CM

## 2024-02-15 DIAGNOSIS — M06.4 INFLAMMATORY POLYARTHROPATHY (HCC): ICD-10-CM

## 2024-02-15 DIAGNOSIS — I51.89 DIASTOLIC DYSFUNCTION: ICD-10-CM

## 2024-02-15 DIAGNOSIS — I10 BENIGN ESSENTIAL HYPERTENSION: ICD-10-CM

## 2024-02-15 DIAGNOSIS — E11.22 TYPE 2 DIABETES MELLITUS WITH STAGE 3A CHRONIC KIDNEY DISEASE, WITHOUT LONG-TERM CURRENT USE OF INSULIN (HCC): Primary | ICD-10-CM

## 2024-02-15 LAB — SL AMB POCT HEMOGLOBIN AIC: 6.8 (ref ?–6.5)

## 2024-02-15 PROCEDURE — 99214 OFFICE O/P EST MOD 30 MIN: CPT | Performed by: FAMILY MEDICINE

## 2024-02-15 PROCEDURE — 83036 HEMOGLOBIN GLYCOSYLATED A1C: CPT | Performed by: FAMILY MEDICINE

## 2024-02-15 RX ORDER — AMOXICILLIN 500 MG/1
CAPSULE ORAL
COMMUNITY
Start: 2024-02-07 | End: 2024-02-15

## 2024-02-15 RX ORDER — PREDNISONE 5 MG/1
TABLET ORAL
COMMUNITY
Start: 2024-01-16

## 2024-02-15 RX ORDER — VALSARTAN 160 MG/1
160 TABLET ORAL DAILY
Qty: 90 TABLET | Refills: 0 | Status: SHIPPED | OUTPATIENT
Start: 2024-02-15

## 2024-02-15 NOTE — TELEPHONE ENCOUNTER
Pt called and said the medication Farxiga is $652 a month and she can't afford that, she also said it wasn't discussed at visit and said its for kidney function  Is there something else in its place for reasonable?    Also the Valsartan should the patient start taking that? And she wants to know when she should stop taking the lisinopri?    Please advise   Thank you

## 2024-02-15 NOTE — TELEPHONE ENCOUNTER
"Please reach out to patient.    Farxiga (dapagliflozin) was discussed during visit.  I explained that it is an \"SGLT2 inhibitor\" a class of medications often prescribed to manage diabetes, HOWEVER it has also been shown to provide cardiovascular and renal benefits to those who are taking this medication.  Her A1c is 6.8% (at goal) -- I am prescribing this medication to her for the cardio/renal benefits given her Grade 1 Diastolic Dysfunction noted on echo and her CKD.  The cost of medication was not discussed because I do not know how much the medicine will cost for any given prescription plan.  This was the medication noted to be the lowest Tier for her insurance so it is unlikely that she will find the alternatives any more affordable.    If she does not wish to take it, I suggest she make an appointment with Nephrology to get a second opinion.  If she does want to take it, I can place a referral to  to help her navigate her options regarding medication cost.    Additionally, she was instructed to d/c lisinopril immediately and begin taking valsartan (Diovan) as soon as tomorrow.      Thanks!  Karla Solano, DO   "

## 2024-02-15 NOTE — ASSESSMENT & PLAN NOTE
Patient BP currently uncontrolled on Lisinopril 40mg and Toprol-XL 50 with in office BP of 172/90. Will continue Toprol-XL and switch lisinopril to valsartan 160mg

## 2024-02-15 NOTE — TELEPHONE ENCOUNTER
Pt notified of below message.  She stated she will wait until her appointment with nephrology.  Thank you.

## 2024-02-15 NOTE — PROGRESS NOTES
Name: Celeste Garg      : 1945      MRN: [id number]  Encounter Provider: Karla Solano DO  Encounter Date: 2/15/2024   Encounter department: [facility]    Assessment & Plan     1. Type 2 diabetes mellitus with stage 3a chronic kidney disease, without long-term current use of insulin (HCC)  -     POCT hemoglobin A1c  -     dapagliflozin (Farxiga) 5 MG TABS; Take 1 tablet (5 mg total) by mouth daily    2. Benign essential hypertension  Assessment & Plan:  Patient BP currently uncontrolled on Lisinopril 40mg and Toprol-XL 50 with in office BP of 172/90. Will continue Toprol-XL and switch lisinopril to valsartan 160mg    Orders:  -     valsartan (DIOVAN) 160 mg tablet; Take 1 tablet (160 mg total) by mouth daily    3. Stage 3a chronic kidney disease (HCC)  Assessment & Plan:  Lab Results   Component Value Date    EGFR 46 2023    EGFR 41 2023    EGFR 41 2023    CREATININE 1.14 2023    CREATININE 1.25 2023    CREATININE 1.24 2023     Due to CKD and echo showing mild diastolic abnormality, will start Farxiga 5 mg. Referral to Nephrology sent.    Orders:  -     dapagliflozin (Farxiga) 5 MG TABS; Take 1 tablet (5 mg total) by mouth daily  -     Ambulatory Referral to Nephrology; Future    4. 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HCC)    5. Morbid obesity (HCC)           Subjective      77yo female presents for follow up of chronic conditions. She is feeling well today. In office, her BP was 172/90. She states she took her lisinopril 40 mg this morning and that she takes metoprolol 50mg at night. She expressed desire in stopping metoprolol due to feeling fatigued and foggy, although she is not sure the metoprolol is causing these symptoms. She is controlling her DM with diet at this time. We discussed her echocardiogram results and the need to control her BP before considering stopping the metoprolol. We discussed switching from lisinopril to valsartan and starting  Farxiga for cardiac and renal protection. We also discussed starting Lipitor as although patient was previously prescribed, she never took it as she had heard that statins can cause psychosis-like side effects.         Review of Systems   Constitutional:  Positive for fatigue (lack of energy). Negative for activity change and fever.   HENT:  Negative for congestion, ear pain, sinus pain and sore throat.    Eyes:  Negative for pain and itching.   Respiratory:  Negative for cough, chest tightness and shortness of breath.    Cardiovascular:  Negative for chest pain and palpitations.   Gastrointestinal:  Negative for abdominal pain, constipation, diarrhea, nausea and vomiting.   Endocrine: Negative for cold intolerance and heat intolerance.   Genitourinary:  Negative for difficulty urinating and dysuria.   Musculoskeletal:  Negative for myalgias.   Skin:  Negative for color change and rash.   Neurological:  Negative for dizziness, syncope, light-headedness and headaches.   Hematological:  Negative for adenopathy.   Psychiatric/Behavioral:  Negative for behavioral problems, dysphoric mood and sleep disturbance. The patient is not nervous/anxious.        Current Outpatient Medications on File Prior to Visit   Medication Sig    Acetaminophen 500 MG Take 1,000 mg by mouth daily     allopurinol (ZYLOPRIM) 100 mg tablet TAKE 1/2 TABLET BY MOUTH EVERY DAY FOR 1 WEEK, THEN TAKE 1 TABLET BY MOUTH EVERY DAY FOR 1 WEEK, THEN TAKE 1 AND 1/2 TABLETS BY MOUTH ONE TI    ibuprofen (MOTRIN) 200 mg tablet Take 200 mg by mouth every 6 (six) hours as needed for mild pain    latanoprost (XALATAN) 0.005 % ophthalmic solution INSTILL 1 DROP IN EACH EYE AT BEDTIME    levothyroxine 100 mcg tablet TAKE 1 TABLET BY MOUTH EVERY DAY    metoprolol succinate (TOPROL-XL) 50 mg 24 hr tablet TAKE 1 TABLET BY MOUTH EVERY DAY    predniSONE 5 mg tablet TAKE 4 TABLETS BY MOUTH EVERY DAY FOR 3 DAYS, THEN TAKE 3 TABLETS BY MOUTH EVERY DAY FOR 4 DAYS, THEN  "TAKE 2 TABLETS BY MOUTH EVERY DAY FOR    temazepam (RESTORIL) 15 mg capsule Take 1 capsule (15 mg total) by mouth daily at bedtime as needed for sleep    [DISCONTINUED] lisinopril (ZESTRIL) 20 mg tablet TAKE 1 TABLET BY MOUTH TWO TIMES DAILY    atorvastatin (LIPITOR) 10 mg tablet Take 1 tablet (10 mg total) by mouth daily (Patient not taking: Reported on 11/2/2023)    [DISCONTINUED] amoxicillin (AMOXIL) 500 mg capsule take 1 capsule by mouth three times daily until gone (Patient not taking: Reported on 2/15/2024)    [DISCONTINUED] BETIMOL 0.5 % ophthalmic solution INSTILL 1 DROP INTO THE AFFECTED EYE(S) EVERY DAY (Patient not taking: Reported on 11/2/2023)    [DISCONTINUED] Ergocalciferol 10 MCG (400 UNIT) TABS Take 1 capsule by mouth once a week (Patient not taking: Reported on 2/15/2024)    [DISCONTINUED] timolol (TIMOPTIC) 0.5 % ophthalmic solution  (Patient not taking: Reported on 11/2/2023)       Objective     BP (!) 172/90   Pulse 59   Temp (!) 97.1 °F (36.2 °C)   Resp 18   Ht 5' 2\" (1.575 m)   Wt 104 kg (229 lb 3.2 oz)   SpO2 97%   BMI 41.92 kg/m²     Physical Exam  Vitals and nursing note reviewed.   Constitutional:       General: She is not in acute distress.     Appearance: Normal appearance. She is well-developed. She is not ill-appearing.      Comments: Body mass index is 41.92 kg/m².    HENT:      Head: Normocephalic and atraumatic.      Right Ear: External ear normal.      Left Ear: External ear normal.      Nose: Nose normal.      Mouth/Throat:      Mouth: Mucous membranes are moist.      Pharynx: Oropharynx is clear.   Eyes:      General: No scleral icterus.     Extraocular Movements: Extraocular movements intact.      Conjunctiva/sclera: Conjunctivae normal.      Pupils: Pupils are equal, round, and reactive to light.   Neck:      Thyroid: No thyromegaly.   Cardiovascular:      Rate and Rhythm: Normal rate and regular rhythm.      Pulses: Normal pulses.      Heart sounds: Normal heart sounds. " No murmur heard.  Pulmonary:      Effort: Pulmonary effort is normal. No respiratory distress.      Breath sounds: Normal breath sounds. No wheezing.   Abdominal:      General: Abdomen is flat. Bowel sounds are normal. There is no distension.      Palpations: Abdomen is soft.      Tenderness: There is no abdominal tenderness.   Musculoskeletal:         General: No tenderness. Normal range of motion.      Cervical back: Normal range of motion and neck supple.      Right lower leg: No edema.      Left lower leg: No edema.   Lymphadenopathy:      Cervical: No cervical adenopathy.   Skin:     General: Skin is warm and dry.      Findings: No rash.   Neurological:      General: No focal deficit present.      Mental Status: She is alert and oriented to person, place, and time.      Cranial Nerves: No cranial nerve deficit.   Psychiatric:         Mood and Affect: Mood normal.         Behavior: Behavior normal.         Thought Content: Thought content normal.       Karla Solano, DO

## 2024-02-15 NOTE — ASSESSMENT & PLAN NOTE
Lab Results   Component Value Date    EGFR 46 12/14/2023    EGFR 41 08/21/2023    EGFR 41 01/04/2023    CREATININE 1.14 12/14/2023    CREATININE 1.25 08/21/2023    CREATININE 1.24 01/04/2023     Due to CKD and echo showing mild diastolic abnormality, will start Farxiga 5 mg. Referral to Nephrology sent.

## 2024-02-16 ENCOUNTER — TELEPHONE (OUTPATIENT)
Dept: NEPHROLOGY | Facility: CLINIC | Age: 79
End: 2024-02-16

## 2024-02-16 NOTE — TELEPHONE ENCOUNTER
New Patient Intake Form   Patient Details   Celeste Garg     1945     [id number]     Insurance Information   Name of Insurance Company Medicare    Does the patient need an insurance referral? no   If patient has VA insurance, please ask if they will be using their VA insurance.   Appointment Information   Who is calling to schedule?  If not patient, what is callers name? KimR   Referring Provider  Dr. Solano   Reason for Appt (Diagnosis) CKD    Does Patient have labs/urine done at [facility]?  If not, where do they go?  List the date of last lab / urine  *Please try to get labs 2 years back if not at  Yes 12/2023   Has patient been hospitalized recently?  If yes, list name and location of hospital they were in no   Has patient been seen by a Nephrologist before?  If yes, list name, location and phone number no   Has the patient had renal imaging done?  If so, list the most recent date and type of imaging no    Does patient have a history of Kidney Stones? no   Appointment Details   Is there a referral on file? yes    Appointment Date 4/4    [facility]

## 2024-02-21 ENCOUNTER — APPOINTMENT (OUTPATIENT)
Dept: LAB | Facility: CLINIC | Age: 79
End: 2024-02-21
Payer: MEDICARE

## 2024-02-21 ENCOUNTER — TRANSCRIBE ORDERS (OUTPATIENT)
Dept: LAB | Facility: CLINIC | Age: 79
End: 2024-02-21

## 2024-02-21 DIAGNOSIS — Z79.899 OTHER LONG TERM (CURRENT) DRUG THERAPY: ICD-10-CM

## 2024-02-21 DIAGNOSIS — M10.9 GOUT, UNSPECIFIED CAUSE, UNSPECIFIED CHRONICITY, UNSPECIFIED SITE: ICD-10-CM

## 2024-02-21 DIAGNOSIS — M10.9 GOUT, UNSPECIFIED CAUSE, UNSPECIFIED CHRONICITY, UNSPECIFIED SITE: Primary | ICD-10-CM

## 2024-02-21 DIAGNOSIS — M35.3 POLYMYALGIA RHEUMATICA (HCC): ICD-10-CM

## 2024-02-21 LAB — ERYTHROCYTE [SEDIMENTATION RATE] IN BLOOD: 26 MM/HOUR (ref 0–29)

## 2024-02-21 PROCEDURE — 80053 COMPREHEN METABOLIC PANEL: CPT

## 2024-02-21 PROCEDURE — 86140 C-REACTIVE PROTEIN: CPT

## 2024-02-21 PROCEDURE — 36415 COLL VENOUS BLD VENIPUNCTURE: CPT

## 2024-02-21 PROCEDURE — 85652 RBC SED RATE AUTOMATED: CPT

## 2024-02-21 PROCEDURE — 84550 ASSAY OF BLOOD/URIC ACID: CPT

## 2024-02-22 LAB
ALBUMIN SERPL BCP-MCNC: 4.1 G/DL (ref 3.5–5)
ALP SERPL-CCNC: 77 U/L (ref 34–104)
ALT SERPL W P-5'-P-CCNC: 15 U/L (ref 7–52)
ANION GAP SERPL CALCULATED.3IONS-SCNC: 12 MMOL/L
AST SERPL W P-5'-P-CCNC: 12 U/L (ref 13–39)
BILIRUB SERPL-MCNC: 0.5 MG/DL (ref 0.2–1)
BUN SERPL-MCNC: 23 MG/DL (ref 5–25)
CALCIUM SERPL-MCNC: 9.4 MG/DL (ref 8.4–10.2)
CHLORIDE SERPL-SCNC: 104 MMOL/L (ref 96–108)
CO2 SERPL-SCNC: 26 MMOL/L (ref 21–32)
CREAT SERPL-MCNC: 1.12 MG/DL (ref 0.6–1.3)
CRP SERPL QL: 8.2 MG/L
GFR SERPL CREATININE-BSD FRML MDRD: 47 ML/MIN/1.73SQ M
GLUCOSE P FAST SERPL-MCNC: 103 MG/DL (ref 65–99)
POTASSIUM SERPL-SCNC: 4 MMOL/L (ref 3.5–5.3)
PROT SERPL-MCNC: 6.5 G/DL (ref 6.4–8.4)
SODIUM SERPL-SCNC: 142 MMOL/L (ref 135–147)
URATE SERPL-MCNC: 5.5 MG/DL (ref 2–7.5)

## 2024-02-23 LAB
LEFT EYE DIABETIC RETINOPATHY: NORMAL
RIGHT EYE DIABETIC RETINOPATHY: NORMAL

## 2024-03-05 ENCOUNTER — RA CDI HCC (OUTPATIENT)
Dept: OTHER | Facility: HOSPITAL | Age: 79
End: 2024-03-05

## 2024-03-11 ENCOUNTER — OFFICE VISIT (OUTPATIENT)
Dept: FAMILY MEDICINE CLINIC | Facility: OTHER | Age: 79
End: 2024-03-11
Payer: MEDICARE

## 2024-03-11 VITALS
HEART RATE: 64 BPM | SYSTOLIC BLOOD PRESSURE: 150 MMHG | OXYGEN SATURATION: 96 % | HEIGHT: 62 IN | RESPIRATION RATE: 18 BRPM | WEIGHT: 228 LBS | DIASTOLIC BLOOD PRESSURE: 80 MMHG | BODY MASS INDEX: 41.96 KG/M2 | TEMPERATURE: 97.3 F

## 2024-03-11 DIAGNOSIS — I10 BENIGN ESSENTIAL HYPERTENSION: ICD-10-CM

## 2024-03-11 PROBLEM — N18.32 STAGE 3B CHRONIC KIDNEY DISEASE (HCC): Status: ACTIVE | Noted: 2019-05-07

## 2024-03-11 PROCEDURE — G2211 COMPLEX E/M VISIT ADD ON: HCPCS | Performed by: FAMILY MEDICINE

## 2024-03-11 PROCEDURE — 99214 OFFICE O/P EST MOD 30 MIN: CPT | Performed by: FAMILY MEDICINE

## 2024-03-11 RX ORDER — METOPROLOL SUCCINATE 25 MG/1
25 TABLET, EXTENDED RELEASE ORAL DAILY
Qty: 90 TABLET | Refills: 0 | Status: SHIPPED | OUTPATIENT
Start: 2024-03-11

## 2024-03-11 RX ORDER — VALSARTAN 320 MG/1
320 TABLET ORAL DAILY
Qty: 90 TABLET | Refills: 1 | Status: SHIPPED | OUTPATIENT
Start: 2024-03-11

## 2024-03-13 NOTE — ASSESSMENT & PLAN NOTE
BP goal <140/90 (ideally below 130/80)  Will increase valsartan to 320 mg daily  Reasonable to decrease Toprol XL to 25 mg daily as it is not a strong antihypertensive -- taper favored to avoid rebound tachycardia  Chart review (and pt hx) reveals no clear cardiac indication for beta-blockade

## 2024-03-13 NOTE — PROGRESS NOTES
Name: Celeste Garg      : 1945      MRN: [id number]  Encounter Provider: Karla Solano DO  Encounter Date: 3/11/2024   Encounter department: [facility]    Assessment & Plan     1. Benign essential hypertension  Comments:  BP goal <130/80  Lisinopril 40 mg ineffective and therefore d/c  Will begin Valsartan 160 mg and recheck 1-2 wks  If improved, will d/c BB  Assessment & Plan:  BP goal <140/90 (ideally below 130/80)  Will increase valsartan to 320 mg daily  Reasonable to decrease Toprol XL to 25 mg daily as it is not a strong antihypertensive -- taper favored to avoid rebound tachycardia  Chart review (and pt hx) reveals no clear cardiac indication for beta-blockade    Orders:  -     metoprolol succinate (Toprol XL) 25 mg 24 hr tablet; Take 1 tablet (25 mg total) by mouth daily  -     valsartan (DIOVAN) 320 MG tablet; Take 1 tablet (320 mg total) by mouth daily         Return in about 3 weeks (around 2024) for Recheck HTN.    The patient indicates understanding of these issues and agrees with the plan.      Subjective      HPI  Pt presents for HTN follow up  Was switched from lisinopril to valsartan at last visit  Reports good medication compliance/tolerance  Home BP's have not been checked  Denies CP, SOB, LE edema, near syncope  Lower sodium diet in place  She would very much like to d/c BB due to fatigue      Review of Systems   Constitutional:  Positive for fatigue. Negative for activity change and fever.   HENT:  Negative for congestion, ear pain, sinus pain and sore throat.    Eyes:  Negative for pain and itching.   Respiratory:  Negative for cough and shortness of breath.    Cardiovascular:  Negative for chest pain and palpitations.   Gastrointestinal:  Negative for abdominal pain, constipation, diarrhea, nausea and vomiting.   Endocrine: Negative for cold intolerance and heat intolerance.   Genitourinary:  Negative for dysuria.   Musculoskeletal:  Negative for myalgias.  "  Skin:  Negative for color change and rash.   Neurological:  Negative for dizziness, syncope and headaches.   Hematological:  Negative for adenopathy.   Psychiatric/Behavioral:  Negative for behavioral problems, dysphoric mood and sleep disturbance. The patient is not nervous/anxious.        Current Outpatient Medications on File Prior to Visit   Medication Sig    Acetaminophen 500 MG Take 1,000 mg by mouth daily     allopurinol (ZYLOPRIM) 100 mg tablet TAKE 1/2 TABLET BY MOUTH EVERY DAY FOR 1 WEEK, THEN TAKE 1 TABLET BY MOUTH EVERY DAY FOR 1 WEEK, THEN TAKE 1 AND 1/2 TABLETS BY MOUTH ONE TI    dapagliflozin (Farxiga) 5 MG TABS Take 1 tablet (5 mg total) by mouth daily    ibuprofen (MOTRIN) 200 mg tablet Take 200 mg by mouth every 6 (six) hours as needed for mild pain    latanoprost (XALATAN) 0.005 % ophthalmic solution INSTILL 1 DROP IN EACH EYE AT BEDTIME    levothyroxine 100 mcg tablet TAKE 1 TABLET BY MOUTH EVERY DAY    predniSONE 5 mg tablet TAKE 4 TABLETS BY MOUTH EVERY DAY FOR 3 DAYS, THEN TAKE 3 TABLETS BY MOUTH EVERY DAY FOR 4 DAYS, THEN TAKE 2 TABLETS BY MOUTH EVERY DAY FOR    temazepam (RESTORIL) 15 mg capsule Take 1 capsule (15 mg total) by mouth daily at bedtime as needed for sleep    atorvastatin (LIPITOR) 10 mg tablet Take 1 tablet (10 mg total) by mouth daily (Patient not taking: Reported on 11/2/2023)       Objective     /80   Pulse 64   Temp (!) 97.3 °F (36.3 °C)   Resp 18   Ht 5' 2\" (1.575 m)   Wt 103 kg (228 lb)   SpO2 96%   BMI 41.70 kg/m²     Physical Exam  Vitals and nursing note reviewed.   Constitutional:       General: She is not in acute distress.     Appearance: Normal appearance. She is well-developed. She is not ill-appearing.      Comments: Body mass index is 41.7 kg/m².    HENT:      Head: Normocephalic and atraumatic.      Right Ear: External ear normal.      Left Ear: External ear normal.      Nose: Nose normal.   Eyes:      General: No scleral icterus.     " Conjunctiva/sclera: Conjunctivae normal.      Pupils: Pupils are equal, round, and reactive to light.   Neck:      Thyroid: No thyromegaly.   Cardiovascular:      Rate and Rhythm: Normal rate and regular rhythm.      Heart sounds: Normal heart sounds. No murmur heard.  Pulmonary:      Effort: Pulmonary effort is normal. No respiratory distress.      Breath sounds: Normal breath sounds. No wheezing.   Abdominal:      General: Bowel sounds are normal. There is no distension.      Palpations: Abdomen is soft.      Tenderness: There is no abdominal tenderness.   Musculoskeletal:         General: No tenderness. Normal range of motion.      Cervical back: Normal range of motion and neck supple.      Right lower leg: No edema.      Left lower leg: No edema.   Lymphadenopathy:      Cervical: No cervical adenopathy.   Skin:     General: Skin is warm and dry.      Findings: No rash.   Neurological:      General: No focal deficit present.      Mental Status: She is alert and oriented to person, place, and time.      Cranial Nerves: No cranial nerve deficit.   Psychiatric:         Mood and Affect: Mood normal.         Behavior: Behavior normal.       Karla Solano, DO

## 2024-04-01 ENCOUNTER — OFFICE VISIT (OUTPATIENT)
Dept: FAMILY MEDICINE CLINIC | Facility: OTHER | Age: 79
End: 2024-04-01
Payer: MEDICARE

## 2024-04-01 VITALS
DIASTOLIC BLOOD PRESSURE: 76 MMHG | HEIGHT: 62 IN | BODY MASS INDEX: 41.92 KG/M2 | HEART RATE: 81 BPM | SYSTOLIC BLOOD PRESSURE: 150 MMHG | OXYGEN SATURATION: 95 % | TEMPERATURE: 98 F | RESPIRATION RATE: 18 BRPM | WEIGHT: 227.8 LBS

## 2024-04-01 DIAGNOSIS — G47.00 INSOMNIA, UNSPECIFIED TYPE: ICD-10-CM

## 2024-04-01 DIAGNOSIS — I10 BENIGN ESSENTIAL HYPERTENSION: Primary | ICD-10-CM

## 2024-04-01 DIAGNOSIS — N18.32 STAGE 3B CHRONIC KIDNEY DISEASE (HCC): ICD-10-CM

## 2024-04-01 PROCEDURE — G2211 COMPLEX E/M VISIT ADD ON: HCPCS | Performed by: FAMILY MEDICINE

## 2024-04-01 PROCEDURE — 99214 OFFICE O/P EST MOD 30 MIN: CPT | Performed by: FAMILY MEDICINE

## 2024-04-01 RX ORDER — RAMELTEON 8 MG/1
8 TABLET ORAL
Qty: 30 TABLET | Refills: 0 | Status: SHIPPED | OUTPATIENT
Start: 2024-04-01

## 2024-04-01 NOTE — ASSESSMENT & PLAN NOTE
BP goal <130/80  Continue valsartan 320 mg daily in AM  Suspect home readings are just above target  Automated cuff is inaccurate -- recommend purchasing new cuff  Consider trial of CCB if readings remain elevated at next visit  Appreciated Nephrology input at 4/4 appointment

## 2024-04-01 NOTE — PROGRESS NOTES
Name: Celeste Garg      : 1945      MRN: [id number]  Encounter Provider: Karla Solano DO  Encounter Date: 2024   Encounter department: [facility]    Assessment & Plan     1. Benign essential hypertension  Assessment & Plan:  BP goal <130/80  Continue valsartan 320 mg daily in AM  Suspect home readings are just above target  Automated cuff is inaccurate -- recommend purchasing new cuff  Consider trial of CCB if readings remain elevated at next visit  Appreciated Nephrology input at  appointment      2. Insomnia, unspecified type  Assessment & Plan:  Recommend trial of ramelteon  If cost prohibitive, would consider mirtazapine   Goal is to d/c temazepam if possible    Orders:  -     ramelteon (ROZEREM) 8 mg tablet; Take 1 tablet (8 mg total) by mouth daily at bedtime    3. Stage 3b chronic kidney disease (HCC)  Assessment & Plan:  Lab Results   Component Value Date    EGFR 47 2024    EGFR 46 2023    EGFR 41 2023    CREATININE 1.12 2024    CREATININE 1.14 2023    CREATININE 1.25 2023     Stable  Continue to strive for adequate BP and glucose control  Pt to establish care with Nephro this week             Subjective      HPI  Pt presents for HTN follow up  Last seen 3 wks ago -- valsartan increased to 320 mg and Toprol XL decreased to 35 mg  Since last visit pt reports feeling okay, no change in fatigue  Home BP's are about 190 systolic by her automated cuff  Denies CP, SOB, dizziness/lightheadedness, LE edema  She does struggle with insomnia  Currently using PRN temazepam for this but reports that it makes her feel groggy      Review of Systems   Constitutional:  Positive for fatigue. Negative for activity change and fever.   HENT:  Negative for congestion, ear pain, sinus pain and sore throat.    Eyes:  Negative for pain and itching.   Respiratory:  Negative for cough and shortness of breath.    Cardiovascular:  Negative for chest pain and  palpitations.   Gastrointestinal:  Negative for abdominal pain, constipation, diarrhea, nausea and vomiting.   Endocrine: Negative for cold intolerance and heat intolerance.   Genitourinary:  Negative for dysuria.   Musculoskeletal:  Negative for myalgias.   Skin:  Negative for color change and rash.   Neurological:  Negative for dizziness, syncope and headaches.   Hematological:  Negative for adenopathy.   Psychiatric/Behavioral:  Positive for sleep disturbance. Negative for behavioral problems and dysphoric mood. The patient is not nervous/anxious.        Current Outpatient Medications on File Prior to Visit   Medication Sig    Acetaminophen 500 MG Take 1,000 mg by mouth daily     allopurinol (ZYLOPRIM) 100 mg tablet TAKE 1/2 TABLET BY MOUTH EVERY DAY FOR 1 WEEK, THEN TAKE 1 TABLET BY MOUTH EVERY DAY FOR 1 WEEK, THEN TAKE 1 AND 1/2 TABLETS BY MOUTH ONE TI    dapagliflozin (Farxiga) 5 MG TABS Take 1 tablet (5 mg total) by mouth daily    ibuprofen (MOTRIN) 200 mg tablet Take 200 mg by mouth every 6 (six) hours as needed for mild pain    latanoprost (XALATAN) 0.005 % ophthalmic solution INSTILL 1 DROP IN EACH EYE AT BEDTIME    levothyroxine 100 mcg tablet TAKE 1 TABLET BY MOUTH EVERY DAY    predniSONE 5 mg tablet TAKE 4 TABLETS BY MOUTH EVERY DAY FOR 3 DAYS, THEN TAKE 3 TABLETS BY MOUTH EVERY DAY FOR 4 DAYS, THEN TAKE 2 TABLETS BY MOUTH EVERY DAY FOR    temazepam (RESTORIL) 15 mg capsule Take 1 capsule (15 mg total) by mouth daily at bedtime as needed for sleep    valsartan (DIOVAN) 320 MG tablet Take 1 tablet (320 mg total) by mouth daily    [DISCONTINUED] metoprolol succinate (Toprol XL) 25 mg 24 hr tablet Take 1 tablet (25 mg total) by mouth daily    atorvastatin (LIPITOR) 10 mg tablet Take 1 tablet (10 mg total) by mouth daily (Patient not taking: Reported on 11/2/2023)       Objective     /76 (BP Location: Left arm, Patient Position: Sitting, Cuff Size: Large)   Pulse 81   Temp 98 °F (36.7 °C)   Resp 18  "  Ht 5' 2\" (1.575 m)   Wt 103 kg (227 lb 12.8 oz)   SpO2 95%   BMI 41.67 kg/m²     Physical Exam  Vitals and nursing note reviewed.   Constitutional:       General: She is not in acute distress.     Appearance: Normal appearance. She is well-developed. She is not ill-appearing.      Comments: Body mass index is 41.67 kg/m².    HENT:      Head: Normocephalic and atraumatic.      Right Ear: External ear normal.      Left Ear: External ear normal.      Nose: Nose normal.   Eyes:      General: No scleral icterus.     Conjunctiva/sclera: Conjunctivae normal.      Pupils: Pupils are equal, round, and reactive to light.   Neck:      Thyroid: No thyromegaly.   Cardiovascular:      Rate and Rhythm: Normal rate and regular rhythm.      Heart sounds: Normal heart sounds. No murmur heard.  Pulmonary:      Effort: Pulmonary effort is normal. No respiratory distress.      Breath sounds: Normal breath sounds. No wheezing.   Abdominal:      General: Bowel sounds are normal. There is no distension.      Palpations: Abdomen is soft.      Tenderness: There is no abdominal tenderness.   Musculoskeletal:         General: No tenderness. Normal range of motion.      Cervical back: Normal range of motion and neck supple.   Lymphadenopathy:      Cervical: No cervical adenopathy.   Skin:     General: Skin is warm and dry.      Findings: No rash.   Neurological:      Mental Status: She is alert and oriented to person, place, and time.      Cranial Nerves: No cranial nerve deficit.   Psychiatric:         Behavior: Behavior normal.       Karla Solano, DO    "

## 2024-04-01 NOTE — ASSESSMENT & PLAN NOTE
Lab Results   Component Value Date    EGFR 47 02/21/2024    EGFR 46 12/14/2023    EGFR 41 08/21/2023    CREATININE 1.12 02/21/2024    CREATININE 1.14 12/14/2023    CREATININE 1.25 08/21/2023     Stable  Continue to strive for adequate BP and glucose control  Pt to establish care with Nephro this week

## 2024-04-01 NOTE — ASSESSMENT & PLAN NOTE
Recommend trial of ramelteon  If cost prohibitive, would consider mirtazapine   Goal is to d/c temazepam if possible

## 2024-04-04 ENCOUNTER — CONSULT (OUTPATIENT)
Dept: NEPHROLOGY | Facility: CLINIC | Age: 79
End: 2024-04-04

## 2024-04-04 VITALS — BODY MASS INDEX: 42.14 KG/M2 | HEIGHT: 62 IN | WEIGHT: 229 LBS

## 2024-04-04 DIAGNOSIS — E55.9 VITAMIN D DEFICIENCY: ICD-10-CM

## 2024-04-04 DIAGNOSIS — E66.01 MORBID OBESITY (HCC): ICD-10-CM

## 2024-04-04 DIAGNOSIS — N18.31 STAGE 3A CHRONIC KIDNEY DISEASE (HCC): ICD-10-CM

## 2024-04-04 DIAGNOSIS — E11.22 TYPE 2 DIABETES MELLITUS WITH STAGE 3 CHRONIC KIDNEY DISEASE AND HYPERTENSION (HCC): Primary | ICD-10-CM

## 2024-04-04 DIAGNOSIS — N18.30 TYPE 2 DIABETES MELLITUS WITH STAGE 3 CHRONIC KIDNEY DISEASE AND HYPERTENSION (HCC): Primary | ICD-10-CM

## 2024-04-04 DIAGNOSIS — I12.9 TYPE 2 DIABETES MELLITUS WITH STAGE 3 CHRONIC KIDNEY DISEASE AND HYPERTENSION (HCC): Primary | ICD-10-CM

## 2024-04-04 PROBLEM — N18.32 STAGE 3B CHRONIC KIDNEY DISEASE (HCC): Status: RESOLVED | Noted: 2019-05-07 | Resolved: 2024-04-04

## 2024-04-04 RX ORDER — HYDROCHLOROTHIAZIDE 25 MG/1
25 TABLET ORAL DAILY
COMMUNITY

## 2024-04-04 NOTE — PROGRESS NOTES
NEPHROLOGY OUTPATIENT CONSULTATION   Celeste Garg 78 y.o. female MRN: [id number]  Date: 4/4/2024  Reason for consultation:   Chief Complaint   Patient presents with    Consult       ASSESSMENT and PLAN:    Thank you for the courtesy of this consultation.  I had the pleasure of seeing Celeste today in the renal clinic for the initial management of chronic kidney disease and hypertension.    Chronic Kidney Disease Stage IIIA (G3A2)  --Imaging: Check renal ultrasound  --Urinalysis: Urinalysis in the office is bland no microscopic hematuria and no proteinuria  --Proteinuria: Albumin/creatinine ratio 0.05  --Baseline Kidney Function: low 1's (1.1-1.3 mg/dL)  --Etiology: Presumed secondary to diabetic kidney disease, hypertensive nephrosclerosis, obesity related renal function, prior history of acute kidney injury with a stop amount of CKD, former smoker, analgesic nephropathy  --Biopsy Proven: No  --Serologies: No indication for serologies  --RAAS Blockade: Valsartan  --Reducing Cardiovascular Risk Factors:  Simvastatin+ Ezetimibe reduced atherosclerotic events in CKD (SHARP trial); Low dose aspirin safe (if no contraindications exist); smoking is an independent risk factor for Chronic Kidney Disease and progression, strongly recommend smoking cessation  --Sodium-Glucose Cotransporter-2 (SGLT2) Inhibitors:  May see an acute drop in the eGFR initially when starting the medication but then a stabilization of the renal function, with slower loss of renal function as compared to placebo.  Relative risk of end-stage renal disease, doubling of serum creatinine or death from renal causes were also found to be lower as compared to placebo. (CREDENCE).  DAPA-CKD study, showed that patients with chronic kidney disease regardless of the presence or absence of diabetes the risk of composite of a sustained decline in the estimated GFR of at least 50%, end-stage renal disease or death from renal or cardiovascular causes was significantly  lower with Dapagliflozin than with placebo. EMPEROR-Reduced study also showed beneficial effects. EMPA-CKD, among wide range of patients with CKD, who were at risk for progression, empagliflozin led a lower risk of kidney disease progression or death from cardiovascular causes than placebo.  If no contraindications exist I would recommend this medication added to the regiment. If eGFR < 60 cc/min (avoid ertugliflozin); avoid or caution with GFR < 20 mL/min, not an absolute contraindication, likely lower benefits.   --Finerenone: In patients with chronic kidney disease with type 2 diabetes, treatment led to lower risks of CKD progression and cardiovascular events than placebo. (FIDELIO-DKD)  --Status: Blood pressure not at target.  Renal function overall has been stable for the past few years.  Has very minimal microalbuminuria  --Management/Recommendations: Needs better blood pressure control add hydrochlorothiazide 25 mg to the valsartan 320 mg daily.  I agree with the addition of an SGLT inhibitor but the cost was too much.  Check a renal ultrasound.  Repeat BMP in 2 weeks after restarting hydrochlorothiazide concurrently with valsartan.    Hypertension  -- Stage II (American College of Cardiology/American Heart Association)  -- with underlying chronic kidney disease and obesity  -- Body mass index is 41.88 kg/m².  -- Volume status: Slight hypervolemia  -- Etiology: Primary hypertension and obesity  -- Secondary Work-Up: No clinical indication but consider sleep apnea study  -- Target Goal: < 130/80 (ACC/AHA, CKD with proteinuria, CKD in diabetics) ; if tolerated can target SBP < 120 mmHg in high cardiovascular risk ( Age > 75, CKD, CVD, No Diabetics SPRINT)  -- Lifestyle Modifications: DASH Diet, Weight Loss for ideal body weight, 45 mins of cardiovascular exercise 3 times a week as tolerated, and if no contraindications exist, smoking cessation, limit alcohol use, avoid NSAIDS, monitor blood pressure at home  --  Status: Blood pressure not at target  -- Current antihypertensive regiment: Valsartan 320 mg daily, metoprolol which is actively being weaned  -- Changes: Metoprolol plan to be weaned this weekend.  I agree as she does not have any evidence of cardiac disease.  Will need the addition of a diuretic she has hydrochlorothiazide at home I asked her to check the expiration date.  If not  can start 25 mg daily if it is  she is to call me and I will place her on a thiazide diuretic.  No indication for secondary workup as she does not classify as resistant hypertension.  Though she may need a sleep study.    Diabetes mellitus type 2  -hemoglobin A1c: 6.8 (A1C values may be falsely elevated or decreased in those with CKD, assay method should be certified by National glycohemoglobin standardization Program). Target A1C less then 7 (will need to be individualized for each patient), and would utilize A1C  in conjunction with continuous glucose monitoring (CGM).  It should also be noted that the A1c with more advanced kidney disease would be inaccurate due to decreased red blood cell life along with anemia.  -current medications: Not on any medications  -proteinuria: Minimal  -retinopathy: No  -neuropathy: No  -She is on prednisone  -Farxiga was to high cost  -please follow with an ophthalmologist and podiatrist every year  -continued self monitoring of blood glucose at home  -Management in CKD Recommendations:   Metformin:  Avoid if creatinine clearance is less than 30 cc/min (concern for lactic acidosis)  Sodium-Glucose Cotransporter-2 (SGLT2) Inhibitors:  May see an acute drop in the eGFR initially when starting the medication but then a stabilization of the renal function, with slower loss of renal function as compared to placebo.  Relative risk of end-stage renal disease, doubling of serum creatinine or death from renal causes were also found to be lower as compared to placebo. (CREDENCE).  DAPA-CKD study,  showed that patients with chronic kidney disease regardless of the presence or absence of diabetes the risk of composite of a sustained decline in the estimated GFR of at least 50%, end-stage renal disease or death from renal or cardiovascular causes was significantly lower with Dapagliflozin than with placebo. EMPEROR-Reduced study also showed beneficial effects. EMPA-CKD, among wide range of patients with CKD, who were at risk for progression, empagliflozin led a lower risk of kidney disease progression or death from cardiovascular causes than placebo.  If no contraindications exist I would recommend this medication added to the regiment. If eGFR < 60 cc/min (avoid ertugliflozin); avoid or caution with GFR < 20 mL/min, not an absolute contraindication, likely lower benefits   Sulfonylureas:  Preferred short-acting (glipizide, glimepiride, repaglinide), relatively safe in patients with non dialysis CKD  Thiazolidinedones/Alpha-Gluosidase inhibitors/Dipeptidyl peptidase-4 inhibitors:  Generally not considered first-line agents in CKD (limited data in long-term safety and efficacy)  Insulin:  Starting dose may need to be lower than what would ordinarily be used, as there is a decrease metabolism of insulin (no dose adjustment if the GFR > 50 mL/min, dose should be reduced to 75% of baseline if GFR 10-50 mL/min, and 50% baseline if GFR < 10 mL/min)  Finerenone: Patients with CKD with type 2 diabetes, treatment led to lower risks of CKD progression and cardiovascular events than placebo (FIDELIO-DKD). Avoid or caution if serum potassium more then 4.8.    Obesity  -- BMI 41.88, will refer her to dietitian  --Recommend decreasing portion sizes, encouraging healthy choices of fruits and vegetables, consuming healthier snacks and moderation in carbohydrate intake. Exercise recommendations; 3-5 times a week, the American Heart Association recommends 150 minutes a week moderate exercise  --Strongly recommend evaluation by  nutritionist  --Overweight and obesity have been associated with increased mortality and morbidity.  Associated with a reduction in life expectancy, associated with increased all cause and cardiovascular mortality  --Metabolic risks, including increased risk of diabetes type 2, insulin resistance with hyperinsulinemia (weight loss of 5 to 7% associated with a decreased risk of type 2 diabetes among individuals with prediabetes and weight loss of 15% can lead to dramatic improvement of diabetes in nearly half of people).  Dyslipidemia, hypertension and heart disease are all increased in patients with obesity.  Along with increased risk of stroke increased risk of multiple cancer types.  Can also be associated with increased renal dysfunction, strongest risk factor for new onset chronic kidney disease.  There is also a degree of compensatory hyperfiltration to meet high in the metabolic demands of increased body weight.  This can also result in increased increased risk for nephrolithiasis.        PATIENT INSTRUCTIONS:    Patient Instructions   1.)  Low 2 g sodium diet    2.)  Monitor weights at home    3.)  Avoid NSAIDs (ibuprofen, Motrin, Advil, Aleve, naproxen)    4.)  Monitor blood pressure at home, call if blood pressure greater than 150/90 persistently    5.) I will plan to discuss all results including blood work, and/or imaging at our next visit, unless there is an urgent indication, in which case I will call you earlier. If you have any questions or concerns about your results, please feel free to call our office.    6.)  Check a kidney ultrasound    7.) SGLT2 Inhibitors such as Farxiga, have been shown and studied to reduce the decline of kidney function and reduce cardiovascular outcomes.  We recommend you continue this medication.  Please stop this medication during any period of illness, during any perioperative period.  Please maintain good foot care and avoid keto diet.  Maintain adequate hydration.  And  watch out for hypoglycemia.    8.)  Continue good diabetic and blood pressure control     9.) Start hydrochlorothiazide 25 mg daily, repeat blood work in 2 weeks after being on both valsartan and hydrochlorothiazide    10.)  Referral to dietitian        HISTORY OF PRESENT ILLNESS:  Requesting Physician: Karla Solano DO    Celeste Garg is a 78 y.o. female who has a history of hypertension and diabetes controlled without medications, obesity, polymyalgia rheumatica who presents for an evaluation of uncontrolled hypertension and renal insufficiency.  She had an episode of acute kidney injury in April 2019 with a creatinine was 2.09 mg/dL which improved.  Her baseline creatinine appears to be between 1.1 to 1.3 mg/dL with a GFR less than 60 mill per minute with minimal microalbuminuria.  Her most recent A1c is 6.8.  Her blood pressure is not at target.  She is currently on valsartan 3 to 20 mg.  Reports no chest pain or shortness of breath.  Has had only 1 urinary infection in her lifetime.  Her sister is on dialysis.  She does use ibuprofen few times a week.    PAST MEDICAL HISTORY:  Past Medical History:   Diagnosis Date    Anemia     Arthritis     Chronic right-sided low back pain without sciatica 12/31/2020    Hypertension     Polymyalgia rheumatica (HCC)     Shoulder impingement syndrome     last assessed 12/27/16    Subacromial impingement of left shoulder 11/1/2016    Subacromial impingement of right shoulder 11/1/2016       PAST SURGICAL HISTORY:  Past Surgical History:   Procedure Laterality Date    ABDOMINAL SURGERY      last assessed 11/1/16    OOPHORECTOMY Right     OTHER SURGICAL HISTORY Right 2017    shoulder    TOTAL ABDOMINAL HYSTERECTOMY  30 year ago    WRIST FRACTURE SURGERY Right 2017       ALLERGIES:  Allergies   Allergen Reactions    Hydromorphone Dizziness and Other (See Comments)     Severe vertigo     Tdap [Tetanus-Diphth-Acell Pertussis] Swelling       SOCIAL HISTORY:  Social History      Substance and Sexual Activity   Alcohol Use Yes    Comment: rarely ; occasional      Social History     Substance and Sexual Activity   Drug Use No     Social History     Tobacco Use   Smoking Status Former    Current packs/day: 0.00    Types: Cigarettes    Quit date:     Years since quittin.2   Smokeless Tobacco Never   Tobacco Comments    40 years ago        FAMILY HISTORY:  Family History   Problem Relation Age of Onset    Hypertension Mother     Glaucoma Mother     Stroke Mother     Gout Father     COPD Father     Heart disease Father     Kidney failure Sister         chronic    Gout Sister     Multiple sclerosis Sister     Hypertension Sister     No Known Problems Sister     Alcohol abuse Brother     Cancer Brother     Gout Brother     Coronary artery disease Brother         CABGx4  (2019)    Pancreatic cancer Brother     No Known Problems Son     No Known Problems Son     No Known Problems Son     No Known Problems Daughter     No Known Problems Daughter     No Known Problems Maternal Grandmother     No Known Problems Maternal Grandfather     No Known Problems Paternal Grandmother     No Known Problems Paternal Grandfather     Lung cancer Paternal Uncle        MEDICATIONS:    Current Outpatient Medications:     Acetaminophen 500 MG, Take 1,000 mg by mouth daily , Disp: , Rfl:     allopurinol (ZYLOPRIM) 100 mg tablet, TAKE 1/2 TABLET BY MOUTH EVERY DAY FOR 1 WEEK, THEN TAKE 1 TABLET BY MOUTH EVERY DAY FOR 1 WEEK, THEN TAKE 1 AND 1/2 TABLETS BY MOUTH ONE TI, Disp: , Rfl:     atorvastatin (LIPITOR) 10 mg tablet, Take 1 tablet (10 mg total) by mouth daily, Disp: 90 tablet, Rfl: 1    hydroCHLOROthiazide 25 mg tablet, Take 25 mg by mouth daily, Disp: , Rfl:     ibuprofen (MOTRIN) 200 mg tablet, Take 200 mg by mouth every 6 (six) hours as needed for mild pain, Disp: , Rfl:     latanoprost (XALATAN) 0.005 % ophthalmic solution, INSTILL 1 DROP IN EACH EYE AT BEDTIME, Disp: , Rfl: 5    levothyroxine 100 mcg  "tablet, TAKE 1 TABLET BY MOUTH EVERY DAY, Disp: 90 tablet, Rfl: 1    predniSONE 5 mg tablet, TAKE 4 TABLETS BY MOUTH EVERY DAY FOR 3 DAYS, THEN TAKE 3 TABLETS BY MOUTH EVERY DAY FOR 4 DAYS, THEN TAKE 2 TABLETS BY MOUTH EVERY DAY FOR, Disp: , Rfl:     ramelteon (ROZEREM) 8 mg tablet, Take 1 tablet (8 mg total) by mouth daily at bedtime, Disp: 30 tablet, Rfl: 0    temazepam (RESTORIL) 15 mg capsule, Take 1 capsule (15 mg total) by mouth daily at bedtime as needed for sleep, Disp: 30 capsule, Rfl: 0    valsartan (DIOVAN) 320 MG tablet, Take 1 tablet (320 mg total) by mouth daily, Disp: 90 tablet, Rfl: 1    REVIEW OF SYSTEMS:  Review of Systems   Constitutional:  Negative for activity change and fever.   Respiratory:  Negative for cough, chest tightness, shortness of breath and wheezing.    Cardiovascular:  Negative for chest pain and leg swelling.   Gastrointestinal:  Negative for abdominal pain, diarrhea, nausea and vomiting.   Endocrine: Negative for polyuria.   Genitourinary:  Negative for difficulty urinating, dysuria, flank pain, frequency and urgency.   Musculoskeletal:  Positive for arthralgias and myalgias.   Skin:  Negative for rash.   Neurological:  Negative for dizziness, syncope, light-headedness and headaches.     All the systems were reviewed and were negative except as documented on the HPI.    PHYSICAL EXAM:  Current Weight: Body mass index is 41.88 kg/m².  Vitals:    04/04/24 1440   Weight: 104 kg (229 lb)   Height: 5' 2\" (1.575 m)       Physical Exam  Vitals and nursing note reviewed.   Constitutional:       General: She is not in acute distress.     Appearance: She is well-developed. She is obese.   HENT:      Head: Normocephalic and atraumatic.   Eyes:      General: No scleral icterus.     Conjunctiva/sclera: Conjunctivae normal.      Pupils: Pupils are equal, round, and reactive to light.   Cardiovascular:      Rate and Rhythm: Normal rate and regular rhythm.      Heart sounds: S1 normal and S2 " normal. No murmur heard.     No friction rub. No gallop.   Pulmonary:      Effort: Pulmonary effort is normal. No respiratory distress.      Breath sounds: Normal breath sounds. No wheezing or rales.   Abdominal:      General: Bowel sounds are normal.      Palpations: Abdomen is soft.      Tenderness: There is no abdominal tenderness. There is no rebound.   Musculoskeletal:         General: Normal range of motion.      Cervical back: Normal range of motion and neck supple.      Right lower leg: Edema present.      Left lower leg: Edema present.   Skin:     Findings: No rash.   Neurological:      Mental Status: She is alert and oriented to person, place, and time.   Psychiatric:         Behavior: Behavior normal.         Laboratory results:   Results for orders placed or performed in visit on 02/21/24   Sedimentation rate, automated   Result Value Ref Range    Sed Rate 26 0 - 29 mm/hour   C-reactive protein   Result Value Ref Range    CRP 8.2 (H) <3.0 mg/L   Uric acid   Result Value Ref Range    Uric Acid 5.5 2.0 - 7.5 mg/dL   Comprehensive metabolic panel   Result Value Ref Range    Sodium 142 135 - 147 mmol/L    Potassium 4.0 3.5 - 5.3 mmol/L    Chloride 104 96 - 108 mmol/L    CO2 26 21 - 32 mmol/L    ANION GAP 12 mmol/L    BUN 23 5 - 25 mg/dL    Creatinine 1.12 0.60 - 1.30 mg/dL    Glucose, Fasting 103 (H) 65 - 99 mg/dL    Calcium 9.4 8.4 - 10.2 mg/dL    AST 12 (L) 13 - 39 U/L    ALT 15 7 - 52 U/L    Alkaline Phosphatase 77 34 - 104 U/L    Total Protein 6.5 6.4 - 8.4 g/dL    Albumin 4.1 3.5 - 5.0 g/dL    Total Bilirubin 0.50 0.20 - 1.00 mg/dL    eGFR 47 ml/min/1.73sq m

## 2024-04-04 NOTE — PATIENT INSTRUCTIONS
1.)  Low 2 g sodium diet    2.)  Monitor weights at home    3.)  Avoid NSAIDs (ibuprofen, Motrin, Advil, Aleve, naproxen)    4.)  Monitor blood pressure at home, call if blood pressure greater than 150/90 persistently    5.) I will plan to discuss all results including blood work, and/or imaging at our next visit, unless there is an urgent indication, in which case I will call you earlier. If you have any questions or concerns about your results, please feel free to call our office.    6.)  Check a kidney ultrasound    7.) SGLT2 Inhibitors such as Farxiga, have been shown and studied to reduce the decline of kidney function and reduce cardiovascular outcomes.  We recommend you continue this medication.  Please stop this medication during any period of illness, during any perioperative period.  Please maintain good foot care and avoid keto diet.  Maintain adequate hydration.  And watch out for hypoglycemia.    8.)  Continue good diabetic and blood pressure control     9.) Start hydrochlorothiazide 25 mg daily, repeat blood work in 2 weeks after being on both valsartan and hydrochlorothiazide    10.)  Referral to dietitian

## 2024-04-18 ENCOUNTER — APPOINTMENT (OUTPATIENT)
Dept: LAB | Facility: CLINIC | Age: 79
End: 2024-04-18
Payer: MEDICARE

## 2024-04-18 DIAGNOSIS — N18.30 TYPE 2 DIABETES MELLITUS WITH STAGE 3 CHRONIC KIDNEY DISEASE AND HYPERTENSION (HCC): ICD-10-CM

## 2024-04-18 DIAGNOSIS — N18.31 STAGE 3A CHRONIC KIDNEY DISEASE (HCC): ICD-10-CM

## 2024-04-18 DIAGNOSIS — M35.3 POLYMYALGIA RHEUMATICA (HCC): Primary | ICD-10-CM

## 2024-04-18 DIAGNOSIS — M10.9 GOUT, UNSPECIFIED CAUSE, UNSPECIFIED CHRONICITY, UNSPECIFIED SITE: ICD-10-CM

## 2024-04-18 DIAGNOSIS — E11.22 TYPE 2 DIABETES MELLITUS WITH STAGE 3 CHRONIC KIDNEY DISEASE AND HYPERTENSION (HCC): ICD-10-CM

## 2024-04-18 DIAGNOSIS — I12.9 TYPE 2 DIABETES MELLITUS WITH STAGE 3 CHRONIC KIDNEY DISEASE AND HYPERTENSION (HCC): ICD-10-CM

## 2024-04-18 DIAGNOSIS — Z79.899 ENCOUNTER FOR LONG-TERM (CURRENT) USE OF OTHER MEDICATIONS: ICD-10-CM

## 2024-04-18 LAB
ALBUMIN SERPL BCP-MCNC: 4.1 G/DL (ref 3.5–5)
ALP SERPL-CCNC: 85 U/L (ref 34–104)
ALT SERPL W P-5'-P-CCNC: 22 U/L (ref 7–52)
ANION GAP SERPL CALCULATED.3IONS-SCNC: 12 MMOL/L (ref 4–13)
AST SERPL W P-5'-P-CCNC: 17 U/L (ref 13–39)
BILIRUB SERPL-MCNC: 0.35 MG/DL (ref 0.2–1)
BUN SERPL-MCNC: 23 MG/DL (ref 5–25)
CALCIUM SERPL-MCNC: 9.5 MG/DL (ref 8.4–10.2)
CHLORIDE SERPL-SCNC: 105 MMOL/L (ref 96–108)
CO2 SERPL-SCNC: 22 MMOL/L (ref 21–32)
CREAT SERPL-MCNC: 1.13 MG/DL (ref 0.6–1.3)
CRP SERPL QL: 15.2 MG/L
ERYTHROCYTE [SEDIMENTATION RATE] IN BLOOD: 46 MM/HOUR (ref 0–29)
GFR SERPL CREATININE-BSD FRML MDRD: 46 ML/MIN/1.73SQ M
GLUCOSE P FAST SERPL-MCNC: 127 MG/DL (ref 65–99)
POTASSIUM SERPL-SCNC: 4.2 MMOL/L (ref 3.5–5.3)
PROT SERPL-MCNC: 6.9 G/DL (ref 6.4–8.4)
SODIUM SERPL-SCNC: 139 MMOL/L (ref 135–147)
URATE SERPL-MCNC: 5.8 MG/DL (ref 2–7.5)

## 2024-04-18 PROCEDURE — 36415 COLL VENOUS BLD VENIPUNCTURE: CPT

## 2024-04-18 PROCEDURE — 85652 RBC SED RATE AUTOMATED: CPT

## 2024-04-18 PROCEDURE — 80053 COMPREHEN METABOLIC PANEL: CPT

## 2024-04-18 PROCEDURE — 84550 ASSAY OF BLOOD/URIC ACID: CPT

## 2024-04-18 PROCEDURE — 86140 C-REACTIVE PROTEIN: CPT

## 2024-04-25 ENCOUNTER — HOSPITAL ENCOUNTER (OUTPATIENT)
Dept: RADIOLOGY | Facility: MEDICAL CENTER | Age: 79
Discharge: HOME | End: 2024-04-25
Payer: MEDICARE

## 2024-04-25 DIAGNOSIS — N18.31 STAGE 3A CHRONIC KIDNEY DISEASE (HCC): ICD-10-CM

## 2024-04-25 DIAGNOSIS — E11.22 TYPE 2 DIABETES MELLITUS WITH STAGE 3 CHRONIC KIDNEY DISEASE AND HYPERTENSION (HCC): ICD-10-CM

## 2024-04-25 DIAGNOSIS — N18.30 TYPE 2 DIABETES MELLITUS WITH STAGE 3 CHRONIC KIDNEY DISEASE AND HYPERTENSION (HCC): ICD-10-CM

## 2024-04-25 DIAGNOSIS — I12.9 TYPE 2 DIABETES MELLITUS WITH STAGE 3 CHRONIC KIDNEY DISEASE AND HYPERTENSION (HCC): ICD-10-CM

## 2024-04-25 PROCEDURE — 76775 US EXAM ABDO BACK WALL LIM: CPT

## 2024-05-01 ENCOUNTER — TELEPHONE (OUTPATIENT)
Age: 79
End: 2024-05-01

## 2024-05-01 NOTE — PROGRESS NOTES
"Name: Celeste Garg      : 1945      MRN: [id number]  Encounter Provider: Karla Solano DO  Encounter Date: 2024   Encounter department: [facility]    Assessment & Plan     1. Benign essential hypertension  Assessment & Plan:  BP goal less than 140/90 (ideally below 130/80)  Patient strongly advised to purchase new blood pressure cuff and monitor home blood pressure readings  Continue valsartan 320 mg AND resume hydrochlorothiazide 25 mg daily as per nephrology  Lifestyle modifications including DASH diet and regular exercise are encouraged      2. Type 2 diabetes mellitus with stage 3 chronic kidney disease and hypertension (HCC)  Assessment & Plan:  Nephrology also recommended SGLT2 inhibitor for renal protection  Lab Results   Component Value Date    HGBA1C 6.8 (A) 02/15/2024     Referral to social work placed to help patient look into procurement options for Jardiance 10 mg (tier 3 with her insurance plan) as medication is currently cost prohibitive for patient    Orders:  -     Ambulatory referral to Social work care management program; Future; Expected date: 2024  -     Empagliflozin (JARDIANCE) 10 MG TABS tablet; Take 1 tablet (10 mg total) by mouth daily           Return in about 3 months (around 2024) for Recheck T2DM, HTN, HLD.    The patient indicates understanding of these issues and agrees with the plan.      Subjective      HPI  Pt presents for HTN follow up  Was seen by Nephro / -- 25 mg hctz added to valsartan  \"I don't take home BP's\"  \"I stopped hctz because I was peeing too much\"  Otherwise, patient reports feeling well at this time  She denies chest pain, palpitations, shortness of breath, headaches, change in vision  She is currently experiencing diffuse myalgias and arthralgias for which she is under the care and supervision of a rheumatologist      Review of Systems   Constitutional:  Negative for activity change, fatigue and fever.   HENT:  " Negative for congestion, ear pain, sinus pain and sore throat.    Eyes:  Negative for pain and itching.   Respiratory:  Negative for cough and shortness of breath.    Cardiovascular:  Negative for chest pain and palpitations.   Gastrointestinal:  Negative for abdominal pain, constipation, diarrhea, nausea and vomiting.   Endocrine: Negative for cold intolerance and heat intolerance.   Genitourinary:  Negative for dysuria.   Musculoskeletal:  Positive for arthralgias (followed by Rheum) and joint swelling (followed by Rheum). Negative for myalgias.   Skin:  Negative for color change and rash.   Neurological:  Negative for dizziness, syncope and headaches.   Hematological:  Negative for adenopathy.   Psychiatric/Behavioral:  Negative for behavioral problems, dysphoric mood and sleep disturbance. The patient is not nervous/anxious.        Current Outpatient Medications on File Prior to Visit   Medication Sig    Acetaminophen 500 MG Take 1,000 mg by mouth daily     allopurinol (ZYLOPRIM) 100 mg tablet TAKE 1/2 TABLET BY MOUTH EVERY DAY FOR 1 WEEK, THEN TAKE 1 TABLET BY MOUTH EVERY DAY FOR 1 WEEK, THEN TAKE 1 AND 1/2 TABLETS BY MOUTH ONE TI    ibuprofen (MOTRIN) 200 mg tablet Take 200 mg by mouth every 6 (six) hours as needed for mild pain    latanoprost (XALATAN) 0.005 % ophthalmic solution INSTILL 1 DROP IN EACH EYE AT BEDTIME    levothyroxine 100 mcg tablet TAKE 1 TABLET BY MOUTH EVERY DAY    methylprednisolone (Medrol) 4 mg tablet 5 tablets once a day in the morning for 3 days then 4 tablets a day for 3 days then 3 tablets a day for 3 days then 2 tablets a day for 3 days the stay on 1 tablet a day.    temazepam (RESTORIL) 15 mg capsule Take 1 capsule (15 mg total) by mouth daily at bedtime as needed for sleep    valsartan (DIOVAN) 320 MG tablet Take 1 tablet (320 mg total) by mouth daily    hydroCHLOROthiazide 25 mg tablet Take 25 mg by mouth daily (Patient not taking: Reported on 5/2/2024)    ramelteon (ROZEREM) 8  "mg tablet Take 1 tablet (8 mg total) by mouth daily at bedtime (Patient not taking: Reported on 5/2/2024)       Objective     /80   Pulse 82   Temp 98 °F (36.7 °C)   Resp 18   Ht 5' 2\" (1.575 m)   Wt 105 kg (231 lb 3.2 oz)   SpO2 97%   BMI 42.29 kg/m²     Physical Exam  Vitals and nursing note reviewed.   Constitutional:       General: She is not in acute distress.     Appearance: Normal appearance. She is well-developed. She is not ill-appearing.      Comments: Body mass index is 42.29 kg/m².    HENT:      Head: Normocephalic and atraumatic.      Right Ear: External ear normal.      Left Ear: External ear normal.      Nose: Nose normal.   Eyes:      General: No scleral icterus.     Conjunctiva/sclera: Conjunctivae normal.      Pupils: Pupils are equal, round, and reactive to light.   Neck:      Thyroid: No thyromegaly.   Cardiovascular:      Rate and Rhythm: Normal rate and regular rhythm.      Heart sounds: Normal heart sounds. No murmur heard.  Pulmonary:      Effort: Pulmonary effort is normal. No respiratory distress.      Breath sounds: Normal breath sounds. No wheezing.   Abdominal:      General: Bowel sounds are normal. There is no distension.      Palpations: Abdomen is soft.      Tenderness: There is no abdominal tenderness.   Musculoskeletal:         General: No tenderness. Normal range of motion.      Cervical back: Normal range of motion and neck supple.      Right lower leg: Edema (trace) present.      Left lower leg: Edema (race) present.   Lymphadenopathy:      Cervical: No cervical adenopathy.   Skin:     General: Skin is warm and dry.      Findings: No rash.   Neurological:      Mental Status: She is alert and oriented to person, place, and time.      Cranial Nerves: No cranial nerve deficit.   Psychiatric:         Behavior: Behavior normal.       Karla Solano, DO    "

## 2024-05-01 NOTE — TELEPHONE ENCOUNTER
Patient called looking for her ultrasound results.  Upon review of chart, not resulted yet. Informed patient once resulted and reviewed by provider office will reach out.  No further questions or concerns.

## 2024-05-02 ENCOUNTER — OFFICE VISIT (OUTPATIENT)
Age: 79
End: 2024-05-02
Payer: MEDICARE

## 2024-05-02 VITALS
RESPIRATION RATE: 18 BRPM | BODY MASS INDEX: 42.55 KG/M2 | TEMPERATURE: 98 F | DIASTOLIC BLOOD PRESSURE: 80 MMHG | OXYGEN SATURATION: 97 % | HEIGHT: 62 IN | WEIGHT: 231.2 LBS | HEART RATE: 82 BPM | SYSTOLIC BLOOD PRESSURE: 142 MMHG

## 2024-05-02 DIAGNOSIS — N18.30 TYPE 2 DIABETES MELLITUS WITH STAGE 3 CHRONIC KIDNEY DISEASE AND HYPERTENSION (HCC): ICD-10-CM

## 2024-05-02 DIAGNOSIS — E11.22 TYPE 2 DIABETES MELLITUS WITH STAGE 3 CHRONIC KIDNEY DISEASE AND HYPERTENSION (HCC): ICD-10-CM

## 2024-05-02 DIAGNOSIS — I12.9 TYPE 2 DIABETES MELLITUS WITH STAGE 3 CHRONIC KIDNEY DISEASE AND HYPERTENSION (HCC): ICD-10-CM

## 2024-05-02 DIAGNOSIS — I10 BENIGN ESSENTIAL HYPERTENSION: Primary | ICD-10-CM

## 2024-05-02 PROCEDURE — 99214 OFFICE O/P EST MOD 30 MIN: CPT | Performed by: FAMILY MEDICINE

## 2024-05-02 PROCEDURE — G2211 COMPLEX E/M VISIT ADD ON: HCPCS | Performed by: FAMILY MEDICINE

## 2024-05-02 RX ORDER — METHYLPREDNISOLONE 4 MG/1
TABLET ORAL
COMMUNITY
Start: 2024-05-01

## 2024-05-03 ENCOUNTER — TELEPHONE (OUTPATIENT)
Dept: ADMINISTRATIVE | Facility: OTHER | Age: 79
End: 2024-05-03

## 2024-05-03 NOTE — TELEPHONE ENCOUNTER
Upon review of the In Basket request and the patient's chart, initial outreach has been made via fax to facility. Please see Contacts section for details.     Thank you  Shira Hay

## 2024-05-03 NOTE — LETTER
Diabetic Eye Exam Form    Date Requested: 24  Patient: Celeste Garg  Patient : 1945   Referring Provider: Karla Solano,       DIABETIC Eye Exam Date _______________________________      Type of Exam MUST be documented for Diabetic Eye Exams. Please CHECK ONE.     Retinal Exam       Dilated Retinal Exam       OCT       Optomap-Iris Exam      Fundus Photography       Left Eye - Please check Retinopathy or No Retinopathy        Exam did show retinopathy    Exam did not show retinopathy       Right Eye - Please check Retinopathy or No Retinopathy       Exam did show retinopathy    Exam did not show retinopathy       Comments __________________________________________________________    Practice Providing Exam ______________________________________________    Exam Performed By (print name) _______________________________________      Provider Signature ___________________________________________________      These reports are needed for  compliance.  Please fax this completed form and a copy of the Diabetic Eye Exam report to our office located at [address] [place] [address] as soon as possible via Fax [phone] attention [place]: Phone [phone]  We thank you for your assistance in treating our mutual patient.   [facility] - [phone] F [phone]

## 2024-05-03 NOTE — LETTER
Diabetic Eye Exam Form    Date Requested: 24  Patient: Celeste Garg  Patient : 1945   Referring Provider: Karla Solano,       DIABETIC Eye Exam Date _______________________________      Type of Exam MUST be documented for Diabetic Eye Exams. Please CHECK ONE.     Retinal Exam       Dilated Retinal Exam       OCT       Optomap-Iris Exam      Fundus Photography       Left Eye - Please check Retinopathy or No Retinopathy        Exam did show retinopathy    Exam did not show retinopathy       Right Eye - Please check Retinopathy or No Retinopathy       Exam did show retinopathy    Exam did not show retinopathy       Comments __________________________________________________________    Practice Providing Exam ______________________________________________    Exam Performed By (print name) _______________________________________      Provider Signature ___________________________________________________      These reports are needed for  compliance.  Please fax this completed form and a copy of the Diabetic Eye Exam report to our office located at [address] [place] [address] as soon as possible via Fax [phone] attention [place]: Phone [phone]  We thank you for your assistance in treating our mutual patient.    [facility] - [phone] F [phone]

## 2024-05-03 NOTE — TELEPHONE ENCOUNTER
----- Message from Vero Hawk sent at 5/2/2024 12:19 PM EDT -----  Regarding: Care Gap Request Dm eye exam  05/02/24 12:19 PM    Hello, our patient Celeste Garg has had Diabetic Eye Exam completed/performed. Please assist in updating the patient chart by making an External outreach to DR.emily whipple facility located in [facility]. The date of service is 09/2023.    Thank you,  Vero CARRANZA

## 2024-05-06 NOTE — ASSESSMENT & PLAN NOTE
Nephrology also recommended SGLT2 inhibitor for renal protection  Lab Results   Component Value Date    HGBA1C 6.8 (A) 02/15/2024     Referral to social work placed to help patient look into procurement options for Jardiance 10 mg (tier 3 with her insurance plan) as medication is currently cost prohibitive for patient

## 2024-05-06 NOTE — ASSESSMENT & PLAN NOTE
BP goal less than 140/90 (ideally below 130/80)  Patient strongly advised to purchase new blood pressure cuff and monitor home blood pressure readings  Continue valsartan 320 mg AND resume hydrochlorothiazide 25 mg daily as per nephrology  Lifestyle modifications including DASH diet and regular exercise are encouraged

## 2024-05-07 ENCOUNTER — PATIENT OUTREACH (OUTPATIENT)
Age: 79
End: 2024-05-07

## 2024-05-07 ENCOUNTER — CLINICAL SUPPORT (OUTPATIENT)
Dept: NEPHROLOGY | Facility: CLINIC | Age: 79
End: 2024-05-07
Payer: MEDICARE

## 2024-05-07 DIAGNOSIS — N18.30 TYPE 2 DIABETES MELLITUS WITH STAGE 3 CHRONIC KIDNEY DISEASE AND HYPERTENSION (HCC): ICD-10-CM

## 2024-05-07 DIAGNOSIS — I12.9 TYPE 2 DIABETES MELLITUS WITH STAGE 3 CHRONIC KIDNEY DISEASE AND HYPERTENSION (HCC): ICD-10-CM

## 2024-05-07 DIAGNOSIS — E11.22 TYPE 2 DIABETES MELLITUS WITH STAGE 3 CHRONIC KIDNEY DISEASE AND HYPERTENSION (HCC): ICD-10-CM

## 2024-05-07 PROCEDURE — 97802 MEDICAL NUTRITION INDIV IN: CPT | Performed by: DIETITIAN, REGISTERED

## 2024-05-07 NOTE — PROGRESS NOTES
Initial Nutrition Assessment Form    Patient Name: Celeste Garg    YOB: 1945    Sex: Female     Assessment Date: 5/7/2024  Start Time: 10:55 Stop Time: 11:25 Total Minutes: 30     Data:  Present at session: self   Parent Concerns:    Medical Dx/Reason for Referral:   CKD3a, DM   Past Medical History:   Diagnosis Date    Anemia     Arthritis     Chronic right-sided low back pain without sciatica 12/31/2020    Hypertension     Polymyalgia rheumatica (HCC)     Shoulder impingement syndrome     last assessed 12/27/16    Subacromial impingement of left shoulder 11/1/2016    Subacromial impingement of right shoulder 11/1/2016       Current Outpatient Medications   Medication Sig Dispense Refill    Acetaminophen 500 MG Take 1,000 mg by mouth daily       allopurinol (ZYLOPRIM) 100 mg tablet TAKE 1/2 TABLET BY MOUTH EVERY DAY FOR 1 WEEK, THEN TAKE 1 TABLET BY MOUTH EVERY DAY FOR 1 WEEK, THEN TAKE 1 AND 1/2 TABLETS BY MOUTH ONE TI      Empagliflozin (JARDIANCE) 10 MG TABS tablet Take 1 tablet (10 mg total) by mouth daily 30 tablet 1    hydroCHLOROthiazide 25 mg tablet Take 25 mg by mouth daily (Patient not taking: Reported on 5/2/2024)      ibuprofen (MOTRIN) 200 mg tablet Take 200 mg by mouth every 6 (six) hours as needed for mild pain      latanoprost (XALATAN) 0.005 % ophthalmic solution INSTILL 1 DROP IN EACH EYE AT BEDTIME  5    levothyroxine 100 mcg tablet TAKE 1 TABLET BY MOUTH EVERY DAY 90 tablet 1    methylprednisolone (Medrol) 4 mg tablet 5 tablets once a day in the morning for 3 days then 4 tablets a day for 3 days then 3 tablets a day for 3 days then 2 tablets a day for 3 days the stay on 1 tablet a day.      ramelteon (ROZEREM) 8 mg tablet Take 1 tablet (8 mg total) by mouth daily at bedtime (Patient not taking: Reported on 5/2/2024) 30 tablet 0    temazepam (RESTORIL) 15 mg capsule Take 1 capsule (15 mg total) by mouth daily at bedtime as needed for sleep 30 capsule 0    valsartan (DIOVAN) 320 MG  "tablet Take 1 tablet (320 mg total) by mouth daily 90 tablet 1     No current facility-administered medications for this visit.        Additional Meds/Supplements:    Special Learning Needs:    Height:   HC Readings from Last 3 Encounters:   No data found for HC      Weight: Wt Readings from Last 12 Encounters:   24 105 kg (231 lb 3.2 oz)   24 104 kg (229 lb)   24 103 kg (227 lb 12.8 oz)   24 103 kg (228 lb)   02/15/24 104 kg (229 lb 3.2 oz)   24 104 kg (229 lb 4.5 oz)   23 104 kg (230 lb)   23 104 kg (230 lb)   23 104 kg (229 lb)   23 105 kg (231 lb)   23 105 kg (231 lb)   23 105 kg (232 lb)     Estimated body mass index is 42.29 kg/m² as calculated from the following:    Height as of 24: 5' 2\" (1.575 m).    Weight as of 24: 105 kg (231 lb 3.2 oz).  Usual Weight: #  Ideal Body Weight: #   Recent Weight Change:    [x]No  Amount:       Energy Needs: No calculation needed   Allergies   Allergen Reactions    Hydromorphone Dizziness and Other (See Comments)     Severe vertigo     Tdap [Tetanus-Diphth-Acell Pertussis] Swelling       Social History     Substance and Sexual Activity   Alcohol Use Yes    Comment: rarely ; occasional        Social History     Tobacco Use   Smoking Status Former    Current packs/day: 0.00    Types: Cigarettes    Quit date:     Years since quittin.3   Smokeless Tobacco Never   Tobacco Comments    40 years ago        Who shops? patient   Who cooks? patient   Exercise: Ambulatory.  Independent for ADLs   Prior Counseling?   []No  When      Why:         Diet Hx:  Usually grazes throughout the day.  7:30 am -1/2 bagel cream cheese  10:00 am - other 1/2 bagel with ham and cheese  3 pm - salad  5 pm - homemade soup  Does enjoy bread.  Dines out occasionally with family.         Nutrition Diagnosis:   Food and nutrition related knowledge deficit  related to  CKD3a   as evidenced by No prior knowledge of need for food " and nutrition related recommendations       Medical Nutrition Therapy Intervention:  []Individualized Meal Plan    Met with Celeste at the [facility] office.  Provided and reviewed written handouts:  from [facility] - eating right for kidney health, sodium, potassium, phosphorus; and 5 days of meal plans adjusted for renal diet guidelines.   Celeste reports having a good appetite, prepares most meals at home, and does some meal prepping so she has quick meals on hand at home.  She has been reducing salt intake.  Celeste reports not taking Jardiance due to cost, encouraged to montior HbA1c.            Comprehension:   []Good   Receptivity:   []Good   Expected Compliance:   []Good        Goals:   Follow a low sodium diet by limiting intake of highly processed, pre-packaged foods and choosing fresh ingredients when preparing meals.    2.   3.       No follow-ups on file.  Labs:  CMP  Lab Results   Component Value Date     11/05/2015    K 4.2 04/18/2024     04/18/2024    CO2 22 04/18/2024    ANIONGAP 6 11/05/2015    BUN 23 04/18/2024    CREATININE 1.13 04/18/2024    GLUCOSE 107 11/05/2015    GLUF 127 (H) 04/18/2024    CALCIUM 9.5 04/18/2024    CORRECTEDCA 10.1 11/21/2022    AST 17 04/18/2024    ALT 22 04/18/2024    ALKPHOS 85 04/18/2024    PROT 7.3 05/27/2015    BILITOT 0.30 05/27/2015    EGFR 46 04/18/2024       BMP  Lab Results   Component Value Date    GLUCOSE 107 11/05/2015    CALCIUM 9.5 04/18/2024     11/05/2015    K 4.2 04/18/2024    CO2 22 04/18/2024     04/18/2024    BUN 23 04/18/2024    CREATININE 1.13 04/18/2024       Lipids  Lab Results   Component Value Date    CHOL 196 05/27/2015    CHOL 180 03/06/2014     Lab Results   Component Value Date    HDL 47 (L) 08/21/2023    HDL 49 (L) 01/04/2023    HDL 42 (L) 04/18/2022     Lab Results   Component Value Date    LDLCALC 117 (H) 08/21/2023    LDLCALC 106 (H) 01/04/2023    LDLCALC 107 (H) 04/18/2022     Lab Results   Component Value Date    TRIG  "197 (H) 08/21/2023    TRIG 181 (H) 01/04/2023    TRIG 197 (H) 04/18/2022     No results found for: \"CHOLHDL\"    Hemoglobin A1C  Lab Results   Component Value Date    HGBA1C 6.8 (A) 02/15/2024       Fasting Glucose  Lab Results   Component Value Date    GLUF 127 (H) 04/18/2024       Insulin     Thyroid  No results found for: \"TSH\", \"J5ZOATG\", \"J4RMGKN\", \"THYROIDAB\"    Hepatic Function Panel  Lab Results   Component Value Date    ALT 22 04/18/2024    AST 17 04/18/2024    ALKPHOS 85 04/18/2024    BILITOT 0.30 05/27/2015       Celiac Disease Antibody Panel  No results found for: \"ENDOMYSIAL IGA\", \"GLIADIN IGA\", \"GLIADIN IGG\", \"IGA\", \"TISSUE TRANSGLUT AB\", \"TTG IGA\"   Iron  No results found for: \"IRON\", \"TIBC\", \"FERRITIN\"    Vitamins  No results found for: \"VITAMIN B2\"   No results found for: \"NICOTINAMIDE\", \"NICOTINIC ACID\"   No results found for: \"VITAMINB6\"  No results found for: \"NISYADFM37\"  No results found for: \"VITB5\"  No results found for: \"X9VNTIKS\"  No results found for: \"THYROGLB\"  No results found for: \"VITAMIN K\"   No results found for: \"25-HYDROXY VIT D\"   No components found for: \"VITAMINE\"     Melida Hernandez, RD, LDN  [facility] [address] [place] [address]    "

## 2024-05-07 NOTE — PROGRESS NOTES
ELDON received and reviewed referral.  Pt needs assistance affording Jardiance.  [employer] has a PAP.      Phone call placed to pt.  SW reviewed PACE and the Jardiance PAP.  Pt was provided with the phone number to PACE to apply.  Pt's income is very close to the income guideline.  SW also mailed Jardiance PAP to pt with instructions to complete and provide to Dr. Solano in the event that PACE is denied.      Pt is appreciative of the information provided.

## 2024-05-09 NOTE — TELEPHONE ENCOUNTER
As a follow-up, a second attempt has been made for outreach via fax to facility. Please see Contacts section for details.    Thank you  Shira Hay

## 2024-05-14 NOTE — TELEPHONE ENCOUNTER
As a final attempt, a third outreach has been made via telephone call to facility. Please see Contacts section for details. This encounter will be closed and completed by end of day. Should we receive the requested information because of previous outreach attempts, the requested patient's chart will be updated appropriately.     Thank you  Shira Hay

## 2024-05-17 NOTE — TELEPHONE ENCOUNTER
Upon review of the In Basket request we were able to locate, review, and update the patient chart as requested for Diabetic Eye Exam.    Any additional questions or concerns should be emailed to the Practice Liaisons via the appropriate education email address, please do not reply via In Basket.    Thank you  Shira Hay

## 2024-05-28 ENCOUNTER — TRANSCRIBE ORDERS (OUTPATIENT)
Dept: LAB | Facility: CLINIC | Age: 79
End: 2024-05-28

## 2024-05-28 ENCOUNTER — APPOINTMENT (OUTPATIENT)
Dept: LAB | Facility: CLINIC | Age: 79
End: 2024-05-28
Payer: MEDICARE

## 2024-05-28 DIAGNOSIS — M35.3 POLYMYALGIA RHEUMATICA (HCC): Primary | ICD-10-CM

## 2024-05-28 LAB
CRP SERPL QL: 4.4 MG/L
ERYTHROCYTE [SEDIMENTATION RATE] IN BLOOD: 38 MM/HOUR (ref 0–29)

## 2024-05-28 PROCEDURE — 36415 COLL VENOUS BLD VENIPUNCTURE: CPT

## 2024-05-28 PROCEDURE — 86140 C-REACTIVE PROTEIN: CPT

## 2024-05-28 PROCEDURE — 85652 RBC SED RATE AUTOMATED: CPT

## 2024-07-03 DIAGNOSIS — E03.9 HYPOTHYROIDISM, UNSPECIFIED TYPE: ICD-10-CM

## 2024-07-03 RX ORDER — LEVOTHYROXINE SODIUM 0.1 MG/1
100 TABLET ORAL DAILY
Qty: 100 TABLET | Refills: 1 | Status: SHIPPED | OUTPATIENT
Start: 2024-07-03

## 2024-07-30 ENCOUNTER — RA CDI HCC (OUTPATIENT)
Dept: OTHER | Facility: HOSPITAL | Age: 79
End: 2024-07-30

## 2024-08-02 ENCOUNTER — TELEPHONE (OUTPATIENT)
Dept: NEPHROLOGY | Facility: CLINIC | Age: 79
End: 2024-08-02

## 2024-08-05 ENCOUNTER — TELEPHONE (OUTPATIENT)
Age: 79
End: 2024-08-05

## 2024-08-22 ENCOUNTER — OFFICE VISIT (OUTPATIENT)
Age: 79
End: 2024-08-22
Payer: MEDICARE

## 2024-08-22 VITALS
WEIGHT: 231 LBS | DIASTOLIC BLOOD PRESSURE: 78 MMHG | HEIGHT: 62 IN | TEMPERATURE: 97.8 F | OXYGEN SATURATION: 97 % | HEART RATE: 71 BPM | SYSTOLIC BLOOD PRESSURE: 142 MMHG | RESPIRATION RATE: 18 BRPM | BODY MASS INDEX: 42.51 KG/M2

## 2024-08-22 DIAGNOSIS — I10 BENIGN ESSENTIAL HYPERTENSION: ICD-10-CM

## 2024-08-22 DIAGNOSIS — E78.2 MIXED HYPERLIPIDEMIA: ICD-10-CM

## 2024-08-22 DIAGNOSIS — I12.9 TYPE 2 DIABETES MELLITUS WITH STAGE 3 CHRONIC KIDNEY DISEASE AND HYPERTENSION (HCC): Primary | ICD-10-CM

## 2024-08-22 DIAGNOSIS — E11.22 TYPE 2 DIABETES MELLITUS WITH STAGE 3 CHRONIC KIDNEY DISEASE AND HYPERTENSION (HCC): Primary | ICD-10-CM

## 2024-08-22 DIAGNOSIS — N18.30 TYPE 2 DIABETES MELLITUS WITH STAGE 3 CHRONIC KIDNEY DISEASE AND HYPERTENSION (HCC): Primary | ICD-10-CM

## 2024-08-22 DIAGNOSIS — N18.32 STAGE 3B CHRONIC KIDNEY DISEASE (HCC): ICD-10-CM

## 2024-08-22 LAB — SL AMB POCT HEMOGLOBIN AIC: 7 (ref ?–6.5)

## 2024-08-22 PROCEDURE — 99214 OFFICE O/P EST MOD 30 MIN: CPT | Performed by: STUDENT IN AN ORGANIZED HEALTH CARE EDUCATION/TRAINING PROGRAM

## 2024-08-22 PROCEDURE — 83036 HEMOGLOBIN GLYCOSYLATED A1C: CPT | Performed by: STUDENT IN AN ORGANIZED HEALTH CARE EDUCATION/TRAINING PROGRAM

## 2024-08-22 RX ORDER — VALSARTAN 320 MG/1
320 TABLET ORAL DAILY
Qty: 90 TABLET | Refills: 1 | Status: SHIPPED | OUTPATIENT
Start: 2024-08-22

## 2024-08-22 RX ORDER — OMEGA-3 FATTY ACIDS/FISH OIL 300-1000MG
CAPSULE ORAL
COMMUNITY

## 2024-08-22 NOTE — PROGRESS NOTES
Ambulatory Visit  Name: Celeste Garg      : 1945      MRN: [id number]  Encounter Provider: Ember Sahu MD  Encounter Date: 2024   Encounter department: [facility]    Assessment & Plan   1. Type 2 diabetes mellitus with stage 3 chronic kidney disease and hypertension (HCC)  -     POCT hemoglobin A1c  -     Albumin / creatinine urine ratio; Future; Expected date: 2024  -     Comprehensive metabolic panel; Future; Expected date: 2024  -     Lipid Panel with Direct LDL reflex; Future; Expected date: 2024  2. Benign essential hypertension  -     valsartan (DIOVAN) 320 MG tablet; Take 1 tablet (320 mg total) by mouth daily  3. Stage 3b chronic kidney disease (HCC)  4. Mixed hyperlipidemia  -     Lipoprotein A (LPA); Future     Continue current antihypertensive. Update diabetic labs and cholesterol.  Will get lipoprotein A, send for coronary calcium score if elevated.  Patient with significant ASCVD risk, has not tolerated atorvasatin in the past.  Would try crestor of lipids still elevated.     Return in about 4 weeks (around 2024) for Annual physical AMW.      History of Present Illness     Patient has been taking Diovan 320 mg PO daily. BP close to goal in office today. She does no take BP at home.  Patient self discontinued HCTZ as she was urinating too frequently.     HgbA1c 7.0 in office today, this is at goal of 7.0 or below. This is managed with lifestyle alone. Patient prescrubed Jiardiance in the past but it is not financially feasible at this time.     Using voltaren gel for knee pain which has been effective.         Review of Systems   Constitutional:  Negative for chills and fever.   HENT:  Negative for ear pain and sore throat.    Eyes:  Negative for pain and visual disturbance.   Respiratory:  Negative for cough and shortness of breath.    Cardiovascular:  Negative for chest pain and palpitations.   Gastrointestinal:  Negative for abdominal  "pain and vomiting.   Genitourinary:  Negative for dysuria and hematuria.   Musculoskeletal:  Negative for arthralgias and back pain.   Skin:  Negative for color change and rash.   Neurological:  Negative for seizures and syncope.   All other systems reviewed and are negative.      Objective     /78   Pulse 71   Temp 97.8 °F (36.6 °C)   Resp 18   Ht 5' 2\" (1.575 m)   Wt 105 kg (231 lb)   SpO2 97%   BMI 42.25 kg/m²     Physical Exam  Vitals and nursing note reviewed.   Constitutional:       General: She is not in acute distress.     Appearance: She is well-developed.   HENT:      Head: Normocephalic and atraumatic.   Eyes:      Conjunctiva/sclera: Conjunctivae normal.   Cardiovascular:      Rate and Rhythm: Normal rate and regular rhythm.      Pulses: no weak pulses.           Dorsalis pedis pulses are 2+ on the right side and 2+ on the left side.      Heart sounds: No murmur heard.  Pulmonary:      Effort: Pulmonary effort is normal. No respiratory distress.      Breath sounds: Normal breath sounds.   Abdominal:      Palpations: Abdomen is soft.      Tenderness: There is no abdominal tenderness.   Musculoskeletal:         General: No swelling.      Cervical back: Neck supple.   Feet:      Right foot:      Skin integrity: No ulcer, skin breakdown, erythema, warmth, callus or dry skin.      Left foot:      Skin integrity: No ulcer, skin breakdown, erythema, warmth, callus or dry skin.   Skin:     General: Skin is warm and dry.   Neurological:      Mental Status: She is alert.       Diabetic Foot Exam    Patient's shoes and socks removed.    Right Foot/Ankle   Right Foot Inspection  Skin Exam: skin normal and skin intact. No dry skin, no warmth, no callus, no erythema, no maceration, no abnormal color, no pre-ulcer, no ulcer and no callus.     Toe Exam: ROM and strength within normal limits.     Sensory   Proprioception: intact  Monofilament testing: intact    Vascular  Capillary refills: < 3 seconds  The " right DP pulse is 2+.     Left Foot/Ankle  Left Foot Inspection  Skin Exam: skin normal and skin intact. No dry skin, no warmth, no erythema, no maceration, normal color, no pre-ulcer, no ulcer and no callus.     Toe Exam: ROM and strength within normal limits.     Sensory   Proprioception: intact  Monofilament testing: intact    Vascular  Capillary refills: < 3 seconds  The left DP pulse is 2+.     Assign Risk Category  No deformity present  No loss of protective sensation  No weak pulses  Risk: 0

## 2024-09-05 ENCOUNTER — TRANSCRIBE ORDERS (OUTPATIENT)
Dept: LAB | Facility: CLINIC | Age: 79
End: 2024-09-05

## 2024-09-05 ENCOUNTER — TELEPHONE (OUTPATIENT)
Age: 79
End: 2024-09-05

## 2024-09-05 ENCOUNTER — APPOINTMENT (OUTPATIENT)
Dept: LAB | Facility: CLINIC | Age: 79
End: 2024-09-05
Payer: MEDICARE

## 2024-09-05 DIAGNOSIS — N18.31 STAGE 3A CHRONIC KIDNEY DISEASE (HCC): ICD-10-CM

## 2024-09-05 DIAGNOSIS — M10.9 GOUT, UNSPECIFIED CAUSE, UNSPECIFIED CHRONICITY, UNSPECIFIED SITE: ICD-10-CM

## 2024-09-05 DIAGNOSIS — I12.9 TYPE 2 DIABETES MELLITUS WITH STAGE 3 CHRONIC KIDNEY DISEASE AND HYPERTENSION (HCC): ICD-10-CM

## 2024-09-05 DIAGNOSIS — N18.30 TYPE 2 DIABETES MELLITUS WITH STAGE 3 CHRONIC KIDNEY DISEASE AND HYPERTENSION (HCC): ICD-10-CM

## 2024-09-05 DIAGNOSIS — M35.3 POLYMYALGIA RHEUMATICA (HCC): ICD-10-CM

## 2024-09-05 DIAGNOSIS — M10.9 GOUT, UNSPECIFIED CAUSE, UNSPECIFIED CHRONICITY, UNSPECIFIED SITE: Primary | ICD-10-CM

## 2024-09-05 DIAGNOSIS — E11.22 TYPE 2 DIABETES MELLITUS WITH STAGE 3 CHRONIC KIDNEY DISEASE AND HYPERTENSION (HCC): ICD-10-CM

## 2024-09-05 DIAGNOSIS — E78.2 MIXED HYPERLIPIDEMIA: ICD-10-CM

## 2024-09-05 LAB
ALBUMIN SERPL BCG-MCNC: 4.1 G/DL (ref 3.5–5)
ALP SERPL-CCNC: 66 U/L (ref 34–104)
ALT SERPL W P-5'-P-CCNC: 22 U/L (ref 7–52)
ANION GAP SERPL CALCULATED.3IONS-SCNC: 13 MMOL/L (ref 4–13)
AST SERPL W P-5'-P-CCNC: 14 U/L (ref 13–39)
BILIRUB SERPL-MCNC: 0.4 MG/DL (ref 0.2–1)
BUN SERPL-MCNC: 28 MG/DL (ref 5–25)
CALCIUM SERPL-MCNC: 9.5 MG/DL (ref 8.4–10.2)
CHLORIDE SERPL-SCNC: 103 MMOL/L (ref 96–108)
CHOLEST SERPL-MCNC: 213 MG/DL
CO2 SERPL-SCNC: 24 MMOL/L (ref 21–32)
CREAT SERPL-MCNC: 1.11 MG/DL (ref 0.6–1.3)
CREAT UR-MCNC: 142 MG/DL
CRP SERPL QL: 7.3 MG/L
ERYTHROCYTE [DISTWIDTH] IN BLOOD BY AUTOMATED COUNT: 13.5 % (ref 11.6–15.1)
ERYTHROCYTE [SEDIMENTATION RATE] IN BLOOD: 59 MM/HOUR (ref 0–29)
GFR SERPL CREATININE-BSD FRML MDRD: 47 ML/MIN/1.73SQ M
GLUCOSE P FAST SERPL-MCNC: 115 MG/DL (ref 65–99)
HCT VFR BLD AUTO: 39.9 % (ref 34.8–46.1)
HDLC SERPL-MCNC: 48 MG/DL
HGB BLD-MCNC: 12.6 G/DL (ref 11.5–15.4)
LDLC SERPL CALC-MCNC: 130 MG/DL (ref 0–100)
MCH RBC QN AUTO: 29.8 PG (ref 26.8–34.3)
MCHC RBC AUTO-ENTMCNC: 31.6 G/DL (ref 31.4–37.4)
MCV RBC AUTO: 94 FL (ref 82–98)
MICROALBUMIN UR-MCNC: 71.9 MG/L
MICROALBUMIN/CREAT 24H UR: 51 MG/G CREATININE (ref 0–30)
PLATELET # BLD AUTO: 247 THOUSANDS/UL (ref 149–390)
PMV BLD AUTO: 11.1 FL (ref 8.9–12.7)
POTASSIUM SERPL-SCNC: 3.9 MMOL/L (ref 3.5–5.3)
PROT SERPL-MCNC: 6.9 G/DL (ref 6.4–8.4)
RBC # BLD AUTO: 4.23 MILLION/UL (ref 3.81–5.12)
SODIUM SERPL-SCNC: 140 MMOL/L (ref 135–147)
TRIGL SERPL-MCNC: 177 MG/DL
URATE SERPL-MCNC: 5.9 MG/DL (ref 2–7.5)
WBC # BLD AUTO: 12.64 THOUSAND/UL (ref 4.31–10.16)

## 2024-09-05 PROCEDURE — 80061 LIPID PANEL: CPT

## 2024-09-05 PROCEDURE — 36415 COLL VENOUS BLD VENIPUNCTURE: CPT

## 2024-09-05 PROCEDURE — 82570 ASSAY OF URINE CREATININE: CPT

## 2024-09-05 PROCEDURE — 82043 UR ALBUMIN QUANTITATIVE: CPT

## 2024-09-05 PROCEDURE — 83695 ASSAY OF LIPOPROTEIN(A): CPT

## 2024-09-05 PROCEDURE — 86430 RHEUMATOID FACTOR TEST QUAL: CPT

## 2024-09-05 PROCEDURE — 85652 RBC SED RATE AUTOMATED: CPT

## 2024-09-05 PROCEDURE — 84550 ASSAY OF BLOOD/URIC ACID: CPT

## 2024-09-05 PROCEDURE — 86140 C-REACTIVE PROTEIN: CPT

## 2024-09-05 PROCEDURE — 86200 CCP ANTIBODY: CPT

## 2024-09-05 PROCEDURE — 85027 COMPLETE CBC AUTOMATED: CPT

## 2024-09-05 PROCEDURE — 86431 RHEUMATOID FACTOR QUANT: CPT

## 2024-09-05 PROCEDURE — 80053 COMPREHEN METABOLIC PANEL: CPT

## 2024-09-06 LAB
CCP AB SER IA-ACNC: 2.3
CRYOGLOB RF SER-ACNC: ABNORMAL [IU]/ML
LPA SERPL-SCNC: <9 NMOL/L
RHEUMATOID FACT SER QL LA: POSITIVE

## 2024-09-26 ENCOUNTER — OFFICE VISIT (OUTPATIENT)
Age: 79
End: 2024-09-26
Payer: MEDICARE

## 2024-09-26 VITALS
HEIGHT: 62 IN | TEMPERATURE: 98 F | WEIGHT: 229 LBS | HEART RATE: 72 BPM | DIASTOLIC BLOOD PRESSURE: 82 MMHG | SYSTOLIC BLOOD PRESSURE: 144 MMHG | OXYGEN SATURATION: 97 % | BODY MASS INDEX: 42.14 KG/M2

## 2024-09-26 DIAGNOSIS — N18.32 STAGE 3B CHRONIC KIDNEY DISEASE (HCC): ICD-10-CM

## 2024-09-26 DIAGNOSIS — I10 BENIGN ESSENTIAL HYPERTENSION: ICD-10-CM

## 2024-09-26 DIAGNOSIS — N18.30 TYPE 2 DIABETES MELLITUS WITH STAGE 3 CHRONIC KIDNEY DISEASE AND HYPERTENSION (HCC): ICD-10-CM

## 2024-09-26 DIAGNOSIS — I12.9 TYPE 2 DIABETES MELLITUS WITH STAGE 3 CHRONIC KIDNEY DISEASE AND HYPERTENSION (HCC): ICD-10-CM

## 2024-09-26 DIAGNOSIS — E11.22 TYPE 2 DIABETES MELLITUS WITH STAGE 3 CHRONIC KIDNEY DISEASE AND HYPERTENSION (HCC): ICD-10-CM

## 2024-09-26 DIAGNOSIS — Z00.00 MEDICARE ANNUAL WELLNESS VISIT, SUBSEQUENT: Primary | ICD-10-CM

## 2024-09-26 DIAGNOSIS — E78.2 MIXED HYPERLIPIDEMIA: ICD-10-CM

## 2024-09-26 DIAGNOSIS — G47.00 INSOMNIA, UNSPECIFIED TYPE: ICD-10-CM

## 2024-09-26 PROCEDURE — G0439 PPPS, SUBSEQ VISIT: HCPCS | Performed by: STUDENT IN AN ORGANIZED HEALTH CARE EDUCATION/TRAINING PROGRAM

## 2024-09-26 NOTE — ASSESSMENT & PLAN NOTE
BP above goal today, recommend no medication change at this appointment.  RTC with log 3-6 months.

## 2024-09-26 NOTE — PROGRESS NOTES
Ambulatory Visit  Name: Celeste Garg      : 1945      MRN: [id number]  Encounter Provider: Ember Sahu MD  Encounter Date: 2024   Encounter department: [facility]  Medicare annual wellness visit, subsequent         Insomnia, unspecified type         Type 2 diabetes mellitus with stage 3 chronic kidney disease and hypertension (HCC)    Lab Results   Component Value Date    HGBA1C 7 (A) 2024            Stage 3b chronic kidney disease (HCC)  Lab Results   Component Value Date    EGFR 47 2024    EGFR 46 2024    EGFR 47 2024    CREATININE 1.11 2024    CREATININE 1.13 2024    CREATININE 1.12 2024            Benign essential hypertension  BP above goal today, recommend no medication change at this appointment.  RTC with log 3-6 months.        Mixed hyperlipidemia       Continue crestor recheck lipid panel 6 months     Preventive health issues were discussed with patient, and age appropriate screening tests were ordered as noted in patient's After Visit Summary. Personalized health advice and appropriate referrals for health education or preventive services given if needed, as noted in patient's After Visit Summary.    History of Present Illness     Celeste Garg presents for AMW visit.  She has no complaints nor concerns today.  She started crestor 1 week ago.  Tolerating well without side effects.  Saw rheumatology on 9/10 for bilateral arm pain consistent with PMR flairs she had in the past.  This is much improved with methylprednisolone 4 mg daily.        Patient Care Team:  Karla Solano DO as PCP - General  DO Massimo Parkinson MD Dr. Steven Faigenbaum (Ophthalmology)  Gilbert Peralta DO (Orthopedic Surgery)    Review of Systems   Constitutional:  Negative for activity change, fatigue, fever and unexpected weight change.   HENT:  Negative for congestion and rhinorrhea.    Respiratory:  Negative  for cough, chest tightness and shortness of breath.    Cardiovascular:  Negative for chest pain.   Gastrointestinal:  Negative for abdominal distention, abdominal pain, diarrhea, nausea and vomiting.   Skin:  Negative for color change.   Neurological:  Negative for headaches.   Psychiatric/Behavioral:  Negative for suicidal ideas.      Medical History Reviewed by provider this encounter:       Annual Wellness Visit Questionnaire   Celeste is here for her Initial Wellness visit. Last Medicare Wellness visit information reviewed, patient interviewed and updates made to the record today.      Health Risk Assessment:   Patient rates overall health as good. Patient feels that their physical health rating is same. Patient is satisfied with their life. Eyesight was rated as same. Hearing was rated as same. Patient feels that their emotional and mental health rating is same. Patients states they are never, rarely angry. Patient states they are sometimes unusually tired/fatigued. Pain experienced in the last 7 days has been some. Patient's pain rating has been 5/10. Patient states that she has experienced no weight loss or gain in last 6 months. Knee pain    Depression Screening:   PHQ-2 Score: 0      Fall Risk Screening:   In the past year, patient has experienced: no history of falling in past year      Urinary Incontinence Screening:   Patient has leaked urine accidently in the last six months.     Home Safety:  Patient does not have trouble with stairs inside or outside of their home. Patient has working smoke alarms and has working carbon monoxide detector. Home safety hazards include: none.     Nutrition:   Current diet is Regular.     Medications:   Patient is not currently taking any over-the-counter supplements. Patient is able to manage medications.     Activities of Daily Living (ADLs)/Instrumental Activities of Daily Living (IADLs):   Walk and transfer into and out of bed and chair?: Yes  Dress and groom yourself?:  Yes    Bathe or shower yourself?: Yes    Feed yourself? Yes  Do your laundry/housekeeping?: Yes  Manage your money, pay your bills and track your expenses?: Yes  Make your own meals?: Yes    Do your own shopping?: Yes    Previous Hospitalizations:   Any hospitalizations or ED visits within the last 12 months?: No      Advance Care Planning:   Living will: No      PREVENTIVE SCREENINGS      Cardiovascular Screening:    General: Screening Not Indicated and History Lipid Disorder      Diabetes Screening:     General: Screening Not Indicated and History Diabetes      Breast Cancer Screening:     General: Screening Current      Cervical Cancer Screening:    General: Screening Not Indicated      Lung Cancer Screening:     General: Screening Not Indicated      Hepatitis C Screening:    General: Screening Current    Screening, Brief Intervention, and Referral to Treatment (SBIRT)    Screening  Typical number of drinks in a day: 0  Typical number of drinks in a week: 0  Interpretation: Low risk drinking behavior.    Single Item Drug Screening:  How often have you used an illegal drug (including marijuana) or a prescription medication for non-medical reasons in the past year? never    Single Item Drug Screen Score: 0  Interpretation: Negative screen for possible drug use disorder    Social Determinants of Health     Financial Resource Strain: Low Risk  (8/14/2023)    Overall Financial Resource Strain (CARDIA)     Difficulty of Paying Living Expenses: Not very hard   Food Insecurity: No Food Insecurity (9/26/2024)    Hunger Vital Sign     Worried About Running Out of Food in the Last Year: Never true     Ran Out of Food in the Last Year: Never true   Transportation Needs: No Transportation Needs (9/26/2024)    PRAPARE - Transportation     Lack of Transportation (Medical): No     Lack of Transportation (Non-Medical): No   Housing Stability: Low Risk  (9/26/2024)    Housing Stability Vital Sign     Unable to Pay for Housing in  "the Last Year: No     Number of Times Moved in the Last Year: 0     Homeless in the Last Year: No   Utilities: Not At Risk (9/26/2024)    [facility] Utilities     Threatened with loss of utilities: No     No results found.    Objective     /82   Pulse 72   Temp 98 °F (36.7 °C)   Ht 5' 2\" (1.575 m)   Wt 104 kg (229 lb)   SpO2 97%   BMI 41.88 kg/m²     Physical Exam  Vitals and nursing note reviewed.   Constitutional:       General: She is not in acute distress.     Appearance: She is well-developed.   HENT:      Head: Normocephalic and atraumatic.   Eyes:      Conjunctiva/sclera: Conjunctivae normal.   Cardiovascular:      Rate and Rhythm: Normal rate and regular rhythm.      Heart sounds: No murmur heard.  Pulmonary:      Effort: Pulmonary effort is normal. No respiratory distress.      Breath sounds: Normal breath sounds.   Abdominal:      Palpations: Abdomen is soft.      Tenderness: There is no abdominal tenderness.   Musculoskeletal:         General: No swelling.      Cervical back: Neck supple.   Skin:     General: Skin is warm and dry.      Capillary Refill: Capillary refill takes less than 2 seconds.   Neurological:      Mental Status: She is alert.   Psychiatric:         Mood and Affect: Mood normal.         "

## 2024-09-26 NOTE — PATIENT INSTRUCTIONS
Note from rheumatology:      09/10/2024 Celeste Garg is a pleasant 79 year old white female with history of PMR, bilateral knee osteoarthritis, chronic low back pain, gout, PMR high RF is here for follow up.  She has flare-up with right shoulder, wrist and hand pain with elevated ESR and CRP. Probable early RA/PMR. She is doing well now on Medrol 4 mg a day. We will try every other day and assess response as her inflammatory markers have been up and down.      Continue allopurinol 200 mg a day. Normal uric acid level, no recurrent attacks.      She is going to be tried on statins which she does not tolerate well. Methotrexate was discussed but will hold off due to multiple meds and patient does not want at this time as well.      We will check dexa scan. We will reconsider options and see her back in 4 months and repeat labs and reassess.

## 2024-10-07 ENCOUNTER — OFFICE VISIT (OUTPATIENT)
Dept: OBGYN CLINIC | Facility: CLINIC | Age: 79
End: 2024-10-07
Payer: MEDICARE

## 2024-10-07 VITALS — BODY MASS INDEX: 42.14 KG/M2 | WEIGHT: 229 LBS | HEIGHT: 62 IN

## 2024-10-07 DIAGNOSIS — M17.0 PRIMARY OSTEOARTHRITIS OF BOTH KNEES: Primary | ICD-10-CM

## 2024-10-07 PROCEDURE — 20610 DRAIN/INJ JOINT/BURSA W/O US: CPT

## 2024-10-07 PROCEDURE — 99213 OFFICE O/P EST LOW 20 MIN: CPT | Performed by: ORTHOPAEDIC SURGERY

## 2024-10-07 RX ORDER — BUPIVACAINE HYDROCHLORIDE 2.5 MG/ML
2 INJECTION, SOLUTION INFILTRATION; PERINEURAL
Status: COMPLETED | OUTPATIENT
Start: 2024-10-07 | End: 2024-10-07

## 2024-10-07 RX ORDER — TRIAMCINOLONE ACETONIDE 40 MG/ML
80 INJECTION, SUSPENSION INTRA-ARTICULAR; INTRAMUSCULAR
Status: COMPLETED | OUTPATIENT
Start: 2024-10-07 | End: 2024-10-07

## 2024-10-07 RX ADMIN — TRIAMCINOLONE ACETONIDE 80 MG: 40 INJECTION, SUSPENSION INTRA-ARTICULAR; INTRAMUSCULAR at 13:45

## 2024-10-07 RX ADMIN — BUPIVACAINE HYDROCHLORIDE 2 ML: 2.5 INJECTION, SOLUTION INFILTRATION; PERINEURAL at 13:45

## 2024-10-09 DIAGNOSIS — G47.00 INSOMNIA, UNSPECIFIED TYPE: ICD-10-CM

## 2024-10-09 RX ORDER — TEMAZEPAM 15 MG/1
15 CAPSULE ORAL
Qty: 30 CAPSULE | Refills: 0 | Status: SHIPPED | OUTPATIENT
Start: 2024-10-09

## 2024-10-09 NOTE — TELEPHONE ENCOUNTER
Reason for call:   [x] Refill   [] Prior Auth  [] Other:     Office:   [x] PCP/Provider - christi  [] Specialty/Provider -     Medication:   temazepam (RESTORIL) 15 mg capsule       Dose/Frequency: Sig: Take 1 capsule (15 mg total) by mouth daily at bedtime as needed for sleep      Quantity: 30    [id number] [place] - [facility] [address]  Phone: [phone]  Fax: [phone]    Does the patient have enough for 3 days?   [x] Yes   [] No - Send as HP to POD

## 2024-10-10 DIAGNOSIS — I10 BENIGN ESSENTIAL HYPERTENSION: ICD-10-CM

## 2024-10-10 NOTE — TELEPHONE ENCOUNTER
Reason for call:   [x] Refill   [] Prior Auth  [] Other:     Office:   [x] PCP/Provider -Karla Solano/Greg ZHENG    [] Specialty/Provider -     Medication: Diovan    Dose/Frequency: 320 mg     Quantity: #90    Pharmacy: [facility] [address]    Does the patient have enough for 3 days?   [x] Yes   [] No - Send as HP to POD

## 2024-10-11 RX ORDER — VALSARTAN 320 MG/1
320 TABLET ORAL DAILY
Qty: 90 TABLET | Refills: 1 | Status: SHIPPED | OUTPATIENT
Start: 2024-10-11

## 2024-10-22 ENCOUNTER — OFFICE VISIT (OUTPATIENT)
Dept: NEPHROLOGY | Facility: CLINIC | Age: 79
End: 2024-10-22
Payer: MEDICARE

## 2024-10-22 VITALS — WEIGHT: 227 LBS | BODY MASS INDEX: 41.77 KG/M2 | HEIGHT: 62 IN

## 2024-10-22 DIAGNOSIS — N18.30 TYPE 2 DIABETES MELLITUS WITH STAGE 3 CHRONIC KIDNEY DISEASE AND HYPERTENSION (HCC): Primary | ICD-10-CM

## 2024-10-22 DIAGNOSIS — I12.9 TYPE 2 DIABETES MELLITUS WITH STAGE 3 CHRONIC KIDNEY DISEASE AND HYPERTENSION (HCC): Primary | ICD-10-CM

## 2024-10-22 DIAGNOSIS — E11.22 TYPE 2 DIABETES MELLITUS WITH STAGE 3 CHRONIC KIDNEY DISEASE AND HYPERTENSION (HCC): Primary | ICD-10-CM

## 2024-10-22 PROCEDURE — 99214 OFFICE O/P EST MOD 30 MIN: CPT | Performed by: INTERNAL MEDICINE

## 2024-10-22 RX ORDER — CHLORTHALIDONE 25 MG/1
12.5 TABLET ORAL EVERY OTHER DAY
Qty: 90 TABLET | Refills: 1 | Status: SHIPPED | OUTPATIENT
Start: 2024-10-22

## 2024-10-22 RX ORDER — PREDNISONE 10 MG/1
10 TABLET ORAL DAILY
COMMUNITY

## 2024-10-22 NOTE — PATIENT INSTRUCTIONS
1.)  Low 2 g sodium diet    2.)  Monitor weights at home    3.)  Avoid NSAIDs (ibuprofen, Motrin, Advil, Aleve, naproxen)    4.)  Monitor blood pressure at home, call if blood pressure greater than 150/90 persistently    5.) I will plan to discuss all results including blood work, and/or imaging at our next visit, unless there is an urgent indication, in which case I will call you earlier. If you have any questions or concerns about your results, please feel free to call our office.    6.)  Start chlorthalidone 12.5 mg every other day.  Check BMP and uric acid in November

## 2024-10-22 NOTE — ASSESSMENT & PLAN NOTE
Lab Results   Component Value Date    HGBA1C 7 (A) 08/22/2024   Chronic Kidney Disease Stage IIIA (G3A2)  --Imaging: Renal ultrasound showed no masses no lesions.  Symmetrically sized kidneys.  Bilateral echogenicity consistent with underlying chronic kidney disease.  --Urinalysis: Urinalysis in the office is bland no microscopic hematuria and no proteinuria  --Proteinuria: Albumin/creatinine ratio 0.05  --Baseline Kidney Function: low 1's (1.1-1.3 mg/dL)  --Etiology: Presumed secondary to diabetic kidney disease, hypertensive nephrosclerosis, obesity related renal function, prior history of acute kidney injury with a stop amount of CKD, former smoker, analgesic nephropathy  --Biopsy Proven: No  --Serologies: No indication for serologies  --RAAS Blockade: Valsartan  --Reducing Cardiovascular Risk Factors:  Simvastatin+ Ezetimibe reduced atherosclerotic events in CKD (SHARP trial); Low dose aspirin safe (if no contraindications exist); smoking is an independent risk factor for Chronic Kidney Disease and progression, strongly recommend smoking cessation  --Sodium-Glucose Cotransporter-2 (SGLT2) Inhibitors:  May see an acute drop in the eGFR initially when starting the medication but then a stabilization of the renal function, with slower loss of renal function as compared to placebo.  Relative risk of end-stage renal disease, doubling of serum creatinine or death from renal causes were also found to be lower as compared to placebo. (CREDENCE).  DAPA-CKD study, showed that patients with chronic kidney disease regardless of the presence or absence of diabetes the risk of composite of a sustained decline in the estimated GFR of at least 50%, end-stage renal disease or death from renal or cardiovascular causes was significantly lower with Dapagliflozin than with placebo. EMPEROR-Reduced study also showed beneficial effects. EMPA-CKD, among wide range of patients with CKD, who were at risk for progression, empagliflozin  led a lower risk of kidney disease progression or death from cardiovascular causes than placebo.  If no contraindications exist I would recommend this medication added to the regiment. If eGFR < 60 cc/min (avoid ertugliflozin); avoid or caution with GFR < 20 mL/min, not an absolute contraindication, likely lower benefits.   --Finerenone: In patients with chronic kidney disease with type 2 diabetes, treatment led to lower risks of CKD progression and cardiovascular events than placebo. (FIDELIO-DKD)  --Status: Renal function stable and at baseline.  Diabetes well-controlled.  Needs better blood pressure control.  --Management/Recommendations: Add chlorthalidone 12.5 mg every other day.  Repeat labs in November.  Avoid excessive NSAIDs which she does use on occasion.  Asked her to avoid NSAIDs as best as possible.  Continue RAAS blockade with valsartan.     Hypertension  -- Stage II (American College of Cardiology/American Heart Association)  -- with underlying chronic kidney disease and obesity  -- Body mass index is 41.5 kg/m².  -- Volume status: Slight volume   -- Etiology: Primary hypertension and obesity  -- Secondary Work-Up: No clinical indication but consider sleep apnea study  -- Target Goal: < 130/80 (ACC/AHA, CKD with proteinuria, CKD in diabetics) ; if tolerated can target SBP < 120 mmHg in high cardiovascular risk ( Age > 75, CKD, CVD, No Diabetics SPRINT)  -- Lifestyle Modifications: DASH Diet, Weight Loss for ideal body weight, 45 mins of cardiovascular exercise 3 times a week as tolerated, and if no contraindications exist, smoking cessation, limit alcohol use, avoid NSAIDS, monitor blood pressure at home  -- Status: Blood pressure not at target slightly volume mediated  -- Current antihypertensive regiment: Valsartan 320 mg daily, off metoprolol.  Did not start hydrochlorothiazide  -- Changes: Start chlorthalidone 12.5 mg every other day.  Check repeat labs next month long with uric acid     Diabetes  mellitus type 2  -hemoglobin A1c: 7 (A1C values may be falsely elevated or decreased in those with CKD, assay method should be certified by National glycohemoglobin standardization Program). Target A1C less then 7 (will need to be individualized for each patient), and would utilize A1C  in conjunction with continuous glucose monitoring (CGM).  It should also be noted that the A1c with more advanced kidney disease would be inaccurate due to decreased red blood cell life along with anemia.  -current medications: Not on any medications  -proteinuria: Minimal, controlled continue valsartan  -retinopathy: No  -neuropathy: No  -She is on prednisone  -Farxiga was to high cost  -please follow with an ophthalmologist and podiatrist every year  -continued self monitoring of blood glucose at home  -Management in CKD Recommendations:   Metformin:  Avoid if creatinine clearance is less than 30 cc/min (concern for lactic acidosis)  Sodium-Glucose Cotransporter-2 (SGLT2) Inhibitors:  May see an acute drop in the eGFR initially when starting the medication but then a stabilization of the renal function, with slower loss of renal function as compared to placebo.  Relative risk of end-stage renal disease, doubling of serum creatinine or death from renal causes were also found to be lower as compared to placebo. (CREDENCE).  DAPA-CKD study, showed that patients with chronic kidney disease regardless of the presence or absence of diabetes the risk of composite of a sustained decline in the estimated GFR of at least 50%, end-stage renal disease or death from renal or cardiovascular causes was significantly lower with Dapagliflozin than with placebo. EMPEROR-Reduced study also showed beneficial effects. EMPA-CKD, among wide range of patients with CKD, who were at risk for progression, empagliflozin led a lower risk of kidney disease progression or death from cardiovascular causes than placebo.  If no contraindications exist I would  recommend this medication added to the regiment. If eGFR < 60 cc/min (avoid ertugliflozin); avoid or caution with GFR < 20 mL/min, not an absolute contraindication, likely lower benefits   Sulfonylureas:  Preferred short-acting (glipizide, glimepiride, repaglinide), relatively safe in patients with non dialysis CKD  Thiazolidinedones/Alpha-Gluosidase inhibitors/Dipeptidyl peptidase-4 inhibitors:  Generally not considered first-line agents in CKD (limited data in long-term safety and efficacy)  Insulin:  Starting dose may need to be lower than what would ordinarily be used, as there is a decrease metabolism of insulin (no dose adjustment if the GFR > 50 mL/min, dose should be reduced to 75% of baseline if GFR 10-50 mL/min, and 50% baseline if GFR < 10 mL/min)  Finerenone: Patients with CKD with type 2 diabetes, treatment led to lower risks of CKD progression and cardiovascular events than placebo (FIDELIO-DKD). Avoid or caution if serum potassium more then 4.8.    Orders:    chlorthalidone 25 mg tablet; Take 0.5 tablets (12.5 mg total) by mouth every other day    Basic metabolic panel; Future    Uric acid; Future    PTH, intact; Future    Phosphorus; Future    Comprehensive metabolic panel; Future    Albumin / creatinine urine ratio; Future    Hemoglobin A1C; Future    CBC and Platelet; Future

## 2024-10-22 NOTE — PROGRESS NOTES
Ambulatory Visit  Name: Celeste Garg      : 1945      MRN: [id number]  Encounter Provider: Charlene Lo MD  Encounter Date: 10/22/2024   Encounter department: [facility] [place]    Assessment & Plan  Type 2 diabetes mellitus with stage 3 chronic kidney disease and hypertension (HCC)    Lab Results   Component Value Date    HGBA1C 7 (A) 2024   Chronic Kidney Disease Stage IIIA (G3A2)  --Imaging: Renal ultrasound showed no masses no lesions.  Symmetrically sized kidneys.  Bilateral echogenicity consistent with underlying chronic kidney disease.  --Urinalysis: Urinalysis in the office is bland no microscopic hematuria and no proteinuria  --Proteinuria: Albumin/creatinine ratio 0.05  --Baseline Kidney Function: low 1's (1.1-1.3 mg/dL)  --Etiology: Presumed secondary to diabetic kidney disease, hypertensive nephrosclerosis, obesity related renal function, prior history of acute kidney injury with a stop amount of CKD, former smoker, analgesic nephropathy  --Biopsy Proven: No  --Serologies: No indication for serologies  --RAAS Blockade: Valsartan  --Reducing Cardiovascular Risk Factors:  Simvastatin+ Ezetimibe reduced atherosclerotic events in CKD (SHARP trial); Low dose aspirin safe (if no contraindications exist); smoking is an independent risk factor for Chronic Kidney Disease and progression, strongly recommend smoking cessation  --Sodium-Glucose Cotransporter-2 (SGLT2) Inhibitors:  May see an acute drop in the eGFR initially when starting the medication but then a stabilization of the renal function, with slower loss of renal function as compared to placebo.  Relative risk of end-stage renal disease, doubling of serum creatinine or death from renal causes were also found to be lower as compared to placebo. (CREDENCE).  DAPA-CKD study, showed that patients with chronic kidney disease regardless of the presence or absence of diabetes the risk of composite of a sustained decline in the  estimated GFR of at least 50%, end-stage renal disease or death from renal or cardiovascular causes was significantly lower with Dapagliflozin than with placebo. EMPEROR-Reduced study also showed beneficial effects. EMPA-CKD, among wide range of patients with CKD, who were at risk for progression, empagliflozin led a lower risk of kidney disease progression or death from cardiovascular causes than placebo.  If no contraindications exist I would recommend this medication added to the regiment. If eGFR < 60 cc/min (avoid ertugliflozin); avoid or caution with GFR < 20 mL/min, not an absolute contraindication, likely lower benefits.   --Finerenone: In patients with chronic kidney disease with type 2 diabetes, treatment led to lower risks of CKD progression and cardiovascular events than placebo. (FIDELIO-DKD)  --Status: Renal function stable and at baseline.  Diabetes well-controlled.  Needs better blood pressure control.  --Management/Recommendations: Add chlorthalidone 12.5 mg every other day.  Repeat labs in November.  Avoid excessive NSAIDs which she does use on occasion.  Asked her to avoid NSAIDs as best as possible.  Continue RAAS blockade with valsartan.     Hypertension  -- Stage II (American College of Cardiology/American Heart Association)  -- with underlying chronic kidney disease and obesity  -- Body mass index is 41.5 kg/m².  -- Volume status: Slight volume   -- Etiology: Primary hypertension and obesity  -- Secondary Work-Up: No clinical indication but consider sleep apnea study  -- Target Goal: < 130/80 (ACC/AHA, CKD with proteinuria, CKD in diabetics) ; if tolerated can target SBP < 120 mmHg in high cardiovascular risk ( Age > 75, CKD, CVD, No Diabetics SPRINT)  -- Lifestyle Modifications: DASH Diet, Weight Loss for ideal body weight, 45 mins of cardiovascular exercise 3 times a week as tolerated, and if no contraindications exist, smoking cessation, limit alcohol use, avoid NSAIDS, monitor blood  pressure at home  -- Status: Blood pressure not at target slightly volume mediated  -- Current antihypertensive regiment: Valsartan 320 mg daily, off metoprolol.  Did not start hydrochlorothiazide  -- Changes: Start chlorthalidone 12.5 mg every other day.  Check repeat labs next month long with uric acid     Diabetes mellitus type 2  -hemoglobin A1c: 7 (A1C values may be falsely elevated or decreased in those with CKD, assay method should be certified by National glycohemoglobin standardization Program). Target A1C less then 7 (will need to be individualized for each patient), and would utilize A1C  in conjunction with continuous glucose monitoring (CGM).  It should also be noted that the A1c with more advanced kidney disease would be inaccurate due to decreased red blood cell life along with anemia.  -current medications: Not on any medications  -proteinuria: Minimal, controlled continue valsartan  -retinopathy: No  -neuropathy: No  -She is on prednisone  -Farxiga was to high cost  -please follow with an ophthalmologist and podiatrist every year  -continued self monitoring of blood glucose at home  -Management in CKD Recommendations:   Metformin:  Avoid if creatinine clearance is less than 30 cc/min (concern for lactic acidosis)  Sodium-Glucose Cotransporter-2 (SGLT2) Inhibitors:  May see an acute drop in the eGFR initially when starting the medication but then a stabilization of the renal function, with slower loss of renal function as compared to placebo.  Relative risk of end-stage renal disease, doubling of serum creatinine or death from renal causes were also found to be lower as compared to placebo. (CREDENCE).  DAPA-CKD study, showed that patients with chronic kidney disease regardless of the presence or absence of diabetes the risk of composite of a sustained decline in the estimated GFR of at least 50%, end-stage renal disease or death from renal or cardiovascular causes was significantly lower with  Dapagliflozin than with placebo. EMPEROR-Reduced study also showed beneficial effects. EMPA-CKD, among wide range of patients with CKD, who were at risk for progression, empagliflozin led a lower risk of kidney disease progression or death from cardiovascular causes than placebo.  If no contraindications exist I would recommend this medication added to the regiment. If eGFR < 60 cc/min (avoid ertugliflozin); avoid or caution with GFR < 20 mL/min, not an absolute contraindication, likely lower benefits   Sulfonylureas:  Preferred short-acting (glipizide, glimepiride, repaglinide), relatively safe in patients with non dialysis CKD  Thiazolidinedones/Alpha-Gluosidase inhibitors/Dipeptidyl peptidase-4 inhibitors:  Generally not considered first-line agents in CKD (limited data in long-term safety and efficacy)  Insulin:  Starting dose may need to be lower than what would ordinarily be used, as there is a decrease metabolism of insulin (no dose adjustment if the GFR > 50 mL/min, dose should be reduced to 75% of baseline if GFR 10-50 mL/min, and 50% baseline if GFR < 10 mL/min)  Finerenone: Patients with CKD with type 2 diabetes, treatment led to lower risks of CKD progression and cardiovascular events than placebo (FIDELIO-DKD). Avoid or caution if serum potassium more then 4.8.    Orders:    chlorthalidone 25 mg tablet; Take 0.5 tablets (12.5 mg total) by mouth every other day    Basic metabolic panel; Future    Uric acid; Future    PTH, intact; Future    Phosphorus; Future    Comprehensive metabolic panel; Future    Albumin / creatinine urine ratio; Future    Hemoglobin A1C; Future    CBC and Platelet; Future      History of Present Illness     Celeste Garg is a 79 y.o. female who presents chronic kidney disease stage III and hypertension.  Since her last visit no ER visits or hospitalizations.  Last blood work was from September.  Renal function stable with creatinine of 1.1 mg/dL.  GFR greater than 45 mL/min.   Electrolytes including sodium and potassium are normal.  Renal ultrasound reviewed from April no acute pathology evidence of bilateral echogenicity consistent with underlying chronic kidney disease.  Proteinuria is controlled with a micro/creatinine ratio of 0.05.  Elevated cholesterol.  Inflammatory factors including ESR C-reactive protein rheumatoid factor elevated.  Following with rheumatology for fibromyalgia.    I had started on hydrochlorothiazide at the last visit but she never started this medication.  Blood pressure still above target.    History obtained from : patient  Review of Systems   Constitutional:  Negative for activity change and fever.   Respiratory:  Negative for cough, chest tightness, shortness of breath and wheezing.    Cardiovascular:  Negative for chest pain and leg swelling.   Gastrointestinal:  Negative for abdominal pain, diarrhea, nausea and vomiting.   Endocrine: Negative for polyuria.   Genitourinary:  Negative for difficulty urinating, dysuria, flank pain, frequency and urgency.   Skin:  Negative for rash.   Neurological:  Negative for dizziness, syncope, light-headedness and headaches.     Current Outpatient Medications on File Prior to Visit   Medication Sig Dispense Refill    Acetaminophen 500 MG Take 1,000 mg by mouth 2 (two) times a day      allopurinol (ZYLOPRIM) 100 mg tablet TAKE 1/2 TABLET BY MOUTH EVERY DAY FOR 1 WEEK, THEN TAKE 1 TABLET BY MOUTH EVERY DAY FOR 1 WEEK, THEN TAKE 1 AND 1/2 TABLETS BY MOUTH ONE TI      ibuprofen (MOTRIN) 200 mg tablet Take 200 mg by mouth every 6 (six) hours as needed for mild pain      latanoprost (XALATAN) 0.005 % ophthalmic solution INSTILL 1 DROP IN EACH EYE AT BEDTIME  5    levothyroxine 100 mcg tablet TAKE 1 TABLET BY MOUTH EVERY  tablet 1    predniSONE 10 mg tablet Take 10 mg by mouth daily      rosuvastatin (CRESTOR) 5 mg tablet Take 1 tablet (5 mg total) by mouth daily 30 tablet 5    temazepam (RESTORIL) 15 mg capsule Take 1  "capsule (15 mg total) by mouth daily at bedtime as needed for sleep 30 capsule 0    valsartan (DIOVAN) 320 MG tablet Take 1 tablet (320 mg total) by mouth daily 90 tablet 1    Ibuprofen (Advil) 200 MG CAPS PRN (Patient not taking: Reported on 10/22/2024)      methylprednisolone (Medrol) 4 mg tablet 5 tablets once a day in the morning for 3 days then 4 tablets a day for 3 days then 3 tablets a day for 3 days then 2 tablets a day for 3 days the stay on 1 tablet a day. (Patient not taking: Reported on 10/22/2024)       No current facility-administered medications on file prior to visit.          Objective     Ht 5' 2\" (1.575 m)   Wt 103 kg (227 lb)   BMI 41.52 kg/m²     Physical Exam  Vitals and nursing note reviewed.   Constitutional:       General: She is not in acute distress.     Appearance: She is well-developed. She is obese. She is not diaphoretic.   HENT:      Head: Normocephalic and atraumatic.   Eyes:      General: No scleral icterus.     Pupils: Pupils are equal, round, and reactive to light.   Cardiovascular:      Rate and Rhythm: Normal rate and regular rhythm.      Heart sounds: Normal heart sounds. No murmur heard.     No friction rub. No gallop.   Pulmonary:      Effort: Pulmonary effort is normal. No respiratory distress.      Breath sounds: Normal breath sounds. No wheezing or rales.   Chest:      Chest wall: No tenderness.   Abdominal:      General: Bowel sounds are normal. There is no distension.      Palpations: Abdomen is soft.      Tenderness: There is no abdominal tenderness. There is no rebound.   Musculoskeletal:         General: Swelling present. Normal range of motion.      Cervical back: Normal range of motion and neck supple.   Skin:     Findings: No rash.   Neurological:      Mental Status: She is alert and oriented to person, place, and time.       Administrative Statements   I have spent a total time of 25 minutes in caring for this patient on the day of the visit/encounter including " Diagnostic results, Risks and benefits of tx options, Instructions for management, Patient and family education, Importance of tx compliance, Risk factor reductions, Impressions, Counseling / Coordination of care, Documenting in the medical record, Reviewing / ordering tests, medicine, procedures  , and Obtaining or reviewing history  .

## 2024-11-08 ENCOUNTER — TELEPHONE (OUTPATIENT)
Age: 79
End: 2024-11-08

## 2024-11-08 DIAGNOSIS — M25.562 BILATERAL CHRONIC KNEE PAIN: Primary | ICD-10-CM

## 2024-11-08 DIAGNOSIS — M25.561 BILATERAL CHRONIC KNEE PAIN: Primary | ICD-10-CM

## 2024-11-08 DIAGNOSIS — G89.29 BILATERAL CHRONIC KNEE PAIN: Primary | ICD-10-CM

## 2024-11-08 NOTE — TELEPHONE ENCOUNTER
Caller: Celeste     Doctor: Kathryn    Reason for call: Calling to ask if you could recommend one of [place] Rheumatologist to her.    Call back#: [phone]

## 2024-11-08 NOTE — TELEPHONE ENCOUNTER
Called and spoke with pt and relayed RANJANA Kauffman's message, she stated understanding. Also wanted me to pass along to tell Dr Kathryn yoder and thank you.

## 2024-11-08 NOTE — TELEPHONE ENCOUNTER
Caller: patient    Doctor: Dr. Peralta    Reason for call: Patient calling statin that she called Dr. Pastrana's office and they are requesting a written referral to be faxed to the number below.    Attn: Dr. Pastrana  Fax: [phone]    Call back#: [phone]

## 2024-11-11 ENCOUNTER — APPOINTMENT (OUTPATIENT)
Dept: LAB | Facility: CLINIC | Age: 79
End: 2024-11-11
Payer: MEDICARE

## 2024-11-11 ENCOUNTER — TRANSCRIBE ORDERS (OUTPATIENT)
Dept: LAB | Facility: CLINIC | Age: 79
End: 2024-11-11

## 2024-11-11 DIAGNOSIS — M35.3 POLYMYALGIA RHEUMATICA (HCC): ICD-10-CM

## 2024-11-11 DIAGNOSIS — I12.9 TYPE 2 DIABETES MELLITUS WITH STAGE 3 CHRONIC KIDNEY DISEASE AND HYPERTENSION (HCC): ICD-10-CM

## 2024-11-11 DIAGNOSIS — E11.22 TYPE 2 DIABETES MELLITUS WITH STAGE 3 CHRONIC KIDNEY DISEASE AND HYPERTENSION (HCC): ICD-10-CM

## 2024-11-11 DIAGNOSIS — M10.9 GOUT, UNSPECIFIED CAUSE, UNSPECIFIED CHRONICITY, UNSPECIFIED SITE: ICD-10-CM

## 2024-11-11 DIAGNOSIS — Z79.899 NEED FOR PROPHYLACTIC CHEMOTHERAPY: Primary | ICD-10-CM

## 2024-11-11 DIAGNOSIS — N18.30 TYPE 2 DIABETES MELLITUS WITH STAGE 3 CHRONIC KIDNEY DISEASE AND HYPERTENSION (HCC): ICD-10-CM

## 2024-11-11 DIAGNOSIS — Z79.899 NEED FOR PROPHYLACTIC CHEMOTHERAPY: ICD-10-CM

## 2024-11-11 LAB
ALBUMIN SERPL BCG-MCNC: 4.2 G/DL (ref 3.5–5)
ALP SERPL-CCNC: 66 U/L (ref 34–104)
ALT SERPL W P-5'-P-CCNC: 47 U/L (ref 7–52)
ANION GAP SERPL CALCULATED.3IONS-SCNC: 13 MMOL/L (ref 4–13)
AST SERPL W P-5'-P-CCNC: 21 U/L (ref 13–39)
BASOPHILS # BLD AUTO: 0.05 THOUSANDS/ÂΜL (ref 0–0.1)
BASOPHILS NFR BLD AUTO: 0 % (ref 0–1)
BILIRUB SERPL-MCNC: 0.47 MG/DL (ref 0.2–1)
BUN SERPL-MCNC: 27 MG/DL (ref 5–25)
CALCIUM SERPL-MCNC: 9.7 MG/DL (ref 8.4–10.2)
CHLORIDE SERPL-SCNC: 99 MMOL/L (ref 96–108)
CO2 SERPL-SCNC: 28 MMOL/L (ref 21–32)
CREAT SERPL-MCNC: 1.21 MG/DL (ref 0.6–1.3)
CRP SERPL QL: 15.6 MG/L
EOSINOPHIL # BLD AUTO: 0.03 THOUSAND/ÂΜL (ref 0–0.61)
EOSINOPHIL NFR BLD AUTO: 0 % (ref 0–6)
ERYTHROCYTE [DISTWIDTH] IN BLOOD BY AUTOMATED COUNT: 13.4 % (ref 11.6–15.1)
ERYTHROCYTE [SEDIMENTATION RATE] IN BLOOD: 32 MM/HOUR (ref 0–29)
GFR SERPL CREATININE-BSD FRML MDRD: 42 ML/MIN/1.73SQ M
GLUCOSE P FAST SERPL-MCNC: 154 MG/DL (ref 65–99)
HCT VFR BLD AUTO: 43 % (ref 34.8–46.1)
HGB BLD-MCNC: 13.7 G/DL (ref 11.5–15.4)
IMM GRANULOCYTES # BLD AUTO: 0.1 THOUSAND/UL (ref 0–0.2)
IMM GRANULOCYTES NFR BLD AUTO: 1 % (ref 0–2)
LYMPHOCYTES # BLD AUTO: 2.03 THOUSANDS/ÂΜL (ref 0.6–4.47)
LYMPHOCYTES NFR BLD AUTO: 16 % (ref 14–44)
MCH RBC QN AUTO: 29.3 PG (ref 26.8–34.3)
MCHC RBC AUTO-ENTMCNC: 31.9 G/DL (ref 31.4–37.4)
MCV RBC AUTO: 92 FL (ref 82–98)
MONOCYTES # BLD AUTO: 1.2 THOUSAND/ÂΜL (ref 0.17–1.22)
MONOCYTES NFR BLD AUTO: 9 % (ref 4–12)
NEUTROPHILS # BLD AUTO: 9.7 THOUSANDS/ÂΜL (ref 1.85–7.62)
NEUTS SEG NFR BLD AUTO: 74 % (ref 43–75)
NRBC BLD AUTO-RTO: 0 /100 WBCS
PLATELET # BLD AUTO: 235 THOUSANDS/UL (ref 149–390)
PMV BLD AUTO: 10.2 FL (ref 8.9–12.7)
POTASSIUM SERPL-SCNC: 3.9 MMOL/L (ref 3.5–5.3)
PROT SERPL-MCNC: 7.5 G/DL (ref 6.4–8.4)
RBC # BLD AUTO: 4.67 MILLION/UL (ref 3.81–5.12)
SODIUM SERPL-SCNC: 140 MMOL/L (ref 135–147)
URATE SERPL-MCNC: 4.1 MG/DL (ref 2–7.5)
WBC # BLD AUTO: 13.11 THOUSAND/UL (ref 4.31–10.16)

## 2024-11-11 PROCEDURE — 85025 COMPLETE CBC W/AUTO DIFF WBC: CPT

## 2024-11-11 PROCEDURE — 36415 COLL VENOUS BLD VENIPUNCTURE: CPT

## 2024-11-11 PROCEDURE — 84550 ASSAY OF BLOOD/URIC ACID: CPT

## 2024-11-11 PROCEDURE — 80053 COMPREHEN METABOLIC PANEL: CPT

## 2024-11-11 PROCEDURE — 86140 C-REACTIVE PROTEIN: CPT

## 2024-11-11 PROCEDURE — 85652 RBC SED RATE AUTOMATED: CPT

## 2025-01-05 DIAGNOSIS — E03.9 HYPOTHYROIDISM, UNSPECIFIED TYPE: ICD-10-CM

## 2025-01-07 RX ORDER — LEVOTHYROXINE SODIUM 100 UG/1
100 TABLET ORAL DAILY
Qty: 100 TABLET | Refills: 0 | Status: SHIPPED | OUTPATIENT
Start: 2025-01-07

## 2025-01-09 ENCOUNTER — CONSULT (OUTPATIENT)
Dept: RHEUMATOLOGY | Facility: CLINIC | Age: 80
End: 2025-01-09

## 2025-01-09 VITALS
HEIGHT: 62 IN | WEIGHT: 235 LBS | SYSTOLIC BLOOD PRESSURE: 128 MMHG | OXYGEN SATURATION: 98 % | HEART RATE: 100 BPM | DIASTOLIC BLOOD PRESSURE: 70 MMHG | BODY MASS INDEX: 43.24 KG/M2

## 2025-01-09 DIAGNOSIS — M35.3 POLYMYALGIA RHEUMATICA (HCC): Primary | ICD-10-CM

## 2025-01-09 DIAGNOSIS — Z79.899 HIGH RISK MEDICATION USE: ICD-10-CM

## 2025-01-09 DIAGNOSIS — M06.4 INFLAMMATORY POLYARTHROPATHY (HCC): ICD-10-CM

## 2025-01-09 DIAGNOSIS — G89.29 BILATERAL CHRONIC KNEE PAIN: ICD-10-CM

## 2025-01-09 DIAGNOSIS — M25.562 BILATERAL CHRONIC KNEE PAIN: ICD-10-CM

## 2025-01-09 DIAGNOSIS — M10.9 GOUT, UNSPECIFIED CAUSE, UNSPECIFIED CHRONICITY, UNSPECIFIED SITE: ICD-10-CM

## 2025-01-09 DIAGNOSIS — M25.561 BILATERAL CHRONIC KNEE PAIN: ICD-10-CM

## 2025-01-09 RX ORDER — METHYLPREDNISOLONE 4 MG/1
TABLET ORAL
Qty: 30 TABLET | Refills: 0 | Status: SHIPPED | OUTPATIENT
Start: 2025-01-09

## 2025-01-09 RX ORDER — METHOTREXATE 2.5 MG/1
TABLET ORAL WEEKLY
COMMUNITY
Start: 2024-11-12 | End: 2025-01-09 | Stop reason: ALTCHOICE

## 2025-01-09 RX ORDER — FOLIC ACID 1 MG/1
1 TABLET ORAL DAILY
COMMUNITY
Start: 2024-11-12 | End: 2025-01-09 | Stop reason: ALTCHOICE

## 2025-01-09 RX ORDER — ALLOPURINOL 100 MG/1
200 TABLET ORAL DAILY
Qty: 180 TABLET | Refills: 1 | Status: SHIPPED | OUTPATIENT
Start: 2025-01-09

## 2025-01-09 NOTE — PROGRESS NOTES
Name: Celeste Garg      : 1945      MRN: [id number]  Encounter Provider: Sarah Leavitt MD  Encounter Date: 2025   Encounter department: [facility] [place]  :  Assessment & Plan  Polymyalgia rheumatica (HCC)  Celeste Garg is a 79 y.o. female who presents as to establish care for past medical history of Gout and PMR. She does not have any symptoms of PMR today. She has been on steroids for elevated inflammatory markers even in the absence of symptoms. Methotrexate was started as steroid-sparing agent recently. But started noticing tremors to it. No recent gout flares and her uric acid is at goal < 6.    Do labs in a month  Decrease Medrol to half tab daily for a month, then quarter tab daily for a month, then stop  Stop methotrexate and folic acid for now  Patient is to update clinic on how she does off methotrexate  Will consider starting leflunomide 10mg daily if needed as steroid-sparing agent in future    Orders:    CBC and differential    C-reactive protein    Comprehensive metabolic panel    Sedimentation rate, automated    methylprednisolone (Medrol) 4 mg tablet; Take half tablet Medrol daily for 30 days, then quarter tablet Medrol daily for 30 days, then stop    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HCC)    Orders:    CBC and differential    C-reactive protein    Comprehensive metabolic panel    Sedimentation rate, automated    methylprednisolone (Medrol) 4 mg tablet; Take half tablet Medrol daily for 30 days, then quarter tablet Medrol daily for 30 days, then stop    Gout, unspecified cause, unspecified chronicity, unspecified site  No recent gout flare, last uric acid level at goal of <6 at 4.1  Continue allopurinol 200mg daily for now, but may go down to 100mg daily if next uric acid level is still around 4  Orders:    allopurinol (ZYLOPRIM) 100 mg tablet; Take 2 tablets (200 mg total) by mouth daily    Uric acid    Bilateral chronic knee pain    Orders:    Ambulatory Referral to  Rheumatology    High risk medication use  Benefits and risks of methotrexate use, including but not limited to gastrointestinal disturbances such as diarrhea, hair loss, fatigue, stomatitis, leukopenia, lung hypersensitivity reaction, hepatotoxicity, and lymphoma were discussed with the patient. CBC,CMP will be regularly monitored. Patient was prescribed Folic Acid 1 mg po daily to take while on methotrexate to help prevent side effects. Patient counseled on abstinence from alcohol while using methotrexate.          RTC in 6 months        History of Present Illness   HPI  Celeste Garg is a 79 y.o. female who presents as to establish care for past medical history of Gout and PMR. Patient previously had significant pain in upper arms and thighs and was diagnosed with PMR and was started on steroids and had been on them for 1-1.5 years with resolution of symptoms. But, patient says her inflammatory markers were always high so she was restarted on steroids again even though she did not have significant pain or stiffness. Currently, she is on methylprednisolone 4 mg daily. Patient was recently started on methotrexate 10 mg weekly as a steroid sparing agent. But she noticed having tremors in her hands and legs after starting methotrexate.    Today, she complains of only mild aches in her back and right hip after she wakes up. Denies any stiffness. And the symptoms are chronic and feels unrelated to her PMR.    Denies any gout flares for so many years. She is on allopurinol 100 mg daily for so many years. Her uric acid is at goal.    History obtained from: patient    Review of Systems  Patient admits to fatigue, shortness of breath, joint pain, back pain, and tremors.    Medical History Reviewed by provider this encounter:  Tobacco  Allergies  Meds  Problems  Med Hx  Surg Hx  Fam Hx     .  Past Medical History   Past Medical History:   Diagnosis Date    Anemia     Arthritis     Chronic right-sided low back pain  without sciatica 12/31/2020    Hypertension     Polymyalgia rheumatica (HCC)     Shoulder impingement syndrome     last assessed 12/27/16    Subacromial impingement of left shoulder 11/1/2016    Subacromial impingement of right shoulder 11/1/2016     Past Surgical History:   Procedure Laterality Date    ABDOMINAL SURGERY      last assessed 11/1/16    OOPHORECTOMY Right     OTHER SURGICAL HISTORY Right 2017    shoulder    TOTAL ABDOMINAL HYSTERECTOMY  30 year ago    WRIST FRACTURE SURGERY Right 2017     Family History   Problem Relation Age of Onset    Hypertension Mother     Glaucoma Mother     Stroke Mother     Gout Father     COPD Father     Heart disease Father     Kidney failure Sister         chronic    Gout Sister     Multiple sclerosis Sister     Hypertension Sister     No Known Problems Sister     Alcohol abuse Brother     Cancer Brother     Gout Brother     Coronary artery disease Brother         CABGx4  (2019)    Pancreatic cancer Brother     No Known Problems Son     No Known Problems Son     No Known Problems Son     No Known Problems Daughter     No Known Problems Daughter     No Known Problems Maternal Grandmother     No Known Problems Maternal Grandfather     No Known Problems Paternal Grandmother     No Known Problems Paternal Grandfather     Lung cancer Paternal Uncle       reports that she quit smoking about 27 years ago. Her smoking use included cigarettes. She has never used smokeless tobacco. She reports current alcohol use. She reports that she does not use drugs.  Current Outpatient Medications on File Prior to Visit   Medication Sig Dispense Refill    Acetaminophen 500 MG Take 1,000 mg by mouth 2 (two) times a day (Patient taking differently: Take 1,000 mg by mouth as needed)      chlorthalidone 25 mg tablet Take 0.5 tablets (12.5 mg total) by mouth every other day 90 tablet 1    ibuprofen (MOTRIN) 200 mg tablet Take 200 mg by mouth every 6 (six) hours as needed for mild pain      latanoprost  (XALATAN) 0.005 % ophthalmic solution INSTILL 1 DROP IN EACH EYE AT BEDTIME  5    levothyroxine 100 mcg tablet TAKE 1 TABLET BY MOUTH EVERY  tablet 0    temazepam (RESTORIL) 15 mg capsule Take 1 capsule (15 mg total) by mouth daily at bedtime as needed for sleep 30 capsule 0    valsartan (DIOVAN) 320 MG tablet Take 1 tablet (320 mg total) by mouth daily 90 tablet 1    [DISCONTINUED] allopurinol (ZYLOPRIM) 100 mg tablet TAKE 1/2 TABLET BY MOUTH EVERY DAY FOR 1 WEEK, THEN TAKE 1 TABLET BY MOUTH EVERY DAY FOR 1 WEEK, THEN TAKE 1 AND 1/2 TABLETS BY MOUTH ONE TI      [DISCONTINUED] folic acid (FOLVITE) 1 mg tablet Take 1 tablet by mouth in the morning      [DISCONTINUED] methotrexate 2.5 MG tablet Take by mouth once a week      Ibuprofen (Advil) 200 MG CAPS PRN (Patient not taking: Reported on 10/22/2024)      [DISCONTINUED] methylprednisolone (Medrol) 4 mg tablet 5 tablets once a day in the morning for 3 days then 4 tablets a day for 3 days then 3 tablets a day for 3 days then 2 tablets a day for 3 days the stay on 1 tablet a day. (Patient not taking: Reported on 10/22/2024)      [DISCONTINUED] predniSONE 10 mg tablet Take 10 mg by mouth daily      [DISCONTINUED] rosuvastatin (CRESTOR) 5 mg tablet Take 1 tablet (5 mg total) by mouth daily 30 tablet 5     No current facility-administered medications on file prior to visit.     Allergies   Allergen Reactions    Hydromorphone Dizziness and Other (See Comments)     Severe vertigo     Tdap [Tetanus-Diphth-Acell Pertussis] Swelling      Current Outpatient Medications on File Prior to Visit   Medication Sig Dispense Refill    Acetaminophen 500 MG Take 1,000 mg by mouth 2 (two) times a day (Patient taking differently: Take 1,000 mg by mouth as needed)      chlorthalidone 25 mg tablet Take 0.5 tablets (12.5 mg total) by mouth every other day 90 tablet 1    ibuprofen (MOTRIN) 200 mg tablet Take 200 mg by mouth every 6 (six) hours as needed for mild pain      latanoprost  "(XALATAN) 0.005 % ophthalmic solution INSTILL 1 DROP IN EACH EYE AT BEDTIME  5    levothyroxine 100 mcg tablet TAKE 1 TABLET BY MOUTH EVERY  tablet 0    temazepam (RESTORIL) 15 mg capsule Take 1 capsule (15 mg total) by mouth daily at bedtime as needed for sleep 30 capsule 0    valsartan (DIOVAN) 320 MG tablet Take 1 tablet (320 mg total) by mouth daily 90 tablet 1    [DISCONTINUED] allopurinol (ZYLOPRIM) 100 mg tablet TAKE 1/2 TABLET BY MOUTH EVERY DAY FOR 1 WEEK, THEN TAKE 1 TABLET BY MOUTH EVERY DAY FOR 1 WEEK, THEN TAKE 1 AND 1/2 TABLETS BY MOUTH ONE TI      [DISCONTINUED] folic acid (FOLVITE) 1 mg tablet Take 1 tablet by mouth in the morning      [DISCONTINUED] methotrexate 2.5 MG tablet Take by mouth once a week      Ibuprofen (Advil) 200 MG CAPS PRN (Patient not taking: Reported on 10/22/2024)      [DISCONTINUED] methylprednisolone (Medrol) 4 mg tablet 5 tablets once a day in the morning for 3 days then 4 tablets a day for 3 days then 3 tablets a day for 3 days then 2 tablets a day for 3 days the stay on 1 tablet a day. (Patient not taking: Reported on 10/22/2024)      [DISCONTINUED] predniSONE 10 mg tablet Take 10 mg by mouth daily      [DISCONTINUED] rosuvastatin (CRESTOR) 5 mg tablet Take 1 tablet (5 mg total) by mouth daily 30 tablet 5     No current facility-administered medications on file prior to visit.      Social History     Tobacco Use    Smoking status: Former     Current packs/day: 0.00     Types: Cigarettes     Quit date:      Years since quittin.0    Smokeless tobacco: Never    Tobacco comments:     40 years ago    Vaping Use    Vaping status: Former   Substance and Sexual Activity    Alcohol use: Yes     Comment: rarely ; occasional     Drug use: No    Sexual activity: Not on file        Objective   /70 (BP Location: Right arm)   Pulse 100   Ht 5' 2\" (1.575 m)   Wt 107 kg (235 lb)   SpO2 98%   BMI 42.98 kg/m²      Physical Exam  Constitutional:       Appearance: " Normal appearance.   HENT:      Head: Normocephalic and atraumatic.   Musculoskeletal:      Comments: No swelling, tenderness, erythema or deformities of bilateral wrists. MCPs. PIPs and DIPs  No swelling, tenderness of bilateral shoulders, hips, knees, elbows, ankles and feet  ROM of all the joints is normal      Skin:     Findings: No rash.   Neurological:      Mental Status: She is alert and oriented to person, place, and time.

## 2025-01-09 NOTE — PATIENT INSTRUCTIONS
Do labs in a month  Continue allopurinol 200mg daily for now  Decrease Medrol to half tab daily for a month, then quarter tab daily for a month, then stop  Stop methotrexate and folic acid for now  Update us on how you do off methotrexate  Will consider starting leflunomide 10mg daily if needed    Return to clinic in 6 months

## 2025-01-09 NOTE — ASSESSMENT & PLAN NOTE
Orders:    CBC and differential    C-reactive protein    Comprehensive metabolic panel    Sedimentation rate, automated    methylprednisolone (Medrol) 4 mg tablet; Take half tablet Medrol daily for 30 days, then quarter tablet Medrol daily for 30 days, then stop

## 2025-01-09 NOTE — ASSESSMENT & PLAN NOTE
Celeste Garg is a 79 y.o. female who presents as to establish care for past medical history of Gout and PMR. She does not have any symptoms of PMR today. She has been on steroids for elevated inflammatory markers even in the absence of symptoms. Methotrexate was started as steroid-sparing agent recently. But started noticing tremors to it. No recent gout flares and her uric acid is at goal < 6.    Do labs in a month  Decrease Medrol to half tab daily for a month, then quarter tab daily for a month, then stop  Stop methotrexate and folic acid for now  Patient is to update clinic on how she does off methotrexate  Will consider starting leflunomide 10mg daily if needed as steroid-sparing agent in future    Orders:    CBC and differential    C-reactive protein    Comprehensive metabolic panel    Sedimentation rate, automated    methylprednisolone (Medrol) 4 mg tablet; Take half tablet Medrol daily for 30 days, then quarter tablet Medrol daily for 30 days, then stop

## 2025-01-14 PROBLEM — M10.9 GOUT: Status: ACTIVE | Noted: 2025-01-14

## 2025-01-14 PROBLEM — Z79.899 HIGH RISK MEDICATION USE: Status: ACTIVE | Noted: 2025-01-14

## 2025-01-14 NOTE — ASSESSMENT & PLAN NOTE
No recent gout flare, last uric acid level at goal of <6 at 4.1  Continue allopurinol 200mg daily for now, but may go down to 100mg daily if next uric acid level is still around 4  Orders:    allopurinol (ZYLOPRIM) 100 mg tablet; Take 2 tablets (200 mg total) by mouth daily    Uric acid

## 2025-01-14 NOTE — ASSESSMENT & PLAN NOTE
Benefits and risks of methotrexate use, including but not limited to gastrointestinal disturbances such as diarrhea, hair loss, fatigue, stomatitis, leukopenia, lung hypersensitivity reaction, hepatotoxicity, and lymphoma were discussed with the patient. CBC,CMP will be regularly monitored. Patient was prescribed Folic Acid 1 mg po daily to take while on methotrexate to help prevent side effects. Patient counseled on abstinence from alcohol while using methotrexate.

## 2025-01-21 ENCOUNTER — HOSPITAL ENCOUNTER (OUTPATIENT)
Dept: RADIOLOGY | Facility: MEDICAL CENTER | Age: 80
Discharge: HOME | End: 2025-01-21
Payer: MEDICARE

## 2025-01-21 VITALS — BODY MASS INDEX: 44.37 KG/M2 | WEIGHT: 226 LBS | HEIGHT: 60 IN

## 2025-01-21 DIAGNOSIS — Z13.820 ENCOUNTER FOR SCREENING FOR OSTEOPOROSIS: ICD-10-CM

## 2025-01-21 PROCEDURE — 77080 DXA BONE DENSITY AXIAL: CPT

## 2025-02-25 DIAGNOSIS — M06.4 INFLAMMATORY POLYARTHROPATHY (HCC): ICD-10-CM

## 2025-02-25 DIAGNOSIS — M35.3 POLYMYALGIA RHEUMATICA (HCC): ICD-10-CM

## 2025-02-26 RX ORDER — METHYLPREDNISOLONE 4 MG/1
TABLET ORAL
Qty: 30 TABLET | Refills: 0 | Status: SHIPPED | OUTPATIENT
Start: 2025-02-26

## 2025-02-27 ENCOUNTER — OFFICE VISIT (OUTPATIENT)
Age: 80
End: 2025-02-27
Payer: MEDICARE

## 2025-02-27 VITALS
SYSTOLIC BLOOD PRESSURE: 126 MMHG | HEART RATE: 104 BPM | DIASTOLIC BLOOD PRESSURE: 72 MMHG | TEMPERATURE: 97.8 F | WEIGHT: 231 LBS | BODY MASS INDEX: 44.74 KG/M2 | OXYGEN SATURATION: 97 %

## 2025-02-27 DIAGNOSIS — N18.30 TYPE 2 DIABETES MELLITUS WITH STAGE 3 CHRONIC KIDNEY DISEASE AND HYPERTENSION (HCC): ICD-10-CM

## 2025-02-27 DIAGNOSIS — E11.22 TYPE 2 DIABETES MELLITUS WITH STAGE 3 CHRONIC KIDNEY DISEASE AND HYPERTENSION (HCC): ICD-10-CM

## 2025-02-27 DIAGNOSIS — N18.32 STAGE 3B CHRONIC KIDNEY DISEASE (HCC): ICD-10-CM

## 2025-02-27 DIAGNOSIS — R68.89 FLU-LIKE SYMPTOMS: Primary | ICD-10-CM

## 2025-02-27 DIAGNOSIS — I12.9 TYPE 2 DIABETES MELLITUS WITH STAGE 3 CHRONIC KIDNEY DISEASE AND HYPERTENSION (HCC): ICD-10-CM

## 2025-02-27 DIAGNOSIS — F41.9 ANXIETY: ICD-10-CM

## 2025-02-27 DIAGNOSIS — R05.1 ACUTE COUGH: ICD-10-CM

## 2025-02-27 DIAGNOSIS — E66.01 MORBID OBESITY (HCC): ICD-10-CM

## 2025-02-27 LAB
SARS-COV-2 AG UPPER RESP QL IA: NEGATIVE
SL AMB POCT RAPID FLU A: NEGATIVE
SL AMB POCT RAPID FLU B: NEGATIVE
VALID CONTROL: NORMAL

## 2025-02-27 PROCEDURE — 87811 SARS-COV-2 COVID19 W/OPTIC: CPT | Performed by: FAMILY MEDICINE

## 2025-02-27 PROCEDURE — 99214 OFFICE O/P EST MOD 30 MIN: CPT | Performed by: FAMILY MEDICINE

## 2025-02-27 PROCEDURE — 87804 INFLUENZA ASSAY W/OPTIC: CPT | Performed by: FAMILY MEDICINE

## 2025-02-27 PROCEDURE — G2211 COMPLEX E/M VISIT ADD ON: HCPCS | Performed by: FAMILY MEDICINE

## 2025-02-27 RX ORDER — HYDROXYZINE HYDROCHLORIDE 10 MG/1
10 TABLET, FILM COATED ORAL EVERY 6 HOURS PRN
Qty: 30 TABLET | Refills: 0 | Status: SHIPPED | OUTPATIENT
Start: 2025-02-27 | End: 2025-03-03

## 2025-02-27 RX ORDER — OSELTAMIVIR PHOSPHATE 75 MG/1
75 CAPSULE ORAL EVERY 12 HOURS SCHEDULED
Qty: 10 CAPSULE | Refills: 0 | Status: SHIPPED | OUTPATIENT
Start: 2025-02-27 | End: 2025-03-04

## 2025-02-27 NOTE — PROGRESS NOTES
Name: Celeste Garg      : 1945      MRN: [id number]  Encounter Provider: Karla Solano DO  Encounter Date: 2025   Encounter department: [facility]  :  Assessment & Plan  Flu-like symptoms  In office COVID and flu both negative  Symptoms appear to be most consistent with a flulike illness  Will recommend initiation of Tamiflu to, hopefully, lessen the severity and duration of her symptoms  She will continue with supportive care and reach out to us if symptoms remain unchanged or worsen    Orders:    oseltamivir (TAMIFLU) 75 mg capsule; Take 1 capsule (75 mg total) by mouth every 12 (twelve) hours for 5 days    Acute cough  See flulike symptoms assessment and plan    Orders:    POCT rapid flu A and B    POCT Rapid Covid Ag    oseltamivir (TAMIFLU) 75 mg capsule; Take 1 capsule (75 mg total) by mouth every 12 (twelve) hours for 5 days    Anxiety  Patient with significant anxiety surrounding the possibility that she will feel like her throat is closing  This happened to her once, very long time ago, and has not happened since  She is encouraged to use her temazepam (PDMP appropriate)  If she feels uncomfortable using temazepam, I am recommending hydroxyzine as needed    Orders:    hydrOXYzine HCL (ATARAX) 10 mg tablet; Take 1 tablet (10 mg total) by mouth every 6 (six) hours as needed for itching, anxiety or allergies    Morbid obesity (HCC)  Weight reduction advised, continue lifestyle modifications    Type 2 diabetes mellitus with stage 3 chronic kidney disease and hypertension (HCC)  Chronic, stable  A1c goal less than 8%  Agree, would benefit significantly from an SGLT2 medications as suggested by nephrology    Lab Results   Component Value Date    HGBA1C 7 (A) 2024            Stage 3b chronic kidney disease (HCC)  Chronic, stable  Baseline creatinine 1.1, EGFR 45  Followed closely by nephrology  Continue hypertension and diabetes management while avoiding nephrotoxic  agents  SGLT2 medication is indicated for renal protection    Lab Results   Component Value Date    EGFR 42 11/11/2024    EGFR 47 09/05/2024    EGFR 46 04/18/2024    CREATININE 1.21 11/11/2024    CREATININE 1.11 09/05/2024    CREATININE 1.13 04/18/2024                  ASCVD Risk Management:  The 10-year ASCVD risk score (Samantha BISHOP, et al., 2019) is: 49.3%    Patent does not have underlying ASCVD. Their ASCVD Risk is high (>= 20%).    Return for Next scheduled follow up.    The patient indicates understanding of these issues and agrees with the plan.              History of Present Illness   Patients presents to the office today with a 3-day history of subjective fevers, chills, nonproductive cough, clear rhinorrhea, tender cervical lymph nodes, and shortness of breath increased from baseline. She denies headache, sinus pressure, sinus pain, sore throat, nausea/vomiting, constipation/diarrhea, urinary symptoms, and myalgias. She also notes minimal right ear pain. She lives at home alone, so no known sick contacts. Patient able to maintain adequate hydration status. Minimal relief with tylenol and NSAIDs. Last took tylenol 8AM this morning. Of note, patient explains that she is anxious that her tonsils may enlarge and constrict her airway.      Review of Systems   Constitutional:  Positive for chills, diaphoresis, fatigue and fever.   HENT:  Positive for ear pain and rhinorrhea. Negative for congestion, sinus pressure, sinus pain, sore throat and trouble swallowing.    Respiratory:  Positive for cough and shortness of breath. Negative for chest tightness.    Cardiovascular:  Negative for chest pain and palpitations.   Gastrointestinal:  Negative for abdominal pain, constipation, diarrhea, nausea and vomiting.   Genitourinary:  Negative for dysuria, frequency, urgency and vaginal discharge.   Musculoskeletal:  Negative for arthralgias and myalgias.   Skin:  Negative for rash.   Neurological:  Negative for  light-headedness and headaches.       Objective   /72   Pulse 104   Temp 97.8 °F (36.6 °C)   Wt 105 kg (231 lb)   SpO2 97%   BMI 44.74 kg/m²      Physical Exam  Constitutional:       Appearance: Normal appearance.      Comments: Body mass index is 44.74 kg/m².    HENT:      Head: Normocephalic and atraumatic.      Right Ear: Tympanic membrane and ear canal normal.      Left Ear: Tympanic membrane and ear canal normal.      Mouth/Throat:      Mouth: Mucous membranes are moist.      Pharynx: No oropharyngeal exudate or posterior oropharyngeal erythema.   Neck:      Comments: Bilateral cervical adenopathy with tenderness present.  Cardiovascular:      Rate and Rhythm: Normal rate and regular rhythm.      Pulses: Normal pulses.      Heart sounds: No murmur heard.  Pulmonary:      Effort: Pulmonary effort is normal.      Breath sounds: Normal breath sounds. No rhonchi or rales.   Abdominal:      General: Abdomen is flat. Bowel sounds are normal.      Palpations: Abdomen is soft.   Lymphadenopathy:      Cervical: Cervical adenopathy present.   Skin:     Capillary Refill: Capillary refill takes less than 2 seconds.   Neurological:      General: No focal deficit present.      Mental Status: She is alert and oriented to person, place, and time.   Psychiatric:         Mood and Affect: Mood normal.

## 2025-03-03 RX ORDER — HYDROXYZINE HYDROCHLORIDE 10 MG/1
10 TABLET, FILM COATED ORAL EVERY 6 HOURS PRN
Qty: 30 TABLET | Refills: 0 | Status: SHIPPED | OUTPATIENT
Start: 2025-03-03

## 2025-03-03 NOTE — ASSESSMENT & PLAN NOTE
Chronic, stable  A1c goal less than 8%  Agree, would benefit significantly from an SGLT2 medications as suggested by nephrology    Lab Results   Component Value Date    HGBA1C 7 (A) 08/22/2024

## 2025-03-04 ENCOUNTER — DOCUMENTATION (OUTPATIENT)
Age: 80
End: 2025-03-04

## 2025-03-04 ENCOUNTER — TELEPHONE (OUTPATIENT)
Age: 80
End: 2025-03-04

## 2025-03-04 DIAGNOSIS — J01.40 ACUTE NON-RECURRENT PANSINUSITIS: Primary | ICD-10-CM

## 2025-03-04 NOTE — TELEPHONE ENCOUNTER
Caridad from [facility] pharmacy called stating the Augmentin interacts with the Methotrexate that the patient is currently on - it increases the level of Methotrexate and causes side effects. She wants to make sure that the doctor is aware. Please call and advise.

## 2025-03-04 NOTE — TELEPHONE ENCOUNTER
Patient called to say she is about 30 % better, but her sinus are bothering her and her neck hurts. She wants to know if Dr Solano would wasn't to call in a an antibiotic for her. Can someone please call her back?    Thank you

## 2025-03-04 NOTE — TELEPHONE ENCOUNTER
Called patient and let them know. Also advised to give call back if symptoms dont improve to schedule a visit.

## 2025-03-04 NOTE — TELEPHONE ENCOUNTER
Please inform pt that augmentin was sent to [facility] for presumed sinusitis.  If not improving in 2-3 days, needs appointment.

## 2025-03-21 ENCOUNTER — RA CDI HCC (OUTPATIENT)
Dept: OTHER | Facility: HOSPITAL | Age: 80
End: 2025-03-21

## 2025-03-27 ENCOUNTER — OFFICE VISIT (OUTPATIENT)
Age: 80
End: 2025-03-27
Payer: MEDICARE

## 2025-03-27 VITALS
OXYGEN SATURATION: 99 % | WEIGHT: 230 LBS | BODY MASS INDEX: 44.55 KG/M2 | TEMPERATURE: 97.5 F | DIASTOLIC BLOOD PRESSURE: 64 MMHG | SYSTOLIC BLOOD PRESSURE: 126 MMHG | HEART RATE: 62 BPM

## 2025-03-27 DIAGNOSIS — E66.01 MORBID OBESITY (HCC): ICD-10-CM

## 2025-03-27 DIAGNOSIS — I10 BENIGN ESSENTIAL HYPERTENSION: ICD-10-CM

## 2025-03-27 DIAGNOSIS — N18.30 TYPE 2 DIABETES MELLITUS WITH STAGE 3 CHRONIC KIDNEY DISEASE AND HYPERTENSION (HCC): ICD-10-CM

## 2025-03-27 DIAGNOSIS — R06.09 DYSPNEA ON EXERTION: Primary | ICD-10-CM

## 2025-03-27 DIAGNOSIS — I12.9 TYPE 2 DIABETES MELLITUS WITH STAGE 3 CHRONIC KIDNEY DISEASE AND HYPERTENSION (HCC): ICD-10-CM

## 2025-03-27 DIAGNOSIS — M85.80 OSTEOPENIA, UNSPECIFIED LOCATION: ICD-10-CM

## 2025-03-27 DIAGNOSIS — E11.22 TYPE 2 DIABETES MELLITUS WITH STAGE 3 CHRONIC KIDNEY DISEASE AND HYPERTENSION (HCC): ICD-10-CM

## 2025-03-27 DIAGNOSIS — M85.80 OSTEOPENIA WITH SPARSE HAIR: ICD-10-CM

## 2025-03-27 DIAGNOSIS — L65.9 OSTEOPENIA WITH SPARSE HAIR: ICD-10-CM

## 2025-03-27 PROCEDURE — 99214 OFFICE O/P EST MOD 30 MIN: CPT | Performed by: STUDENT IN AN ORGANIZED HEALTH CARE EDUCATION/TRAINING PROGRAM

## 2025-03-27 PROCEDURE — G2211 COMPLEX E/M VISIT ADD ON: HCPCS | Performed by: STUDENT IN AN ORGANIZED HEALTH CARE EDUCATION/TRAINING PROGRAM

## 2025-03-27 RX ORDER — VALSARTAN 320 MG/1
320 TABLET ORAL DAILY
Qty: 90 TABLET | Refills: 1 | Status: SHIPPED | OUTPATIENT
Start: 2025-03-27

## 2025-03-27 NOTE — PROGRESS NOTES
Name: Celeste Garg      : 1945      MRN: [id number]  Encounter Provider: Ember Sahu MD  Encounter Date: 3/27/2025   Encounter department: [facility]  :  Assessment & Plan  Type 2 diabetes mellitus with stage 3 chronic kidney disease and hypertension (HCC)  Last Hgb A1c was 7 (24), and fasting glucose was 154 (24). She is currently controlled on chlorthalidone 12.5 mg every other day. Her blood pressure is controlled with valsartan 320 mg daily and the chlorthalidone. Her blood pressure in the office today is 126/64 which is at goal.    Continue current medication regimen  Recheck Hgb A1c  Follow up in 6 months for annual physical    Lab Results   Component Value Date    HGBA1C 7 (A) 2024            Morbid obesity (HCC)  Patient is complaining of fatigue with a history of obesity, hypothyroidism, and Vitamin D deficiency. DEXA scan from 25 shows low bone mass with T-scores between -0.7 and -2.0.     Check TSH to monitor thyroid function  Check Vit D to rule out deficiency  Continue levothyroxine 100 mg daily    Orders:    TSH, 3rd generation with Free T4 reflex; Future    Vitamin D 25 hydroxy; Future    Osteopenia with sparse hair  Patient is complaining of fatigue with a history of obesity, hypothyroidism, and Vitamin D deficiency. DEXA scan from 25 shows low bone mass with T-scores between -0.7 and -2.0.     Check TSH to monitor thyroid function  Check Vit D to rule out deficiency  Continue levothyroxine 100 mg daily    Orders:    TSH, 3rd generation with Free T4 reflex; Future    Osteopenia, unspecified location  Patient is complaining of fatigue with a history of obesity, hypothyroidism, and Vitamin D deficiency. DEXA scan from 25 shows low bone mass with T-scores between -0.7 and -2.0.     Check TSH to monitor thyroid function  Check Vit D to rule out deficiency  Continue levothyroxine 100 mg daily    Orders:    Vitamin D 25 hydroxy;  "Future    Dyspnea on exertion  Patient mentioned some dyspnea on exertion, and on exam was noted to have a hepatojugular reflex and some LE edema. Prior echo on 1/26/24 showed mild left ventricular hypertrophy and LVEF of 65%, but want to recheck to rule out HFpEF or HFrEF.    Continue hypertension medication regimen  Schedule echocardiogram    Orders:    Echo complete w/ contrast if indicated; Future           History of Present Illness   Celeste Garg is a 78yo F with PMH of HTN, T2DM, osteopenia, and obesity presenting for a blood pressure check.    Her BP in office today is 126/64. She has a blood pressure cuff at home, but does not use it to check her blood pressure because it is out of batteries and she is not bothered by it. She denies any headaches, vision change, leg swelling, chest pain, or palpitations. She is currently on valsartan and chlorthalidone, and has no complaints about her medication.     She says she has some shortness of breath on exertion which she attributes to her weight, and she says she has \"no pep at all\" and wants her thyroid checked.    Last A1c 8/22 was 7, and Ms. Garg is interested in getting it rechecked.    She had a rheumatology visit in January where she discontinued methotrexate due to tremors, and she is planning on scheduling an appointment with orthopedics for a repeat steroid injection for her knees.          Review of Systems   Constitutional:  Positive for fatigue. Negative for unexpected weight change.   Respiratory:  Positive for shortness of breath. Negative for cough and chest tightness.    Cardiovascular:  Negative for chest pain, palpitations and leg swelling.   Gastrointestinal:  Negative for abdominal pain.   Genitourinary:  Negative for difficulty urinating and flank pain.   Musculoskeletal:  Positive for arthralgias (follows with ortho for steroid injections).   Neurological:  Negative for dizziness, tremors, light-headedness and headaches.       Objective   BP " 126/64   Pulse 62   Temp 97.5 °F (36.4 °C)   Wt 104 kg (230 lb)   SpO2 99%   BMI 44.55 kg/m²      Physical Exam  Constitutional:       General: She is not in acute distress.     Comments: Body mass index is 44.55 kg/m².   Neck:      Vascular: Hepatojugular reflux present.   Cardiovascular:      Rate and Rhythm: Normal rate and regular rhythm.      Heart sounds: No murmur heard.  Pulmonary:      Effort: Pulmonary effort is normal. No respiratory distress.      Breath sounds: Normal breath sounds.   Abdominal:      Palpations: Abdomen is soft.      Tenderness: There is no abdominal tenderness.   Musculoskeletal:      Right lower leg: Edema present.      Left lower leg: Edema present.   Neurological:      Mental Status: She is alert and oriented to person, place, and time.

## 2025-03-27 NOTE — ASSESSMENT & PLAN NOTE
Last Hgb A1c was 7 (8/22/24), and fasting glucose was 154 (11/11/24). She is currently controlled on chlorthalidone 12.5 mg every other day. Her blood pressure is controlled with valsartan 320 mg daily and the chlorthalidone. Her blood pressure in the office today is 126/64 which is at goal.    Continue current medication regimen  Recheck Hgb A1c  Follow up in 6 months for annual physical    Lab Results   Component Value Date    HGBA1C 7 (A) 08/22/2024

## 2025-03-27 NOTE — ASSESSMENT & PLAN NOTE
Patient is complaining of fatigue with a history of obesity, hypothyroidism, and Vitamin D deficiency. DEXA scan from 1/21/25 shows low bone mass with T-scores between -0.7 and -2.0.     Check TSH to monitor thyroid function  Check Vit D to rule out deficiency  Continue levothyroxine 100 mg daily    Orders:    TSH, 3rd generation with Free T4 reflex; Future    Vitamin D 25 hydroxy; Future

## 2025-04-04 ENCOUNTER — APPOINTMENT (OUTPATIENT)
Dept: LAB | Facility: CLINIC | Age: 80
End: 2025-04-04
Payer: MEDICARE

## 2025-04-04 DIAGNOSIS — E11.22 TYPE 2 DIABETES MELLITUS WITH STAGE 3 CHRONIC KIDNEY DISEASE AND HYPERTENSION (HCC): ICD-10-CM

## 2025-04-04 DIAGNOSIS — I12.9 TYPE 2 DIABETES MELLITUS WITH STAGE 3 CHRONIC KIDNEY DISEASE AND HYPERTENSION (HCC): ICD-10-CM

## 2025-04-04 DIAGNOSIS — M85.80 OSTEOPENIA, UNSPECIFIED LOCATION: ICD-10-CM

## 2025-04-04 DIAGNOSIS — E66.01 MORBID OBESITY (HCC): ICD-10-CM

## 2025-04-04 DIAGNOSIS — L65.9 OSTEOPENIA WITH SPARSE HAIR: ICD-10-CM

## 2025-04-04 DIAGNOSIS — M85.80 OSTEOPENIA WITH SPARSE HAIR: ICD-10-CM

## 2025-04-04 DIAGNOSIS — N18.30 TYPE 2 DIABETES MELLITUS WITH STAGE 3 CHRONIC KIDNEY DISEASE AND HYPERTENSION (HCC): ICD-10-CM

## 2025-04-04 LAB
25(OH)D3 SERPL-MCNC: 19.2 NG/ML (ref 30–100)
ALBUMIN SERPL BCG-MCNC: 4.1 G/DL (ref 3.5–5)
ALP SERPL-CCNC: 69 U/L (ref 34–104)
ALT SERPL W P-5'-P-CCNC: 19 U/L (ref 7–52)
ANION GAP SERPL CALCULATED.3IONS-SCNC: 11 MMOL/L (ref 4–13)
AST SERPL W P-5'-P-CCNC: 13 U/L (ref 13–39)
BASOPHILS # BLD AUTO: 0.05 THOUSANDS/ÂΜL (ref 0–0.1)
BASOPHILS NFR BLD AUTO: 1 % (ref 0–1)
BILIRUB SERPL-MCNC: 0.36 MG/DL (ref 0.2–1)
BUN SERPL-MCNC: 29 MG/DL (ref 5–25)
CALCIUM SERPL-MCNC: 9.3 MG/DL (ref 8.4–10.2)
CHLORIDE SERPL-SCNC: 103 MMOL/L (ref 96–108)
CO2 SERPL-SCNC: 24 MMOL/L (ref 21–32)
CREAT SERPL-MCNC: 1.14 MG/DL (ref 0.6–1.3)
CREAT UR-MCNC: 163.5 MG/DL
CRP SERPL QL: 9.7 MG/L
EOSINOPHIL # BLD AUTO: 0.11 THOUSAND/ÂΜL (ref 0–0.61)
EOSINOPHIL NFR BLD AUTO: 1 % (ref 0–6)
ERYTHROCYTE [DISTWIDTH] IN BLOOD BY AUTOMATED COUNT: 13 % (ref 11.6–15.1)
ERYTHROCYTE [SEDIMENTATION RATE] IN BLOOD: 76 MM/HOUR (ref 0–29)
EST. AVERAGE GLUCOSE BLD GHB EST-MCNC: 180 MG/DL
GFR SERPL CREATININE-BSD FRML MDRD: 45 ML/MIN/1.73SQ M
GLUCOSE P FAST SERPL-MCNC: 158 MG/DL (ref 65–99)
HBA1C MFR BLD: 7.9 %
HCT VFR BLD AUTO: 39.2 % (ref 34.8–46.1)
HGB BLD-MCNC: 12.7 G/DL (ref 11.5–15.4)
IMM GRANULOCYTES # BLD AUTO: 0.04 THOUSAND/UL (ref 0–0.2)
IMM GRANULOCYTES NFR BLD AUTO: 0 % (ref 0–2)
LYMPHOCYTES # BLD AUTO: 2.44 THOUSANDS/ÂΜL (ref 0.6–4.47)
LYMPHOCYTES NFR BLD AUTO: 26 % (ref 14–44)
MCH RBC QN AUTO: 30.5 PG (ref 26.8–34.3)
MCHC RBC AUTO-ENTMCNC: 32.4 G/DL (ref 31.4–37.4)
MCV RBC AUTO: 94 FL (ref 82–98)
MICROALBUMIN UR-MCNC: 14.1 MG/L
MICROALBUMIN/CREAT 24H UR: 9 MG/G CREATININE (ref 0–30)
MONOCYTES # BLD AUTO: 0.94 THOUSAND/ÂΜL (ref 0.17–1.22)
MONOCYTES NFR BLD AUTO: 10 % (ref 4–12)
NEUTROPHILS # BLD AUTO: 5.68 THOUSANDS/ÂΜL (ref 1.85–7.62)
NEUTS SEG NFR BLD AUTO: 62 % (ref 43–75)
NRBC BLD AUTO-RTO: 0 /100 WBCS
PHOSPHATE SERPL-MCNC: 3.6 MG/DL (ref 2.3–4.1)
PLATELET # BLD AUTO: 269 THOUSANDS/UL (ref 149–390)
PMV BLD AUTO: 10.8 FL (ref 8.9–12.7)
POTASSIUM SERPL-SCNC: 3.8 MMOL/L (ref 3.5–5.3)
PROT SERPL-MCNC: 7 G/DL (ref 6.4–8.4)
PTH-INTACT SERPL-MCNC: 44.2 PG/ML (ref 12–88)
RBC # BLD AUTO: 4.17 MILLION/UL (ref 3.81–5.12)
SODIUM SERPL-SCNC: 138 MMOL/L (ref 135–147)
T4 FREE SERPL-MCNC: 1.03 NG/DL (ref 0.61–1.12)
TSH SERPL DL<=0.05 MIU/L-ACNC: 5.26 UIU/ML (ref 0.45–4.5)
URATE SERPL-MCNC: 8.3 MG/DL (ref 2–7.5)
WBC # BLD AUTO: 9.26 THOUSAND/UL (ref 4.31–10.16)

## 2025-04-04 PROCEDURE — 36415 COLL VENOUS BLD VENIPUNCTURE: CPT

## 2025-04-04 PROCEDURE — 83036 HEMOGLOBIN GLYCOSYLATED A1C: CPT

## 2025-04-04 PROCEDURE — 82043 UR ALBUMIN QUANTITATIVE: CPT

## 2025-04-04 PROCEDURE — 83970 ASSAY OF PARATHORMONE: CPT

## 2025-04-04 PROCEDURE — 82570 ASSAY OF URINE CREATININE: CPT

## 2025-04-04 PROCEDURE — 84439 ASSAY OF FREE THYROXINE: CPT

## 2025-04-04 PROCEDURE — 84443 ASSAY THYROID STIM HORMONE: CPT

## 2025-04-04 PROCEDURE — 84100 ASSAY OF PHOSPHORUS: CPT

## 2025-04-04 PROCEDURE — 82306 VITAMIN D 25 HYDROXY: CPT

## 2025-04-05 ENCOUNTER — RESULTS FOLLOW-UP (OUTPATIENT)
Age: 80
End: 2025-04-05

## 2025-04-06 DIAGNOSIS — E03.9 HYPOTHYROIDISM, UNSPECIFIED TYPE: ICD-10-CM

## 2025-04-07 ENCOUNTER — OFFICE VISIT (OUTPATIENT)
Dept: OBGYN CLINIC | Facility: CLINIC | Age: 80
End: 2025-04-07
Payer: MEDICARE

## 2025-04-07 VITALS — BODY MASS INDEX: 45.16 KG/M2 | HEIGHT: 60 IN | WEIGHT: 230 LBS

## 2025-04-07 DIAGNOSIS — M25.562 BILATERAL CHRONIC KNEE PAIN: Primary | ICD-10-CM

## 2025-04-07 DIAGNOSIS — G89.29 BILATERAL CHRONIC KNEE PAIN: Primary | ICD-10-CM

## 2025-04-07 DIAGNOSIS — M17.0 PRIMARY OSTEOARTHRITIS OF BOTH KNEES: ICD-10-CM

## 2025-04-07 DIAGNOSIS — M25.561 BILATERAL CHRONIC KNEE PAIN: Primary | ICD-10-CM

## 2025-04-07 PROCEDURE — 99213 OFFICE O/P EST LOW 20 MIN: CPT | Performed by: ORTHOPAEDIC SURGERY

## 2025-04-07 PROCEDURE — 20610 DRAIN/INJ JOINT/BURSA W/O US: CPT | Performed by: ORTHOPAEDIC SURGERY

## 2025-04-07 RX ORDER — TRIAMCINOLONE ACETONIDE 40 MG/ML
80 INJECTION, SUSPENSION INTRA-ARTICULAR; INTRAMUSCULAR
Status: COMPLETED | OUTPATIENT
Start: 2025-04-07 | End: 2025-04-07

## 2025-04-07 RX ORDER — LEVOTHYROXINE SODIUM 100 UG/1
100 TABLET ORAL DAILY
Qty: 100 TABLET | Refills: 1 | Status: SHIPPED | OUTPATIENT
Start: 2025-04-07 | End: 2025-04-10

## 2025-04-07 RX ORDER — BUPIVACAINE HYDROCHLORIDE 2.5 MG/ML
2 INJECTION, SOLUTION INFILTRATION; PERINEURAL
Status: COMPLETED | OUTPATIENT
Start: 2025-04-07 | End: 2025-04-07

## 2025-04-07 RX ADMIN — BUPIVACAINE HYDROCHLORIDE 2 ML: 2.5 INJECTION, SOLUTION INFILTRATION; PERINEURAL at 15:15

## 2025-04-07 RX ADMIN — TRIAMCINOLONE ACETONIDE 80 MG: 40 INJECTION, SUSPENSION INTRA-ARTICULAR; INTRAMUSCULAR at 15:15

## 2025-04-07 NOTE — PROGRESS NOTES
Name: Celeste Garg      : 1945      MRN: [id number]  Encounter Provider: Gilbert Peralta DO  Encounter Date: 2025   Encounter department: [facility]  :  Assessment & Plan  Bilateral chronic knee pain    Orders:    Large joint arthrocentesis: bilateral knee    Primary osteoarthritis of both knees    Orders:    Large joint arthrocentesis: bilateral knee            Plan:  Celeste Garg is a 79 y.o. female with bilateral knee osteoarthritis  Diagnostics reviewed and physical exam performed.  Diagnosis, treatment options and associated risks were discussed with the patient including no treatment, nonsurgical treatment and potential for surgical intervention.  The patient was given the opportunity to ask questions regarding each.   X-ray reviewed with the patient demonstrating severe osteoarthritis  Patient was offered, accepted, and received a cortisone injection of the bilateral knee today. Patient tolerated procedure well with no immediate complications. Post-injection protocols and expectations were discussed with the patient.   Apply Voltaren gel to painful area up to 4 times a day  May use ice or heat as needed for pain relief  May take NSAIDs/Tylenol as needed for pain control   Follow up as needed    Subjective:    Patient ID:  Celeste Garg 79 y.o. female    Celeste Garg is a 79 y.o. female who presents today for follow-up evaluation of bilateral knee pain due to known underlying chronic osteoarthritis.  She found relief in the past with periodic injection of cortisone, most recently on 10/7/2024.      The following portions of the patient's history were reviewed and updated as appropriate: allergies, current medications, past family history, past social history, past surgical history and problem list.      Social History     Socioeconomic History    Marital status:      Spouse name: Not on file    Number of children: 5    Years of education: 16    Highest education  level: Not on file   Occupational History    Occupation: RN   Tobacco Use    Smoking status: Former     Current packs/day: 0.00     Types: Cigarettes     Quit date:      Years since quittin.2    Smokeless tobacco: Never    Tobacco comments:     40 years ago    Vaping Use    Vaping status: Former   Substance and Sexual Activity    Alcohol use: Yes     Comment: rarely ; occasional     Drug use: No    Sexual activity: Not on file   Other Topics Concern    Not on file   Social History Narrative    Drinks coffee     Social Drivers of Health     Financial Resource Strain: Low Risk  (2023)    Overall Financial Resource Strain (CARDIA)     Difficulty of Paying Living Expenses: Not very hard   Food Insecurity: No Food Insecurity (2024)    Nursing - Inadequate Food Risk Classification     Worried About Running Out of Food in the Last Year: Never true     Ran Out of Food in the Last Year: Never true     Ran Out of Food in the Last Year: Not on file   Transportation Needs: No Transportation Needs (2024)    PRAPARE - Transportation     Lack of Transportation (Medical): No     Lack of Transportation (Non-Medical): No   Physical Activity: Not on file   Stress: Not on file   Social Connections: Not on file   Intimate Partner Violence: Not on file   Housing Stability: Low Risk  (2024)    Housing Stability Vital Sign     Unable to Pay for Housing in the Last Year: No     Number of Times Moved in the Last Year: 0     Homeless in the Last Year: No     Past Medical History:   Diagnosis Date    Anemia     Arthritis     Chronic right-sided low back pain without sciatica 2020    Hypertension     Polymyalgia rheumatica (HCC)     Shoulder impingement syndrome     last assessed 16    Subacromial impingement of left shoulder 2016    Subacromial impingement of right shoulder 2016     Past Surgical History:   Procedure Laterality Date    ABDOMINAL SURGERY      last assessed 16     "OOPHORECTOMY Right     OTHER SURGICAL HISTORY Right 2017    shoulder    TOTAL ABDOMINAL HYSTERECTOMY  30 year ago    WRIST FRACTURE SURGERY Right 2017     Allergies   Allergen Reactions    Hydromorphone Dizziness and Other (See Comments)     Severe vertigo     Tdap [Tetanus-Diphth-Acell Pertussis] Swelling     Current Outpatient Medications on File Prior to Visit   Medication Sig Dispense Refill    Acetaminophen 500 MG Take 1,000 mg by mouth 2 (two) times a day      chlorthalidone 25 mg tablet Take 0.5 tablets (12.5 mg total) by mouth every other day 90 tablet 1    ibuprofen (MOTRIN) 200 mg tablet Take 200 mg by mouth every 6 (six) hours as needed for mild pain      latanoprost (XALATAN) 0.005 % ophthalmic solution INSTILL 1 DROP IN EACH EYE AT BEDTIME  5    levothyroxine 100 mcg tablet TAKE 1 TABLET BY MOUTH EVERY  tablet 1    temazepam (RESTORIL) 15 mg capsule Take 1 capsule (15 mg total) by mouth daily at bedtime as needed for sleep 30 capsule 0    valsartan (DIOVAN) 320 MG tablet TAKE 1 TABLET BY MOUTH EVERY DAY 90 tablet 1    [DISCONTINUED] levothyroxine 100 mcg tablet TAKE 1 TABLET BY MOUTH EVERY  tablet 0     No current facility-administered medications on file prior to visit.            Objective:    Review of Systems  Pertinent items are noted in HPI.  All other systems were reviewed and are negative.    Physical Exam  Cons: Appears well.  No apparent distress.  Psych: Alert. Oriented x3.  Mood and affect normal.  Eyes: PERRLA, EOMI  Resp: Normal effort.  No audible wheezing or stridor.  CV: Palpable pulse.  No discernable arrhythmia.  No LE edema.  Lymph:  No palpable cervical, axillary, or inguinal lymphadenopathy.  Skin: Warm.  No palpable masses.  No visible lesions.  Neuro: Normal muscle tone.  Normal and symmetric DTR's.    BMI:   Estimated body mass index is 44.55 kg/m² as calculated from the following:    Height as of this encounter: 5' 0.25\" (1.53 m).    Weight as of this encounter: " "104 kg (230 lb).  Lab Results   Component Value Date    HGBA1C 7.9 (H) 04/04/2025         Right Knee Exam     Tenderness   The patient is experiencing tenderness in the medial joint line and lateral joint line.    Range of Motion   Extension:  normal   Flexion:  normal     Other   Effusion: no effusion present      Left Knee Exam     Tenderness   The patient is experiencing tenderness in the medial joint line and lateral joint line.    Range of Motion   Extension:  normal   Flexion:  normal     Other   Effusion: no effusion present            Procedures  Large joint arthrocentesis: bilateral knee  Universal Protocol:  Consent: Verbal consent obtained.  Risks and benefits: risks, benefits and alternatives were discussed  Consent given by: patient  Time out: Immediately prior to procedure a \"time out\" was called to verify the correct patient, procedure, equipment, support staff and site/side marked as required.  Patient understanding: patient states understanding of the procedure being performed  Site marked: the operative site was marked  Patient identity confirmed: verbally with patient  Supporting Documentation  Indications: pain and diagnostic evaluation   Procedure Details  Location: knee - bilateral knee  Preparation: Patient was prepped and draped in the usual sterile fashion  Needle size: 22 G  Ultrasound guidance: no    Medications (Right): 2 mL bupivacaine 0.25 %; 80 mg triamcinolone acetonide 40 mg/mLMedications (Left): 2 mL bupivacaine 0.25 %; 80 mg triamcinolone acetonide 40 mg/mL   Patient tolerance: patient tolerated the procedure well with no immediate complications  Dressing:  Sterile dressing applied        -    Diagnostics, reviewed and taken today if performed as documented:  I have personally reviewed pertinent films in PACS.        Scribe Attestation      I,:  Catherine Acuña am acting as a scribe while in the presence of the attending physician.:       I,:  Gilbert Peralta, DO personally performed " "the services described in this documentation    as scribed in my presence.:             Portions of the record may have been created with voice recognition software.  Occasional wrong word or \"sound a like\" substitutions may have occurred due to the inherent limitations of voice recognition software.  Read the chart carefully and recognize, using context, where substitutions have occurred.  "

## 2025-04-10 ENCOUNTER — OFFICE VISIT (OUTPATIENT)
Age: 80
End: 2025-04-10
Payer: MEDICARE

## 2025-04-10 VITALS
HEART RATE: 105 BPM | SYSTOLIC BLOOD PRESSURE: 124 MMHG | WEIGHT: 231 LBS | DIASTOLIC BLOOD PRESSURE: 66 MMHG | TEMPERATURE: 97.5 F | OXYGEN SATURATION: 97 % | BODY MASS INDEX: 45.35 KG/M2 | HEIGHT: 60 IN

## 2025-04-10 DIAGNOSIS — E11.22 TYPE 2 DIABETES MELLITUS WITH STAGE 3 CHRONIC KIDNEY DISEASE AND HYPERTENSION (HCC): ICD-10-CM

## 2025-04-10 DIAGNOSIS — N18.30 TYPE 2 DIABETES MELLITUS WITH STAGE 3 CHRONIC KIDNEY DISEASE AND HYPERTENSION (HCC): ICD-10-CM

## 2025-04-10 DIAGNOSIS — E55.9 VITAMIN D DEFICIENCY: ICD-10-CM

## 2025-04-10 DIAGNOSIS — I12.9 TYPE 2 DIABETES MELLITUS WITH STAGE 3 CHRONIC KIDNEY DISEASE AND HYPERTENSION (HCC): ICD-10-CM

## 2025-04-10 DIAGNOSIS — E03.9 HYPOTHYROIDISM, UNSPECIFIED TYPE: Primary | ICD-10-CM

## 2025-04-10 PROCEDURE — 99214 OFFICE O/P EST MOD 30 MIN: CPT | Performed by: STUDENT IN AN ORGANIZED HEALTH CARE EDUCATION/TRAINING PROGRAM

## 2025-04-10 PROCEDURE — G2211 COMPLEX E/M VISIT ADD ON: HCPCS | Performed by: STUDENT IN AN ORGANIZED HEALTH CARE EDUCATION/TRAINING PROGRAM

## 2025-04-10 RX ORDER — ERGOCALCIFEROL 1.25 MG/1
50000 CAPSULE, LIQUID FILLED ORAL WEEKLY
Qty: 8 CAPSULE | Refills: 0 | Status: SHIPPED | OUTPATIENT
Start: 2025-04-10 | End: 2025-05-30

## 2025-04-10 RX ORDER — DAPAGLIFLOZIN 5 MG/1
5 TABLET, FILM COATED ORAL DAILY
Qty: 30 TABLET | Refills: 0 | Status: SHIPPED | OUTPATIENT
Start: 2025-04-10

## 2025-04-10 RX ORDER — LEVOTHYROXINE SODIUM 112 UG/1
112 TABLET ORAL
Qty: 100 TABLET | Refills: 3 | Status: SHIPPED | OUTPATIENT
Start: 2025-04-10

## 2025-04-10 NOTE — PROGRESS NOTES
Name: Celeste Garg      : 1945      MRN: [id number]  Encounter Provider: Ember Sahu MD  Encounter Date: 4/10/2025   Encounter department: [facility]  :  Assessment & Plan  Hypothyroidism, unspecified type  Increase levothyroxine to 112 mcg daily.  Recheck TSH in 6 weeks  Orders:    levothyroxine 112 mcg tablet; Take 1 tablet (112 mcg total) by mouth daily in the early morning    TSH, 3rd generation with Free T4 reflex; Future    Type 2 diabetes mellitus with stage 3 chronic kidney disease and hypertension (HCC)    Lab Results   Component Value Date    HGBA1C 7.9 (H) 2025     Discussed continued lifestyle modification with a glycemic Luis balanced diet.  Will add Farxiga 5 mg p.o. daily,, renally dosed as patient currently has CKD stage III.  Orders:    dapagliflozin (Farxiga) 5 MG TABS; Take 1 tablet (5 mg total) by mouth daily    Vitamin D deficiency    Orders:    ergocalciferol (VITAMIN D2) 50,000 units; Take 1 capsule (50,000 Units total) by mouth once a week for 8 doses           History of Present Illness   HPI  Ms. Puga presents to discuss recent serology.  Hemoglobin A1c elevated at 7.9 up from 7.0 when last checked.  She attributes this to more sedentary lifestyle in the setting of increased fatigue as well as recent Medrol Dosepak for the treatment of bilateral knee pain and polymyalgia rheumatica.  She also recently underwent corticosteroid injection to her knee.  Discussed diet, patient does state she eats a lot of bread but avoids other sugars.  She is open to trial of SGLT2 inhibitor, in the past these have been prohibitively expensive.  TSH elevated at 5.2.  Patient states she currently takes levothyroxine first thing in the morning on an empty stomach and avoids food for at least 30 to 60 minutes after taking it.  She is agreeable to increase dose of levothyroxine at this time.  Vitamin D is deficient as well she has taken vitamin D supplementation in the  "past and is agreeable to this again.  Review of Systems   Constitutional:  Positive for activity change and fatigue. Negative for appetite change, chills and fever.   HENT:  Negative for ear pain and sore throat.    Eyes:  Negative for pain and visual disturbance.   Respiratory:  Negative for cough and shortness of breath.    Cardiovascular:  Negative for chest pain and palpitations.   Gastrointestinal:  Negative for abdominal pain and vomiting.   Genitourinary:  Negative for dysuria and hematuria.   Musculoskeletal:  Positive for arthralgias and joint swelling. Negative for back pain.   Skin:  Negative for color change and rash.   Neurological:  Negative for seizures and syncope.   All other systems reviewed and are negative.      Objective   /66   Pulse 105   Temp 97.5 °F (36.4 °C)   Ht 5' 0.25\" (1.53 m)   Wt 105 kg (231 lb)   SpO2 97%   BMI 44.74 kg/m²      Physical Exam  Vitals and nursing note reviewed.   Constitutional:       General: She is not in acute distress.     Appearance: She is well-developed.   HENT:      Head: Normocephalic and atraumatic.   Eyes:      Conjunctiva/sclera: Conjunctivae normal.   Cardiovascular:      Rate and Rhythm: Normal rate and regular rhythm.      Heart sounds: No murmur heard.  Pulmonary:      Effort: Pulmonary effort is normal. No respiratory distress.      Breath sounds: Normal breath sounds.   Abdominal:      Palpations: Abdomen is soft.      Tenderness: There is no abdominal tenderness.   Musculoskeletal:         General: No swelling.      Cervical back: Neck supple.   Skin:     General: Skin is warm and dry.   Neurological:      Mental Status: She is alert.         "

## 2025-04-10 NOTE — ASSESSMENT & PLAN NOTE
Increase levothyroxine to 112 mcg daily.  Recheck TSH in 6 weeks  Orders:    levothyroxine 112 mcg tablet; Take 1 tablet (112 mcg total) by mouth daily in the early morning    TSH, 3rd generation with Free T4 reflex; Future

## 2025-04-10 NOTE — ASSESSMENT & PLAN NOTE
Orders:    ergocalciferol (VITAMIN D2) 50,000 units; Take 1 capsule (50,000 Units total) by mouth once a week for 8 doses

## 2025-04-10 NOTE — ASSESSMENT & PLAN NOTE
Lab Results   Component Value Date    HGBA1C 7.9 (H) 04/04/2025     Discussed continued lifestyle modification with a glycemic Luis balanced diet.  Will add Farxiga 5 mg p.o. daily,, renally dosed as patient currently has CKD stage III.  Orders:    dapagliflozin (Farxiga) 5 MG TABS; Take 1 tablet (5 mg total) by mouth daily

## 2025-04-23 ENCOUNTER — OFFICE VISIT (OUTPATIENT)
Dept: NEPHROLOGY | Facility: CLINIC | Age: 80
End: 2025-04-23
Payer: MEDICARE

## 2025-04-23 VITALS
WEIGHT: 224 LBS | BODY MASS INDEX: 43.98 KG/M2 | HEART RATE: 94 BPM | HEIGHT: 60 IN | OXYGEN SATURATION: 97 % | SYSTOLIC BLOOD PRESSURE: 124 MMHG | DIASTOLIC BLOOD PRESSURE: 68 MMHG

## 2025-04-23 DIAGNOSIS — E11.22 TYPE 2 DIABETES MELLITUS WITH STAGE 3 CHRONIC KIDNEY DISEASE AND HYPERTENSION (HCC): Primary | ICD-10-CM

## 2025-04-23 DIAGNOSIS — E55.9 VITAMIN D DEFICIENCY: ICD-10-CM

## 2025-04-23 DIAGNOSIS — I51.89 DIASTOLIC DYSFUNCTION: ICD-10-CM

## 2025-04-23 DIAGNOSIS — E79.0 HYPERURICEMIA: ICD-10-CM

## 2025-04-23 DIAGNOSIS — E66.01 MORBID OBESITY (HCC): ICD-10-CM

## 2025-04-23 DIAGNOSIS — G47.33 OBSTRUCTIVE SLEEP APNEA: ICD-10-CM

## 2025-04-23 DIAGNOSIS — I12.9 TYPE 2 DIABETES MELLITUS WITH STAGE 3 CHRONIC KIDNEY DISEASE AND HYPERTENSION (HCC): Primary | ICD-10-CM

## 2025-04-23 DIAGNOSIS — N18.30 TYPE 2 DIABETES MELLITUS WITH STAGE 3 CHRONIC KIDNEY DISEASE AND HYPERTENSION (HCC): Primary | ICD-10-CM

## 2025-04-23 PROCEDURE — G2211 COMPLEX E/M VISIT ADD ON: HCPCS | Performed by: INTERNAL MEDICINE

## 2025-04-23 PROCEDURE — 99214 OFFICE O/P EST MOD 30 MIN: CPT | Performed by: INTERNAL MEDICINE

## 2025-04-23 RX ORDER — ALLOPURINOL 100 MG/1
100 TABLET ORAL DAILY
COMMUNITY

## 2025-04-23 RX ORDER — CHLORTHALIDONE 25 MG/1
12.5 TABLET ORAL EVERY OTHER DAY
Qty: 180 TABLET | Refills: 1 | Status: SHIPPED | OUTPATIENT
Start: 2025-04-23

## 2025-04-23 NOTE — ASSESSMENT & PLAN NOTE
-- Continue angiotensin receptor blocker and beta-blocker  --Continue low-dose thiazide diuretic    Orders:    Uric acid; Future    Vitamin D 25 hydroxy; Future    Albumin / creatinine urine ratio; Future    Comprehensive metabolic panel; Future    Hemoglobin A1C; Future    CBC and Platelet; Future

## 2025-04-23 NOTE — ASSESSMENT & PLAN NOTE
-- BMI worsening to 43.75.  Thyroid function being addressed by the primary team levothyroxine was increased  Orders:    Uric acid; Future    Vitamin D 25 hydroxy; Future    Albumin / creatinine urine ratio; Future    Comprehensive metabolic panel; Future    Hemoglobin A1C; Future    CBC and Platelet; Future

## 2025-04-23 NOTE — ASSESSMENT & PLAN NOTE
-- Continue ergocalciferol 50,000 units weekly  --Recheck vitamin D level in 6 months  Orders:    Uric acid; Future    Vitamin D 25 hydroxy; Future    Albumin / creatinine urine ratio; Future    Comprehensive metabolic panel; Future    Hemoglobin A1C; Future    CBC and Platelet; Future

## 2025-04-23 NOTE — ASSESSMENT & PLAN NOTE
-- Weight loss exercise and diet  --Use CPAP at night    Orders:    Uric acid; Future    Vitamin D 25 hydroxy; Future    Albumin / creatinine urine ratio; Future    Comprehensive metabolic panel; Future    Hemoglobin A1C; Future    CBC and Platelet; Future

## 2025-04-23 NOTE — ASSESSMENT & PLAN NOTE
Lab Results   Component Value Date    HGBA1C 7.9 (H) 04/04/2025     Chronic Kidney Disease Stage IIIA (G3A2)  --Imaging: Renal ultrasound showed no masses no lesions.  Symmetrically sized kidneys.  Bilateral echogenicity consistent with underlying chronic kidney disease.  --Urinalysis: Urinalysis in the office is bland no microscopic hematuria and no proteinuria  --Proteinuria: Albumin/creatinine ratio has improved and currently normal at 0.009  --Baseline Kidney Function: low 1's (1.1-1.3 mg/dL)  --Etiology: Presumed secondary to diabetic kidney disease, hypertensive nephrosclerosis, obesity related renal function, prior history of acute kidney injury with a stop amount of CKD, former smoker, analgesic nephropathy  --Biopsy Proven: No  --Serologies: No indication for serologies  --RAAS Blockade: Valsartan  --Reducing Cardiovascular Risk Factors:  Simvastatin+ Ezetimibe reduced atherosclerotic events in CKD (SHARP trial); Low dose aspirin safe (if no contraindications exist); smoking is an independent risk factor for Chronic Kidney Disease and progression, strongly recommend smoking cessation  --Sodium-Glucose Cotransporter-2 (SGLT2) Inhibitors:  May see an acute drop in the eGFR initially when starting the medication but then a stabilization of the renal function, with slower loss of renal function as compared to placebo.  Relative risk of end-stage renal disease, doubling of serum creatinine or death from renal causes were also found to be lower as compared to placebo. (CREDENCE).  DAPA-CKD study, showed that patients with chronic kidney disease regardless of the presence or absence of diabetes the risk of composite of a sustained decline in the estimated GFR of at least 50%, end-stage renal disease or death from renal or cardiovascular causes was significantly lower with Dapagliflozin than with placebo. EMPEROR-Reduced study also showed beneficial effects. EMPA-CKD, among wide range of patients with CKD, who  were at risk for progression, empagliflozin led a lower risk of kidney disease progression or death from cardiovascular causes than placebo.  If no contraindications exist I would recommend this medication added to the regiment. If eGFR < 60 cc/min (avoid ertugliflozin); avoid or caution with GFR < 20 mL/min, not an absolute contraindication, likely lower benefits.   --Finerenone: In patients with chronic kidney disease with type 2 diabetes, treatment led to lower risks of CKD progression and cardiovascular events than placebo. (FIDELIO-DKD)  --Status: Renal function stable and at baseline with a creatinine of 1.1 mg/dL.  No evidence of proteinuria.  Blood pressure is now under much better control.  Needs better diabetic control  --Management/Recommendations: Continue RAAS blockade with valsartan, continue chlorthalidone 12.5 mg every other day.  Uric acid level is starting to elevate we will repeat in a couple months if still high will consider increasing allopurinol to 200 mg daily     Hypertension  -- Stage II (American College of Cardiology/American Heart Association)  -- with underlying chronic kidney disease and obesity  -- Body mass index is 43.75 kg/m².  -- Volume status: Examines close to euvolemic  -- Etiology: Primary hypertension and obesity  -- Secondary Work-Up: No clinical indication but consider sleep apnea study  -- Target Goal: < 130/80 (ACC/AHA, CKD with proteinuria, CKD in diabetics) ; if tolerated can target SBP < 120 mmHg in high cardiovascular risk ( Age > 75, CKD, CVD, No Diabetics SPRINT)  -- Lifestyle Modifications: DASH Diet, Weight Loss for ideal body weight, 45 mins of cardiovascular exercise 3 times a week as tolerated, and if no contraindications exist, smoking cessation, limit alcohol use, avoid NSAIDS, monitor blood pressure at home  -- Status: Blood pressure not at target slightly volume mediated  -- Current antihypertensive regiment: Valsartan 320 mg daily chlorthalidone 12.5 mg  every other day  -- Changes: Continue current antihypertensive agent     Diabetes mellitus type 2  -hemoglobin A1c: 7.9 (A1C values may be falsely elevated or decreased in those with CKD, assay method should be certified by National glycohemoglobin standardization Program). Target A1C less then 7 (will need to be individualized for each patient), and would utilize A1C  in conjunction with continuous glucose monitoring (CGM).  It should also be noted that the A1c with more advanced kidney disease would be inaccurate due to decreased red blood cell life along with anemia.  -current medications: Not on any medications  -proteinuria: Improved and currently normal  -retinopathy: No  -neuropathy: No  -Off prednisone  -Farxiga was to high cost  -please follow with an ophthalmologist and podiatrist every year  -continued self monitoring of blood glucose at home  -Management in CKD Recommendations:   Metformin:  Avoid if creatinine clearance is less than 30 cc/min (concern for lactic acidosis)  Sodium-Glucose Cotransporter-2 (SGLT2) Inhibitors:  May see an acute drop in the eGFR initially when starting the medication but then a stabilization of the renal function, with slower loss of renal function as compared to placebo.  Relative risk of end-stage renal disease, doubling of serum creatinine or death from renal causes were also found to be lower as compared to placebo. (CREDENCE).  DAPA-CKD study, showed that patients with chronic kidney disease regardless of the presence or absence of diabetes the risk of composite of a sustained decline in the estimated GFR of at least 50%, end-stage renal disease or death from renal or cardiovascular causes was significantly lower with Dapagliflozin than with placebo. EMPEROR-Reduced study also showed beneficial effects. EMPA-CKD, among wide range of patients with CKD, who were at risk for progression, empagliflozin led a lower risk of kidney disease progression or death from  cardiovascular causes than placebo.  If no contraindications exist I would recommend this medication added to the regiment. If eGFR < 60 cc/min (avoid ertugliflozin); avoid or caution with GFR < 20 mL/min, not an absolute contraindication, likely lower benefits   Sulfonylureas:  Preferred short-acting (glipizide, glimepiride, repaglinide), relatively safe in patients with non dialysis CKD  Thiazolidinedones/Alpha-Gluosidase inhibitors/Dipeptidyl peptidase-4 inhibitors:  Generally not considered first-line agents in CKD (limited data in long-term safety and efficacy)  Insulin:  Starting dose may need to be lower than what would ordinarily be used, as there is a decrease metabolism of insulin (no dose adjustment if the GFR > 50 mL/min, dose should be reduced to 75% of baseline if GFR 10-50 mL/min, and 50% baseline if GFR < 10 mL/min)  Finerenone: Patients with CKD with type 2 diabetes, treatment led to lower risks of CKD progression and cardiovascular events than placebo (FIDELIO-DKD). Avoid or caution if serum potassium more then 4.8.       Orders:    Uric acid; Future    Vitamin D 25 hydroxy; Future    Albumin / creatinine urine ratio; Future    Comprehensive metabolic panel; Future    Hemoglobin A1C; Future    CBC and Platelet; Future    chlorthalidone 25 mg tablet; Take 0.5 tablets (12.5 mg total) by mouth every other day

## 2025-04-23 NOTE — PROGRESS NOTES
Name: Celeste Garg      : 1945      MRN: [id number]  Encounter Provider: Charlene Lo MD  Encounter Date: 2025   Encounter department: [facility] [place]  :  Assessment & Plan  Type 2 diabetes mellitus with stage 3 chronic kidney disease and hypertension (HCC)    Lab Results   Component Value Date    HGBA1C 7.9 (H) 2025     Chronic Kidney Disease Stage IIIA (G3A2)  --Imaging: Renal ultrasound showed no masses no lesions.  Symmetrically sized kidneys.  Bilateral echogenicity consistent with underlying chronic kidney disease.  --Urinalysis: Urinalysis in the office is bland no microscopic hematuria and no proteinuria  --Proteinuria: Albumin/creatinine ratio has improved and currently normal at 0.009  --Baseline Kidney Function: low 1's (1.1-1.3 mg/dL)  --Etiology: Presumed secondary to diabetic kidney disease, hypertensive nephrosclerosis, obesity related renal function, prior history of acute kidney injury with a stop amount of CKD, former smoker, analgesic nephropathy  --Biopsy Proven: No  --Serologies: No indication for serologies  --RAAS Blockade: Valsartan  --Reducing Cardiovascular Risk Factors:  Simvastatin+ Ezetimibe reduced atherosclerotic events in CKD (SHARP trial); Low dose aspirin safe (if no contraindications exist); smoking is an independent risk factor for Chronic Kidney Disease and progression, strongly recommend smoking cessation  --Sodium-Glucose Cotransporter-2 (SGLT2) Inhibitors:  May see an acute drop in the eGFR initially when starting the medication but then a stabilization of the renal function, with slower loss of renal function as compared to placebo.  Relative risk of end-stage renal disease, doubling of serum creatinine or death from renal causes were also found to be lower as compared to placebo. (CREDENCE).  DAPA-CKD study, showed that patients with chronic kidney disease regardless of the presence or absence of diabetes the risk of composite of a  sustained decline in the estimated GFR of at least 50%, end-stage renal disease or death from renal or cardiovascular causes was significantly lower with Dapagliflozin than with placebo. EMPEROR-Reduced study also showed beneficial effects. EMPA-CKD, among wide range of patients with CKD, who were at risk for progression, empagliflozin led a lower risk of kidney disease progression or death from cardiovascular causes than placebo.  If no contraindications exist I would recommend this medication added to the regiment. If eGFR < 60 cc/min (avoid ertugliflozin); avoid or caution with GFR < 20 mL/min, not an absolute contraindication, likely lower benefits.   --Finerenone: In patients with chronic kidney disease with type 2 diabetes, treatment led to lower risks of CKD progression and cardiovascular events than placebo. (FIDELIO-DKD)  --Status: Renal function stable and at baseline with a creatinine of 1.1 mg/dL.  No evidence of proteinuria.  Blood pressure is now under much better control.  Needs better diabetic control  --Management/Recommendations: Continue RAAS blockade with valsartan, continue chlorthalidone 12.5 mg every other day.  Uric acid level is starting to elevate we will repeat in a couple months if still high will consider increasing allopurinol to 200 mg daily     Hypertension  -- Stage II (American College of Cardiology/American Heart Association)  -- with underlying chronic kidney disease and obesity  -- Body mass index is 43.75 kg/m².  -- Volume status: Examines close to euvolemic  -- Etiology: Primary hypertension and obesity  -- Secondary Work-Up: No clinical indication but consider sleep apnea study  -- Target Goal: < 130/80 (ACC/AHA, CKD with proteinuria, CKD in diabetics) ; if tolerated can target SBP < 120 mmHg in high cardiovascular risk ( Age > 75, CKD, CVD, No Diabetics SPRINT)  -- Lifestyle Modifications: DASH Diet, Weight Loss for ideal body weight, 45 mins of cardiovascular exercise 3  times a week as tolerated, and if no contraindications exist, smoking cessation, limit alcohol use, avoid NSAIDS, monitor blood pressure at home  -- Status: Blood pressure not at target slightly volume mediated  -- Current antihypertensive regiment: Valsartan 320 mg daily chlorthalidone 12.5 mg every other day  -- Changes: Continue current antihypertensive agent     Diabetes mellitus type 2  -hemoglobin A1c: 7.9 (A1C values may be falsely elevated or decreased in those with CKD, assay method should be certified by National glycohemoglobin standardization Program). Target A1C less then 7 (will need to be individualized for each patient), and would utilize A1C  in conjunction with continuous glucose monitoring (CGM).  It should also be noted that the A1c with more advanced kidney disease would be inaccurate due to decreased red blood cell life along with anemia.  -current medications: Not on any medications  -proteinuria: Improved and currently normal  -retinopathy: No  -neuropathy: No  -Off prednisone  -Farxiga was to high cost  -please follow with an ophthalmologist and podiatrist every year  -continued self monitoring of blood glucose at home  -Management in CKD Recommendations:   Metformin:  Avoid if creatinine clearance is less than 30 cc/min (concern for lactic acidosis)  Sodium-Glucose Cotransporter-2 (SGLT2) Inhibitors:  May see an acute drop in the eGFR initially when starting the medication but then a stabilization of the renal function, with slower loss of renal function as compared to placebo.  Relative risk of end-stage renal disease, doubling of serum creatinine or death from renal causes were also found to be lower as compared to placebo. (CREDENCE).  DAPA-CKD study, showed that patients with chronic kidney disease regardless of the presence or absence of diabetes the risk of composite of a sustained decline in the estimated GFR of at least 50%, end-stage renal disease or death from renal or  cardiovascular causes was significantly lower with Dapagliflozin than with placebo. EMPEROR-Reduced study also showed beneficial effects. EMPA-CKD, among wide range of patients with CKD, who were at risk for progression, empagliflozin led a lower risk of kidney disease progression or death from cardiovascular causes than placebo.  If no contraindications exist I would recommend this medication added to the regiment. If eGFR < 60 cc/min (avoid ertugliflozin); avoid or caution with GFR < 20 mL/min, not an absolute contraindication, likely lower benefits   Sulfonylureas:  Preferred short-acting (glipizide, glimepiride, repaglinide), relatively safe in patients with non dialysis CKD  Thiazolidinedones/Alpha-Gluosidase inhibitors/Dipeptidyl peptidase-4 inhibitors:  Generally not considered first-line agents in CKD (limited data in long-term safety and efficacy)  Insulin:  Starting dose may need to be lower than what would ordinarily be used, as there is a decrease metabolism of insulin (no dose adjustment if the GFR > 50 mL/min, dose should be reduced to 75% of baseline if GFR 10-50 mL/min, and 50% baseline if GFR < 10 mL/min)  Finerenone: Patients with CKD with type 2 diabetes, treatment led to lower risks of CKD progression and cardiovascular events than placebo (FIDELIO-DKD). Avoid or caution if serum potassium more then 4.8.       Orders:    Uric acid; Future    Vitamin D 25 hydroxy; Future    Albumin / creatinine urine ratio; Future    Comprehensive metabolic panel; Future    Hemoglobin A1C; Future    CBC and Platelet; Future    chlorthalidone 25 mg tablet; Take 0.5 tablets (12.5 mg total) by mouth every other day    Obstructive sleep apnea  -- Weight loss exercise and diet  --Use CPAP at night    Orders:    Uric acid; Future    Vitamin D 25 hydroxy; Future    Albumin / creatinine urine ratio; Future    Comprehensive metabolic panel; Future    Hemoglobin A1C; Future    CBC and Platelet; Future    Vitamin D  deficiency  -- Continue ergocalciferol 50,000 units weekly  --Recheck vitamin D level in 6 months  Orders:    Uric acid; Future    Vitamin D 25 hydroxy; Future    Albumin / creatinine urine ratio; Future    Comprehensive metabolic panel; Future    Hemoglobin A1C; Future    CBC and Platelet; Future    Morbid obesity (HCC)  -- BMI worsening to 43.75.  Thyroid function being addressed by the primary team levothyroxine was increased  Orders:    Uric acid; Future    Vitamin D 25 hydroxy; Future    Albumin / creatinine urine ratio; Future    Comprehensive metabolic panel; Future    Hemoglobin A1C; Future    CBC and Platelet; Future    Diastolic dysfunction  -- Continue angiotensin receptor blocker and beta-blocker  --Continue low-dose thiazide diuretic    Orders:    Uric acid; Future    Vitamin D 25 hydroxy; Future    Albumin / creatinine urine ratio; Future    Comprehensive metabolic panel; Future    Hemoglobin A1C; Future    CBC and Platelet; Future    Hyperuricemia  -- Low purine diet  --No episodes of gout  --Allopurinol 100 mg daily.  Check repeat uric acid level in 6 months if worsening consider increasing allopurinol to 200 mg daily  Orders:    Uric acid; Future    Vitamin D 25 hydroxy; Future    Albumin / creatinine urine ratio; Future    Comprehensive metabolic panel; Future    Hemoglobin A1C; Future    CBC and Platelet; Future        Patient Instructions   1.)  Low 2 g sodium diet    2.)  Monitor weights at home    3.)  Avoid NSAIDs (ibuprofen, Motrin, Advil, Aleve, naproxen)    4.)  Monitor blood pressure at home, call if blood pressure greater than 150/90 persistently    5.) I will plan to discuss all results including blood work, and/or imaging at our next visit, unless there is an urgent indication, in which case I will call you earlier. If you have any questions or concerns about your results, please feel free to call our office.    6.)  Follow-up in 6-7 months      It was a pleasure evaluating your patient  in the office today. Thank you for allowing our team to participate in the care of  Celeste Garg. Please do not hesitate to contact our team if further issues/questions shall arise in the interim.     History of Present Illness   HPI  Celeste Garg is a 79 y.o. female who presents chronic kidney disease stage III and hypertension.    At her last visit her blood pressure was not at target and I had her start chlorthalidone 12.5 mg every other day.  She is tolerating the medication well and her blood pressure is now much better ranging 120 systolic.    He is feeling very tired and sleepy and having issues with her hair loss along with weight gain.  Her TSH was high with a normal T4.  Synthroid was increased by her primary care physician.    Reviewed her blood work from April 4 which included TSH and free T4.  Vitamin D 25-hydroxy level which is low at 19 recently started on ergocalciferol 50,000 units weekly, hemoglobin A1c worse at 7.1 now off prednisone.  Albumin/creatinine ratio is normal.  Phosphorus and PTH are normal.  Uric acid is high at 8.3.  Renal function stable with a creatinine 1.1 mg/dL.    History obtained from: patient  Pertinent Medical History         Review of Systems   Constitutional:  Positive for fatigue and unexpected weight change. Negative for activity change and fever.   Respiratory:  Negative for cough, chest tightness, shortness of breath and wheezing.    Cardiovascular:  Negative for chest pain and leg swelling.   Gastrointestinal:  Negative for abdominal pain, diarrhea, nausea and vomiting.   Endocrine: Negative for polyuria.   Genitourinary:  Negative for difficulty urinating, dysuria, flank pain, frequency and urgency.   Skin:  Negative for rash.   Neurological:  Negative for dizziness, syncope, light-headedness and headaches.     Medical History Reviewed by provider this encounter:  Meds     .  Current Outpatient Medications on File Prior to Visit   Medication Sig Dispense Refill     Acetaminophen 500 MG Take 1,000 mg by mouth 2 (two) times a day      allopurinol (ZYLOPRIM) 100 mg tablet Take 100 mg by mouth daily      ergocalciferol (VITAMIN D2) 50,000 units Take 1 capsule (50,000 Units total) by mouth once a week for 8 doses 8 capsule 0    ibuprofen (MOTRIN) 200 mg tablet Take 200 mg by mouth every 6 (six) hours as needed for mild pain      latanoprost (XALATAN) 0.005 % ophthalmic solution INSTILL 1 DROP IN EACH EYE AT BEDTIME  5    levothyroxine 112 mcg tablet Take 1 tablet (112 mcg total) by mouth daily in the early morning 100 tablet 3    temazepam (RESTORIL) 15 mg capsule Take 1 capsule (15 mg total) by mouth daily at bedtime as needed for sleep 30 capsule 0    valsartan (DIOVAN) 320 MG tablet TAKE 1 TABLET BY MOUTH EVERY DAY 90 tablet 1    [DISCONTINUED] chlorthalidone 25 mg tablet Take 0.5 tablets (12.5 mg total) by mouth every other day 90 tablet 1    dapagliflozin (Farxiga) 5 MG TABS Take 1 tablet (5 mg total) by mouth daily (Patient not taking: Reported on 4/23/2025) 30 tablet 0     No current facility-administered medications on file prior to visit.         Current Outpatient Medications on File Prior to Visit   Medication Sig Dispense Refill    Acetaminophen 500 MG Take 1,000 mg by mouth 2 (two) times a day      allopurinol (ZYLOPRIM) 100 mg tablet Take 100 mg by mouth daily      ergocalciferol (VITAMIN D2) 50,000 units Take 1 capsule (50,000 Units total) by mouth once a week for 8 doses 8 capsule 0    ibuprofen (MOTRIN) 200 mg tablet Take 200 mg by mouth every 6 (six) hours as needed for mild pain      latanoprost (XALATAN) 0.005 % ophthalmic solution INSTILL 1 DROP IN EACH EYE AT BEDTIME  5    levothyroxine 112 mcg tablet Take 1 tablet (112 mcg total) by mouth daily in the early morning 100 tablet 3    temazepam (RESTORIL) 15 mg capsule Take 1 capsule (15 mg total) by mouth daily at bedtime as needed for sleep 30 capsule 0    valsartan (DIOVAN) 320 MG tablet TAKE 1 TABLET BY MOUTH  "EVERY DAY 90 tablet 1    [DISCONTINUED] chlorthalidone 25 mg tablet Take 0.5 tablets (12.5 mg total) by mouth every other day 90 tablet 1    dapagliflozin (Farxiga) 5 MG TABS Take 1 tablet (5 mg total) by mouth daily (Patient not taking: Reported on 4/23/2025) 30 tablet 0     No current facility-administered medications on file prior to visit.     Objective   /68 (BP Location: Left arm, Patient Position: Sitting, Cuff Size: Large)   Pulse 94   Ht 5' (1.524 m)   Wt 102 kg (224 lb)   SpO2 97%   BMI 43.75 kg/m²      Physical Exam  Vitals and nursing note reviewed.   Constitutional:       General: She is not in acute distress.     Appearance: She is well-developed. She is obese. She is not diaphoretic.   HENT:      Head: Normocephalic and atraumatic.   Eyes:      General: No scleral icterus.     Pupils: Pupils are equal, round, and reactive to light.   Cardiovascular:      Rate and Rhythm: Normal rate and regular rhythm.      Heart sounds: Normal heart sounds. No murmur heard.     No friction rub. No gallop.   Pulmonary:      Effort: Pulmonary effort is normal. No respiratory distress.      Breath sounds: Normal breath sounds. No wheezing or rales.   Chest:      Chest wall: No tenderness.   Abdominal:      General: Bowel sounds are normal. There is no distension.      Palpations: Abdomen is soft.      Tenderness: There is no abdominal tenderness. There is no rebound.   Musculoskeletal:         General: Normal range of motion.      Cervical back: Normal range of motion and neck supple.   Skin:     Findings: No rash.   Neurological:      Mental Status: She is alert and oriented to person, place, and time.           Laboratory Results:        Invalid input(s): \"ALBUMIN\"    Results for orders placed or performed in visit on 04/04/25   PTH, intact   Result Value Ref Range    PTH 44.2 12.0 - 88.0 pg/mL   Phosphorus   Result Value Ref Range    Phosphorus 3.6 2.3 - 4.1 mg/dL   Albumin / creatinine urine ratio "   Result Value Ref Range    Creatinine, Ur 163.5 Reference range not established. mg/dL    Albumin,U,Random 14.1 <20.0 mg/L    Albumin Creat Ratio 9 0 - 30 mg/g creatinine   Hemoglobin A1C   Result Value Ref Range    Hemoglobin A1C 7.9 (H) Normal 4.0-5.6%; PreDiabetic 5.7-6.4%; Diabetic >=6.5%; Glycemic control for adults with diabetes <7.0% %     mg/dl   TSH, 3rd generation with Free T4 reflex   Result Value Ref Range    TSH 3RD GENERATON 5.261 (H) 0.450 - 4.500 uIU/mL   Vitamin D 25 hydroxy   Result Value Ref Range    Vit D, 25-Hydroxy 19.2 (L) 30.0 - 100.0 ng/mL   T4, free   Result Value Ref Range    Free T4 1.03 0.61 - 1.12 ng/dL     *Note: Due to a large number of results and/or encounters for the requested time period, some results have not been displayed. A complete set of results can be found in Results Review.       Administrative Statements   I have spent a total time of 25 minutes in caring for this patient on the day of the visit/encounter including Risk factor reductions, Impressions, Counseling / Coordination of care, Documenting in the medical record, Reviewing/placing orders in the medical record (including tests, medications, and/or procedures), and Obtaining or reviewing history  .

## 2025-04-23 NOTE — ASSESSMENT & PLAN NOTE
-- Low purine diet  --No episodes of gout  --Allopurinol 100 mg daily.  Check repeat uric acid level in 6 months if worsening consider increasing allopurinol to 200 mg daily  Orders:    Uric acid; Future    Vitamin D 25 hydroxy; Future    Albumin / creatinine urine ratio; Future    Comprehensive metabolic panel; Future    Hemoglobin A1C; Future    CBC and Platelet; Future

## 2025-04-23 NOTE — PATIENT INSTRUCTIONS
1.)  Low 2 g sodium diet    2.)  Monitor weights at home    3.)  Avoid NSAIDs (ibuprofen, Motrin, Advil, Aleve, naproxen)    4.)  Monitor blood pressure at home, call if blood pressure greater than 150/90 persistently    5.) I will plan to discuss all results including blood work, and/or imaging at our next visit, unless there is an urgent indication, in which case I will call you earlier. If you have any questions or concerns about your results, please feel free to call our office.    6.)  Follow-up in 6-7 months

## 2025-05-01 ENCOUNTER — HOSPITAL ENCOUNTER (OUTPATIENT)
Dept: NON INVASIVE DIAGNOSTICS | Facility: CLINIC | Age: 80
Discharge: HOME | End: 2025-05-01
Payer: MEDICARE

## 2025-05-01 VITALS
SYSTOLIC BLOOD PRESSURE: 124 MMHG | WEIGHT: 224 LBS | HEIGHT: 60 IN | DIASTOLIC BLOOD PRESSURE: 68 MMHG | BODY MASS INDEX: 43.98 KG/M2 | HEART RATE: 94 BPM

## 2025-05-01 DIAGNOSIS — R06.09 DYSPNEA ON EXERTION: ICD-10-CM

## 2025-05-01 LAB
AORTIC ROOT: 3.1 CM
ASCENDING AORTA: 2.9 CM
BSA FOR ECHO PROCEDURE: 1.96 M2
E WAVE DECELERATION TIME: 169 MS
E/A RATIO: 0.6
FRACTIONAL SHORTENING: 28 (ref 28–44)
INTERVENTRICULAR SEPTUM IN DIASTOLE (PARASTERNAL SHORT AXIS VIEW): 1.4 CM
INTERVENTRICULAR SEPTUM: 1.4 CM (ref 0.6–1.1)
LAAS-AP2: 16.3 CM2
LAAS-AP4: 19.2 CM2
LEFT ATRIUM AREA SYSTOLE SINGLE PLANE A4C: 20.4 CM2
LEFT ATRIUM SIZE: 4.4 CM
LEFT ATRIUM VOLUME (MOD BIPLANE): 50 ML
LEFT ATRIUM VOLUME INDEX (MOD BIPLANE): 25.5 ML/M2
LEFT INTERNAL DIMENSION IN SYSTOLE: 3.3 CM (ref 2.1–4)
LEFT VENTRICULAR INTERNAL DIMENSION IN DIASTOLE: 4.6 CM (ref 3.5–6)
LEFT VENTRICULAR POSTERIOR WALL IN END DIASTOLE: 0.9 CM
LEFT VENTRICULAR STROKE VOLUME: 55 ML
LV EF US.2D.A4C+ESTIMATED: 55 %
LVSV (TEICH): 55 ML
MV E'TISSUE VEL-SEP: 7 CM/S
MV PEAK A VEL: 0.83 M/S
MV PEAK E VEL: 50 CM/S
MV STENOSIS PRESSURE HALF TIME: 49 MS
MV VALVE AREA P 1/2 METHOD: 4.49
RIGHT ATRIAL 2D VOLUME: 36 ML
RIGHT ATRIUM AREA SYSTOLE A4C: 14.8 CM2
RIGHT VENTRICLE ID DIMENSION: 2.9 CM
SL CV LEFT ATRIUM LENGTH A2C: 6 CM
SL CV LV EF: 60
SL CV PED ECHO LEFT VENTRICLE DIASTOLIC VOLUME (MOD BIPLANE) 2D: 98 ML
SL CV PED ECHO LEFT VENTRICLE SYSTOLIC VOLUME (MOD BIPLANE) 2D: 43 ML
TRICUSPID ANNULAR PLANE SYSTOLIC EXCURSION: 1.9 CM

## 2025-05-01 PROCEDURE — 93306 TTE W/DOPPLER COMPLETE: CPT

## 2025-05-01 PROCEDURE — 93306 TTE W/DOPPLER COMPLETE: CPT | Performed by: INTERNAL MEDICINE

## 2025-05-13 ENCOUNTER — TELEPHONE (OUTPATIENT)
Age: 80
End: 2025-05-13

## 2025-05-13 NOTE — TELEPHONE ENCOUNTER
Echo (ordered by Dr Feng) is unremarkable.  NO evidence of heart failure or significant valvular abnormality.

## 2025-05-26 DIAGNOSIS — E55.9 VITAMIN D DEFICIENCY: ICD-10-CM

## 2025-05-27 RX ORDER — ERGOCALCIFEROL 1.25 MG/1
CAPSULE, LIQUID FILLED ORAL
Qty: 8 CAPSULE | Refills: 0 | Status: SHIPPED | OUTPATIENT
Start: 2025-05-27

## 2025-06-12 ENCOUNTER — TELEPHONE (OUTPATIENT)
Age: 80
End: 2025-06-12

## 2025-06-12 DIAGNOSIS — E11.22 TYPE 2 DIABETES MELLITUS WITH STAGE 3 CHRONIC KIDNEY DISEASE AND HYPERTENSION (HCC): Primary | ICD-10-CM

## 2025-06-12 DIAGNOSIS — I12.9 TYPE 2 DIABETES MELLITUS WITH STAGE 3 CHRONIC KIDNEY DISEASE AND HYPERTENSION (HCC): Primary | ICD-10-CM

## 2025-06-12 DIAGNOSIS — N18.30 TYPE 2 DIABETES MELLITUS WITH STAGE 3 CHRONIC KIDNEY DISEASE AND HYPERTENSION (HCC): Primary | ICD-10-CM

## 2025-06-12 NOTE — TELEPHONE ENCOUNTER
Patient called asking if PCP can recommend a podiatrist in [facility].  Please advise and call patient.

## 2025-06-12 NOTE — TELEPHONE ENCOUNTER
Caller: Patient    Doctor: Dr. Peralta / [place]    Reason for call: Patient is asking for DR's recommendation on a podiatrist she can see within the area, [place]. Please advise.     Call back#: [phone]

## 2025-06-16 NOTE — TELEPHONE ENCOUNTER
[address] [place] [address]     [phone]     Department of Dermatology    Romi Dumas MD    Post-operative Wound Care        The postoperative care of your wound(s) is important to obtain the least amount of scarring possible, reduce the amount of wound crusting which will speed healing, and protect the wound from trauma. The following supplies are necessary for wound care:      Vaseline (unless instructed otherwise)    Telfa or generic nonstick pads 2 x 3 inches, one to two boxes    ½ inch paper tape       A dressing will be placed over the suture line(s) at the conclusion of your surgical procedure which you will leave on for 72 hours (about 3 days). This dressing may be removed after 72-hours following surgery, unless otherwise directed. Once the initial dressing has been removed, start cleaning the wound once daily with water and gauze or Q-tip. Use a light/gentle rubbing to loosen any crusting. Do NOT scrub the wound.      You may shower/get the wound wet after the 72-hours. Avoid having the shower spray hit the wound directly - that can be too forceful and cause pain or damage to the healing wound. You may allow water to cascade over the wound and use gentle rubbing with Q-tip while showering. PAT the wound dry.     Following each cleaning, liberally apply Vaseline to the suture line(s) and any abraded areas using a Q-tip. Do NOT let the wound dry out! If the wound dries out it will be more difficult to remove the sutures and can lead to delayed healing.      Dress the wound with non-stick pads and paper tape. The non-stick pad may be trimmed to fit the wound.      You will need to restrict your activity while the sutures are in place. Excessive lifting, exercise, strenuous activity or hitting the incision area could cause bleeding and opening of the wound. If you require a work restriction letter, please let us know.      You will return to our office to have the sutures removed  Please call Ms. Forest and inform her that I have referred her to Dr. Yanes who has a practice in [place]. I do not know of any podiatrists within [place] itself but Dr. Yanes is close.     Ember Sahu MD     in 5-7 days unless otherwise directed.  Please refer to your After Visit Summary (AVS) for that date and time.     Exposure of the wound(s) to the sun during the first year of healing may result in increased or decreased pigmentation of the scar and, therefore, worsen the appearance of the final scar. It is important to protect the healing wound(s) from the sun for a full year after surgery. This protection can be accomplished by using a sunscreen of SPF30 or great on the wound(s) on a daily basis, reapplying every 2-3 hours. Purchase several bottles of sunscreen to keep in convenient places. An easy way to remember to use sunscreen is to place it next to your toothpaste so it can be applied every morning. Be sure to apply at least 30 minutes prior to sun exposure. A large, brimmed hat should also be used to protect the wound(s) from sun exposure.       WHEN TO CALL THE CLINIC: a moderate amount of bruising and swelling is expected. If the wound becomes red, markedly swollen, warm, purulent drainage, red streaks or progressively more painful, call our office.

## 2025-06-19 ENCOUNTER — TELEPHONE (OUTPATIENT)
Age: 80
End: 2025-06-19

## 2025-07-03 ENCOUNTER — OFFICE VISIT (OUTPATIENT)
Age: 80
End: 2025-07-03
Payer: MEDICARE

## 2025-07-03 VITALS
OXYGEN SATURATION: 95 % | WEIGHT: 226.2 LBS | HEART RATE: 108 BPM | DIASTOLIC BLOOD PRESSURE: 56 MMHG | BODY MASS INDEX: 44.41 KG/M2 | HEIGHT: 60 IN | SYSTOLIC BLOOD PRESSURE: 138 MMHG

## 2025-07-03 DIAGNOSIS — E11.22 TYPE 2 DIABETES MELLITUS WITH STAGE 3 CHRONIC KIDNEY DISEASE AND HYPERTENSION (HCC): Primary | ICD-10-CM

## 2025-07-03 DIAGNOSIS — M35.3 POLYMYALGIA RHEUMATICA (HCC): ICD-10-CM

## 2025-07-03 DIAGNOSIS — N18.32 STAGE 3B CHRONIC KIDNEY DISEASE (HCC): ICD-10-CM

## 2025-07-03 DIAGNOSIS — I12.9 TYPE 2 DIABETES MELLITUS WITH STAGE 3 CHRONIC KIDNEY DISEASE AND HYPERTENSION (HCC): Primary | ICD-10-CM

## 2025-07-03 DIAGNOSIS — E78.2 MIXED HYPERLIPIDEMIA: ICD-10-CM

## 2025-07-03 DIAGNOSIS — N18.30 TYPE 2 DIABETES MELLITUS WITH STAGE 3 CHRONIC KIDNEY DISEASE AND HYPERTENSION (HCC): Primary | ICD-10-CM

## 2025-07-03 PROCEDURE — G2211 COMPLEX E/M VISIT ADD ON: HCPCS | Performed by: STUDENT IN AN ORGANIZED HEALTH CARE EDUCATION/TRAINING PROGRAM

## 2025-07-03 PROCEDURE — 99213 OFFICE O/P EST LOW 20 MIN: CPT | Performed by: STUDENT IN AN ORGANIZED HEALTH CARE EDUCATION/TRAINING PROGRAM

## 2025-07-03 NOTE — ASSESSMENT & PLAN NOTE
Scheduled to see rheumatology in August, patient feels like she is currently experiencing a flair with shoulder pain.  Orders:    Sedimentation rate, automated; Future    C-reactive protein; Future    Diclofenac Sodium (VOLTAREN) 1 %; Apply 2 g topically 4 (four) times a day

## 2025-07-03 NOTE — PROGRESS NOTES
Name: Celeste Garg      : 1945      MRN: [id number]  Encounter Provider: Ember Sahu MD  Encounter Date: 7/3/2025   Encounter department: [facility]  :  Assessment & Plan  Type 2 diabetes mellitus with stage 3 chronic kidney disease and hypertension (HCC)  Hemoglobin A1c previously at goal with lifestyle modification alone.  Given patient's history of CKD stage III would benefit from SGLT2 inhibitor have recommended Farxiga in the past but this was prohibitively expensive, will trial Jardiance at this time.  Repeat hemoglobin A1c this month.  If Jardiance not an option and hemoglobin A1c is above 7.0 will recommend metformin  Lab Results   Component Value Date    HGBA1C 7.9 (H) 2025       Orders:    Empagliflozin (JARDIANCE) 10 MG TABS tablet; Take 1 tablet (10 mg total) by mouth daily    Mixed hyperlipidemia    Orders:    Lipid panel; Future    Polymyalgia rheumatica (HCC)  Scheduled to see rheumatology in August, patient feels like she is currently experiencing a flair with shoulder pain.  Orders:    Sedimentation rate, automated; Future    C-reactive protein; Future    Diclofenac Sodium (VOLTAREN) 1 %; Apply 2 g topically 4 (four) times a day    Stage 3b chronic kidney disease (HCC)  Lab Results   Component Value Date    EGFR 45 2025    EGFR 42 2024    EGFR 47 2024    CREATININE 1.14 2025    CREATININE 1.21 2024    CREATININE 1.11 2024     Following closely with Dr. Ruiz who recommends SGLT2 inhibitor as well.  Orders:    Empagliflozin (JARDIANCE) 10 MG TABS tablet; Take 1 tablet (10 mg total) by mouth daily           History of Present Illness   HPI  Celeste presents for reevaluation hypothyroidism and type 2 diabetes without long-term insulin use.  She complains of shoulder pain consistent with PMR.  Continues to be cognizant of diet, states exercising less than she should be.  Continues to experience fatigue which feels like low  thyroid in the past  Review of Systems   Constitutional:  Positive for fatigue. Negative for chills and fever.   HENT:  Negative for ear pain and sore throat.    Eyes:  Negative for pain and visual disturbance.   Respiratory:  Negative for cough and shortness of breath.    Cardiovascular:  Negative for chest pain and palpitations.   Gastrointestinal:  Negative for abdominal pain and vomiting.   Genitourinary:  Negative for dysuria and hematuria.   Musculoskeletal:  Positive for arthralgias. Negative for back pain.   Skin:  Negative for color change and rash.   Neurological:  Negative for seizures and syncope.   All other systems reviewed and are negative.      Objective   /56   Pulse (!) 108   Ht 5' (1.524 m)   Wt 103 kg (226 lb 3.2 oz)   SpO2 95%   BMI 44.18 kg/m²      Physical Exam  Vitals and nursing note reviewed.   Constitutional:       General: She is not in acute distress.     Appearance: She is well-developed.   HENT:      Head: Normocephalic and atraumatic.     Eyes:      Conjunctiva/sclera: Conjunctivae normal.       Cardiovascular:      Rate and Rhythm: Normal rate and regular rhythm.      Heart sounds: No murmur heard.  Pulmonary:      Effort: Pulmonary effort is normal. No respiratory distress.      Breath sounds: Normal breath sounds.   Abdominal:      Palpations: Abdomen is soft.      Tenderness: There is no abdominal tenderness.     Musculoskeletal:         General: No swelling.      Cervical back: Neck supple.     Skin:     General: Skin is warm and dry.      Capillary Refill: Capillary refill takes less than 2 seconds.     Neurological:      Mental Status: She is alert.     Psychiatric:         Mood and Affect: Mood normal.

## 2025-07-03 NOTE — ASSESSMENT & PLAN NOTE
Hemoglobin A1c previously at goal with lifestyle modification alone.  Given patient's history of CKD stage III would benefit from SGLT2 inhibitor have recommended Farxiga in the past but this was prohibitively expensive, will trial Jardiance at this time.  Repeat hemoglobin A1c this month.  If Jardiance not an option and hemoglobin A1c is above 7.0 will recommend metformin  Lab Results   Component Value Date    HGBA1C 7.9 (H) 04/04/2025       Orders:    Empagliflozin (JARDIANCE) 10 MG TABS tablet; Take 1 tablet (10 mg total) by mouth daily

## 2025-07-14 ENCOUNTER — APPOINTMENT (OUTPATIENT)
Dept: LAB | Facility: CLINIC | Age: 80
End: 2025-07-14
Payer: MEDICARE

## 2025-07-14 DIAGNOSIS — M35.3 POLYMYALGIA RHEUMATICA (HCC): ICD-10-CM

## 2025-07-14 DIAGNOSIS — E78.2 MIXED HYPERLIPIDEMIA: ICD-10-CM

## 2025-07-14 DIAGNOSIS — E03.9 HYPOTHYROIDISM, UNSPECIFIED TYPE: ICD-10-CM

## 2025-07-14 LAB
CHOLEST SERPL-MCNC: 201 MG/DL (ref ?–200)
CRP SERPL QL: 9.3 MG/L
ERYTHROCYTE [SEDIMENTATION RATE] IN BLOOD: 69 MM/HOUR (ref 0–29)
HDLC SERPL-MCNC: 45 MG/DL
LDLC SERPL CALC-MCNC: 119 MG/DL (ref 0–100)
NONHDLC SERPL-MCNC: 156 MG/DL
TRIGL SERPL-MCNC: 187 MG/DL (ref ?–150)
TSH SERPL DL<=0.05 MIU/L-ACNC: 1.88 UIU/ML (ref 0.45–4.5)

## 2025-07-14 PROCEDURE — 80061 LIPID PANEL: CPT

## 2025-07-14 PROCEDURE — 36415 COLL VENOUS BLD VENIPUNCTURE: CPT

## 2025-07-14 PROCEDURE — 84443 ASSAY THYROID STIM HORMONE: CPT

## 2025-07-14 PROCEDURE — 86140 C-REACTIVE PROTEIN: CPT

## 2025-07-14 PROCEDURE — 85652 RBC SED RATE AUTOMATED: CPT

## 2025-07-15 ENCOUNTER — TELEPHONE (OUTPATIENT)
Age: 80
End: 2025-07-15

## 2025-07-15 DIAGNOSIS — M35.3 PMR (POLYMYALGIA RHEUMATICA) (HCC): Primary | ICD-10-CM

## 2025-07-16 RX ORDER — PREDNISONE 5 MG/1
10 TABLET ORAL DAILY
Qty: 60 TABLET | Refills: 0 | Status: SHIPPED | OUTPATIENT
Start: 2025-07-16

## 2025-07-16 NOTE — TELEPHONE ENCOUNTER
Pls inform pt she can start 10 mg prednisone (called in) and f/u with her regular rheumatologist Dr Leavitt next week for further reccs

## 2025-07-21 DIAGNOSIS — E55.9 VITAMIN D DEFICIENCY: ICD-10-CM

## 2025-07-22 RX ORDER — ERGOCALCIFEROL 1.25 MG/1
CAPSULE, LIQUID FILLED ORAL
Qty: 8 CAPSULE | Refills: 0 | Status: SHIPPED | OUTPATIENT
Start: 2025-07-22

## 2025-07-23 NOTE — TELEPHONE ENCOUNTER
How is she doing on prednisone? Would she like to add on leflunomide 10mg daily (a long term controller medication similar to methotrexate) as well so it will make it easier for us to taper down and off prednisone in the future?

## 2025-07-23 NOTE — TELEPHONE ENCOUNTER
"Dr. Leavitt    Prednisone- \"not enough\", \"just holds the pain\" in the arms. Pt prefers to use Prednisone, not start a new medication.    Arava/leflunomide - reviewed medication with patient. Pt states she is not sure, does not want to start to a new medication.  "

## 2025-07-28 DIAGNOSIS — M35.3 PMR (POLYMYALGIA RHEUMATICA) (HCC): Primary | ICD-10-CM

## 2025-07-28 RX ORDER — PREDNISONE 2.5 MG/1
7.5 TABLET ORAL DAILY
Qty: 90 TABLET | Refills: 0 | Status: SHIPPED | OUTPATIENT
Start: 2025-07-28

## 2025-07-28 NOTE — TELEPHONE ENCOUNTER
LM for pt as per Dr Leavitt 07/28/25 0633 notation:    Please let her know that I want her to go down on prednisone to 7.5mg daily until I see her again next month, sending to her pharmacy

## 2025-07-28 NOTE — TELEPHONE ENCOUNTER
Please let her know that I want her to go down on prednisone to 7.5mg daily until I see her again next month, sending to her pharmacy

## 2025-08-02 DIAGNOSIS — M35.3 PMR (POLYMYALGIA RHEUMATICA) (HCC): ICD-10-CM

## 2025-08-04 RX ORDER — PREDNISONE 5 MG/1
10 TABLET ORAL DAILY
Qty: 60 TABLET | Refills: 0 | OUTPATIENT
Start: 2025-08-04

## 2025-08-05 ENCOUNTER — OFFICE VISIT (OUTPATIENT)
Dept: PODIATRY | Facility: CLINIC | Age: 80
End: 2025-08-05
Payer: MEDICARE

## 2025-08-05 VITALS — BODY MASS INDEX: 43.98 KG/M2 | HEART RATE: 106 BPM | OXYGEN SATURATION: 97 % | WEIGHT: 224 LBS | HEIGHT: 60 IN

## 2025-08-05 DIAGNOSIS — B35.1 ONYCHOMYCOSIS: Primary | ICD-10-CM

## 2025-08-05 DIAGNOSIS — E11.9 ENCOUNTER FOR DIABETIC FOOT EXAM (HCC): ICD-10-CM

## 2025-08-05 PROCEDURE — 99203 OFFICE O/P NEW LOW 30 MIN: CPT | Performed by: PODIATRIST

## 2025-08-07 DIAGNOSIS — G47.00 INSOMNIA, UNSPECIFIED TYPE: ICD-10-CM

## 2025-08-07 RX ORDER — TEMAZEPAM 15 MG/1
15 CAPSULE ORAL
Qty: 30 CAPSULE | Refills: 0 | Status: SHIPPED | OUTPATIENT
Start: 2025-08-07

## 2025-08-17 DIAGNOSIS — M35.3 PMR (POLYMYALGIA RHEUMATICA) (HCC): ICD-10-CM

## 2025-08-18 RX ORDER — PREDNISONE 2.5 MG/1
7.5 TABLET ORAL DAILY
Qty: 90 TABLET | Refills: 0 | Status: SHIPPED | OUTPATIENT
Start: 2025-08-18 | End: 2025-08-21 | Stop reason: SDUPTHER